# Patient Record
Sex: MALE | Race: WHITE | NOT HISPANIC OR LATINO | Employment: UNEMPLOYED | URBAN - METROPOLITAN AREA
[De-identification: names, ages, dates, MRNs, and addresses within clinical notes are randomized per-mention and may not be internally consistent; named-entity substitution may affect disease eponyms.]

---

## 2017-01-24 ENCOUNTER — HOSPITAL ENCOUNTER (OUTPATIENT)
Dept: SLEEP MEDICINE | Facility: HOSPITAL | Age: 37
Discharge: HOME | End: 2017-01-24
Attending: INTERNAL MEDICINE | Admitting: INTERNAL MEDICINE

## 2017-04-04 ENCOUNTER — HOSPITAL ENCOUNTER (OUTPATIENT)
Dept: LAB | Facility: HOSPITAL | Age: 37
Discharge: HOME | End: 2017-04-04
Attending: INTERNAL MEDICINE | Admitting: INTERNAL MEDICINE

## 2017-04-04 LAB
ALBUMIN SERPL-MCNC: 4 G/DL (ref 3.5–4.8)
ALBUMIN/GLOB SERPL: 1.4 {RATIO} (ref 1–1.7)
ALP SERPL-CCNC: 50 IU/L (ref 32–91)
ALT SERPL-CCNC: 31 IU/L (ref 17–63)
ANION GAP SERPL CALC-SCNC: 12.8 MMOL/L (ref 10–20)
AST SERPL-CCNC: 19 IU/L (ref 15–41)
BASOPHILS # BLD AUTO: 0 10*3/UL (ref 0–0.2)
BASOPHILS NFR BLD AUTO: 0 % (ref 0–2)
BILIRUB SERPL-MCNC: 0.7 MG/DL (ref 0.3–1.2)
BUN SERPL-MCNC: 10 MG/DL (ref 8–20)
BUN/CREAT SERPL: 11.1 (ref 6.2–20.3)
CALCIUM SERPL-MCNC: 9.2 MG/DL (ref 8.9–10.3)
CHLORIDE SERPL-SCNC: 102 MMOL/L (ref 101–111)
CONV CO2: 28 MMOL/L (ref 22–32)
CONV TOTAL PROTEIN: 6.9 G/DL (ref 6.1–7.9)
CREAT UR-MCNC: 0.9 MG/DL (ref 0.7–1.2)
DIFFERENTIAL METHOD BLD: (no result)
EOSINOPHIL # BLD AUTO: 0 % (ref 0–3)
EOSINOPHIL # BLD AUTO: 0 10*3/UL (ref 0–0.3)
ERYTHROCYTE [DISTWIDTH] IN BLOOD BY AUTOMATED COUNT: 14.9 % (ref 11.5–14.5)
GLOBULIN UR ELPH-MCNC: 2.9 G/DL (ref 2.5–3.8)
GLUCOSE SERPL-MCNC: 177 MG/DL (ref 65–99)
HCT VFR BLD AUTO: 42.8 % (ref 40–54)
HGB BLD-MCNC: 14.4 G/DL (ref 14–18)
LYMPHOCYTES # BLD AUTO: 1.9 10*3/UL (ref 0.8–4.8)
LYMPHOCYTES NFR BLD AUTO: 15 % (ref 18–42)
MCH RBC QN AUTO: 26.7 PG (ref 26–32)
MCHC RBC AUTO-ENTMCNC: 33.6 G/DL (ref 32–36)
MCV RBC AUTO: 79.4 FL (ref 80–94)
MONOCYTES # BLD AUTO: 0.8 10*3/UL (ref 0.1–1.3)
MONOCYTES NFR BLD AUTO: 6 % (ref 2–11)
NEUTROPHILS # BLD AUTO: 10.4 10*3/UL (ref 2.3–8.6)
NEUTROPHILS NFR BLD AUTO: 79 % (ref 50–75)
NRBC BLD AUTO-RTO: 0 /100{WBCS}
NRBC/RBC NFR BLD MANUAL: 0 10*3/UL
PLATELET # BLD AUTO: 256 10*3/UL (ref 150–450)
PMV BLD AUTO: 8.9 FL (ref 7.4–10.4)
POTASSIUM SERPL-SCNC: 3.8 MMOL/L (ref 3.6–5.1)
RBC # BLD AUTO: 5.39 10*6/UL (ref 4.6–6)
SODIUM SERPL-SCNC: 139 MMOL/L (ref 136–144)
WBC # BLD AUTO: 13.2 10*3/UL (ref 4.5–11.5)

## 2017-08-02 ENCOUNTER — CONVERSION ENCOUNTER (OUTPATIENT)
Dept: FAMILY MEDICINE CLINIC | Facility: CLINIC | Age: 37
End: 2017-08-02

## 2017-08-03 ENCOUNTER — HOSPITAL ENCOUNTER (OUTPATIENT)
Dept: PAIN MEDICINE | Facility: HOSPITAL | Age: 37
Discharge: HOME | End: 2017-08-03
Attending: PHYSICAL MEDICINE & REHABILITATION | Admitting: PHYSICAL MEDICINE & REHABILITATION

## 2017-08-03 LAB
ALBUMIN SERPL-MCNC: 4.9 G/DL (ref 3.6–5.1)
ALP SERPL-CCNC: 55 U/L (ref 40–115)
ALT SERPL-CCNC: 39 U/L (ref 9–46)
AMPHETAMINES UR QL SCN: NEGATIVE
AST SERPL-CCNC: 31 U/L (ref 10–40)
BASOPHILS # BLD AUTO: 64 CELLS/UL (ref 0–200)
BASOPHILS NFR BLD AUTO: 0.6 %
BILIRUB SERPL-MCNC: 0.7 MG/DL (ref 0.2–1.2)
BUN SERPL-MCNC: 12 MG/DL (ref 7–25)
BUN/CREAT SERPL: ABNORMAL (CALC) (ref 6–22)
BZE UR QL SCN: NEGATIVE
CALCIUM SERPL-MCNC: 9.8 MG/DL (ref 8.6–10.3)
CHLORIDE SERPL-SCNC: 102 MMOL/L (ref 98–110)
CHOLEST SERPL-MCNC: 173 MG/DL (ref 125–200)
CHOLEST/HDLC SERPL: 5.4 (CALC)
CONV CO2: 25 MMOL/L (ref 20–31)
CONV TOTAL PROTEIN: 7.9 G/DL (ref 6.1–8.1)
CREAT 24H UR-MCNC: NORMAL MG/DL
CREAT UR-MCNC: 1.01 MG/DL (ref 0.6–1.35)
EOSINOPHIL # BLD AUTO: 2 %
EOSINOPHIL # BLD AUTO: 214 CELLS/UL (ref 15–500)
ERYTHROCYTE [DISTWIDTH] IN BLOOD BY AUTOMATED COUNT: 14 % (ref 11–15)
GLOBULIN UR ELPH-MCNC: 3 MG/DL (ref 1.9–3.7)
GLUCOSE UR QL: 124 MG/DL (ref 65–99)
HCT VFR BLD AUTO: 45.5 % (ref 38.5–50)
HDLC SERPL-MCNC: 32 MG/DL
HGB BLD-MCNC: 15.7 G/DL (ref 13.2–17.1)
INSULIN SERPL-ACNC: 1.6 (CALC) (ref 1–2.5)
LDLC SERPL CALC-MCNC: 113 MG/DL
LYMPHOCYTES # BLD AUTO: 3392 CELLS/UL (ref 850–3900)
LYMPHOCYTES NFR BLD AUTO: 31.7 %
MCH RBC QN AUTO: 27.4 PG (ref 27–33)
MCHC RBC AUTO-ENTMCNC: 34.5 G/DL (ref 32–36)
MCV RBC AUTO: 79.4 FL (ref 80–100)
METHADONE UR QL SCN: NEGATIVE
MONOCYTES # BLD AUTO: 899 CELLS/UL (ref 200–950)
MONOCYTES NFR BLD AUTO: 8.4 %
NEUTROPHILS # BLD AUTO: 6131 CELLS/UL (ref 1500–7800)
NEUTROPHILS NFR BLD AUTO: 57.3 %
NONHDLC SERPL-MCNC: 141 MG/DL
OPIATE CONFIRMATION URINE: NORMAL
PLATELET # BLD AUTO: 318 10*3/UL (ref 140–400)
PMV BLD AUTO: 10.9 FL (ref 7.5–12.5)
POTASSIUM SERPL-SCNC: 4 MMOL/L (ref 3.5–5.3)
RBC # BLD AUTO: 5.73 MILLION/UL (ref 4.2–5.8)
SODIUM SERPL-SCNC: 138 MMOL/L (ref 135–146)
THC SERPLBLD CFM-MCNC: NEGATIVE NG/ML
TRIGL SERPL-MCNC: 140 MG/DL
TSH SERPL-ACNC: 1.86 MIU/L (ref 0.4–4.5)
WBC # BLD AUTO: 10.7 10*3/UL (ref 3.8–10.8)

## 2017-08-24 ENCOUNTER — HOSPITAL ENCOUNTER (OUTPATIENT)
Dept: PAIN MEDICINE | Facility: HOSPITAL | Age: 37
Discharge: HOME | End: 2017-08-24
Attending: PHYSICAL MEDICINE & REHABILITATION | Admitting: PHYSICAL MEDICINE & REHABILITATION

## 2017-09-07 ENCOUNTER — HOSPITAL ENCOUNTER (OUTPATIENT)
Dept: PAIN MEDICINE | Facility: HOSPITAL | Age: 37
Discharge: HOME | End: 2017-09-07
Attending: PHYSICAL MEDICINE & REHABILITATION | Admitting: PHYSICAL MEDICINE & REHABILITATION

## 2017-09-21 ENCOUNTER — HOSPITAL ENCOUNTER (OUTPATIENT)
Dept: PAIN MEDICINE | Facility: HOSPITAL | Age: 37
Discharge: HOME | End: 2017-09-21
Attending: PHYSICAL MEDICINE & REHABILITATION | Admitting: PHYSICAL MEDICINE & REHABILITATION

## 2018-02-12 ENCOUNTER — CONVERSION ENCOUNTER (OUTPATIENT)
Dept: FAMILY MEDICINE CLINIC | Facility: CLINIC | Age: 38
End: 2018-02-12

## 2018-02-13 LAB — URATE SERPL-MCNC: 8.3 MG/DL (ref 4–8)

## 2019-05-29 ENCOUNTER — CONVERSION ENCOUNTER (OUTPATIENT)
Dept: ORTHOPEDIC SURGERY | Facility: CLINIC | Age: 39
End: 2019-05-29

## 2019-06-04 VITALS
HEART RATE: 87 BPM | DIASTOLIC BLOOD PRESSURE: 89 MMHG | BODY MASS INDEX: 35.09 KG/M2 | SYSTOLIC BLOOD PRESSURE: 152 MMHG | WEIGHT: 303.25 LBS | HEIGHT: 78 IN

## 2019-06-06 NOTE — PROGRESS NOTES
Referring Provider:  Eric Rosenberg MD  Primary Provider:  Chet HUNTER MD    CC:  numbness and tingling.    History of Present Illness:   This is a 39 years old male who presents for follow-up visit.   previously seen by Dr. Perkins.    Numbness and tingling sensation of skin, Pt. been having problems in his hands and feet in various levels.    Pt. been experiencing memory loss, short term pt. becoming more forgetfull and losing things and not always the same to the next some days the symptoms are worse.   Pt. grandfather on his fathers side has dementia    . Pt. tosses and turns through the night averages 9 hours of sleep per night broken sleep. Pt. does have sleep apnea and currently on a bipap machine and see's Dr. Arroyo.      He has had heart and back pain, saw pt. cardiologist and referred you him to [facility] for pain and tightness in chest and back and lower back pain. work up suggested low risk of cv disease .     Chronic low back pain. no change       Past Medical History:     Reviewed history from 03/21/2017 and no changes required:        Guillain-[place] syndrome - 2015 ([facility])        lower back pain        Hypertension        Neuropathy        Sleep Apnea- out of [facility]- BiPAP        ADD/OCD        Hyperlipidemia        Obesity        sleep apnea    Past Surgical History:     Reviewed history from 01/06/2018 and no changes required:        ear tubes x2        Deviated septum        wisdom teeth        Hernia Repair        Epidural         Social History:     Reviewed history from 01/06/2018 and no changes required:        Marital Status Single        Children 0        Occupation unemployed        Patient is a former smoker.         Passive smoke exposure - yes        Alcohol Use - no        Drug Use - yes        HIV/High Risk - no        Regular Exercise - no                Smoking History:        Patient is a former smoker.        Risk Factors:     Smoked Tobacco Use:  Former smoker     Cigarettes:   Yes  Smokeless Tobacco Use:  Never  Passive smoke exposure:  no  Drug use:  yes     Substance:  marijuana  HIV high-risk behavior:  no  Caffeine use:  4+ drinks per day  Alcohol use:  no  Exercise:  no  Seatbelt use:  100 %  Sun Exposure:  rarely    Family History Risk Factors:     Family History of MI in females < 65 years old:  no     Family History of MI in males < 55 years old:  no    Previous Tobacco Use: Signed On 2018  Smoked Tobacco Use:  Former smoker     Cigarettes:  Yes  Smokeless Tobacco Use:  Never  Passive smoke exposure:  no  Drug use:  yes     Substance:  marijuana  HIV high-risk behavior:  no  Caffeine use:  4+ drinks per day    Previous Alcohol Use: Signed On 2018  Alcohol use:  no  Exercise:  no  Seatbelt use:  100 %  Sun Exposure:  rarely    Family History Risk Factors:     Family History of MI in females < 65 years old:  no     Family History of MI in males < 55 years old:  no      Vital Signs:    Patient Profile:    39 Years Old Male  Height:     79 inches  Weight:     303.25 pounds  BMI:        34.16     Pulse rate: 87 / minute  BP Sittin / 89  (right arm)    Cuff size:  large      Problems: Active problems were reviewed with the patient during this visit.  Medications: Medications were reviewed with the patient during this visit.  Allergies: Allergies were reviewed with the patient during this visit.        Vitals Entered By: Tamra Rizvi CMA (May 29, 2019 4:14 PM)    Active Medications (reviewed today):  LISINOPRIL 20 MG ORAL TABLET (LISINOPRIL) Take 1 tablet by mouth daily  NORVASC 5 MG ORAL TABLET (AMLODIPINE BESYLATE) QD  GABAPENTIN 600 MG ORAL TABLET (GABAPENTIN) Take one (1) tablet by mouth up to three times a day as needed  LIDOCAINE-PRILOCAINE (BULK) 2.5-2.5 % CREAM (LIDOCAINE-PRILOCAINE (BULK))   OXYBUTYNIN CHLORIDE ER 10 MG ORAL TABLET EXTENDED RELEASE 24 HOUR (OXYBUTYNIN CHLORIDE) Take 1 tablet by mouth daily  FLUOXETINE HCL 40 MG ORAL CAPSULE  (FLUOXETINE HCL) Take 1 tablet by mouth daily    Current Allergies (reviewed today):  PENICILLIN (Critical)  SULFA (Critical)    Review of Systems   Neurologic: Complains of Headache.   General: Complains of fatigue.   Eyes: Denies blurring.   Ears/Nose/Throat: Denies nasal congestion.   Cardiovascular: Complains of SOB on exertion.   Respiratory: Denies cough.   Gastrointestinal: Complains of heart burn.   Genitourinary: Complains of urinary frequency.   Musculoskeletal: Complains of back pain.   Skin: Denies rash.   Psychiatric: Complains of anxiety.   Endocrine: Denies cold intolerance.   Heme/Lymphatic: Denies abnormal bruising.   Allergic/Immunologic: Denies itchy eyes.         PHYSICAL EXAMINATION:   Ht: 79    Wt: 303.25    P: 87   BP: 152/89     GENERAL:  Well developed, well nourished and in no acute distress.  HIGHER MENTAL FUNCTION:  5/5 on orientation.  Awake and alert.  GAIT:  Normal.      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 07/03/2019      Problem # 2:  Numbness and tingling sensation of skin (ICD-782.0) (BIY21-B54.2)  Assessment: Deteriorated  Pt feels his symptoms wax and wane,    gabapentin helps with discomfort, but it effects his thinking  will repeat ncs and compare to study of two years ago  Orders:  Ofc Vst, Est Level IV (60465)    Future Orders:  EMG 2 Extremities (14560) ... 07/03/2019      Problem # 3:  Memory impairment (ICD-780.9) (JXO85-J19.3)  Assessment: Deteriorated  pt has complaint of memory problems  labs by dr metcalf in 2017 were all normal  Orders:  Ofc Vst, Est Level IV (21220)      Medications Added to Medication List This Visit:  1)  Metformin Hcl 500 Mg Oral Tablet (Metformin hcl) .... 1 po qd  2)  Protonix 40 Mg Oral Tablet Delayed Release  (Pantoprazole sodium) .... 1 po qd  3)  Alavert 10 Mg Oral Tablet Disintegrating (Loratadine) .... Qd  4)  Allopurinol 100 Mg Oral Tablet (Allopurinol) .... 1 po qd  5)  Oxybutynin Chloride Er 15 Mg Oral Tablet Extended Release 24 Hour (Oxybutynin chloride) .... 1 po qd  6)  Chlorthalidone 50 Mg Oral Tablet (Chlorthalidone) .... 1 po qd  7)  Norvasc 10mg Oral Tablet (amlodipine Besylate)  .... Qd  8)  Gabapentin 600 Mg Oral Tablet (Gabapentin) .... Take one (1) tablet by mouth up to three times a day as needed  9)  Fluoxetine Hcl 40 Mg Oral Capsule (Fluoxetine hcl) .... Take 2 tablet by mouth daily      Patient Instructions:  1)  Please schedule a follow-up appointment in 3-4 months.      ]    Mental Status Examination   What is the year, season, date, day, month? Score: 4  Name state, county, city, place (office or hospital), and location (floor) Score: 5  Name 3 objects, ask patient to name each immediately Score: 3  Spell the word WORLD backwards Score: 5  If patient learned 3 objects above, ask for recall now Score: 2  Show patient two simple objects and ask patient to name them Score: 2  Repeat the phrase 'No ifs, ands or buts'Score: 1  Take the paper in your right hand, fold it in half, and place it on the floor. Score: 3  Write 'Close Your Eyes' on a piece of paper, ask pt to read and follow instructions. Score: 1  Make up and write a sentence about anything. (Must have noun and verb) Score: 1  Draw two 5 sided figures that overlap one corner, and ask pt to copy Score: 1    Total Score: 28              Electronically signed by Joseph F Seipel MD on 05/30/2019 at 8:23 PM  ________________________________________________________________________       Disclaimer: Converted Note message may not contain all data elements that existed in the legacy source system. Please see [facility] System for the original note details.

## 2019-06-07 NOTE — PROCEDURES
[facility] 05/21/2019:      Imported By: Karina Jaeger 5/29/2019 4:15:56 PM    _____________________________________________________________________    External Attachment:      Type: Image      Comment:  External Document      Electronically signed by Joseph F Seipel MD on 05/29/2019 at 4:43 PM  ________________________________________________________________________       Disclaimer: Converted Note message may not contain all data elements that existed in the legacy source system. Please see [facility] System for the original note details.

## 2019-06-18 ENCOUNTER — TELEPHONE (OUTPATIENT)
Dept: FAMILY MEDICINE CLINIC | Facility: CLINIC | Age: 39
End: 2019-06-18

## 2019-06-18 DIAGNOSIS — E11.9 TYPE 2 DIABETES MELLITUS WITHOUT COMPLICATION, WITHOUT LONG-TERM CURRENT USE OF INSULIN (HCC): Primary | ICD-10-CM

## 2019-06-18 PROBLEM — M54.50 CHRONIC LOW BACK PAIN: Status: ACTIVE | Noted: 2017-03-21

## 2019-06-18 PROBLEM — M48.07 LUMBOSACRAL SPINAL STENOSIS: Status: ACTIVE | Noted: 2017-04-12

## 2019-06-18 PROBLEM — G89.29 CHRONIC LOW BACK PAIN: Status: ACTIVE | Noted: 2017-03-21

## 2019-06-18 PROBLEM — G65.0 SEQUELAE OF GUILLAIN-BARRE SYNDROME: Status: ACTIVE | Noted: 2017-03-21

## 2019-06-18 PROBLEM — Z83.3 FAMILY HISTORY OF DIABETES MELLITUS: Status: ACTIVE | Noted: 2019-06-18

## 2019-06-18 PROBLEM — M21.40 ACQUIRED FLEXIBLE FLAT FOOT: Status: ACTIVE | Noted: 2018-02-19

## 2019-06-18 PROBLEM — G47.30 SLEEP APNEA: Status: ACTIVE | Noted: 2017-03-21

## 2019-06-18 PROBLEM — R41.3 MEMORY IMPAIRMENT: Status: ACTIVE | Noted: 2019-05-29

## 2019-06-18 PROBLEM — M53.80 OTHER SPECIFIED DORSOPATHIES, SITE UNSPECIFIED: Status: ACTIVE | Noted: 2017-04-12

## 2019-06-18 RX ORDER — LANCETS 28 GAUGE
1 EACH MISCELLANEOUS DAILY
Qty: 30 EACH | Refills: 1 | Status: SHIPPED | OUTPATIENT
Start: 2019-06-18 | End: 2019-08-05 | Stop reason: SDUPTHER

## 2019-06-18 NOTE — TELEPHONE ENCOUNTER
Patient has an appointment at 2:45pm I got notified at 2 PM that patient did not want to keep appointment if we do not have his labs  Labs were drawn on 6/17/19 at [facility].  I have called [facility] and requested they fax and email them.  Spoke with Portia in [facility] about labs and due to large quantity of labs we have she is not sure we will have them by his nica. Time. Patient upset about how our office is running.

## 2019-06-20 ENCOUNTER — TELEPHONE (OUTPATIENT)
Dept: FAMILY MEDICINE CLINIC | Facility: CLINIC | Age: 39
End: 2019-06-20

## 2019-06-24 RX ORDER — FLUOXETINE HYDROCHLORIDE 40 MG/1
CAPSULE ORAL
Qty: 180 CAPSULE | Refills: 3 | Status: SHIPPED | OUTPATIENT
Start: 2019-06-24 | End: 2019-06-24 | Stop reason: SDUPTHER

## 2019-06-24 RX ORDER — FLUOXETINE HYDROCHLORIDE 40 MG/1
80 CAPSULE ORAL DAILY
Qty: 180 CAPSULE | Refills: 3 | Status: SHIPPED | OUTPATIENT
Start: 2019-06-24 | End: 2019-08-05 | Stop reason: SDUPTHER

## 2019-06-24 NOTE — TELEPHONE ENCOUNTER
Patient called and stated that he was needing to have a refill on his fluoxitine 40 mg . Please send it to [facility] in [place], Please call him if you have any questions at  [phone]

## 2019-07-03 ENCOUNTER — PROCEDURE VISIT (OUTPATIENT)
Dept: NEUROLOGY | Facility: CLINIC | Age: 39
End: 2019-07-03

## 2019-07-03 DIAGNOSIS — G62.9 POLYNEUROPATHY: Primary | ICD-10-CM

## 2019-07-03 DIAGNOSIS — G56.01 CARPAL TUNNEL SYNDROME OF RIGHT WRIST: ICD-10-CM

## 2019-07-03 PROCEDURE — 95910 NRV CNDJ TEST 7-8 STUDIES: CPT | Performed by: PSYCHIATRY & NEUROLOGY

## 2019-07-03 PROCEDURE — 95885 MUSC TST DONE W/NERV TST LIM: CPT | Performed by: PSYCHIATRY & NEUROLOGY

## 2019-07-03 NOTE — PROGRESS NOTES
EMG and Nerve Conduction Studies of RUE and RLE    The complete report includes the data sheets.     Referring Doctor: Joseph Seipel, MD     History: This patient had acute inflammatory polyneuropathy syndrome several years ago with near complete resolution of the acute neuropathy.  He does have diabetes which has been somewhat poorly controlled with a recent globin A1c of 9.  He complains of intermittent numbness in his feet as well as his hands.  Are you a diabetic? yesHepatitis?noHIV?noPacemaker?noDefibrillator?noBlood thinner?noAre you allergic to latex or tape?no    Results:    1.  The median sensory and motor responses are prolonged consistent with moderate right median neuropathy at the wrist.    2.  The right ulnar sensory and motor conduction studies were normal showing no evidence of focal ulnar neuropathy or generalized or sensory neuropathy affecting the upper extremity.    3.  The right sural sensory right peroneal and right tibial motor conduction studies were normal showing no signs of the significant neuropathy affecting the large diameter fibers.    4.  EMG of the muscles examined in the right upper and right lower extremities were normal showing no signs of acute or chronic neurogenic change.    Impression:    This is an abnormal study.  There is evidence of moderate right median neuropathy at the wrist.  There is no evidence of significant motor or sensory neuropathy affecting large diameter fibers and lower extremities.  This however does not rule out the possibility of a small fiber neuropathy associated with his diabetes causing numbness and pain sensation in the feet.    Joseph Seipel, M.D.

## 2019-08-01 ENCOUNTER — TELEPHONE (OUTPATIENT)
Dept: FAMILY MEDICINE CLINIC | Facility: CLINIC | Age: 39
End: 2019-08-01

## 2019-08-05 DIAGNOSIS — E11.9 TYPE 2 DIABETES MELLITUS WITHOUT COMPLICATION, WITHOUT LONG-TERM CURRENT USE OF INSULIN (HCC): ICD-10-CM

## 2019-08-05 DIAGNOSIS — F42.2 MIXED OBSESSIONAL THOUGHTS AND ACTS: Primary | ICD-10-CM

## 2019-08-05 RX ORDER — LANCETS 28 GAUGE
1 EACH MISCELLANEOUS DAILY
Qty: 30 EACH | Refills: 12 | Status: SHIPPED | OUTPATIENT
Start: 2019-08-05 | End: 2019-10-10 | Stop reason: SDUPTHER

## 2019-08-05 RX ORDER — FLUOXETINE HYDROCHLORIDE 40 MG/1
80 CAPSULE ORAL DAILY
Qty: 180 CAPSULE | Refills: 3 | Status: SHIPPED | OUTPATIENT
Start: 2019-08-05 | End: 2020-03-31 | Stop reason: SDUPTHER

## 2019-09-03 ENCOUNTER — OFFICE VISIT (OUTPATIENT)
Dept: FAMILY MEDICINE CLINIC | Facility: CLINIC | Age: 39
End: 2019-09-03

## 2019-09-03 VITALS
WEIGHT: 303 LBS | SYSTOLIC BLOOD PRESSURE: 132 MMHG | HEART RATE: 95 BPM | TEMPERATURE: 97.7 F | RESPIRATION RATE: 18 BRPM | OXYGEN SATURATION: 95 % | HEIGHT: 75 IN | DIASTOLIC BLOOD PRESSURE: 88 MMHG | BODY MASS INDEX: 37.67 KG/M2

## 2019-09-03 DIAGNOSIS — E11.9 TYPE 2 DIABETES MELLITUS WITHOUT COMPLICATION, WITHOUT LONG-TERM CURRENT USE OF INSULIN (HCC): Primary | ICD-10-CM

## 2019-09-03 DIAGNOSIS — M48.07 LUMBOSACRAL SPINAL STENOSIS: ICD-10-CM

## 2019-09-03 DIAGNOSIS — B88.0 CHIGGER BITES: ICD-10-CM

## 2019-09-03 DIAGNOSIS — E78.9 DISORDER OF LIPID METABOLISM: ICD-10-CM

## 2019-09-03 DIAGNOSIS — R21 RASH: ICD-10-CM

## 2019-09-03 DIAGNOSIS — J30.2 OTHER SEASONAL ALLERGIC RHINITIS: ICD-10-CM
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
## 2019-09-03 DIAGNOSIS — I10 HYPERTENSION, BENIGN: ICD-10-CM

## 2019-09-03 PROBLEM — F98.8 ATTENTION DEFICIT DISORDER: Status: ACTIVE | Noted: 2019-09-03

## 2019-09-03 PROBLEM — F42.2 MIXED OBSESSIONAL THOUGHTS AND ACTS: Status: ACTIVE | Noted: 2019-09-03

## 2019-09-03 LAB
BILIRUB BLD-MCNC: NEGATIVE MG/DL
CLARITY, POC: CLEAR
COLOR UR: YELLOW
GLUCOSE BLDC GLUCOMTR-MCNC: 321 MG/DL (ref 70–130)
GLUCOSE UR STRIP-MCNC: ABNORMAL MG/DL
HBA1C MFR BLD: 9.6 %
KETONES UR QL: NEGATIVE
LEUKOCYTE EST, POC: NEGATIVE
NITRITE UR-MCNC: NEGATIVE MG/ML
PH UR: 5 [PH] (ref 5–8)
POC MICROALBUMIN URINE: 100
PROT UR STRIP-MCNC: ABNORMAL MG/DL
RBC # UR STRIP: NEGATIVE /UL
SP GR UR: 1 (ref 1–1.03)
UROBILINOGEN UR QL: NORMAL

## 2019-09-03 PROCEDURE — 82962 GLUCOSE BLOOD TEST: CPT | Performed by: FAMILY MEDICINE

## 2019-09-03 PROCEDURE — 83036 HEMOGLOBIN GLYCOSYLATED A1C: CPT | Performed by: FAMILY MEDICINE

## 2019-09-03 PROCEDURE — 99214 OFFICE O/P EST MOD 30 MIN: CPT | Performed by: FAMILY MEDICINE

## 2019-09-03 PROCEDURE — 82044 UR ALBUMIN SEMIQUANTITATIVE: CPT | Performed by: FAMILY MEDICINE

## 2019-09-03 RX ORDER — PYRAZINAMIDE 500 MG/1
1 TABLET ORAL EVERY 6 HOURS PRN
Qty: 30 TABLET | Refills: 0 | Status: SHIPPED | OUTPATIENT
Start: 2019-09-03 | End: 2019-11-20 | Stop reason: SDUPTHER

## 2019-09-03 RX ORDER — ECHINACEA PURPUREA EXTRACT 125 MG
2 TABLET ORAL AS NEEDED
Qty: 88 ML | Refills: 12 | Status: SHIPPED | OUTPATIENT
Start: 2019-09-03 | End: 2019-11-20 | Stop reason: SDUPTHER

## 2019-09-03 RX ORDER — LORATADINE 10 MG/1
10 TABLET ORAL DAILY
Refills: 12 | COMMUNITY
Start: 2019-06-24 | End: 2020-03-12 | Stop reason: SDUPTHER

## 2019-09-03 RX ORDER — TRIAMCINOLONE ACETONIDE 1 MG/G
CREAM TOPICAL 3 TIMES DAILY
Qty: 80 G | Refills: 0 | Status: SHIPPED | OUTPATIENT
Start: 2019-09-03 | End: 2020-01-09 | Stop reason: SDUPTHER

## 2019-09-03 RX ORDER — MULTIVITAMIN
1 CAPSULE ORAL DAILY
Qty: 30 CAPSULE | Refills: 11 | Status: SHIPPED | OUTPATIENT
Start: 2019-09-03 | End: 2020-09-02

## 2019-09-03 RX ORDER — ERYTHROMYCIN 5 MG/G
OINTMENT OPHTHALMIC
Refills: 3 | COMMUNITY
Start: 2019-07-17 | End: 2022-02-10 | Stop reason: SDUPTHER

## 2019-09-03 NOTE — PROGRESS NOTES
Subjective   Barrett Laguerre is a 39 y.o. male.     Went to urgent care due to rash last week, was prescribed triamcinolone .1%, reports rash is improving but needs refill of steroid cream   Neurologist appt on 9/10/19    Requesting referral to dietitian and podiatrist.   Pt requests refill on T#3 which he takes rarely for back pain.   Pt requests rx for OTC nasal saline and multivitamin      Diabetes   He presents for his follow-up diabetic visit. He has type 2 diabetes mellitus. His disease course has been stable. Hypoglycemia symptoms include headaches. Associated symptoms include fatigue, polydipsia, polyuria and weakness. Pertinent negatives for diabetes include no chest pain. Risk factors for coronary artery disease include male sex, hypertension, obesity, diabetes mellitus and dyslipidemia. Current diabetic treatment includes oral agent (monotherapy). He is compliant with treatment most of the time. His weight is stable. He is following a generally unhealthy diet. When asked about meal planning, he reported none. He has not had a previous visit with a dietitian. He rarely participates in exercise. Home blood sugar record trend: does not check. An ACE inhibitor/angiotensin II receptor blocker is not being taken. He does not see a podiatrist.Eye exam is current.   Rash   The current episode started 1 to 4 weeks ago (10 days ago). The affected locations include the left ankle, left foot, right ankle, right foot, left arm and right arm. The rash is characterized by redness and itchiness. Associated symptoms include fatigue. Pertinent negatives include no diarrhea, fever, shortness of breath or vomiting. Treatments tried: triamcinolon .1% The treatment provided moderate relief.     Hemoglobin A1C   Date Value Ref Range Status   09/03/2019 9.6 % Final   03/14/2019 9.3 (H) <5.7 Final   07/21/2017 6.6 4.6 - 7.1 % Final            The following portions of the patient's history were reviewed and updated as  appropriate: allergies, current medications, past family history, past medical history, past social history, past surgical history and problem list.    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  12   • amLODIPine (NORVASC) 10 MG tablet Take 1 tablet by mouth Daily.     • cetirizine (zyrTEC) 10 MG tablet Take 1 tablet by mouth Daily for 30 days. 30 tablet 0   • chlorthalidone (HYGROTEN) 50 MG tablet Take 1 tablet by mouth Daily.  3   • erythromycin (ROMYCIN) 5 MG/GM ophthalmic ointment APPLY OINTMENT TO BOTH EYES AT BEDTIME FOR 1 WEEK THEN AS NEEDED  3   • FLONASE 50 MCG/ACT nasal spray 1 spray into the nostril(s) as directed by provider 2 (Two) Times a Day.  0   • FLUoxetine (PROzac) 40 MG capsule Take 2 capsules by mouth Daily for 90 days. 180 capsule 3   • gabapentin (NEURONTIN) 600 MG tablet Take 1 tablet by mouth 3 (Three) Times a Day.     • glucose blood test strip Daily as needed 30 each 12   • Lancets (FREESTYLE) lancets 1 each by Other route Daily. 30 each 12   • metFORMIN (GLUCOPHAGE) 500 MG tablet Take 1 tablet by mouth 3 (Three) Times a Day. 90 tablet 3   • oxybutynin XL (DITROPAN XL) 15 MG 24 hr tablet Take 1 tablet by mouth Daily.  12   • pantoprazole (PROTONIX) 40 MG EC tablet Take 1 tablet by mouth Daily.     • loratadine (CLARITIN) 10 MG tablet Take 10 mg by mouth Daily.  12   •  [DISCONTINUED] triamcinolone (KENALOG) 0.1 % cream Apply  topically to the appropriate area as directed 3 (Three) Times a Day. 80 g 0     No current facility-administered medications on file prior to visit.        Allergies   Allergen Reactions   • Penicillin G Anaphylaxis   • Sulfa Antibiotics Hives       Review of Systems   Constitutional: Positive for fatigue. Negative for activity change, appetite change and fever.   HENT: Negative for ear pain, swollen glands and voice change.    Eyes: Negative for visual disturbance.   Respiratory: Negative for shortness of breath and wheezing.    Cardiovascular: Negative for chest pain and leg swelling.   Gastrointestinal: Negative for abdominal pain, blood in stool, constipation, diarrhea, nausea and vomiting.   Endocrine: Positive for polydipsia and polyuria.   Genitourinary: Negative for dysuria, frequency and hematuria.   Musculoskeletal: Negative for joint swelling, neck pain and neck stiffness.   Skin: Positive for rash. Negative for bruise.   Neurological: Positive for weakness. Negative for numbness and headache.   Psychiatric/Behavioral: Negative for suicidal ideas and depressed mood.       Objective   Physical Exam   Constitutional: He is oriented to person, place, and time. He appears well-developed and well-nourished.   Eyes: Conjunctivae and EOM are normal. Pupils are equal, round, and reactive to light.   Neck: Normal range of motion. Neck supple.   Cardiovascular: Normal rate, regular rhythm and normal heart sounds.   Pulmonary/Chest: Effort normal and breath sounds normal.   Abdominal: Soft. Bowel sounds are normal.   Musculoskeletal: Normal range of motion.    Barrett had a diabetic foot exam performed today.   During the foot exam he had a monofilament test performed (normal).  Neurological: He is alert and oriented to person, place, and time.   Skin: Skin is warm and dry.   Psychiatric: He has a normal mood and affect. His behavior is normal. Judgment and  thought content normal.         Assessment/Plan .  Problem List Items Addressed This Visit     Lumbosacral spinal stenosis    Relevant Medications    acetaminophen-codeine (TYLENOL/CODEINE #3) 300-30 MG per tablet    Diabetes (CMS/HCC) - Primary    Relevant Medications    sitaGLIPtin-metFORMIN (JANUMET)  MG per tablet    Multiple Vitamin (MULTIVITAMIN) capsule    Other Relevant Orders    POC Glycosylated Hemoglobin (Hb A1C) (Completed)    POC Glucose (Completed)    POC Microalbumin (Completed)    POC Urinalysis Dipstick, Multipro (Completed)    Comprehensive Metabolic Panel    TSH    Lipid Panel With / Chol / HDL Ratio    Ambulatory Referral to Nutrition Services    Ambulatory Referral to Podiatry    Other seasonal allergic rhinitis    Overview     Over-the-counter medication indications, dosage, and precautions discussed.         Relevant Medications    sodium chloride (OCEAN NASAL SPRAY) 0.65 % nasal spray    Hypertension, benign    Overview     Followed by Cardiology Dr Tong who placed pt on different BP med. .    Proteinuria/creatinine noted         Relevant Medications    Multiple Vitamin (MULTIVITAMIN) capsule    Other Relevant Orders    CBC Auto Differential    Comprehensive Metabolic Panel    Disorder of lipid metabolism    Relevant Medications    Multiple Vitamin (MULTIVITAMIN) capsule      Other Visit Diagnoses     Chigger bites        Rash        Relevant Medications    triamcinolone (KENALOG) 0.1 % cream    Insect bite, initial encounter        Relevant Medications    triamcinolone (KENALOG) 0.1 % cream      Findings discussed. All questions answered.  Medication and medication adverse effects discussed.  Drug education given and explained to patient. Patient verbalized understanding.  Follow up in 3 months for recheck, sooner for concerns.

## 2019-09-10 ENCOUNTER — OFFICE VISIT (OUTPATIENT)
Dept: NEUROLOGY | Facility: CLINIC | Age: 39
End: 2019-09-10

## 2019-09-10 VITALS
HEART RATE: 82 BPM | WEIGHT: 302.4 LBS | BODY MASS INDEX: 36.83 KG/M2 | DIASTOLIC BLOOD PRESSURE: 86 MMHG | HEIGHT: 76 IN | SYSTOLIC BLOOD PRESSURE: 130 MMHG

## 2019-09-10 DIAGNOSIS — G65.0 SEQUELAE OF GUILLAIN-BARRE SYNDROME (HCC): ICD-10-CM

## 2019-09-10 DIAGNOSIS — G47.33 OBSTRUCTIVE SLEEP APNEA SYNDROME: ICD-10-CM

## 2019-09-10 DIAGNOSIS — R41.3 MEMORY LOSS: Primary | ICD-10-CM

## 2019-09-10 PROBLEM — G61.81 CIDP (CHRONIC INFLAMMATORY DEMYELINATING POLYNEUROPATHY): Status: ACTIVE | Noted: 2019-09-10

## 2019-09-10 PROCEDURE — 99214 OFFICE O/P EST MOD 30 MIN: CPT | Performed by: PSYCHIATRY & NEUROLOGY

## 2019-09-10 NOTE — PROGRESS NOTES
Subjective:     Patient ID: Barrett Laguerre is a 39 y.o. male.     Numbness, Guillain-Jasper syndrome, Memory impairment    4 month f/u for numbness and tingling sensation of skin. Patent is currently taking gabapentin helps with discomfort, but it effects his thinking. Some worse than last time seen other wise unchanged.     Sequelae of Guillain-Jasper syndrome 2015. treated with ivig,   f/u from EMG. emg showed cts, no sig neuropathy.   Patient still having nerve issues with tempeture change in hands and feet.   He has diabetes. Recent BS was 300 and hg a1c 9    Memory impairment, short term pt. becoming more forgetfull and losing things and not always the same to the next some days the symptoms are worse.   Patient had a neuro psych done at a vocational [facility]hab patient will obtain a report.     Patient having more issues with fatigue, patient has a CPAP machine and averages 8-9 hours of sleep,  see's Dr. Arroyo for this issue. Labs was done from PCP.       The following portions of the patient's history were reviewed and updated as appropriate: allergies, current medications, past family history, past medical history, past social history, past surgical history and problem list.      Current Outpatient Medications:   •  acetaminophen-codeine (TYLENOL/CODEINE #3) 300-30 MG per tablet, Take 1 tablet by mouth Every 6 (Six) Hours As Needed for Moderate Pain ., Disp: 30 tablet, Rfl: 0  •  allopurinol (ZYLOPRIM) 100 MG tablet, Take 100 mg by mouth Daily., Disp: , Rfl: 12  •  amLODIPine (NORVASC) 10 MG tablet, Take 1 tablet by mouth Daily., Disp: , Rfl:   •  chlorthalidone (HYGROTEN) 50 MG tablet, Take 1 tablet by mouth Daily., Disp: , Rfl: 3  •  erythromycin (ROMYCIN) 5 MG/GM ophthalmic ointment, APPLY OINTMENT TO BOTH EYES AT BEDTIME FOR 1 WEEK THEN AS NEEDED, Disp: , Rfl: 3  •  FLONASE 50 MCG/ACT nasal spray, 1 spray into the nostril(s) as directed by provider 2 (Two) Times a Day., Disp: , Rfl: 0  •  FLUoxetine (PROzac)  40 MG capsule, Take 2 capsules by mouth Daily for 90 days., Disp: 180 capsule, Rfl: 3  •  gabapentin (NEURONTIN) 600 MG tablet, Take 1 tablet by mouth 3 (Three) Times a Day., Disp: , Rfl:   •  glucose blood test strip, Daily as needed, Disp: 30 each, Rfl: 12  •  Lancets (FREESTYLE) lancets, 1 each by Other route Daily., Disp: 30 each, Rfl: 12  •  loratadine (CLARITIN) 10 MG tablet, Take 10 mg by mouth Daily., Disp: , Rfl: 12  •  Multiple Vitamin (MULTIVITAMIN) capsule, Take 1 capsule by mouth Daily., Disp: 30 capsule, Rfl: 11  •  oxybutynin XL (DITROPAN XL) 15 MG 24 hr tablet, Take 1 tablet by mouth Daily., Disp: , Rfl: 12  •  pantoprazole (PROTONIX) 40 MG EC tablet, Take 1 tablet by mouth Daily., Disp: , Rfl:   •  sitaGLIPtin-metFORMIN (JANUMET)  MG per tablet, Take 1 tablet by mouth 2 (Two) Times a Day With Meals., Disp: 60 tablet, Rfl: 5  •  sodium chloride (OCEAN NASAL SPRAY) 0.65 % nasal spray, 2 sprays into the nostril(s) as directed by provider As Needed for Congestion., Disp: 88 mL, Rfl: 12  •  triamcinolone (KENALOG) 0.1 % cream, Apply  topically to the appropriate area as directed 3 (Three) Times a Day., Disp: 80 g, Rfl: 0    Review of Systems   Constitutional: Positive for fatigue. Negative for appetite change.   HENT: Negative for sinus pressure and sinus pain.    Eyes: Negative for pain and itching.   Respiratory: Negative for cough and shortness of breath.    Cardiovascular: Negative for chest pain and palpitations.   Gastrointestinal: Negative for constipation and diarrhea.   Endocrine: Negative for cold intolerance and heat intolerance.   Genitourinary: Positive for frequency. Negative for difficulty urinating.   Musculoskeletal: Negative for back pain and gait problem.   Allergic/Immunologic: Negative for environmental allergies.   Neurological: Positive for numbness. Negative for dizziness, tremors, seizures, syncope, facial asymmetry, speech difficulty, weakness, light-headedness and  "headaches.   Psychiatric/Behavioral: Negative for agitation and confusion.          I have reviewed ROS completed by medical assistant.     Objective:        Physical Exam   Constitutional: He is oriented to person, place, and time. He appears well-developed and well-nourished.   Neurological: He is alert and oriented to person, place, and time.   DTR 2+ at knees and 1+ ankle   Psychiatric: He has a normal mood and affect.   Vitals reviewed.      Assessment/Plan:    Barrett was seen today for sequelae of guillain-barre syndrome, numbness and tingling sensation of skin and memory impairment.    Diagnoses and all orders for this visit:    Obstructive sleep apnea syndrome    Sequelae of Guillain-[place] syndrome (CMS/HCC)      No significant residual from the GBS, but at risk for diabetic neuropathy, encouraged to control blood sugar  CTS no treatment needed at this time  Poor memory, noted that the vit d was 15 two years ago and he has not taken vit d so this may be contributing to  The memory complaints, will check labs  ANDREE pt reports he is using the CPAP on regular basis      This document has been electronically signed by Joseph Seipel, MD on September 10, 2019 4:28 PM    Maximum   Score Patient's   Score Questions   5 5 \"What is the year?Season?Date?Day of the week?Month?\"   5 5 \"Where are we now: State?County?Town/city?Hospital?Floor?\"   3 3 3 Unrelated objects Number of trials:___   5 5 Count backward from 100 by sevens or spell WORLD backwards   3 3 Name 3 things from above   2 2 Identify 2 objects   1 1 Repeat the phrase: No ifs, ands,or buts.   3 3 Take paper in right hand, fold it in half, and put it on the floor.   1 1 Please read this and do what it says. \"Close your eyes\"   1 1 Make up and write a sentence about anything. Noun and verb   1 1 Copy this picture 10 angles must be present.   30 30 Total MMSE         "

## 2019-09-16 DIAGNOSIS — J30.2 OTHER SEASONAL ALLERGIC RHINITIS: Primary | ICD-10-CM

## 2019-10-10 DIAGNOSIS — E11.9 TYPE 2 DIABETES MELLITUS WITHOUT COMPLICATION, WITHOUT LONG-TERM CURRENT USE OF INSULIN (HCC): ICD-10-CM

## 2019-10-10 RX ORDER — LANCETS 28 GAUGE
1 EACH MISCELLANEOUS DAILY
Qty: 30 EACH | Refills: 12 | Status: SHIPPED | OUTPATIENT
Start: 2019-10-10 | End: 2020-09-05 | Stop reason: SDUPTHER

## 2019-11-18 ENCOUNTER — OFFICE VISIT (OUTPATIENT)
Dept: FAMILY MEDICINE CLINIC | Facility: CLINIC | Age: 39
End: 2019-11-18

## 2019-11-18 VITALS
SYSTOLIC BLOOD PRESSURE: 134 MMHG | TEMPERATURE: 97.9 F | DIASTOLIC BLOOD PRESSURE: 82 MMHG | BODY MASS INDEX: 36.65 KG/M2 | WEIGHT: 301 LBS | RESPIRATION RATE: 18 BRPM | HEART RATE: 82 BPM | OXYGEN SATURATION: 96 % | HEIGHT: 76 IN

## 2019-11-18 DIAGNOSIS — I10 HYPERTENSION, BENIGN: Primary | ICD-10-CM

## 2019-11-18 DIAGNOSIS — R41.3 MEMORY IMPAIRMENT: ICD-10-CM

## 2019-11-18 DIAGNOSIS — G61.81 CIDP (CHRONIC INFLAMMATORY DEMYELINATING POLYNEUROPATHY) (HCC): ICD-10-CM

## 2019-11-18 DIAGNOSIS — E78.9 DISORDER OF LIPID METABOLISM: ICD-10-CM

## 2019-11-18 DIAGNOSIS — R35.0 URINARY FREQUENCY: ICD-10-CM

## 2019-11-18 DIAGNOSIS — G65.0 SEQUELAE OF GUILLAIN-BARRE SYNDROME (HCC): ICD-10-CM

## 2019-11-18 DIAGNOSIS — E11.49 TYPE 2 DIABETES MELLITUS WITH OTHER NEUROLOGIC COMPLICATION, WITHOUT LONG-TERM CURRENT USE OF INSULIN (HCC): ICD-10-CM

## 2019-11-18 DIAGNOSIS — M10.9 GOUT, UNSPECIFIED CAUSE, UNSPECIFIED CHRONICITY, UNSPECIFIED SITE: ICD-10-CM

## 2019-11-18 DIAGNOSIS — Z00.00 PHYSICAL EXAM: ICD-10-CM

## 2019-11-18 LAB
BILIRUB BLD-MCNC: NEGATIVE MG/DL
CLARITY, POC: CLEAR
COLOR UR: YELLOW
GLUCOSE UR STRIP-MCNC: NEGATIVE MG/DL
KETONES UR QL: NEGATIVE
LEUKOCYTE EST, POC: NEGATIVE
NITRITE UR-MCNC: NEGATIVE MG/ML
PH UR: 5 [PH] (ref 5–8)
PROT UR STRIP-MCNC: NEGATIVE MG/DL
RBC # UR STRIP: NEGATIVE /UL
SP GR UR: 1.01 (ref 1–1.03)
UROBILINOGEN UR QL: NORMAL

## 2019-11-18 PROCEDURE — 99395 PREV VISIT EST AGE 18-39: CPT | Performed by: FAMILY MEDICINE

## 2019-11-18 PROCEDURE — 81003 URINALYSIS AUTO W/O SCOPE: CPT | Performed by: FAMILY MEDICINE

## 2019-11-18 RX ORDER — DIAPER,BRIEF,INFANT-TODD,DISP
EACH MISCELLANEOUS
Refills: 2 | COMMUNITY
Start: 2019-11-04 | End: 2020-03-05 | Stop reason: SDUPTHER

## 2019-11-18 NOTE — PROGRESS NOTES
Subjective   Barrett Laguerre is a 39 y.o. male.     The patient is here: for coordination of medical care and annual wellness examination.  Patient's last comprehensive physical was on 11/16/18.  Patient's previous physician was Eric Rosenberg MD.  Patient has: minimal activity with work/home activities, good appetite, feels well with minor complaints, decreased energy level and is sleeping well.  Patient's last tetanus shot was unknown.     Requesting an order for Folate, Vit B12 & Vit D. Dr.Seipel order them, Barrett lost the order.     Also requesting an order for water aerobics at the [facility].       Diabetes   He presents for his follow-up diabetic visit. He has type 2 diabetes mellitus. His disease course has been stable. Hypoglycemia symptoms include headaches. Associated symptoms include polydipsia, polyuria and weakness. Pertinent negatives for diabetes include no chest pain and no fatigue. Risk factors for coronary artery disease include male sex, hypertension, obesity, diabetes mellitus and dyslipidemia. Current diabetic treatment includes oral agent (monotherapy). He is compliant with treatment most of the time. His weight is stable. He is following a generally unhealthy diet. When asked about meal planning, he reported none. He has not had a previous visit with a dietitian. He rarely participates in exercise. Home blood sugar record trend: does not check. An ACE inhibitor/angiotensin II receptor blocker is not being taken. He does not see a podiatrist.Eye exam is current.   Hypertension   The current episode started more than 1 year ago. Associated symptoms include headaches. Pertinent negatives include no chest pain, neck pain or shortness of breath. Current antihypertension treatment includes calcium channel blockers.      Hemoglobin A1C   Date Value Ref Range Status   09/03/2019 9.6 % Final   03/14/2019 9.3 (H) <5.7 Final   07/21/2017 6.6 4.6 - 7.1 % Final           The following portions of the patient's  history were reviewed and updated as appropriate: allergies, current medications, past family history, past medical history, past social history, past surgical history and problem list.    Patient Active Problem List   Diagnosis   • Family history of diabetes mellitus   • Gout   • Lumbosacral spinal stenosis   • Memory impairment   • Acquired flexible flat foot   • Chronic low back pain   • Other specified dorsopathies, site unspecified   • Sequelae of Guillain-[place] syndrome (CMS/HCC)   • Sleep apnea   • Attention deficit disorder   • Diabetes (CMS/[facility])   • Other seasonal allergic rhinitis   • Mixed obsessional thoughts and acts   • Hypertension, benign   • Disorder of lipid metabolism   • CIDP (chronic inflammatory demyelinating polyneuropathy) (CMS/[facility])       Current Outpatient Medications on File Prior to Visit   Medication Sig Dispense Refill   • acetaminophen-codeine (TYLENOL/CODEINE #3) 300-30 MG per tablet Take 1 tablet by mouth Every 6 (Six) Hours As Needed for Moderate Pain . 30 tablet 0   • allopurinol (ZYLOPRIM) 100 MG tablet Take 100 mg by mouth Daily.  12   • amLODIPine (NORVASC) 10 MG tablet Take 1 tablet by mouth Daily.     • chlorthalidone (HYGROTEN) 50 MG tablet Take 1 tablet by mouth Daily.  3   • erythromycin (ROMYCIN) 5 MG/GM ophthalmic ointment APPLY OINTMENT TO BOTH EYES AT BEDTIME FOR 1 WEEK THEN AS NEEDED  3   • FLONASE 50 MCG/ACT nasal spray 1 spray into the nostril(s) as directed by provider 2 (Two) Times a Day. 1 bottle 12   • gabapentin (NEURONTIN) 600 MG tablet Take 1 tablet by mouth 3 (Three) Times a Day.     • glucose blood test strip Daily as needed 30 each 12   • Lancets (FREESTYLE) lancets 1 each by Other route Daily. 30 each 12   • loratadine (CLARITIN) 10 MG tablet Take 10 mg by mouth Daily.  12   • Multiple Vitamin (MULTIVITAMIN) capsule Take 1 capsule by mouth Daily. 30 capsule 11   • oxybutynin XL (DITROPAN XL) 15 MG 24 hr tablet Take 1 tablet by mouth Daily.  12   •  pantoprazole (PROTONIX) 40 MG EC tablet Take 1 tablet by mouth Daily.     • sitaGLIPtin-metFORMIN (JANUMET)  MG per tablet Take 1 tablet by mouth 2 (Two) Times a Day With Meals. 60 tablet 5   • sodium chloride (OCEAN NASAL SPRAY) 0.65 % nasal spray 2 sprays into the nostril(s) as directed by provider As Needed for Congestion. 88 mL 12   • triamcinolone (KENALOG) 0.1 % cream Apply  topically to the appropriate area as directed 3 (Three) Times a Day. 80 g 0   • FLUoxetine (PROzac) 40 MG capsule Take 2 capsules by mouth Daily for 90 days. 180 capsule 3     No current facility-administered medications on file prior to visit.        Allergies   Allergen Reactions   • Penicillin G Anaphylaxis   • Sulfa Antibiotics Hives       Review of Systems   Constitutional: Negative for activity change, appetite change, fatigue and fever.   HENT: Negative for ear pain, swollen glands and voice change.    Eyes: Negative for visual disturbance.   Respiratory: Negative for shortness of breath and wheezing.    Cardiovascular: Negative for chest pain and leg swelling.   Gastrointestinal: Negative for abdominal pain, blood in stool, constipation, diarrhea, nausea and vomiting.   Endocrine: Positive for polydipsia and polyuria.   Genitourinary: Negative for dysuria, frequency and hematuria.   Musculoskeletal: Negative for joint swelling, neck pain and neck stiffness.   Skin: Negative for rash and bruise.   Neurological: Positive for weakness. Negative for numbness and headache.   Psychiatric/Behavioral: Negative for suicidal ideas and depressed mood.       Objective   Vitals:    11/18/19 1126   BP: 134/82   Pulse: 82   Resp: 18   Temp: 97.9 °F (36.6 °C)   SpO2: 96%     Physical Exam   Constitutional: He is oriented to person, place, and time. He appears well-developed and well-nourished.   Eyes: Conjunctivae and EOM are normal. Pupils are equal, round, and reactive to light.   Neck: Normal range of motion. Neck supple.    Cardiovascular: Normal rate, regular rhythm and normal heart sounds.   Pulmonary/Chest: Effort normal and breath sounds normal.   Abdominal: Soft. Bowel sounds are normal.   Musculoskeletal: Normal range of motion.   Neurological: He is alert and oriented to person, place, and time.   Skin: Skin is warm and dry.   Psychiatric: He has a normal mood and affect. His behavior is normal. Judgment and thought content normal.         Assessment/Plan .  Problem List Items Addressed This Visit     Gout    Relevant Orders    Uric Acid    Memory impairment    Relevant Orders    CBC Auto Differential    Folate    Vitamin D 25 hydroxy    Vitamin B12    Sequelae of Guillain-[place] syndrome (CMS/HCC)    Diabetes (CMS/[facility])    Relevant Orders    CBC Auto Differential    Hypertension, benign - Primary    Overview     Followed by Cardiology Dr Tong who placed pt on different BP med. .    Proteinuria/creatinine noted         Relevant Orders    POC Urinalysis Dipstick, Automated (Completed)    Disorder of lipid metabolism    Relevant Orders    Comprehensive Metabolic Panel    Lipid Panel With / Chol / HDL Ratio    CIDP (chronic inflammatory demyelinating polyneuropathy) (CMS/[facility])    Overview     Story: s/p IVIG Guillain-[place] per [facility] diagnosed 2015 gabapentin 600 tid. Impression: Symptoms include: numbness and tingling in hands and feet, difficulty concentrating, drops items, chest pain. dizziness. Pt needs routine follow up with neurology. Previously seen by Dr Seipel         Relevant Orders    CBC Auto Differential      Other Visit Diagnoses     Physical exam        Relevant Orders    POC Urinalysis Dipstick, Automated (Completed)    CBC Auto Differential    Comprehensive Metabolic Panel    Lipid Panel With / Chol / HDL Ratio    TSH    Sedimentation Rate    Hemoglobin A1c    Urinary frequency        Relevant Orders    PSA Screen      Findings discussed. All questions answered.  Follow-up for routine health maintenance as  directed  Discussed anticipatory guidance, diet, exercise, and weight loss.  Discussed safety and routine screening examinations.  Discussed self-examinations.  Follow-up after testing complete, sooner for worsening symptoms or any concerns

## 2019-11-19 ENCOUNTER — TELEPHONE (OUTPATIENT)
Dept: FAMILY MEDICINE CLINIC | Facility: CLINIC | Age: 39
End: 2019-11-19

## 2019-11-19 LAB
ALBUMIN SERPL-MCNC: 5 G/DL (ref 3.5–5.5)
ALBUMIN/GLOB SERPL: 1.8 {RATIO} (ref 1.2–2.2)
ALP SERPL-CCNC: 54 IU/L (ref 39–117)
ALT SERPL-CCNC: 51 IU/L (ref 0–44)
AST SERPL-CCNC: 39 IU/L (ref 0–40)
BASOPHILS # BLD AUTO: 0.1 X10E3/UL (ref 0–0.2)
BASOPHILS NFR BLD AUTO: 0 %
BILIRUB SERPL-MCNC: 0.4 MG/DL (ref 0–1.2)
BUN SERPL-MCNC: 23 MG/DL (ref 6–20)
BUN/CREAT SERPL: 21 (ref 9–20)
CALCIUM SERPL-MCNC: 10.4 MG/DL (ref 8.7–10.2)
CHLORIDE SERPL-SCNC: 97 MMOL/L (ref 96–106)
CHOLEST SERPL-MCNC: 141 MG/DL (ref 100–199)
CHOLEST/HDLC SERPL: 4.3 RATIO (ref 0–5)
CO2 SERPL-SCNC: 27 MMOL/L (ref 20–29)
CREAT SERPL-MCNC: 1.11 MG/DL (ref 0.76–1.27)
EOSINOPHIL # BLD AUTO: 0.3 X10E3/UL (ref 0–0.4)
EOSINOPHIL NFR BLD AUTO: 2 %
ERYTHROCYTE [DISTWIDTH] IN BLOOD BY AUTOMATED COUNT: 14.8 % (ref 12.3–15.4)
ERYTHROCYTE [SEDIMENTATION RATE] IN BLOOD BY WESTERGREN METHOD: 7 MM/HR (ref 0–15)
FOLATE SERPL-MCNC: >20 NG/ML
GLOBULIN SER CALC-MCNC: 2.8 G/DL (ref 1.5–4.5)
GLUCOSE SERPL-MCNC: 117 MG/DL (ref 65–99)
HBA1C MFR BLD: 7.5 % (ref 4.8–5.6)
HCT VFR BLD AUTO: 40.9 % (ref 37.5–51)
HDLC SERPL-MCNC: 33 MG/DL
HGB BLD-MCNC: 13.9 G/DL (ref 13–17.7)
IMM GRANULOCYTES # BLD AUTO: 0 X10E3/UL (ref 0–0.1)
IMM GRANULOCYTES NFR BLD AUTO: 0 %
LDLC SERPL CALC-MCNC: 78 MG/DL (ref 0–99)
LYMPHOCYTES # BLD AUTO: 3.5 X10E3/UL (ref 0.7–3.1)
LYMPHOCYTES NFR BLD AUTO: 25 %
MCH RBC QN AUTO: 27.2 PG (ref 26.6–33)
MCHC RBC AUTO-ENTMCNC: 34 G/DL (ref 31.5–35.7)
MCV RBC AUTO: 80 FL (ref 79–97)
MONOCYTES # BLD AUTO: 1.2 X10E3/UL (ref 0.1–0.9)
MONOCYTES NFR BLD AUTO: 8 %
NEUTROPHILS # BLD AUTO: 9.2 X10E3/UL (ref 1.4–7)
NEUTROPHILS NFR BLD AUTO: 65 %
PLATELET # BLD AUTO: 345 X10E3/UL (ref 150–450)
POTASSIUM SERPL-SCNC: 4.2 MMOL/L (ref 3.5–5.2)
PROT SERPL-MCNC: 7.8 G/DL (ref 6–8.5)
PSA SERPL-MCNC: 0.5 NG/ML (ref 0–4)
RBC # BLD AUTO: 5.11 X10E6/UL (ref 4.14–5.8)
SODIUM SERPL-SCNC: 139 MMOL/L (ref 134–144)
TRIGL SERPL-MCNC: 151 MG/DL (ref 0–149)
TSH SERPL DL<=0.005 MIU/L-ACNC: 3.19 UIU/ML (ref 0.45–4.5)
URATE SERPL-MCNC: 8.6 MG/DL (ref 3.7–8.6)
VLDLC SERPL CALC-MCNC: 30 MG/DL (ref 5–40)
WBC # BLD AUTO: 14.2 X10E3/UL (ref 3.4–10.8)

## 2019-11-19 NOTE — TELEPHONE ENCOUNTER
----- Message from Eric Rosenberg MD sent at 11/19/2019 10:22 AM EST -----  Please notify patient that labs are stable/looked ok. Sugar some better

## 2019-11-20 DIAGNOSIS — J30.2 OTHER SEASONAL ALLERGIC RHINITIS: ICD-10-CM

## 2019-11-20 DIAGNOSIS — M48.07 LUMBOSACRAL SPINAL STENOSIS: ICD-10-CM

## 2019-11-21 DIAGNOSIS — G61.81 CIDP (CHRONIC INFLAMMATORY DEMYELINATING POLYNEUROPATHY) (HCC): ICD-10-CM

## 2019-11-21 DIAGNOSIS — M48.07 LUMBOSACRAL SPINAL STENOSIS: Primary | ICD-10-CM

## 2019-11-21 RX ORDER — ECHINACEA PURPUREA EXTRACT 125 MG
2 TABLET ORAL AS NEEDED
Qty: 88 ML | Refills: 12 | Status: SHIPPED | OUTPATIENT
Start: 2019-11-21 | End: 2020-11-26 | Stop reason: SDUPTHER

## 2019-11-21 RX ORDER — PYRAZINAMIDE 500 MG/1
1 TABLET ORAL EVERY 6 HOURS PRN
Qty: 30 TABLET | Refills: 0 | Status: SHIPPED | OUTPATIENT
Start: 2019-11-21 | End: 2020-03-05 | Stop reason: SDUPTHER

## 2019-11-21 RX ORDER — GABAPENTIN 600 MG/1
600 TABLET ORAL 3 TIMES DAILY
Qty: 90 TABLET | Refills: 1 | Status: SHIPPED | OUTPATIENT
Start: 2019-11-21 | End: 2020-02-06

## 2019-11-23 ENCOUNTER — RESULTS ENCOUNTER (OUTPATIENT)
Dept: FAMILY MEDICINE CLINIC | Facility: CLINIC | Age: 39
End: 2019-11-23

## 2019-11-23 DIAGNOSIS — R41.3 MEMORY IMPAIRMENT: ICD-10-CM

## 2019-12-09 RX ORDER — OXYBUTYNIN CHLORIDE 15 MG/1
TABLET, EXTENDED RELEASE ORAL
Qty: 30 TABLET | Refills: 12 | Status: SHIPPED | OUTPATIENT
Start: 2019-12-09 | End: 2020-11-26 | Stop reason: SDUPTHER
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
## 2020-01-09 RX ORDER — TRIAMCINOLONE ACETONIDE 1 MG/G
CREAM TOPICAL 3 TIMES DAILY
Qty: 80 G | Refills: 0 | Status: SHIPPED | OUTPATIENT
Start: 2020-01-09 | End: 2020-09-05 | Stop reason: SDUPTHER

## 2020-01-10 RX ORDER — TRIAMCINOLONE ACETONIDE 1 MG/G
CREAM TOPICAL
Qty: 80 G | Refills: 0 | OUTPATIENT
Start: 2020-01-10

## 2020-01-29 DIAGNOSIS — E11.9 TYPE 2 DIABETES MELLITUS WITHOUT COMPLICATION, WITHOUT LONG-TERM CURRENT USE OF INSULIN (HCC): ICD-10-CM

## 2020-01-30 RX ORDER — SITAGLIPTIN AND METFORMIN HYDROCHLORIDE 1000; 50 MG/1; MG/1
TABLET, FILM COATED ORAL
Qty: 60 TABLET | Refills: 3 | Status: SHIPPED | OUTPATIENT
Start: 2020-01-30 | End: 2020-05-22 | Stop reason: SDUPTHER

## 2020-02-01 DIAGNOSIS — M48.07 LUMBOSACRAL SPINAL STENOSIS: ICD-10-CM

## 2020-02-03 DIAGNOSIS — M48.07 LUMBOSACRAL SPINAL STENOSIS: ICD-10-CM

## 2020-02-03 RX ORDER — PYRAZINAMIDE 500 MG/1
1 TABLET ORAL EVERY 6 HOURS PRN
Qty: 30 TABLET | Refills: 0 | OUTPATIENT
Start: 2020-02-03

## 2020-02-03 RX ORDER — ACETAMINOPHEN AND CODEINE PHOSPHATE 300; 30 MG/1; MG/1
TABLET ORAL
Qty: 30 TABLET | Refills: 0 | OUTPATIENT
Start: 2020-02-03

## 2020-02-05 DIAGNOSIS — M48.07 LUMBOSACRAL SPINAL STENOSIS: ICD-10-CM

## 2020-02-05 DIAGNOSIS — G61.81 CIDP (CHRONIC INFLAMMATORY DEMYELINATING POLYNEUROPATHY) (HCC): ICD-10-CM

## 2020-02-06 RX ORDER — GABAPENTIN 600 MG/1
TABLET ORAL
Qty: 90 TABLET | Refills: 0 | Status: SHIPPED | OUTPATIENT
Start: 2020-02-06 | End: 2020-03-03

## 2020-02-06 RX ORDER — ACETAMINOPHEN AND CODEINE PHOSPHATE 300; 30 MG/1; MG/1
TABLET ORAL
Qty: 30 TABLET | Refills: 0 | OUTPATIENT
Start: 2020-02-06

## 2020-03-03 ENCOUNTER — TELEPHONE (OUTPATIENT)
Dept: FAMILY MEDICINE CLINIC | Facility: CLINIC | Age: 40
End: 2020-03-03

## 2020-03-03 DIAGNOSIS — G61.81 CIDP (CHRONIC INFLAMMATORY DEMYELINATING POLYNEUROPATHY) (HCC): ICD-10-CM

## 2020-03-03 RX ORDER — GABAPENTIN 600 MG/1
TABLET ORAL
Qty: 90 TABLET | Refills: 0 | Status: SHIPPED | OUTPATIENT
Start: 2020-03-03 | End: 2020-03-05 | Stop reason: DRUGHIGH

## 2020-03-03 NOTE — TELEPHONE ENCOUNTER
----- Message from Barrett Laguerre sent at 3/2/2020  6:48 PM EST -----  Regarding: Prescription Question  Contact: [phone]  I called the office to set up an appointment regarding issues I am having in Gabapentin, needing a new preapproval for my pain cream, skin issue, and foot doctor referral. I don't see how all of these issues will be covered in 15 mins allotted.

## 2020-03-05 ENCOUNTER — OFFICE VISIT (OUTPATIENT)
Dept: FAMILY MEDICINE CLINIC | Facility: CLINIC | Age: 40
End: 2020-03-05

## 2020-03-05 VITALS
BODY MASS INDEX: 36.53 KG/M2 | TEMPERATURE: 98.4 F | SYSTOLIC BLOOD PRESSURE: 132 MMHG | HEART RATE: 96 BPM | RESPIRATION RATE: 18 BRPM | WEIGHT: 300 LBS | DIASTOLIC BLOOD PRESSURE: 88 MMHG | HEIGHT: 76 IN | OXYGEN SATURATION: 97 %

## 2020-03-05 DIAGNOSIS — G65.0 SEQUELAE OF GUILLAIN-BARRE SYNDROME (HCC): ICD-10-CM

## 2020-03-05 DIAGNOSIS — M21.40 ACQUIRED FLEXIBLE FLAT FOOT, UNSPECIFIED LATERALITY: ICD-10-CM

## 2020-03-05 DIAGNOSIS — J30.2 OTHER SEASONAL ALLERGIC RHINITIS: ICD-10-CM

## 2020-03-05 DIAGNOSIS — G61.81 CIDP (CHRONIC INFLAMMATORY DEMYELINATING POLYNEUROPATHY) (HCC): Primary | ICD-10-CM

## 2020-03-05 DIAGNOSIS — M48.07 LUMBOSACRAL SPINAL STENOSIS: ICD-10-CM

## 2020-03-05 DIAGNOSIS — E11.9 TYPE 2 DIABETES MELLITUS WITHOUT COMPLICATION, WITHOUT LONG-TERM CURRENT USE OF INSULIN (HCC): ICD-10-CM

## 2020-03-05 DIAGNOSIS — L30.8 OTHER ECZEMA: ICD-10-CM

## 2020-03-05 PROCEDURE — 99214 OFFICE O/P EST MOD 30 MIN: CPT | Performed by: FAMILY MEDICINE

## 2020-03-05 RX ORDER — PYRAZINAMIDE 500 MG/1
1 TABLET ORAL EVERY 6 HOURS PRN
Qty: 30 TABLET | Refills: 0 | Status: SHIPPED | OUTPATIENT
Start: 2020-03-05 | End: 2020-04-08 | Stop reason: SDUPTHER

## 2020-03-05 RX ORDER — FLUOXETINE HYDROCHLORIDE 40 MG/1
2 CAPSULE ORAL DAILY
COMMUNITY
Start: 2020-03-01 | End: 2020-11-09

## 2020-03-05 RX ORDER — GABAPENTIN 300 MG/1
300 CAPSULE ORAL NIGHTLY
Qty: 30 CAPSULE | Refills: 1 | Status: SHIPPED | OUTPATIENT
Start: 2020-03-05 | End: 2020-03-31 | Stop reason: ALTCHOICE

## 2020-03-05 RX ORDER — DIAPER,BRIEF,INFANT-TODD,DISP
EACH MISCELLANEOUS 2 TIMES DAILY
Qty: 60 G | Refills: 0 | Status: SHIPPED | OUTPATIENT
Start: 2020-03-05 | End: 2020-11-26 | Stop reason: SDUPTHER

## 2020-03-05 NOTE — PROGRESS NOTES
Subjective   Barrett Laguerre is a 40 y.o. male.     Concerned Gabapentin may be causing  sxdizziness, drowsiness, fatigued, memory loss, back pain, diarrhea, blurred vision, dry mouth, nausea, runny nose, excessive sweats,difficutly concentrating and nose bleeds. Pt looked online and many of his sx are consistent with med side effects. He is only taking 1 pill daily    Requesting refill and pre approval on compound pain cream. [facility] # [phone] selection opt 2.     Rash   The current episode started 1 to 4 weeks ago. The affected locations include the left hand and right hand. The rash is characterized by itchiness, redness and dryness. He was exposed to chemicals (acetic acid, alcohol ethoxylate, butylcavibital, citrus oil, sodium hydroxide). Pertinent negatives include no diarrhea, fatigue, fever, shortness of breath or vomiting.        The following portions of the patient's history were reviewed and updated as appropriate: allergies, current medications, past family history, past medical history, past social history, past surgical history and problem list.    Patient Active Problem List   Diagnosis   • Family history of diabetes mellitus   • Gout   • Lumbosacral spinal stenosis   • Memory impairment   • Acquired flexible flat foot   • Chronic low back pain   • Other specified dorsopathies, site unspecified   • Sequelae of Guillain-[place] syndrome (CMS/HCC)   • Sleep apnea   • Attention deficit disorder   • Diabetes (CMS/HCC)   • Other seasonal allergic rhinitis   • Mixed obsessional thoughts and acts   • Hypertension, benign   • Disorder of lipid metabolism   • CIDP (chronic inflammatory demyelinating polyneuropathy) (CMS/HCC)       Current Outpatient Medications on File Prior to Visit   Medication Sig Dispense Refill   • allopurinol (ZYLOPRIM) 100 MG tablet Take 100 mg by mouth Daily.  12   • amLODIPine (NORVASC) 10 MG tablet Take 1 tablet by mouth Daily.     • chlorthalidone (HYGROTEN)  50 MG tablet Take 1 tablet by mouth Daily.  3   • FLONASE 50 MCG/ACT nasal spray 1 spray into the nostril(s) as directed by provider 2 (Two) Times a Day. 1 bottle 12   • FLUoxetine (PROzac) 40 MG capsule Take 2 capsules by mouth Daily.     • glucose blood test strip Daily as needed 30 each 12   • JANUMET  MG per tablet TAKE 1 TABLET BY MOUTH TWICE DAILY WITH MEALS 60 tablet 3   • Lancets (FREESTYLE) lancets 1 each by Other route Daily. 30 each 12   • loratadine (CLARITIN) 10 MG tablet Take 10 mg by mouth Daily.  12   • Multiple Vitamin (MULTIVITAMIN) capsule Take 1 capsule by mouth Daily. 30 capsule 11   • oxybutynin XL (DITROPAN XL) 15 MG 24 hr tablet TAKE 1 TABLET BY MOUTH ONCE DAILY 30 tablet 12   • pantoprazole (PROTONIX) 40 MG EC tablet Take 1 tablet by mouth Daily.     • sodium chloride (OCEAN NASAL SPRAY) 0.65 % nasal spray 2 sprays into the nostril(s) as directed by provider As Needed for Congestion. 88 mL 12   • triamcinolone (KENALOG) 0.1 % cream Apply  topically to the appropriate area as directed 3 (Three) Times a Day. 80 g 0   • [DISCONTINUED] acetaminophen-codeine (TYLENOL/CODEINE #3) 300-30 MG per tablet Take 1 tablet by mouth Every 6 (Six) Hours As Needed for Moderate Pain . 30 tablet 0   • [DISCONTINUED] gabapentin (NEURONTIN) 600 MG tablet TAKE 1 TABLET BY MOUTH THREE TIMES DAILY 90 tablet 0   • [DISCONTINUED] hydrocortisone 1 % cream APPLY CREAM TOPICALLY TO AFFECTED AREA ONCE DAILY  2   • erythromycin (ROMYCIN) 5 MG/GM ophthalmic ointment APPLY OINTMENT TO BOTH EYES AT BEDTIME FOR 1 WEEK THEN AS NEEDED  3   • FLUoxetine (PROzac) 40 MG capsule Take 2 capsules by mouth Daily for 90 days. 180 capsule 3     No current facility-administered medications on file prior to visit.      Current outpatient and discharge medications have been reconciled for the patient.  Reviewed by: Eric Rosenberg MD      Allergies   Allergen Reactions   • Penicillin G Anaphylaxis   • Sulfa Antibiotics Hives        Review of Systems   Constitutional: Negative for activity change, appetite change, fatigue and fever.   HENT: Negative for ear pain, swollen glands and voice change.    Eyes: Negative for visual disturbance.   Respiratory: Negative for shortness of breath and wheezing.    Cardiovascular: Negative for chest pain and leg swelling.   Gastrointestinal: Negative for abdominal pain, blood in stool, constipation, diarrhea, nausea and vomiting.   Endocrine: Negative for polydipsia and polyuria.   Genitourinary: Negative for dysuria, frequency and hematuria.   Musculoskeletal: Negative for joint swelling, neck pain and neck stiffness.   Skin: Positive for rash. Negative for bruise.   Neurological: Negative for weakness, numbness and headache.   Psychiatric/Behavioral: Negative for suicidal ideas and depressed mood.     I have reviewed and confirmed the accuracy of the ROS as documented by the MA/LPN/RN Eric Rosenberg MD      Objective   Vitals:    03/05/20 1342   BP: 132/88   Pulse: 96   Resp: 18   Temp: 98.4 °F (36.9 °C)   SpO2: 97%     Physical Exam   Constitutional: He is oriented to person, place, and time. He appears well-developed and well-nourished.   Eyes: Pupils are equal, round, and reactive to light. Conjunctivae and EOM are normal.   Neck: Normal range of motion. Neck supple.   Cardiovascular: Normal rate, regular rhythm and normal heart sounds.   Pulmonary/Chest: Effort normal and breath sounds normal.   Abdominal: Soft. Bowel sounds are normal.   Musculoskeletal: Normal range of motion.   Neurological: He is alert and oriented to person, place, and time.   Skin: Skin is warm and dry.   Psychiatric: He has a normal mood and affect. His behavior is normal. Judgment and thought content normal.         Assessment/Plan .  Problem List Items Addressed This Visit     Lumbosacral spinal stenosis    Relevant Medications    acetaminophen-codeine (TYLENOL/CODEINE #3) 300-30 MG per tablet    gabapentin  (NEURONTIN) 300 MG capsule    Other Relevant Orders    Ambulatory Referral to Pain Management    Acquired flexible flat foot    Relevant Orders    Ambulatory Referral to Podiatry    Sequelae of Guillain-[place] syndrome (CMS/[facility])    Relevant Medications    gabapentin (NEURONTIN) 300 MG capsule    Diabetes (CMS/[facility])    Relevant Orders    Ambulatory Referral to Podiatry    CIDP (chronic inflammatory demyelinating polyneuropathy) (CMS/[facility]) - Primary    Overview     Story: s/p IVIG Guillain-[place] per [facility] diagnosed 2015 gabapentin 600 tid. Impression: Symptoms include: numbness and tingling in hands and feet, difficulty concentrating, drops items, chest pain. dizziness. Pt needs routine follow up with neurology. Previously seen by Dr Seipel         Relevant Medications    gabapentin (NEURONTIN) 300 MG capsule      Other Visit Diagnoses     Other eczema        Relevant Medications    hydrocortisone 1 % cream        Try decreasing gabapentin to 300 at hs.  Podiatry referral for foot care given DM  Needs new referral for pain management for consideration of epidurtals.  T#3 refilled.   Medication and medication adverse effects discussed.  Drug education given and explained to patient. Patient verbalized understanding.  Follow-up for routine health maintenance as directed

## 2020-03-06 RX ORDER — FLUTICASONE PROPIONATE 50 MCG
SPRAY, SUSPENSION (ML) NASAL
Qty: 16 G | Refills: 0 | Status: SHIPPED | OUTPATIENT
Start: 2020-03-06 | End: 2020-10-06 | Stop reason: SDUPTHER

## 2020-03-09 ENCOUNTER — OFFICE VISIT (OUTPATIENT)
Dept: PODIATRY | Facility: CLINIC | Age: 40
End: 2020-03-09

## 2020-03-09 VITALS
SYSTOLIC BLOOD PRESSURE: 142 MMHG | HEIGHT: 75 IN | HEART RATE: 76 BPM | BODY MASS INDEX: 37.23 KG/M2 | DIASTOLIC BLOOD PRESSURE: 97 MMHG | WEIGHT: 299.4 LBS

## 2020-03-09 DIAGNOSIS — L60.3 ONYCHODYSTROPHY: Primary | ICD-10-CM

## 2020-03-09 DIAGNOSIS — M21.42 ACQUIRED BILATERAL FLAT FEET: ICD-10-CM

## 2020-03-09 DIAGNOSIS — M21.41 ACQUIRED BILATERAL FLAT FEET: ICD-10-CM

## 2020-03-09 DIAGNOSIS — E11.42 DM TYPE 2 WITH DIABETIC PERIPHERAL NEUROPATHY (HCC): ICD-10-CM

## 2020-03-09 PROCEDURE — 11721 DEBRIDE NAIL 6 OR MORE: CPT | Performed by: PODIATRIST

## 2020-03-09 PROCEDURE — 99213 OFFICE O/P EST LOW 20 MIN: CPT | Performed by: PODIATRIST

## 2020-03-09 NOTE — PROGRESS NOTES
03/09/2020  Foot and Ankle Surgery - Established Patient/Follow-up  Provider: Dr. Anatoliy Rehman DPM  Location: [facility]    Subjective:  Barrett Laguerre is a 40 y.o. male.     Chief Complaint   Patient presents with   • Right Foot - Follow-up   • Left Foot - Follow-up       HPI: Patient returns for issues involving his left foot.  He states that he was trying to trim his nails and noticed some bleeding.  He has not had any additional issues at this time but would like to get his feet checked given his diabetes and history of Guillain-[place].  He denies any other issues.  He has not had any open wounds or infections to his feet.  His most recent A1c was 7.5%    Allergies   Allergen Reactions   • Penicillin G Anaphylaxis   • Sulfa Antibiotics Hives       Current Outpatient Medications on File Prior to Visit   Medication Sig Dispense Refill   • acetaminophen-codeine (TYLENOL/CODEINE #3) 300-30 MG per tablet Take 1 tablet by mouth Every 6 (Six) Hours As Needed for Moderate Pain . 30 tablet 0   • allopurinol (ZYLOPRIM) 100 MG tablet Take 100 mg by mouth Daily.  12   • amLODIPine (NORVASC) 10 MG tablet Take 1 tablet by mouth Daily.     • chlorthalidone (HYGROTEN) 50 MG tablet Take 1 tablet by mouth Daily.  3   • erythromycin (ROMYCIN) 5 MG/GM ophthalmic ointment APPLY OINTMENT TO BOTH EYES AT BEDTIME FOR 1 WEEK THEN AS NEEDED  3   • FLUoxetine (PROzac) 40 MG capsule Take 2 capsules by mouth Daily for 90 days. 180 capsule 3   • FLUoxetine (PROzac) 40 MG capsule Take 2 capsules by mouth Daily.     • fluticasone (FLONASE) 50 MCG/ACT nasal spray Use 1 spray(s) in each nostril twice daily 16 g 0   • gabapentin (NEURONTIN) 300 MG capsule Take 1 capsule by mouth Every Night. 30 capsule 1   • glucose blood test strip Daily as needed 30 each 12   • hydrocortisone 1 % cream Apply  topically to the appropriate area as directed 2 (Two) Times a Day. 60 g 0   • JANUMET  MG per tablet TAKE 1 TABLET BY MOUTH TWICE  "DAILY WITH MEALS 60 tablet 3   • Lancets (FREESTYLE) lancets 1 each by Other route Daily. 30 each 12   • loratadine (CLARITIN) 10 MG tablet Take 10 mg by mouth Daily.  12   • Multiple Vitamin (MULTIVITAMIN) capsule Take 1 capsule by mouth Daily. 30 capsule 11   • oxybutynin XL (DITROPAN XL) 15 MG 24 hr tablet TAKE 1 TABLET BY MOUTH ONCE DAILY 30 tablet 12   • pantoprazole (PROTONIX) 40 MG EC tablet Take 1 tablet by mouth Daily.     • sodium chloride (OCEAN NASAL SPRAY) 0.65 % nasal spray 2 sprays into the nostril(s) as directed by provider As Needed for Congestion. 88 mL 12   • triamcinolone (KENALOG) 0.1 % cream Apply  topically to the appropriate area as directed 3 (Three) Times a Day. 80 g 0     No current facility-administered medications on file prior to visit.        Objective   /97   Pulse 76   Ht 189.9 cm (74.75\")   Wt 136 kg (299 lb 6.4 oz)   BMI 37.67 kg/m²     Podiatry Exam       General Appearance:   obese; no apparent distress  Mental Status Exam        Judgement and Insight:  Intact       Orientation:  Oriented to time, place, and person       Memory:  Intact for recent and remote events       Mood and Affect:  No depression, anxiety, or agitation  Cardiovascular (Bilateral)       Dorsalis Pedis Pulse (BL):  2/4       Posterior Tibialis Pulse (BL):  2/4       Capillary Filling Time (BL):  1-3 Seconds       Edema (BL):  No Edema  Dermatological Exam       Temperature:  warm to warm       Skin Elasticity:  normal skin elasticity  Nails: Elongated and thickened x10.  No signs of infection.  Neurological Exam (Bilateral)       Paresthesia (Bl):  negative       Patella DTRs (Bl):  symmetric       Achilles DTRs (Bl):  symmetric       Tinel over Tarsal Tunnel (Bl):  negative  Monofilament Test (Bl):   Diminished       Diabetic Risk (Bl):  No loss of protective sensation  Additional Neurological Findings   sensation is somewhat diminished with light touch and monofilament " testing        MusculoSkeletal Exam (Bilateral)       Gait and Stance (Bl):   abducted and pronated gait, right.  Early heel off.  Nonantalgic       ROM (Bl):   ankle and pedal joint range of motion is supple, nontender crepitus free       Muscle Strength (Bl):  symmetrical 5/5       Subluxed Digits (Bl):  no subluxation or laxity of joints       Dislocated Joints (Bl):  no dislocation of joints       Gastroc soleus equinus (Bl):  Inability to dorsiflex past neutral position both straight legged and bent knee       Post tibial tendon (Bl):  no soreness noted       Peroneal Tendon (Bl):  no soreness noted  Additional Musculoskelatal Findings   excessive pronation  With flatfoot deformity , right greater than left.  No significant tenderness with palpation.    Assessment/Plan   Barrett was seen today for follow-up and follow-up.    Diagnoses and all orders for this visit:    Onychodystrophy    DM type 2 with diabetic peripheral neuropathy (CMS/HCC)    Acquired bilateral flat feet      Patient presents after recent issue trimming his nails.  He noticed bleeding.  Today, there is no open wounds or signs of infection.  Patient does have loss of protective sensation secondary.  No other concerning features are present.  I did review the importance of diabetic foot care and glycemic control with him.  I do feel that he is at mild to moderate risk for pedal complications.Explained importance of diabetic foot care, daily foot checks, and glycemic control. Patient should check both feet on a daily basis, monitor and control blood sugars, make sure that both feet and in between toes are towel dried after baths or showers. Avoid barefoot walking at all times. Check shoes before putting them on.   Patient was given information on proper foot care. Call the office at the first signs of a wound or with signs of infection.  Patient is to return in 6 months for routine foot check.    Nail debridement: Both feet x10    Nails were  debrided with a nail nipper without complication.  No anesthesia was required.  Indications for procedure were thickened, dystrophic nails which are difficult to trim.  Proper self-care and technique was discussed with patient.  Patient was stable after procedure.    No orders of the defined types were placed in this encounter.         Note is dictated utilizing voice recognition software. Unfortunately this leads to occasional typographical errors. I apologize in advance if the situation occurs. If questions occur please do not hesitate to call our office.

## 2020-03-09 NOTE — PATIENT INSTRUCTIONS
Diabetes Mellitus and Foot Care  Foot care is an important part of your health, especially when you have diabetes. Diabetes may cause you to have problems because of poor blood flow (circulation) to your feet and legs, which can cause your skin to:  · Become thinner and drier.  · Break more easily.  · Heal more slowly.  · Peel and crack.  You may also have nerve damage (neuropathy) in your legs and feet, causing decreased feeling in them. This means that you may not notice minor injuries to your feet that could lead to more serious problems. Noticing and addressing any potential problems early is the best way to prevent future foot problems.  How to care for your feet  Foot hygiene  · Wash your feet daily with warm water and mild soap. Do not use hot water. Then, pat your feet and the areas between your toes until they are completely dry. Do not soak your feet as this can dry your skin.  · Trim your toenails straight across. Do not dig under them or around the cuticle. File the edges of your nails with an emery board or nail file.  · Apply a moisturizing lotion or petroleum jelly to the skin on your feet and to dry, brittle toenails. Use lotion that does not contain alcohol and is unscented. Do not apply lotion between your toes.  Shoes and socks  · Wear clean socks or stockings every day. Make sure they are not too tight. Do not wear knee-high stockings since they may decrease blood flow to your legs.  · Wear shoes that fit properly and have enough cushioning. Always look in your shoes before you put them on to be sure there are no objects inside.  · To break in new shoes, wear them for just a few hours a day. This prevents injuries on your feet.  Wounds, scrapes, corns, and calluses  · Check your feet daily for blisters, cuts, bruises, sores, and redness. If you cannot see the bottom of your feet, use a mirror or ask someone for help.  · Do not cut corns or calluses or try to remove them with medicine.  · If you  find a minor scrape, cut, or break in the skin on your feet, keep it and the skin around it clean and dry. You may clean these areas with mild soap and water. Do not clean the area with peroxide, alcohol, or iodine.  · If you have a wound, scrape, corn, or callus on your foot, look at it several times a day to make sure it is healing and not infected. Check for:  ? Redness, swelling, or pain.  ? Fluid or blood.  ? Warmth.  ? Pus or a bad smell.  General instructions  · Do not cross your legs. This may decrease blood flow to your feet.  · Do not use heating pads or hot water bottles on your feet. They may burn your skin. If you have lost feeling in your feet or legs, you may not know this is happening until it is too late.  · Protect your feet from hot and cold by wearing shoes, such as at the beach or on hot pavement.  · Schedule a complete foot exam at least once a year (annually) or more often if you have foot problems. If you have foot problems, report any cuts, sores, or bruises to your health care provider immediately.  Contact a health care provider if:  · You have a medical condition that increases your risk of infection and you have any cuts, sores, or bruises on your feet.  · You have an injury that is not healing.  · You have redness on your legs or feet.  · You feel burning or tingling in your legs or feet.  · You have pain or cramps in your legs and feet.  · Your legs or feet are numb.  · Your feet always feel cold.  · You have pain around a toenail.  Get help right away if:  · You have a wound, scrape, corn, or callus on your foot and:  ? You have pain, swelling, or redness that gets worse.  ? You have fluid or blood coming from the wound, scrape, corn, or callus.  ? Your wound, scrape, corn, or callus feels warm to the touch.  ? You have pus or a bad smell coming from the wound, scrape, corn, or callus.  ? You have a fever.  ? You have a red line going up your leg.  Summary  · Check your feet every day  for cuts, sores, red spots, swelling, and blisters.  · Moisturize feet and legs daily.  · Wear shoes that fit properly and have enough cushioning.  · If you have foot problems, report any cuts, sores, or bruises to your health care provider immediately.  · Schedule a complete foot exam at least once a year (annually) or more often if you have foot problems.  This information is not intended to replace advice given to you by your health care provider. Make sure you discuss any questions you have with your health care provider.  Document Released: 12/15/2001 Document Revised: 01/30/2019 Document Reviewed: 01/19/2018  [employer] Interactive Patient Education © 2020 [employer]

## 2020-03-11 ENCOUNTER — TELEPHONE (OUTPATIENT)
Dept: FAMILY MEDICINE CLINIC | Facility: CLINIC | Age: 40
End: 2020-03-11

## 2020-03-11 NOTE — TELEPHONE ENCOUNTER
Pt called and said that he is having problems with 2 scripts getting refilled and they are very 2 important scripts and  that the pharmacy will not refill. He is currently still having problems with the pain cream also.

## 2020-03-12 DIAGNOSIS — K21.9 GASTROESOPHAGEAL REFLUX DISEASE, ESOPHAGITIS PRESENCE NOT SPECIFIED: ICD-10-CM

## 2020-03-12 DIAGNOSIS — J30.2 OTHER SEASONAL ALLERGIC RHINITIS: Primary | ICD-10-CM

## 2020-03-12 RX ORDER — LORATADINE 10 MG/1
10 TABLET ORAL DAILY
Qty: 30 TABLET | Refills: 12 | Status: SHIPPED | OUTPATIENT
Start: 2020-03-12 | End: 2020-07-06 | Stop reason: SDUPTHER

## 2020-03-12 RX ORDER — PANTOPRAZOLE SODIUM 40 MG/1
40 TABLET, DELAYED RELEASE ORAL DAILY
Qty: 30 TABLET | Refills: 12 | Status: SHIPPED | OUTPATIENT
Start: 2020-03-12 | End: 2020-11-26 | Stop reason: SDUPTHER

## 2020-03-13 ENCOUNTER — TELEPHONE (OUTPATIENT)
Dept: FAMILY MEDICINE CLINIC | Facility: CLINIC | Age: 40
End: 2020-03-13

## 2020-03-13 NOTE — TELEPHONE ENCOUNTER
Received fax from [employer] requesting PA on compound pain cream from [facility].  Form filled ou and faxed.  Waiting on authorization.

## 2020-03-17 ENCOUNTER — TELEPHONE (OUTPATIENT)
Dept: FAMILY MEDICINE CLINIC | Facility: CLINIC | Age: 40
End: 2020-03-17

## 2020-03-17 NOTE — TELEPHONE ENCOUNTER
Patient called and needs to speak to you re his pain cream, please contact him at .  He said if he doesn't answer he will call you right back. Thanks Portia

## 2020-03-20 ENCOUNTER — TELEPHONE (OUTPATIENT)
Dept: FAMILY MEDICINE CLINIC | Facility: CLINIC | Age: 40
End: 2020-03-20

## 2020-03-20 NOTE — TELEPHONE ENCOUNTER
Patient called and said that he wanted to know if you got the prior auth sent to the correct fax number, please contact him at .  Thanks Portia

## 2020-03-31 ENCOUNTER — OFFICE VISIT (OUTPATIENT)
Dept: NEUROLOGY | Facility: CLINIC | Age: 40
End: 2020-03-31

## 2020-03-31 VITALS
SYSTOLIC BLOOD PRESSURE: 136 MMHG | DIASTOLIC BLOOD PRESSURE: 85 MMHG | TEMPERATURE: 98.1 F | HEIGHT: 75 IN | WEIGHT: 293.8 LBS | HEART RATE: 85 BPM | BODY MASS INDEX: 36.53 KG/M2

## 2020-03-31 DIAGNOSIS — R41.3 MEMORY IMPAIRMENT: Primary | ICD-10-CM

## 2020-03-31 DIAGNOSIS — G89.29 CHRONIC RIGHT-SIDED LOW BACK PAIN WITH RIGHT-SIDED SCIATICA: ICD-10-CM

## 2020-03-31 DIAGNOSIS — M54.41 CHRONIC RIGHT-SIDED LOW BACK PAIN WITH RIGHT-SIDED SCIATICA: ICD-10-CM

## 2020-03-31 DIAGNOSIS — G65.0 SEQUELAE OF GUILLAIN-BARRE SYNDROME (HCC): ICD-10-CM

## 2020-03-31 PROCEDURE — 99214 OFFICE O/P EST MOD 30 MIN: CPT | Performed by: PSYCHIATRY & NEUROLOGY

## 2020-03-31 RX ORDER — GABAPENTIN 100 MG/1
CAPSULE ORAL
Qty: 120 CAPSULE | Refills: 5 | Status: SHIPPED | OUTPATIENT
Start: 2020-03-31 | End: 2020-10-05

## 2020-03-31 NOTE — PROGRESS NOTES
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 male.    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   Patent is currently taking gabapentin helps with discomfort, but it effects his thinking and PCP adjusted his dose trial and the problem is he is experiencing more pain with the decrease dose and coming from the sciatic nerve from the right lower side.  Pt is using a pain cream which helps some.  Also has some tylenol with codeine but insurance doesn't very well       Patient needs a epidural but everything is currently on hold and is wondering if he should go back to the increased dose till after he gets a injection.     Patient is a diabetic and has uncontrolled BS late last night was 123, and  A1C 11/19/2019 was 7.5.   Patient nausea isn't as bad with the lower dosage of gabapentin, and been experiencing cold feet with the numbness and most always at night time.     Memory impairment, short term and forgetfulness slightly better with dosage change but having more numbness and tingling of feet.      (patient has a CPAP machine and see's Dr. Arroyo for his sleep issue. Patient still having fatigue and sleeping allot due to pain.)     The following portions of the patient's history were reviewed and updated as appropriate: allergies, current medications, past family history, past medical history, past social history, past surgical history and problem list.    Family History   Problem Relation Age of Onset   • Diabetes Mother    • Heart disease Maternal Grandmother    • Diabetes Maternal Grandmother    • Heart disease Maternal Grandfather    • Diabetes Maternal Grandfather    • Cancer Paternal Grandfather    • Heart disease Other        Past Medical History:   Diagnosis Date   • Acquired pes planus of both feet     Impression: needs referral for orthotics.   • Acute  bronchitis, unspecified    • Acute conjunctivitis of right eye    • Acute non-recurrent maxillary sinusitis     Impression: His symptoms were not improving.   • ADD (attention deficit disorder)    • Ankle pain, right     Impression: possibly gout vs sprian. No known trauma.   • Annual physical exam     Impression: Discussed anticipatory guidance, diet, exercise, and weight loss. Discussed safety, seatbelts, and routine screening examinations. Discussed self-examinations.   • Asthma     Impression: Stable   • Asymptomatic microscopic hematuria     Impression: recheck neg. Will follow   • Bug bite of face with infection, initial encounter    • Chest pain     Impression: vague, unlikely cardiac. Pt would like referral to cardiology. Take PPI as I suspect this is GI   • Chronic gout, unspecified, without tophus (tophi)     Impression: Recommend rechecking uric acid.   • CIDP (chronic inflammatory demyelinating polyneuropathy) (CMS/HCC)     Story: s/p IVIG Guillain-[place] per [facility] diagnosed 2015 gabapentin 600 tid. Impression: Symptoms include: numbness and tingling in hands and feet, difficulty concentrating, drops items, chest pain. dizziness. Pt needs routine follow up with neurology. Previously seen by Dr Seipel   • Contact with and (suspected) exposure to infections with a predominantly sexual mode of transmission     Impression: Pt requested to be tested. Findings discussed. All questions answered. Discussed the inherent difficulty in screening for all forms of diseases with sexual contact mode of transmission. Follow-up after testing complete, sooner for worsening symptoms or any concerns.   • Diabetes mellitus (CMS/HCC)    • Difficulty concentrating    • Dyslipidemia    • Epigastric pain     Impression: medication side effect, likely from gabapentin.   • Epistaxis     Impression: seems to be improved spontaneously. Will follow. Use vaseline at hs   • Eye drainage     Impression: exam unrevealing here, no redness  or drainage noted. Try astepro. Consider ophth eval if not improved. Suspect allergic   • Guillain-[place] (CMS/HCC)    • Hematuria, microscopic     Impression: noted. Occasuinal dysuria. May need CT with stone protocol.   • History of tobacco use    • Hyperglycemia     Impression: will follow. Last A1C noted. Will recheck. May need to increase metformin as indicated   • Hypertension, benign     Impression: Followed by Cardiology Dr Tong who placed pt on different BP med. . Proteinuria/creatinine noted   • Idiopathic chronic gout without tophus     Unspecified site. Impression: stable. Story: UA 8.3 2/2018   • Intervertebral disc stenosis of neural canal of lumbar region     Story: MRI 11/15 showed lumbar disk disease. 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 Pt would like referra to Dr Watkins, has appt Dec 15   • Laryngitis, acute    • LLQ pain     Impression: sounds self-limiting. Findings discussed. All questions answered. Consider GI eval if not improved.   • Low back pain    • Lumbar disc disorder with myelopathy     Impression: failed therapy. Pt would like refill on compounded topical med. Rx faxed   • Malaise and fatigue    • Microcytosis     Impression: will check iron levels   • Mixed obsessional thoughts and acts    • Obstructive sleep apnea     Impression: needs cpap mask   • Other seasonal allergic rhinitis     Impression: Over-the-counter medication indications, dosage, and precautions discussed.   • Overactive bladder    • Overweight (BMI 25.0-29.9)    • Screening for depression    • Screening for hyperlipidemia    • SI (sacroiliac) joint dysfunction     Impression: Findings discussed. All questions answered. Medication and medication adverse effects discussed. Drug education given and explained to patient. Patient verbalized understanding. Follow-up in 4-6 weeks if not better. Follow-up sooner for worsening symptoms or for any concerns. Consider chiropractor   • Tobacco use disorder        Social  History     Socioeconomic History   • Marital status: Single     Spouse name: Not on file   • Number of children: Not on file   • Years of education: Not on file   • Highest education level: Not on file   Tobacco Use   • Smoking status: Former Smoker   • Smokeless tobacco: Never Used   Substance and Sexual Activity   • Alcohol use: Yes   • Drug use: No   • Sexual activity: Defer         Current Outpatient Medications:   •  acetaminophen-codeine (TYLENOL/CODEINE #3) 300-30 MG per tablet, Take 1 tablet by mouth Every 6 (Six) Hours As Needed for Moderate Pain ., Disp: 30 tablet, Rfl: 0  •  allopurinol (ZYLOPRIM) 100 MG tablet, Take 100 mg by mouth Daily., Disp: , Rfl: 12  •  amLODIPine (NORVASC) 10 MG tablet, Take 1 tablet by mouth Daily., Disp: , Rfl:   •  chlorthalidone (HYGROTEN) 50 MG tablet, Take 1 tablet by mouth Daily., Disp: , Rfl: 3  •  erythromycin (ROMYCIN) 5 MG/GM ophthalmic ointment, APPLY OINTMENT TO BOTH EYES AT BEDTIME FOR 1 WEEK THEN AS NEEDED, Disp: , Rfl: 3  •  FLUoxetine (PROzac) 40 MG capsule, Take 2 capsules by mouth Daily., Disp: , Rfl:   •  fluticasone (FLONASE) 50 MCG/ACT nasal spray, Use 1 spray(s) in each nostril twice daily, Disp: 16 g, Rfl: 0  •  glucose blood test strip, Daily as needed, Disp: 30 each, Rfl: 12  •  hydrocortisone 1 % cream, Apply  topically to the appropriate area as directed 2 (Two) Times a Day., Disp: 60 g, Rfl: 0  •  JANUMET  MG per tablet, TAKE 1 TABLET BY MOUTH TWICE DAILY WITH MEALS, Disp: 60 tablet, Rfl: 3  •  Lancets (FREESTYLE) lancets, 1 each by Other route Daily., Disp: 30 each, Rfl: 12  •  loratadine (CLARITIN) 10 MG tablet, Take 1 tablet by mouth Daily., Disp: 30 tablet, Rfl: 12  •  Multiple Vitamin (MULTIVITAMIN) capsule, Take 1 capsule by mouth Daily., Disp: 30 capsule, Rfl: 11  •  oxybutynin XL (DITROPAN XL) 15 MG 24 hr tablet, TAKE 1 TABLET BY MOUTH ONCE DAILY, Disp: 30 tablet, Rfl: 12  •  pantoprazole (PROTONIX) 40 MG EC tablet, Take 1 tablet by  mouth Daily., Disp: 30 tablet, Rfl: 12  •  sodium chloride (OCEAN NASAL SPRAY) 0.65 % nasal spray, 2 sprays into the nostril(s) as directed by provider As Needed for Congestion., Disp: 88 mL, Rfl: 12  •  triamcinolone (KENALOG) 0.1 % cream, Apply  topically to the appropriate area as directed 3 (Three) Times a Day., Disp: 80 g, Rfl: 0  •  gabapentin (NEURONTIN) 100 MG capsule, One tab three or four times per day, Disp: 120 capsule, Rfl: 5    Review of Systems   Constitutional: Positive for fatigue. Negative for appetite change.   HENT: Negative for sinus pressure and sinus pain.    Eyes: Negative for pain and itching.   Respiratory: Negative for cough and shortness of breath.    Cardiovascular: Negative for chest pain and palpitations.   Gastrointestinal: Negative for constipation and diarrhea.   Endocrine: Negative for cold intolerance and heat intolerance.   Genitourinary: Positive for frequency. Negative for difficulty urinating.   Musculoskeletal: Positive for back pain. Negative for gait problem.   Allergic/Immunologic: Negative for environmental allergies.   Neurological: Positive for numbness. Negative for dizziness, tremors, seizures, syncope, facial asymmetry, speech difficulty, weakness, light-headedness and headaches.   Psychiatric/Behavioral: Negative for agitation and confusion.        EPWORTH SLEEPINESS SCALE  Sitting and reading  0  WatchingTV  0  Sitting, inactive, in a public place  0  As a passenger in a car for 1 hour w/o a break  0  Lying down to rest in the afternoon  3  Sitting and talking to someone  0  Sitting quietly after a lunch  0  In a car, while stopped for traffic or a light  0  Total 3        I have reviewed ROS completed by medical assistant.     Objective:    Neurologic Exam     Mental Status   Oriented to person, place, and time.   Attention: normal.   Level of consciousness: alert    Gait, Coordination, and Reflexes     Reflexes   Reflexes 2+ except as noted.       Physical Exam    Constitutional: He is oriented to person, place, and time.   Neurological: He is oriented to person, place, and time.       Assessment/Plan:    Barrett was seen today for sequelae of guillain-barre syndrome,, memory loss and sleep apnea.    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

## 2020-04-08 ENCOUNTER — OFFICE VISIT (OUTPATIENT)
Dept: PAIN MEDICINE | Facility: CLINIC | Age: 40
End: 2020-04-08

## 2020-04-08 ENCOUNTER — RESULTS ENCOUNTER (OUTPATIENT)
Dept: PAIN MEDICINE | Facility: CLINIC | Age: 40
End: 2020-04-08

## 2020-04-08 VITALS
DIASTOLIC BLOOD PRESSURE: 100 MMHG | RESPIRATION RATE: 16 BRPM | WEIGHT: 293 LBS | BODY MASS INDEX: 36.43 KG/M2 | SYSTOLIC BLOOD PRESSURE: 154 MMHG | OXYGEN SATURATION: 97 % | HEART RATE: 80 BPM | TEMPERATURE: 98.9 F | HEIGHT: 75 IN

## 2020-04-08 DIAGNOSIS — Z79.899 ENCOUNTER FOR LONG-TERM (CURRENT) USE OF OTHER MEDICATIONS: ICD-10-CM

## 2020-04-08 DIAGNOSIS — M54.41 CHRONIC RIGHT-SIDED LOW BACK PAIN WITH RIGHT-SIDED SCIATICA: Primary | ICD-10-CM

## 2020-04-08 DIAGNOSIS — G89.29 CHRONIC RIGHT-SIDED LOW BACK PAIN WITH RIGHT-SIDED SCIATICA: Primary | ICD-10-CM

## 2020-04-08 DIAGNOSIS — M48.07 LUMBOSACRAL SPINAL STENOSIS: ICD-10-CM

## 2020-04-08 PROCEDURE — G0463 HOSPITAL OUTPT CLINIC VISIT: HCPCS | Performed by: PHYSICAL MEDICINE & REHABILITATION

## 2020-04-08 PROCEDURE — 99214 OFFICE O/P EST MOD 30 MIN: CPT | Performed by: PHYSICAL MEDICINE & REHABILITATION

## 2020-04-08 RX ORDER — LIDOCAINE AND PRILOCAINE 25; 25 MG/G; MG/G
CREAM TOPICAL
COMMUNITY
Start: 2020-03-20 | End: 2022-10-26

## 2020-04-08 RX ORDER — PYRAZINAMIDE 500 MG/1
1 TABLET ORAL EVERY 6 HOURS PRN
Qty: 28 TABLET | Refills: 0 | Status: SHIPPED | OUTPATIENT
Start: 2020-04-08 | End: 2020-04-08 | Stop reason: SDUPTHER

## 2020-04-08 RX ORDER — PYRAZINAMIDE 500 MG/1
1 TABLET ORAL EVERY 6 HOURS PRN
Qty: 28 TABLET | Refills: 5 | Status: SHIPPED | OUTPATIENT
Start: 2020-04-08 | End: 2020-05-22 | Stop reason: SDUPTHER
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
## 2020-05-22 ENCOUNTER — OFFICE VISIT (OUTPATIENT)
Dept: PAIN MEDICINE | Facility: CLINIC | Age: 40
End: 2020-05-22

## 2020-05-22 VITALS
TEMPERATURE: 96.6 F | DIASTOLIC BLOOD PRESSURE: 82 MMHG | BODY MASS INDEX: 33.06 KG/M2 | HEIGHT: 77 IN | SYSTOLIC BLOOD PRESSURE: 146 MMHG | HEART RATE: 82 BPM | RESPIRATION RATE: 16 BRPM | WEIGHT: 280 LBS | OXYGEN SATURATION: 99 %

## 2020-05-22 DIAGNOSIS — E11.9 TYPE 2 DIABETES MELLITUS WITHOUT COMPLICATION, WITHOUT LONG-TERM CURRENT USE OF INSULIN (HCC): ICD-10-CM

## 2020-05-22 DIAGNOSIS — M54.16 LUMBAR RADICULOPATHY: ICD-10-CM

## 2020-05-22 DIAGNOSIS — M48.07 LUMBOSACRAL SPINAL STENOSIS: ICD-10-CM

## 2020-05-22 DIAGNOSIS — M54.41 CHRONIC RIGHT-SIDED LOW BACK PAIN WITH RIGHT-SIDED SCIATICA: Primary | ICD-10-CM

## 2020-05-22 DIAGNOSIS — G89.29 CHRONIC RIGHT-SIDED LOW BACK PAIN WITH RIGHT-SIDED SCIATICA: Primary | ICD-10-CM

## 2020-05-22 PROCEDURE — 99214 OFFICE O/P EST MOD 30 MIN: CPT | Performed by: PHYSICAL MEDICINE & REHABILITATION

## 2020-05-22 PROCEDURE — G0463 HOSPITAL OUTPT CLINIC VISIT: HCPCS | Performed by: PHYSICAL MEDICINE & REHABILITATION

## 2020-05-22 RX ORDER — PYRAZINAMIDE 500 MG/1
1 TABLET ORAL EVERY 6 HOURS PRN
Qty: 28 TABLET | Refills: 5 | Status: SHIPPED | OUTPATIENT
Start: 2020-05-22 | End: 2020-07-09 | Stop reason: SDUPTHER
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
## 2020-06-02 ENCOUNTER — OFFICE VISIT (OUTPATIENT)
Dept: FAMILY MEDICINE CLINIC | Facility: CLINIC | Age: 40
End: 2020-06-02

## 2020-06-02 VITALS
WEIGHT: 299 LBS | HEART RATE: 96 BPM | HEIGHT: 77 IN | TEMPERATURE: 98.3 F | DIASTOLIC BLOOD PRESSURE: 88 MMHG | BODY MASS INDEX: 35.3 KG/M2 | SYSTOLIC BLOOD PRESSURE: 130 MMHG | OXYGEN SATURATION: 99 % | RESPIRATION RATE: 18 BRPM

## 2020-06-02 DIAGNOSIS — E78.9 DISORDER OF LIPID METABOLISM: ICD-10-CM

## 2020-06-02 DIAGNOSIS — G65.0 SEQUELAE OF GUILLAIN-BARRE SYNDROME (HCC): ICD-10-CM

## 2020-06-02 DIAGNOSIS — E11.49 TYPE 2 DIABETES MELLITUS WITH OTHER NEUROLOGIC COMPLICATION, WITHOUT LONG-TERM CURRENT USE OF INSULIN (HCC): ICD-10-CM

## 2020-06-02 DIAGNOSIS — I10 HYPERTENSION, BENIGN: Primary | ICD-10-CM

## 2020-06-02 DIAGNOSIS — E11.9 TYPE 2 DIABETES MELLITUS WITHOUT COMPLICATION, WITHOUT LONG-TERM CURRENT USE OF INSULIN (HCC): ICD-10-CM

## 2020-06-02 DIAGNOSIS — G61.81 CIDP (CHRONIC INFLAMMATORY DEMYELINATING POLYNEUROPATHY) (HCC): ICD-10-CM

## 2020-06-02 DIAGNOSIS — J30.89 NON-SEASONAL ALLERGIC RHINITIS DUE TO OTHER ALLERGIC TRIGGER: ICD-10-CM

## 2020-06-02 LAB
GLUCOSE BLDC GLUCOMTR-MCNC: 279 MG/DL (ref 70–130)
HBA1C MFR BLD: 7.2 %

## 2020-06-02 PROCEDURE — 82962 GLUCOSE BLOOD TEST: CPT | Performed by: FAMILY MEDICINE

## 2020-06-02 PROCEDURE — 99214 OFFICE O/P EST MOD 30 MIN: CPT | Performed by: FAMILY MEDICINE

## 2020-06-02 PROCEDURE — 83036 HEMOGLOBIN GLYCOSYLATED A1C: CPT | Performed by: FAMILY MEDICINE

## 2020-06-02 RX ORDER — MONTELUKAST SODIUM 10 MG/1
10 TABLET ORAL NIGHTLY
Qty: 30 TABLET | Refills: 5 | Status: SHIPPED | OUTPATIENT
Start: 2020-06-02 | End: 2020-11-26 | Stop reason: SDUPTHER

## 2020-06-02 NOTE — PROGRESS NOTES
Subjective   Barrett Laguerre is a 40 y.o. male.     Neuropathy symptoms have worsened, reports hands and feet are getting cold with the numbness/tingling now.  Has not been taking sugars regularly.     Pt with worsening allergy sx as well. Currently on flonase on claritin.  Had been on singulair in the past.       Diabetes   He presents for his follow-up diabetic visit. He has type 2 diabetes mellitus. His disease course has been stable. Hypoglycemia symptoms include headaches. Associated symptoms include polydipsia, polyuria and weakness. Pertinent negatives for diabetes include no chest pain and no fatigue. Risk factors for coronary artery disease include male sex, hypertension, obesity, diabetes mellitus and dyslipidemia. Current diabetic treatment includes oral agent (monotherapy). He is compliant with treatment most of the time. His weight is stable. He is following a generally unhealthy diet. When asked about meal planning, he reported none. He has not had a previous visit with a dietitian. He rarely participates in exercise. Home blood sugar record trend: does not check. An ACE inhibitor/angiotensin II receptor blocker is not being taken. He does not see a podiatrist.Eye exam is current.   Hypertension   The current episode started more than 1 year ago. Associated symptoms include headaches. Pertinent negatives include no chest pain, neck pain or shortness of breath. Current antihypertension treatment includes calcium channel blockers.     Hemoglobin A1C   Date Value Ref Range Status   06/02/2020 7.2 % Final   11/18/2019 7.5 (H) 4.8 - 5.6 % Final     Comment:              Prediabetes: 5.7 - 6.4           Diabetes: >6.4           Glycemic control for adults with diabetes: <7.0     09/03/2019 9.6 % Final   03/14/2019 9.3 (H) <5.7 Final   07/21/2017 6.6 4.6 - 7.1 % Final       The following portions of the patient's history were reviewed and updated as appropriate: allergies, current medications, past family  history, past medical history, past social history, past surgical history and problem list.    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 28 tablet 5   • allopurinol (ZYLOPRIM) 100 MG tablet Take 100 mg by mouth Daily.  12   • amLODIPine (NORVASC) 10 MG tablet Take 1 tablet by mouth Daily.     • chlorthalidone (HYGROTEN) 50 MG tablet Take 1 tablet by mouth Daily.  3   • erythromycin (ROMYCIN) 5 MG/GM ophthalmic ointment APPLY OINTMENT TO BOTH EYES AT BEDTIME FOR 1 WEEK THEN AS NEEDED  3   • FLUoxetine (PROzac) 40 MG capsule Take 2 capsules by mouth Daily.     • fluticasone (FLONASE) 50 MCG/ACT nasal spray Use 1 spray(s) in each nostril twice daily 16 g 0   • gabapentin (NEURONTIN) 100 MG capsule One tab three or four times per day 120 capsule 5   • glucose blood test strip Daily as needed 30 each 12   • hydrocortisone 1 % cream Apply  topically to the appropriate area as directed 2 (Two) Times a Day. 60 g 0   • Lancets (FREESTYLE) lancets 1 each by Other route Daily. 30 each 12   • lidocaine-prilocaine (EMLA) 2.5-2.5 % cream      • loratadine (CLARITIN) 10 MG tablet Take 1 tablet by mouth Daily. 30 tablet 12   • Multiple Vitamin (MULTIVITAMIN) capsule Take 1  capsule by mouth Daily. 30 capsule 11   • oxybutynin XL (DITROPAN XL) 15 MG 24 hr tablet TAKE 1 TABLET BY MOUTH ONCE DAILY 30 tablet 12   • pantoprazole (PROTONIX) 40 MG EC tablet Take 1 tablet by mouth Daily. 30 tablet 12   • sodium chloride (OCEAN NASAL SPRAY) 0.65 % nasal spray 2 sprays into the nostril(s) as directed by provider As Needed for Congestion. 88 mL 12   • triamcinolone (KENALOG) 0.1 % cream Apply  topically to the appropriate area as directed 3 (Three) Times a Day. 80 g 0   • [DISCONTINUED] sitaGLIPtin-metFORMIN (Janumet)  MG per tablet Take 1 tablet by mouth 2 (Two) Times a Day With Meals. 60 tablet 3     No current facility-administered medications on file prior to visit.      Current outpatient and discharge medications have been reconciled for the patient.  Reviewed by: Eric Rosenberg MD      Allergies   Allergen Reactions   • Penicillin G Anaphylaxis   • Sulfa Antibiotics Hives       Review of Systems   Constitutional: Negative for activity change, appetite change, fatigue and fever.   HENT: Negative for ear pain, swollen glands and voice change.    Eyes: Negative for visual disturbance.   Respiratory: Negative for shortness of breath and wheezing.    Cardiovascular: Negative for chest pain and leg swelling.   Gastrointestinal: Negative for abdominal pain, blood in stool, constipation, diarrhea, nausea and vomiting.   Endocrine: Positive for polydipsia and polyuria.   Genitourinary: Negative for dysuria, frequency and hematuria.   Musculoskeletal: Negative for joint swelling, neck pain and neck stiffness.   Skin: Negative for rash and bruise.   Neurological: Positive for weakness. Negative for numbness and headache.   Psychiatric/Behavioral: Negative for suicidal ideas and depressed mood.     I have reviewed and confirmed the accuracy of the ROS as documented by the MA/LPN/RN Eric Rosenberg MD      Objective   Vitals:    06/02/20 1438   BP: 130/88   Pulse: 96   Resp: 18    Temp: 98.3 °F (36.8 °C)   SpO2: 99%     Physical Exam   Constitutional: He is oriented to person, place, and time. He appears well-developed and well-nourished.   Eyes: Pupils are equal, round, and reactive to light. Conjunctivae and EOM are normal.   Neck: Normal range of motion. Neck supple.   Cardiovascular: Normal rate, regular rhythm and normal heart sounds.   Pulmonary/Chest: Effort normal and breath sounds normal.   Abdominal: Soft. Bowel sounds are normal.   Musculoskeletal: Normal range of motion.   Neurological: He is alert and oriented to person, place, and time.   Skin: Skin is warm and dry.   Psychiatric: He has a normal mood and affect. His behavior is normal. Judgment and thought content normal.       I wore protective equipment throughout this patient encounter to include mask. Hand hygiene was performed before donning protective equipment and after removal when leaving the room.    Assessment/Plan .  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 .    Proteinuria/creatinine noted         Relevant Orders    Comprehensive Metabolic Panel    CBC Auto Differential    Disorder of lipid metabolism    Relevant Orders    Comprehensive Metabolic Panel    Lipid Panel With / Chol / HDL Ratio    TSH    CIDP (chronic inflammatory demyelinating polyneuropathy) (CMS/[facility])    Overview     Story: s/p IVIG Guillain-[place] per [facility] diagnosed 2015 gabapentin 600 tid. Impression: Symptoms include: numbness and tingling in hands and feet, difficulty concentrating, drops items, chest pain. dizziness. Pt needs routine follow up with neurology. Followed by   [facility]         Relevant Orders    Sedimentation Rate      Other Visit Diagnoses     Non-seasonal allergic rhinitis due to other allergic trigger        Relevant Medications    montelukast (Singulair) 10 MG tablet      Findings discussed. All questions answered.  Medication and medication adverse effects discussed.  Drug education given and explained to patient. Patient verbalized understanding.  Follow up in 6 months for recheck, sooner for worsening symptoms or any concerns.

## 2020-06-09 DIAGNOSIS — M10.9 GOUT, UNSPECIFIED CAUSE, UNSPECIFIED CHRONICITY, UNSPECIFIED SITE: Primary | ICD-10-CM

## 2020-06-09 RX ORDER — ALLOPURINOL 100 MG/1
100 TABLET ORAL DAILY
Qty: 30 TABLET | Refills: 3 | Status: SHIPPED | OUTPATIENT
Start: 2020-06-09 | End: 2020-09-05 | Stop reason: SDUPTHER

## 2020-06-11 ENCOUNTER — HOSPITAL ENCOUNTER (OUTPATIENT)
Dept: PAIN MEDICINE | Facility: HOSPITAL | Age: 40
Discharge: HOME | End: 2020-06-11
Admitting: PHYSICAL MEDICINE & REHABILITATION

## 2020-06-11 ENCOUNTER — HOSPITAL ENCOUNTER (OUTPATIENT)
Dept: PAIN MEDICINE | Facility: HOSPITAL | Age: 40
Discharge: HOME | End: 2020-06-11

## 2020-06-11 VITALS
WEIGHT: 285 LBS | SYSTOLIC BLOOD PRESSURE: 146 MMHG | DIASTOLIC BLOOD PRESSURE: 97 MMHG | TEMPERATURE: 98 F | HEIGHT: 76 IN | HEART RATE: 85 BPM | RESPIRATION RATE: 16 BRPM | BODY MASS INDEX: 34.7 KG/M2 | OXYGEN SATURATION: 97 %

## 2020-06-11 DIAGNOSIS — M48.07 LUMBOSACRAL SPINAL STENOSIS: Primary | ICD-10-CM

## 2020-06-11 DIAGNOSIS — M54.16 LUMBAR RADICULOPATHY: ICD-10-CM

## 2020-06-11 DIAGNOSIS — M54.41 CHRONIC RIGHT-SIDED LOW BACK PAIN WITH RIGHT-SIDED SCIATICA: ICD-10-CM

## 2020-06-11 DIAGNOSIS — R52 PAIN: ICD-10-CM

## 2020-06-11 DIAGNOSIS — G89.29 CHRONIC RIGHT-SIDED LOW BACK PAIN WITH RIGHT-SIDED SCIATICA: ICD-10-CM

## 2020-06-11 PROCEDURE — 77003 FLUOROGUIDE FOR SPINE INJECT: CPT

## 2020-06-11 PROCEDURE — 25010000002 METHYLPREDNISOLONE PER 80 MG

## 2020-06-11 PROCEDURE — 62323 NJX INTERLAMINAR LMBR/SAC: CPT | Performed by: PHYSICAL MEDICINE & REHABILITATION

## 2020-06-11 PROCEDURE — 0 IOPAMIDOL 41 % SOLUTION: Performed by: PHYSICAL MEDICINE & REHABILITATION

## 2020-06-11 RX ORDER — AMANTADINE HYDROCHLORIDE 100 MG/1
TABLET ORAL
COMMUNITY
Start: 2020-06-02 | End: 2022-10-26

## 2020-06-11 RX ORDER — METHYLPREDNISOLONE ACETATE 80 MG/ML
INJECTION, SUSPENSION INTRA-ARTICULAR; INTRALESIONAL; INTRAMUSCULAR; SOFT TISSUE
Status: DISPENSED
Start: 2020-06-11 | End: 2020-06-11

## 2020-06-11 RX ORDER — METHYLPREDNISOLONE ACETATE 80 MG/ML
80 INJECTION, SUSPENSION INTRA-ARTICULAR; INTRALESIONAL; INTRAMUSCULAR; SOFT TISSUE ONCE
Status: COMPLETED | OUTPATIENT
Start: 2020-06-11 | End: 2020-06-11

## 2020-06-11 RX ADMIN — IOPAMIDOL 10 ML: 408 INJECTION, SOLUTION INTRATHECAL at 10:52

## 2020-06-11 RX ADMIN — METHYLPREDNISOLONE ACETATE 80 MG: 80 INJECTION, SUSPENSION INTRA-ARTICULAR; INTRALESIONAL; INTRAMUSCULAR; SOFT TISSUE at 10:53
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
## 2020-07-06 DIAGNOSIS — J30.2 OTHER SEASONAL ALLERGIC RHINITIS: ICD-10-CM

## 2020-07-06 RX ORDER — AMLODIPINE BESYLATE 10 MG/1
TABLET ORAL
Qty: 90 TABLET | Refills: 0 | Status: SHIPPED | OUTPATIENT
Start: 2020-07-06 | End: 2020-09-05 | Stop reason: SDUPTHER

## 2020-07-06 RX ORDER — LORATADINE 10 MG/1
10 TABLET ORAL DAILY
Qty: 30 TABLET | Refills: 12 | Status: SHIPPED | OUTPATIENT
Start: 2020-07-06 | End: 2020-11-26 | Stop reason: SDUPTHER

## 2020-07-06 RX ORDER — LORATADINE 10 MG/1
10 TABLET ORAL DAILY
Qty: 30 TABLET | Refills: 12 | Status: SHIPPED | OUTPATIENT
Start: 2020-07-06 | End: 2020-10-02 | Stop reason: SDUPTHER

## 2020-07-07 ENCOUNTER — TELEPHONE (OUTPATIENT)
Dept: FAMILY MEDICINE CLINIC | Facility: CLINIC | Age: 40
End: 2020-07-07

## 2020-07-07 NOTE — TELEPHONE ENCOUNTER
Received fax from [facility] in [place] requesting PA on Pantoprazole 40mg.  PA submitted online thru Covermymeds.  Med approved. PA Case: [id number], Status: Approved, Coverage Starts on: 7/7/2020 12:00:00 AM, Coverage Ends on: 7/7/2021 12:00:00 AM.  Notified [facility].

## 2020-07-09 ENCOUNTER — HOSPITAL ENCOUNTER (OUTPATIENT)
Dept: PAIN MEDICINE | Facility: HOSPITAL | Age: 40
Discharge: HOME | End: 2020-07-09
Admitting: PHYSICAL MEDICINE & REHABILITATION

## 2020-07-09 ENCOUNTER — HOSPITAL ENCOUNTER (OUTPATIENT)
Dept: PAIN MEDICINE | Facility: HOSPITAL | Age: 40
Discharge: HOME | End: 2020-07-09

## 2020-07-09 VITALS
DIASTOLIC BLOOD PRESSURE: 84 MMHG | HEIGHT: 76 IN | BODY MASS INDEX: 34.7 KG/M2 | SYSTOLIC BLOOD PRESSURE: 134 MMHG | HEART RATE: 83 BPM | WEIGHT: 285 LBS | TEMPERATURE: 96.8 F | OXYGEN SATURATION: 95 % | RESPIRATION RATE: 16 BRPM

## 2020-07-09 DIAGNOSIS — M54.16 LUMBAR RADICULOPATHY: ICD-10-CM

## 2020-07-09 DIAGNOSIS — M54.41 CHRONIC RIGHT-SIDED LOW BACK PAIN WITH RIGHT-SIDED SCIATICA: Primary | ICD-10-CM

## 2020-07-09 DIAGNOSIS — M48.07 LUMBOSACRAL SPINAL STENOSIS: ICD-10-CM

## 2020-07-09 DIAGNOSIS — R52 PAIN: ICD-10-CM

## 2020-07-09 DIAGNOSIS — G89.29 CHRONIC RIGHT-SIDED LOW BACK PAIN WITH RIGHT-SIDED SCIATICA: Primary | ICD-10-CM

## 2020-07-09 PROCEDURE — 25010000002 METHYLPREDNISOLONE PER 80 MG

## 2020-07-09 PROCEDURE — 77003 FLUOROGUIDE FOR SPINE INJECT: CPT

## 2020-07-09 PROCEDURE — 0 IOPAMIDOL 41 % SOLUTION: Performed by: PHYSICAL MEDICINE & REHABILITATION

## 2020-07-09 PROCEDURE — 62323 NJX INTERLAMINAR LMBR/SAC: CPT | Performed by: PHYSICAL MEDICINE & REHABILITATION

## 2020-07-09 RX ORDER — PYRAZINAMIDE 500 MG/1
1 TABLET ORAL EVERY 6 HOURS PRN
Qty: 28 TABLET | Refills: 5 | Status: SHIPPED | OUTPATIENT
Start: 2020-07-09 | End: 2020-09-01 | Stop reason: SDUPTHER

## 2020-07-09 RX ORDER — METHYLPREDNISOLONE ACETATE 80 MG/ML
INJECTION, SUSPENSION INTRA-ARTICULAR; INTRALESIONAL; INTRAMUSCULAR; SOFT TISSUE
Status: DISPENSED
Start: 2020-07-09 | End: 2020-07-09

## 2020-07-09 RX ORDER — METHYLPREDNISOLONE ACETATE 80 MG/ML
80 INJECTION, SUSPENSION INTRA-ARTICULAR; INTRALESIONAL; INTRAMUSCULAR; SOFT TISSUE ONCE
Status: COMPLETED | OUTPATIENT
Start: 2020-07-09 | End: 2020-07-09

## 2020-07-09 RX ADMIN — METHYLPREDNISOLONE ACETATE 80 MG: 80 INJECTION, SUSPENSION INTRA-ARTICULAR; INTRALESIONAL; INTRAMUSCULAR; SOFT TISSUE at 11:19

## 2020-07-09 RX ADMIN — IOPAMIDOL 1 ML: 408 INJECTION, SOLUTION INTRATHECAL at 11:19
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
## 2020-07-27 ENCOUNTER — OFFICE VISIT (OUTPATIENT)
Dept: FAMILY MEDICINE CLINIC | Facility: CLINIC | Age: 40
End: 2020-07-27

## 2020-07-27 VITALS
BODY MASS INDEX: 36.04 KG/M2 | OXYGEN SATURATION: 95 % | HEIGHT: 76 IN | DIASTOLIC BLOOD PRESSURE: 86 MMHG | RESPIRATION RATE: 20 BRPM | WEIGHT: 296 LBS | HEART RATE: 101 BPM | TEMPERATURE: 98.2 F | SYSTOLIC BLOOD PRESSURE: 157 MMHG

## 2020-07-27 DIAGNOSIS — E11.49 TYPE 2 DIABETES MELLITUS WITH OTHER NEUROLOGIC COMPLICATION, WITHOUT LONG-TERM CURRENT USE OF INSULIN (HCC): ICD-10-CM

## 2020-07-27 DIAGNOSIS — I10 HYPERTENSION, BENIGN: ICD-10-CM

## 2020-07-27 DIAGNOSIS — L08.9 ABRASION OF LOWER EXTREMITY WITH INFECTION, UNSPECIFIED LATERALITY, INITIAL ENCOUNTER: ICD-10-CM

## 2020-07-27 DIAGNOSIS — M79.604 PAIN IN BOTH LOWER EXTREMITIES: Primary | ICD-10-CM

## 2020-07-27 DIAGNOSIS — B35.1 ONYCHOMYCOSIS: ICD-10-CM

## 2020-07-27 DIAGNOSIS — M79.605 PAIN IN BOTH LOWER EXTREMITIES: Primary | ICD-10-CM

## 2020-07-27 DIAGNOSIS — Z23 NEED FOR TDAP VACCINATION: ICD-10-CM

## 2020-07-27 DIAGNOSIS — S80.819A ABRASION OF LOWER EXTREMITY WITH INFECTION, UNSPECIFIED LATERALITY, INITIAL ENCOUNTER: ICD-10-CM

## 2020-07-27 PROCEDURE — 99214 OFFICE O/P EST MOD 30 MIN: CPT | Performed by: FAMILY MEDICINE

## 2020-07-27 PROCEDURE — 90471 IMMUNIZATION ADMIN: CPT | Performed by: FAMILY MEDICINE

## 2020-07-27 PROCEDURE — 90715 TDAP VACCINE 7 YRS/> IM: CPT | Performed by: FAMILY MEDICINE

## 2020-07-27 RX ORDER — MUPIROCIN CALCIUM 20 MG/G
CREAM TOPICAL 3 TIMES DAILY
Qty: 30 G | Refills: 2 | Status: SHIPPED | OUTPATIENT
Start: 2020-07-27 | End: 2020-08-10

## 2020-07-27 RX ORDER — DOXYCYCLINE 100 MG/1
100 CAPSULE ORAL 2 TIMES DAILY
Qty: 20 CAPSULE | Refills: 0 | Status: SHIPPED | OUTPATIENT
Start: 2020-07-27 | End: 2020-08-06

## 2020-08-02 PROBLEM — L08.9 ABRASION, LEG WITH INFECTION: Status: ACTIVE | Noted: 2020-08-02

## 2020-08-02 PROBLEM — S80.819A ABRASION, LEG WITH INFECTION: Status: ACTIVE | Noted: 2020-08-02

## 2020-08-02 PROBLEM — B35.1 ONYCHOMYCOSIS: Status: ACTIVE | Noted: 2020-08-02

## 2020-08-06 ENCOUNTER — APPOINTMENT (OUTPATIENT)
Dept: PAIN MEDICINE | Facility: HOSPITAL | Age: 40
End: 2020-08-06

## 2020-09-01 ENCOUNTER — HOSPITAL ENCOUNTER (OUTPATIENT)
Dept: PAIN MEDICINE | Facility: HOSPITAL | Age: 40
Discharge: HOME | End: 2020-09-01
Admitting: PHYSICAL MEDICINE & REHABILITATION

## 2020-09-01 ENCOUNTER — HOSPITAL ENCOUNTER (OUTPATIENT)
Dept: PAIN MEDICINE | Facility: HOSPITAL | Age: 40
Discharge: HOME | End: 2020-09-01

## 2020-09-01 VITALS
SYSTOLIC BLOOD PRESSURE: 151 MMHG | RESPIRATION RATE: 16 BRPM | TEMPERATURE: 96.8 F | DIASTOLIC BLOOD PRESSURE: 91 MMHG | HEART RATE: 75 BPM | OXYGEN SATURATION: 96 %

## 2020-09-01 DIAGNOSIS — M48.07 LUMBOSACRAL SPINAL STENOSIS: ICD-10-CM

## 2020-09-01 DIAGNOSIS — G89.29 CHRONIC RIGHT-SIDED LOW BACK PAIN WITH RIGHT-SIDED SCIATICA: Primary | ICD-10-CM

## 2020-09-01 DIAGNOSIS — M54.41 CHRONIC RIGHT-SIDED LOW BACK PAIN WITH RIGHT-SIDED SCIATICA: Primary | ICD-10-CM

## 2020-09-01 DIAGNOSIS — R52 PAIN: ICD-10-CM

## 2020-09-01 DIAGNOSIS — M54.16 LUMBAR RADICULOPATHY: ICD-10-CM

## 2020-09-01 PROCEDURE — 62323 NJX INTERLAMINAR LMBR/SAC: CPT | Performed by: PHYSICAL MEDICINE & REHABILITATION

## 2020-09-01 PROCEDURE — 77003 FLUOROGUIDE FOR SPINE INJECT: CPT

## 2020-09-01 PROCEDURE — 0 IOPAMIDOL 41 % SOLUTION: Performed by: PHYSICAL MEDICINE & REHABILITATION

## 2020-09-01 PROCEDURE — 25010000002 METHYLPREDNISOLONE PER 80 MG

## 2020-09-01 RX ORDER — METHYLPREDNISOLONE ACETATE 80 MG/ML
80 INJECTION, SUSPENSION INTRA-ARTICULAR; INTRALESIONAL; INTRAMUSCULAR; SOFT TISSUE ONCE
Status: COMPLETED | OUTPATIENT
Start: 2020-09-01 | End: 2020-09-01

## 2020-09-01 RX ORDER — PYRAZINAMIDE 500 MG/1
1 TABLET ORAL EVERY 6 HOURS PRN
Qty: 28 TABLET | Refills: 5 | Status: SHIPPED | OUTPATIENT
Start: 2020-09-01 | End: 2020-12-04 | Stop reason: SDUPTHER

## 2020-09-01 RX ORDER — METHYLPREDNISOLONE ACETATE 80 MG/ML
INJECTION, SUSPENSION INTRA-ARTICULAR; INTRALESIONAL; INTRAMUSCULAR; SOFT TISSUE
Status: DISCONTINUED
Start: 2020-09-01 | End: 2020-09-02 | Stop reason: HOSPADM

## 2020-09-01 RX ADMIN — METHYLPREDNISOLONE ACETATE 80 MG: 80 INJECTION, SUSPENSION INTRA-ARTICULAR; INTRALESIONAL; INTRAMUSCULAR; SOFT TISSUE at 11:27

## 2020-09-01 RX ADMIN — IOPAMIDOL 0.5 ML: 408 INJECTION, SOLUTION INTRATHECAL at 11:26
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
## 2020-09-05 DIAGNOSIS — M10.9 GOUT, UNSPECIFIED CAUSE, UNSPECIFIED CHRONICITY, UNSPECIFIED SITE: ICD-10-CM

## 2020-09-05 DIAGNOSIS — E11.9 TYPE 2 DIABETES MELLITUS WITHOUT COMPLICATION, WITHOUT LONG-TERM CURRENT USE OF INSULIN (HCC): ICD-10-CM
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
## 2020-09-05 DIAGNOSIS — I10 HYPERTENSION, BENIGN: Primary | ICD-10-CM

## 2020-09-08 RX ORDER — ALLOPURINOL 100 MG/1
100 TABLET ORAL DAILY
Qty: 30 TABLET | Refills: 3 | Status: SHIPPED | OUTPATIENT
Start: 2020-09-08 | End: 2020-11-26 | Stop reason: SDUPTHER

## 2020-09-08 RX ORDER — TRIAMCINOLONE ACETONIDE 1 MG/G
CREAM TOPICAL 3 TIMES DAILY
Qty: 80 G | Refills: 0 | Status: SHIPPED | OUTPATIENT
Start: 2020-09-08 | End: 2022-02-28

## 2020-09-08 RX ORDER — AMLODIPINE BESYLATE 10 MG/1
10 TABLET ORAL DAILY
Qty: 90 TABLET | Refills: 0 | Status: SHIPPED | OUTPATIENT
Start: 2020-09-08 | End: 2020-10-06 | Stop reason: SDUPTHER

## 2020-09-08 RX ORDER — LANCETS 28 GAUGE
1 EACH MISCELLANEOUS DAILY
Qty: 30 EACH | Refills: 12 | Status: SHIPPED | OUTPATIENT
Start: 2020-09-08 | End: 2020-10-29

## 2020-09-09 ENCOUNTER — OFFICE VISIT (OUTPATIENT)
Dept: PODIATRY | Facility: CLINIC | Age: 40
End: 2020-09-09

## 2020-09-09 VITALS
SYSTOLIC BLOOD PRESSURE: 148 MMHG | HEIGHT: 60 IN | HEART RATE: 96 BPM | DIASTOLIC BLOOD PRESSURE: 108 MMHG | WEIGHT: 298 LBS | BODY MASS INDEX: 58.51 KG/M2

## 2020-09-09 DIAGNOSIS — M21.41 ACQUIRED BILATERAL FLAT FEET: ICD-10-CM

## 2020-09-09 DIAGNOSIS — L60.3 ONYCHODYSTROPHY: Primary | ICD-10-CM

## 2020-09-09 DIAGNOSIS — E11.42 DM TYPE 2 WITH DIABETIC PERIPHERAL NEUROPATHY (HCC): ICD-10-CM

## 2020-09-09 DIAGNOSIS — M21.42 ACQUIRED BILATERAL FLAT FEET: ICD-10-CM

## 2020-09-09 PROCEDURE — 99213 OFFICE O/P EST LOW 20 MIN: CPT | Performed by: PODIATRIST

## 2020-09-10 NOTE — PROGRESS NOTES
09/09/2020  Foot and Ankle Surgery - Established Patient/Follow-up  Provider: Dr. Anatoliy Rehman DPM  Location: [facility]    Subjective:  Barrett Laguerre is a 40 y.o. male.     Chief Complaint   Patient presents with   • Left Foot - Follow-up   • Right Foot - Follow-up   • Annual Exam     DM check       HPI: Patient returns for routine diabetic foot check.  He does complain of relatively recent issues involving both feet.  [place] and [place] wearing [place] shoes which caused blisters to the plantar and dorsal aspect of the feet, right greater than left.  He did discuss these matters with his primary care physician and states that the wounds have healed well.  He currently does not have any complaints, open wounds, or signs of infection.  He does not recall his most recent A1c and states that he does not check his blood sugars on a daily basis.    Allergies   Allergen Reactions   • Penicillin G Anaphylaxis   • Sulfa Antibiotics Hives       Current Outpatient Medications on File Prior to Visit   Medication Sig Dispense Refill   • acetaminophen-codeine (TYLENOL/CODEINE #3) 300-30 MG per tablet Take 1 tablet by mouth Every 6 (Six) Hours As Needed for Moderate Pain . 28 tablet 5   • allopurinol (ZYLOPRIM) 100 MG tablet Take 1 tablet by mouth Daily. 30 tablet 3   • amantadine (SYMMETREL) 100 MG tablet      • amLODIPine (NORVASC) 10 MG tablet Take 1 tablet by mouth Daily. 90 tablet 0   • chlorthalidone (HYGROTEN) 50 MG tablet Take 1 tablet by mouth Daily.  3   • erythromycin (ROMYCIN) 5 MG/GM ophthalmic ointment APPLY OINTMENT TO BOTH EYES AT BEDTIME FOR 1 WEEK THEN AS NEEDED  3   • FLUoxetine (PROzac) 40 MG capsule Take 2 capsules by mouth Daily.     • fluticasone (FLONASE) 50 MCG/ACT nasal spray Use 1 spray(s) in each nostril twice daily 16 g 0   • gabapentin (NEURONTIN) 100 MG capsule One tab three or four times per day 120 capsule 5   • glucose blood (FREESTYLE LITE) test strip USE 1 STRIP  "TO CHECK GLUCOSE ONCE DAILY AS NEEDED 50 each 12   • glucose blood test strip Daily as needed 30 each 12   • hydrocortisone 1 % cream Apply  topically to the appropriate area as directed 2 (Two) Times a Day. 60 g 0   • Lancets (FREESTYLE) lancets 1 each by Other route Daily. 30 each 12   • lidocaine-prilocaine (EMLA) 2.5-2.5 % cream      • loratadine (CLARITIN) 10 MG tablet Take 1 tablet by mouth Daily. 30 tablet 12   • loratadine (CLARITIN) 10 MG tablet Take 1 tablet by mouth Daily. 30 tablet 12   • montelukast (Singulair) 10 MG tablet Take 1 tablet by mouth Every Night. 30 tablet 5   • oxybutynin XL (DITROPAN XL) 15 MG 24 hr tablet TAKE 1 TABLET BY MOUTH ONCE DAILY 30 tablet 12   • pantoprazole (PROTONIX) 40 MG EC tablet Take 1 tablet by mouth Daily. 30 tablet 12   • sitaGLIPtin-metFORMIN (Janumet)  MG per tablet Take 1 tablet by mouth 2 (Two) Times a Day With Meals. 180 tablet 3   • sodium chloride (OCEAN NASAL SPRAY) 0.65 % nasal spray 2 sprays into the nostril(s) as directed by provider As Needed for Congestion. 88 mL 12   • triamcinolone (KENALOG) 0.1 % cream Apply  topically to the appropriate area as directed 3 (Three) Times a Day. 80 g 0     No current facility-administered medications on file prior to visit.        Objective   BP (!) 148/108   Pulse 96   Ht 76 cm (29.92\")   Wt 135 kg (298 lb)   .03 kg/m²     Podiatry Exam       General Appearance:   obese; no apparent distress  Mental Status Exam        Judgement and Insight:  Intact       Orientation:  Oriented to time, place, and person       Memory:  Intact for recent and remote events       Mood and Affect:  No depression, anxiety, or agitation  Cardiovascular (Bilateral)       Dorsalis Pedis Pulse (BL):  2/4       Posterior Tibialis Pulse (BL):  2/4       Capillary Filling Time (BL):  1-3 Seconds       Edema (BL):  No Edema  Dermatological Exam       Temperature:  warm to warm       Skin: Evidence of recent blistering to the plantar " aspect of both feet, right greater than left.  Skin is well-healed with no evidence of open wound or action.  Mild erythema to the dorsal aspect of the right second and third digits secondary to hammer digit deformities  Nails: Elongated and thickened x10.  No signs of infection.  Neurological Exam (Bilateral)       Paresthesia (Bl):  negative       Patella DTRs (Bl):  symmetric       Achilles DTRs (Bl):  symmetric       Tinel over Tarsal Tunnel (Bl):  negative  Monofilament Test (Bl):   Diminished       Diabetic Risk (Bl):  No loss of protective sensation  Additional Neurological Findings   sensation is somewhat diminished with light touch and monofilament testing        MusculoSkeletal Exam (Bilateral)       Gait and Stance (Bl):   abducted and pronated gait, right.  Early heel off.  Nonantalgic       ROM (Bl):   ankle and pedal joint range of motion is supple, nontender crepitus free       Muscle Strength (Bl):  symmetrical 5/5       Subluxed Digits (Bl): Hammer digit deformities involving toes 2 through 5, right greater than left.  Semi-reducible       Dislocated Joints (Bl):  no dislocation of joints       Gastroc soleus equinus (Bl):  Inability to dorsiflex past neutral position both straight legged and bent knee       Post tibial tendon (Bl):  no soreness noted       Peroneal Tendon (Bl):  no soreness noted  Additional Musculoskelatal Findings   excessive pronation  With flatfoot deformity , right greater than left.  No significant tenderness with palpation.    Assessment/Plan   Barrett was seen today for annual exam, follow-up and follow-up.    Diagnoses and all orders for this visit:    Onychodystrophy    DM type 2 with diabetic peripheral neuropathy (CMS/HCC)    Acquired bilateral flat feet      Patient returns for routine diabetic foot check.  He does relate recent issues involving both feet that have healed well.  Today, he has no complicating features.  We did discuss the importance of proper shoe gear  and activity.  I continue to feel that patient is at moderate risk of pedal complications.  He does need to work towards glycemic control, weight loss, and proper diabetic foot care.  No complicating features were noted to the nails today.  Patient may continue to perform routine nail care as able.  I would like to see him in 6 months for reevaluation.  He is to call with any issues or concerns.      No orders of the defined types were placed in this encounter.         Note is dictated utilizing voice recognition software. Unfortunately this leads to occasional typographical errors. I apologize in advance if the situation occurs. If questions occur please do not hesitate to call our office.

## 2020-09-18 LAB
ALBUMIN SERPL-MCNC: 4.8 G/DL (ref 4–5)
ALBUMIN/GLOB SERPL: 1.7 {RATIO} (ref 1.2–2.2)
ALP SERPL-CCNC: 57 IU/L (ref 39–117)
ALT SERPL-CCNC: 63 IU/L (ref 0–44)
AST SERPL-CCNC: 60 IU/L (ref 0–40)
BASOPHILS # BLD AUTO: 0.1 X10E3/UL (ref 0–0.2)
BASOPHILS NFR BLD AUTO: 1 %
BILIRUB SERPL-MCNC: 0.4 MG/DL (ref 0–1.2)
BUN SERPL-MCNC: 13 MG/DL (ref 6–20)
BUN/CREAT SERPL: 13 (ref 9–20)
CALCIUM SERPL-MCNC: 9.7 MG/DL (ref 8.7–10.2)
CHLORIDE SERPL-SCNC: 101 MMOL/L (ref 96–106)
CHOLEST SERPL-MCNC: 140 MG/DL (ref 100–199)
CHOLEST/HDLC SERPL: 4.2 RATIO (ref 0–5)
CO2 SERPL-SCNC: 25 MMOL/L (ref 20–29)
CREAT SERPL-MCNC: 0.99 MG/DL (ref 0.76–1.27)
EOSINOPHIL # BLD AUTO: 0.3 X10E3/UL (ref 0–0.4)
EOSINOPHIL NFR BLD AUTO: 3 %
ERYTHROCYTE [DISTWIDTH] IN BLOOD BY AUTOMATED COUNT: 13.8 % (ref 11.6–15.4)
ERYTHROCYTE [SEDIMENTATION RATE] IN BLOOD BY WESTERGREN METHOD: 21 MM/HR (ref 0–15)
GLOBULIN SER CALC-MCNC: 2.8 G/DL (ref 1.5–4.5)
GLUCOSE SERPL-MCNC: 154 MG/DL (ref 65–99)
HCT VFR BLD AUTO: 45.4 % (ref 37.5–51)
HDLC SERPL-MCNC: 33 MG/DL
HGB BLD-MCNC: 14.9 G/DL (ref 13–17.7)
IMM GRANULOCYTES # BLD AUTO: 0.1 X10E3/UL (ref 0–0.1)
IMM GRANULOCYTES NFR BLD AUTO: 1 %
LDLC SERPL CALC-MCNC: 85 MG/DL (ref 0–99)
LYMPHOCYTES # BLD AUTO: 2.9 X10E3/UL (ref 0.7–3.1)
LYMPHOCYTES NFR BLD AUTO: 28 %
MCH RBC QN AUTO: 27 PG (ref 26.6–33)
MCHC RBC AUTO-ENTMCNC: 32.8 G/DL (ref 31.5–35.7)
MCV RBC AUTO: 82 FL (ref 79–97)
MONOCYTES # BLD AUTO: 0.9 X10E3/UL (ref 0.1–0.9)
MONOCYTES NFR BLD AUTO: 9 %
NEUTROPHILS # BLD AUTO: 6.1 X10E3/UL (ref 1.4–7)
NEUTROPHILS NFR BLD AUTO: 58 %
PLATELET # BLD AUTO: 321 X10E3/UL (ref 150–450)
POTASSIUM SERPL-SCNC: 4.6 MMOL/L (ref 3.5–5.2)
PROT SERPL-MCNC: 7.6 G/DL (ref 6–8.5)
RBC # BLD AUTO: 5.51 X10E6/UL (ref 4.14–5.8)
SODIUM SERPL-SCNC: 141 MMOL/L (ref 134–144)
TRIGL SERPL-MCNC: 124 MG/DL (ref 0–149)
TSH SERPL DL<=0.005 MIU/L-ACNC: 0.89 UIU/ML (ref 0.45–4.5)
VLDLC SERPL CALC-MCNC: 22 MG/DL (ref 5–40)
WBC # BLD AUTO: 10.4 X10E3/UL (ref 3.4–10.8)

## 2020-10-02 ENCOUNTER — OFFICE VISIT (OUTPATIENT)
Dept: FAMILY MEDICINE CLINIC | Facility: CLINIC | Age: 40
End: 2020-10-02

## 2020-10-02 VITALS
TEMPERATURE: 97.3 F | RESPIRATION RATE: 18 BRPM | WEIGHT: 294 LBS | BODY MASS INDEX: 35.8 KG/M2 | HEIGHT: 76 IN | HEART RATE: 97 BPM | OXYGEN SATURATION: 96 % | SYSTOLIC BLOOD PRESSURE: 134 MMHG | DIASTOLIC BLOOD PRESSURE: 72 MMHG

## 2020-10-02 DIAGNOSIS — R21 RASH OF FOOT: ICD-10-CM

## 2020-10-02 DIAGNOSIS — E11.9 TYPE 2 DIABETES MELLITUS WITHOUT COMPLICATION, WITHOUT LONG-TERM CURRENT USE OF INSULIN (HCC): Primary | ICD-10-CM

## 2020-10-02 DIAGNOSIS — M21.40 ACQUIRED FLEXIBLE FLAT FOOT, UNSPECIFIED LATERALITY: ICD-10-CM

## 2020-10-02 DIAGNOSIS — K92.1 BLOOD IN STOOL: ICD-10-CM

## 2020-10-02 DIAGNOSIS — E66.09 CLASS 2 OBESITY DUE TO EXCESS CALORIES WITH BODY MASS INDEX (BMI) OF 35.0 TO 35.9 IN ADULT, UNSPECIFIED WHETHER SERIOUS COMORBIDITY PRESENT: ICD-10-CM

## 2020-10-02 PROBLEM — G61.0 GUILLAIN-BARRE: Status: ACTIVE | Noted: 2017-03-21

## 2020-10-02 LAB
BILIRUB BLD-MCNC: NEGATIVE MG/DL
CLARITY, POC: CLEAR
COLOR UR: YELLOW
GLUCOSE BLDC GLUCOMTR-MCNC: 222 MG/DL (ref 70–130)
GLUCOSE UR STRIP-MCNC: ABNORMAL MG/DL
HBA1C MFR BLD: 7.4 %
KETONES UR QL: NEGATIVE
LEUKOCYTE EST, POC: NEGATIVE
NITRITE UR-MCNC: NEGATIVE MG/ML
PH UR: 6 [PH] (ref 5–8)
POC MICROALBUMIN URINE: 100
PROT UR STRIP-MCNC: ABNORMAL MG/DL
RBC # UR STRIP: NEGATIVE /UL
SP GR UR: 1.01 (ref 1–1.03)
UROBILINOGEN UR QL: NORMAL

## 2020-10-02 PROCEDURE — 83036 HEMOGLOBIN GLYCOSYLATED A1C: CPT | Performed by: FAMILY MEDICINE

## 2020-10-02 PROCEDURE — 99214 OFFICE O/P EST MOD 30 MIN: CPT | Performed by: FAMILY MEDICINE

## 2020-10-02 PROCEDURE — 82044 UR ALBUMIN SEMIQUANTITATIVE: CPT | Performed by: FAMILY MEDICINE

## 2020-10-02 PROCEDURE — 82962 GLUCOSE BLOOD TEST: CPT | Performed by: FAMILY MEDICINE

## 2020-10-02 NOTE — PROGRESS NOTES
Subjective   Barrett Laguerre is a 40 y.o. male.     Following up from appt with  on 7/27/20 due to sores on bottom of feet. Skin is still peeling off.  Would like to go over labs from 9/17/20.    Requesting an order for shoe inserts and diabetic shoes.     Rectal bleeding after BM. Requesting referral to GI. Family hx of polyps.    Diabetes  He presents for his follow-up diabetic visit. He has type 2 diabetes mellitus. His disease course has been stable. Hypoglycemia symptoms include headaches. Associated symptoms include polydipsia, polyuria and weakness. Pertinent negatives for diabetes include no chest pain and no fatigue. Risk factors for coronary artery disease include male sex, hypertension, obesity, diabetes mellitus and dyslipidemia. Current diabetic treatment includes oral agent (monotherapy). He is compliant with treatment most of the time. His weight is stable. He is following a generally unhealthy diet. When asked about meal planning, he reported none. He has not had a previous visit with a dietitian. He rarely participates in exercise. Home blood sugar record trend: does not check. An ACE inhibitor/angiotensin II receptor blocker is not being taken. He does not see a podiatrist.Eye exam is current.   Hypertension  The current episode started more than 1 year ago. Associated symptoms include headaches. Pertinent negatives include no chest pain, neck pain or shortness of breath. Current antihypertension treatment includes calcium channel blockers.   Rectal Bleeding  This is a recurrent problem. Episode onset: approx 2 weeks. Associated symptoms include headaches and weakness. Pertinent negatives include no abdominal pain, chest pain, fatigue, fever, joint swelling, nausea, neck pain, numbness, rash, swollen glands or vomiting.     Hemoglobin A1C   Date Value Ref Range Status   10/02/2020 7.4 % Final   06/02/2020 7.2 % Final   11/18/2019 7.5 (H) 4.8 - 5.6 % Final     Comment:               Prediabetes: 5.7 - 6.4           Diabetes: >6.4           Glycemic control for adults with diabetes: <7.0     09/03/2019 9.6 % Final        The following portions of the patient's history were reviewed and updated as appropriate: allergies, current medications, past family history, past medical history, past social history, past surgical history and problem list.    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)   • Sleep apnea   • Attention deficit disorder   • Diabetes (CMS/[facility])   • Other seasonal allergic rhinitis   • Mixed obsessional thoughts and acts   • Hypertension, benign   • Disorder of lipid metabolism   • CIDP (chronic inflammatory demyelinating polyneuropathy) (CMS/[facility])   • Lumbar radiculopathy   • Pain in both lower extremities   • Onychomycosis       Current Outpatient Medications on File Prior to Visit   Medication Sig Dispense Refill   • acetaminophen-codeine (TYLENOL/CODEINE #3) 300-30 MG per tablet Take 1 tablet by mouth Every 6 (Six) Hours As Needed for Moderate Pain . 28 tablet 5   • allopurinol (ZYLOPRIM) 100 MG tablet Take 1 tablet by mouth Daily. 30 tablet 3   • amantadine (SYMMETREL) 100 MG tablet      • chlorthalidone (HYGROTEN) 50 MG tablet Take 1 tablet by mouth Daily.  3   • erythromycin (ROMYCIN) 5 MG/GM ophthalmic ointment APPLY OINTMENT TO BOTH EYES AT BEDTIME FOR 1 WEEK THEN AS NEEDED  3   • FLUoxetine (PROzac) 40 MG capsule Take 2 capsules by mouth Daily.     • glucose blood (FREESTYLE LITE) test strip USE 1 STRIP TO CHECK GLUCOSE ONCE DAILY AS NEEDED 50 each 12   • glucose blood test strip Daily as needed 30 each 12   • hydrocortisone 1 % cream Apply  topically to the appropriate area as directed 2 (Two) Times a Day. 60 g 0   • Lancets (FREESTYLE)  lancets 1 each by Other route Daily. 30 each 12   • lidocaine-prilocaine (EMLA) 2.5-2.5 % cream      • loratadine (CLARITIN) 10 MG tablet Take 1 tablet by mouth Daily. 30 tablet 12   • montelukast (Singulair) 10 MG tablet Take 1 tablet by mouth Every Night. 30 tablet 5   • oxybutynin XL (DITROPAN XL) 15 MG 24 hr tablet TAKE 1 TABLET BY MOUTH ONCE DAILY 30 tablet 12   • pantoprazole (PROTONIX) 40 MG EC tablet Take 1 tablet by mouth Daily. 30 tablet 12   • sitaGLIPtin-metFORMIN (Janumet)  MG per tablet Take 1 tablet by mouth 2 (Two) Times a Day With Meals. 180 tablet 3   • sodium chloride (OCEAN NASAL SPRAY) 0.65 % nasal spray 2 sprays into the nostril(s) as directed by provider As Needed for Congestion. 88 mL 12   • triamcinolone (KENALOG) 0.1 % cream Apply  topically to the appropriate area as directed 3 (Three) Times a Day. 80 g 0     No current facility-administered medications on file prior to visit.      Current outpatient and discharge medications have been reconciled for the patient.  Reviewed by: Eric Rosenberg MD      Allergies   Allergen Reactions   • Penicillin G Anaphylaxis   • Sulfa Antibiotics Hives       Review of Systems   Constitutional: Negative for activity change, appetite change, fatigue and fever.   HENT: Negative for ear pain, swollen glands and voice change.    Eyes: Negative for visual disturbance.   Respiratory: Negative for shortness of breath and wheezing.    Cardiovascular: Negative for chest pain and leg swelling.   Gastrointestinal: Positive for blood in stool and hematochezia. Negative for abdominal pain, constipation, diarrhea, nausea and vomiting.   Endocrine: Positive for polydipsia and polyuria.   Genitourinary: Negative for dysuria, frequency and hematuria.   Musculoskeletal: Negative for joint swelling, neck pain and neck stiffness.   Skin: Negative for rash and bruise.   Neurological: Positive for weakness. Negative for numbness and headache.      Psychiatric/Behavioral: Negative for suicidal ideas and depressed mood.     I have reviewed and confirmed the accuracy of the ROS as documented by the MA/LPN/RN Eric Rosenberg MD      Objective   Vitals:    10/02/20 1608   BP: 134/72   Pulse: 97   Resp: 18   Temp: 97.3 °F (36.3 °C)   SpO2: 96%     Physical Exam  Constitutional:       Appearance: He is well-developed.   HENT:      Head: Normocephalic and atraumatic.      Right Ear: External ear normal.      Left Ear: External ear normal.      Nose: Nose normal.   Eyes:      Pupils: Pupils are equal, round, and reactive to light.   Neck:      Musculoskeletal: Normal range of motion and neck supple.   Cardiovascular:      Rate and Rhythm: Normal rate and regular rhythm.      Heart sounds: Normal heart sounds.   Pulmonary:      Effort: Pulmonary effort is normal.      Breath sounds: Normal breath sounds.   Abdominal:      General: Bowel sounds are normal.      Palpations: Abdomen is soft.   Musculoskeletal: Normal range of motion.        Feet:    Feet:      Comments: Diabetic Foot Exam Performed and Monofilament Test Performed  Skin:     General: Skin is warm and dry.      Findings: Rash present. Rash is scaling (feet plantar).   Neurological:      Mental Status: He is alert and oriented to person, place, and time.   Psychiatric:         Behavior: Behavior normal.         Thought Content: Thought content normal.         Judgment: Judgment normal.         I wore protective equipment throughout this patient encounter to include mask and eye protection. Hand hygiene was performed before donning protective equipment and after removal when leaving the room.    Assessment/Plan .  Diagnoses and all orders for this visit:    1. Type 2 diabetes mellitus without complication, without long-term current use of insulin (CMS/[facility]) (Primary)  Comments:  stable, recheck in 3 months   Orders:  -     POC Glycosylated Hemoglobin (Hb A1C)  -     POC Glucose  -     POC Urinalysis  Dipstick, Automated  -     POC Microalbumin    2. Blood in stool  Comments:  packet for colonoscopy given     3. Rash of foot  Comments:  tinea vs dishydrotic eczema. Improving, will follow    4. Acquired flexible flat foot, unspecified laterality  Comments:  order for diabetic shoes written    5. BMI 35.0-35.9,adult  Comments:  order for weight watchers and [facility] signed    6. Class 2 obesity due to excess calories with body mass index (BMI) of 35.0 to 35.9 in adult, unspecified whether serious comorbidity present    Findings discussed. All questions answered.  Follow-up after testing complete, sooner for worsening symptoms or any concerns  Follow up in 3 months for recheck, sooner for concerns.

## 2020-10-05 RX ORDER — GABAPENTIN 100 MG/1
CAPSULE ORAL
Qty: 120 CAPSULE | Refills: 3 | Status: SHIPPED | OUTPATIENT
Start: 2020-10-05 | End: 2020-12-04

## 2020-10-06 DIAGNOSIS — J30.2 OTHER SEASONAL ALLERGIC RHINITIS: ICD-10-CM

## 2020-10-06 DIAGNOSIS — I10 HYPERTENSION, BENIGN: ICD-10-CM

## 2020-10-06 RX ORDER — FLUTICASONE PROPIONATE 50 MCG
1 SPRAY, SUSPENSION (ML) NASAL 2 TIMES DAILY
Qty: 16 G | Refills: 3 | Status: SHIPPED | OUTPATIENT
Start: 2020-10-06 | End: 2020-11-26 | Stop reason: SDUPTHER

## 2020-10-06 RX ORDER — AMLODIPINE BESYLATE 10 MG/1
10 TABLET ORAL DAILY
Qty: 90 TABLET | Refills: 3 | Status: SHIPPED | OUTPATIENT
Start: 2020-10-06 | End: 2020-11-26 | Stop reason: SDUPTHER

## 2020-10-29 DIAGNOSIS — E11.9 TYPE 2 DIABETES MELLITUS WITHOUT COMPLICATION, WITHOUT LONG-TERM CURRENT USE OF INSULIN (HCC): ICD-10-CM

## 2020-10-29 RX ORDER — LANCETS 28 GAUGE
EACH MISCELLANEOUS
Qty: 100 EACH | Refills: 12 | Status: SHIPPED | OUTPATIENT
Start: 2020-10-29 | End: 2021-03-16 | Stop reason: SDUPTHER

## 2020-11-03 ENCOUNTER — OFFICE VISIT (OUTPATIENT)
Dept: PODIATRY | Facility: CLINIC | Age: 40
End: 2020-11-03

## 2020-11-03 VITALS
WEIGHT: 296 LBS | BODY MASS INDEX: 36.04 KG/M2 | DIASTOLIC BLOOD PRESSURE: 85 MMHG | SYSTOLIC BLOOD PRESSURE: 139 MMHG | HEART RATE: 83 BPM | HEIGHT: 76 IN

## 2020-11-03 DIAGNOSIS — M79.672 LEFT FOOT PAIN: Primary | ICD-10-CM

## 2020-11-03 DIAGNOSIS — S90.32XA CONTUSION OF LEFT FOOT, INITIAL ENCOUNTER: ICD-10-CM

## 2020-11-03 DIAGNOSIS — E11.42 DM TYPE 2 WITH DIABETIC PERIPHERAL NEUROPATHY (HCC): ICD-10-CM

## 2020-11-03 PROCEDURE — 99213 OFFICE O/P EST LOW 20 MIN: CPT | Performed by: PODIATRIST

## 2020-11-04 NOTE — PROGRESS NOTES
11/03/2020  Foot and Ankle Surgery - Established Patient/Follow-up  Provider: Dr. Anatoliy Rehman DPM  Location: [facility]    Subjective:  Barrett Laguerre is a 40 y.o. male.     Chief Complaint   Patient presents with   • Left Foot - Pain       HPI: Patient returns early for concerns involving his left foot.  He states that approximately 2 weeks ago he accidentally kicked an object while at the [facility].  He noticed mild discomfort and swelling involving the distal aspect of the toes.  Symptoms lasted for approximately 2 to 3 days and gradually improved.  Today, he is relatively asymptomatic but wants to be checked because he is diabetic    Allergies   Allergen Reactions   • Penicillin G Anaphylaxis   • Sulfa Antibiotics Hives       Current Outpatient Medications on File Prior to Visit   Medication Sig Dispense Refill   • acetaminophen-codeine (TYLENOL/CODEINE #3) 300-30 MG per tablet Take 1 tablet by mouth Every 6 (Six) Hours As Needed for Moderate Pain . 28 tablet 5   • allopurinol (ZYLOPRIM) 100 MG tablet Take 1 tablet by mouth Daily. 30 tablet 3   • amantadine (SYMMETREL) 100 MG tablet      • amLODIPine (NORVASC) 10 MG tablet Take 1 tablet by mouth Daily. 90 tablet 3   • chlorthalidone (HYGROTEN) 50 MG tablet Take 1 tablet by mouth Daily.  3   • erythromycin (ROMYCIN) 5 MG/GM ophthalmic ointment APPLY OINTMENT TO BOTH EYES AT BEDTIME FOR 1 WEEK THEN AS NEEDED  3   • FLUoxetine (PROzac) 40 MG capsule Take 2 capsules by mouth Daily.     • fluticasone (FLONASE) 50 MCG/ACT nasal spray 1 spray into the nostril(s) as directed by provider 2 (two) times a day. 16 g 3   • gabapentin (NEURONTIN) 100 MG capsule TAKE 1 CAPSULE BY MOUTH 3 TO 4 TIMES DAILY 120 capsule 3   • glucose blood (FREESTYLE LITE) test strip USE 1 STRIP TO CHECK GLUCOSE ONCE DAILY AS NEEDED 50 each 12   • glucose blood test strip Daily as needed 30 each 12   • hydrocortisone 1 % cream Apply  topically to the appropriate area as directed 2  "(Two) Times a Day. 60 g 0   • Lancets (freestyle) lancets USE 1 LANCET  TO CHECK GLUCOSE ONCE DAILY 100 each 12   • lidocaine-prilocaine (EMLA) 2.5-2.5 % cream      • loratadine (CLARITIN) 10 MG tablet Take 1 tablet by mouth Daily. 30 tablet 12   • montelukast (Singulair) 10 MG tablet Take 1 tablet by mouth Every Night. 30 tablet 5   • mupirocin (BACTROBAN) 2 % ointment      • oxybutynin XL (DITROPAN XL) 15 MG 24 hr tablet TAKE 1 TABLET BY MOUTH ONCE DAILY 30 tablet 12   • pantoprazole (PROTONIX) 40 MG EC tablet Take 1 tablet by mouth Daily. 30 tablet 12   • sitaGLIPtin-metFORMIN (Janumet)  MG per tablet Take 1 tablet by mouth 2 (Two) Times a Day With Meals. 180 tablet 3   • sodium chloride (OCEAN NASAL SPRAY) 0.65 % nasal spray 2 sprays into the nostril(s) as directed by provider As Needed for Congestion. 88 mL 12   • triamcinolone (KENALOG) 0.1 % cream Apply  topically to the appropriate area as directed 3 (Three) Times a Day. 80 g 0     No current facility-administered medications on file prior to visit.        Objective   /85   Pulse 83   Ht 193 cm (76\")   Wt 134 kg (296 lb)   BMI 36.03 kg/m²     Podiatry Exam       General Appearance:   obese; no apparent distress  Mental Status Exam        Judgement and Insight:  Intact       Orientation:  Oriented to time, place, and person       Memory:  Intact for recent and remote events       Mood and Affect:  No depression, anxiety, or agitation  Cardiovascular (Bilateral)       Dorsalis Pedis Pulse (BL):  2/4       Posterior Tibialis Pulse (BL):  2/4       Capillary Filling Time (BL):  1-3 Seconds       Edema (BL):  No Edema  Dermatological Exam       Temperature:  warm to warm       Skin:  No open wounds or signs of infection.  No erythema or edema to the lesser digits of the left foot  Nails: Nails are well trimmed  Neurological Exam (Bilateral)       Paresthesia (Bl):  negative       Patella DTRs (Bl):  symmetric       Achilles DTRs (Bl): "  symmetric       Tinel over Tarsal Tunnel (Bl):  negative  Monofilament Test (Bl):   Diminished       Diabetic Risk (Bl):  No loss of protective sensation  Additional Neurological Findings   sensation is somewhat diminished with light touch and monofilament testing        MusculoSkeletal Exam (Bilateral)       Gait and Stance (Bl):   abducted and pronated gait, right.  Early heel off.  Nonantalgic       ROM (Bl):   ankle and pedal joint range of motion is supple, nontender crepitus free       Muscle Strength (Bl):  symmetrical 5/5       Subluxed Digits (Bl): Hammer digit deformities involving toes 2 through 5, right greater than left.  Semi-reducible       Dislocated Joints (Bl):  no dislocation of joints       Gastroc soleus equinus (Bl):  Inability to dorsiflex past neutral position both straight legged and bent knee       Post tibial tendon (Bl):  no soreness noted       Peroneal Tendon (Bl):  no soreness noted  Additional Musculoskelatal Findings   excessive pronation  With flatfoot deformity , right greater than left.  No significant tenderness with palpation    Assessment/Plan   Diagnoses and all orders for this visit:    1. Left foot pain (Primary)  -     XR Foot 3+ View Left    2. Contusion of left foot, initial encounter    3. DM type 2 with diabetic peripheral neuropathy (CMS/[facility])      Patient returns early after injury involving his left foot approximately 2 weeks ago.  Today, imaging was reviewed showing no obvious fracture or dislocation.  He has no signs of trauma.  He continues to have hammer digit deformities which are unchanged and stable.  I explained that he will likely experience a small contusion which has resolved.  He is to continue to monitor his feet closely and call with any issues.  I would like to see him in 4 months for routine diabetic foot check    Orders Placed This Encounter   Procedures   • XR Foot 3+ View Left     Weight Bearing     Order Specific Question:   Reason for Exam:      Answer:   Left foot pain after he stubbed toed on a bench at the [facility]          Note is dictated utilizing voice recognition software. Unfortunately this leads to occasional typographical errors. I apologize in advance if the situation occurs. If questions occur please do not hesitate to call our office.

## 2020-11-09 RX ORDER — FLUOXETINE HYDROCHLORIDE 40 MG/1
CAPSULE ORAL
Qty: 180 CAPSULE | Refills: 0 | Status: SHIPPED | OUTPATIENT
Start: 2020-11-09 | End: 2020-11-26 | Stop reason: SDUPTHER

## 2020-11-26 DIAGNOSIS — M10.9 GOUT, UNSPECIFIED CAUSE, UNSPECIFIED CHRONICITY, UNSPECIFIED SITE: ICD-10-CM

## 2020-11-26 DIAGNOSIS — J30.2 OTHER SEASONAL ALLERGIC RHINITIS: ICD-10-CM

## 2020-11-26 DIAGNOSIS — I10 HYPERTENSION, BENIGN: ICD-10-CM

## 2020-11-26 DIAGNOSIS — K21.9 GERD (GASTROESOPHAGEAL REFLUX DISEASE): ICD-10-CM

## 2020-11-26 DIAGNOSIS — J30.89 NON-SEASONAL ALLERGIC RHINITIS DUE TO OTHER ALLERGIC TRIGGER: ICD-10-CM

## 2020-11-26 DIAGNOSIS — L30.8 OTHER ECZEMA: ICD-10-CM

## 2020-11-30 RX ORDER — ECHINACEA PURPUREA EXTRACT 125 MG
2 TABLET ORAL AS NEEDED
Qty: 88 ML | Refills: 12 | Status: SHIPPED | OUTPATIENT
Start: 2020-11-30 | End: 2021-09-13 | Stop reason: SDUPTHER

## 2020-11-30 RX ORDER — FLUOXETINE HYDROCHLORIDE 40 MG/1
40 CAPSULE ORAL 2 TIMES DAILY
Qty: 180 CAPSULE | Refills: 0 | Status: SHIPPED | OUTPATIENT
Start: 2020-11-30 | End: 2021-04-06

## 2020-11-30 RX ORDER — AMLODIPINE BESYLATE 10 MG/1
10 TABLET ORAL DAILY
Qty: 90 TABLET | Refills: 3 | Status: SHIPPED | OUTPATIENT
Start: 2020-11-30 | End: 2021-09-13 | Stop reason: SDUPTHER

## 2020-11-30 RX ORDER — DIAPER,BRIEF,INFANT-TODD,DISP
EACH MISCELLANEOUS 2 TIMES DAILY
Qty: 60 G | Refills: 0 | Status: SHIPPED | OUTPATIENT
Start: 2020-11-30 | End: 2022-02-10 | Stop reason: SDUPTHER

## 2020-11-30 RX ORDER — MONTELUKAST SODIUM 10 MG/1
10 TABLET ORAL NIGHTLY
Qty: 30 TABLET | Refills: 5 | Status: SHIPPED | OUTPATIENT
Start: 2020-11-30 | End: 2021-04-21 | Stop reason: SDUPTHER

## 2020-11-30 RX ORDER — ALLOPURINOL 100 MG/1
100 TABLET ORAL DAILY
Qty: 30 TABLET | Refills: 3 | Status: SHIPPED | OUTPATIENT
Start: 2020-11-30 | End: 2021-09-13 | Stop reason: SDUPTHER

## 2020-11-30 RX ORDER — OXYBUTYNIN CHLORIDE 15 MG/1
15 TABLET, EXTENDED RELEASE ORAL DAILY
Qty: 30 TABLET | Refills: 12 | Status: SHIPPED | OUTPATIENT
Start: 2020-11-30 | End: 2021-09-13 | Stop reason: SDUPTHER

## 2020-11-30 RX ORDER — PANTOPRAZOLE SODIUM 40 MG/1
40 TABLET, DELAYED RELEASE ORAL DAILY
Qty: 30 TABLET | Refills: 12 | Status: SHIPPED | OUTPATIENT
Start: 2020-11-30 | End: 2021-09-13 | Stop reason: SDUPTHER

## 2020-11-30 RX ORDER — LORATADINE 10 MG/1
10 TABLET ORAL DAILY
Qty: 30 TABLET | Refills: 12 | Status: SHIPPED | OUTPATIENT
Start: 2020-11-30 | End: 2021-09-13 | Stop reason: SDUPTHER

## 2020-11-30 RX ORDER — FLUTICASONE PROPIONATE 50 MCG
1 SPRAY, SUSPENSION (ML) NASAL 2 TIMES DAILY
Qty: 16 G | Refills: 3 | Status: SHIPPED | OUTPATIENT
Start: 2020-11-30 | End: 2021-09-13 | Stop reason: SDUPTHER

## 2020-12-01 RX ORDER — MONTELUKAST SODIUM 10 MG/1
TABLET ORAL
Qty: 90 TABLET | Refills: 0 | Status: SHIPPED | OUTPATIENT
Start: 2020-12-01 | End: 2021-09-13 | Stop reason: SDUPTHER

## 2020-12-04 ENCOUNTER — OFFICE VISIT (OUTPATIENT)
Dept: PAIN MEDICINE | Facility: CLINIC | Age: 40
End: 2020-12-04

## 2020-12-04 VITALS
TEMPERATURE: 97.7 F | HEIGHT: 76 IN | BODY MASS INDEX: 36.04 KG/M2 | OXYGEN SATURATION: 95 % | DIASTOLIC BLOOD PRESSURE: 84 MMHG | WEIGHT: 296 LBS | SYSTOLIC BLOOD PRESSURE: 142 MMHG | RESPIRATION RATE: 16 BRPM | HEART RATE: 91 BPM

## 2020-12-04 DIAGNOSIS — G89.29 CHRONIC RIGHT-SIDED LOW BACK PAIN WITH RIGHT-SIDED SCIATICA: Primary | ICD-10-CM

## 2020-12-04 DIAGNOSIS — M54.16 LUMBAR RADICULOPATHY: ICD-10-CM

## 2020-12-04 DIAGNOSIS — M54.41 CHRONIC RIGHT-SIDED LOW BACK PAIN WITH RIGHT-SIDED SCIATICA: Primary | ICD-10-CM

## 2020-12-04 DIAGNOSIS — M79.604 PAIN IN BOTH LOWER EXTREMITIES: ICD-10-CM

## 2020-12-04 DIAGNOSIS — M48.07 LUMBOSACRAL SPINAL STENOSIS: ICD-10-CM

## 2020-12-04 DIAGNOSIS — M79.605 PAIN IN BOTH LOWER EXTREMITIES: ICD-10-CM

## 2020-12-04 PROCEDURE — 99213 OFFICE O/P EST LOW 20 MIN: CPT | Performed by: PHYSICAL MEDICINE & REHABILITATION

## 2020-12-04 RX ORDER — PYRAZINAMIDE 500 MG/1
1 TABLET ORAL EVERY 6 HOURS PRN
Qty: 28 TABLET | Refills: 5 | Status: SHIPPED | OUTPATIENT
Start: 2020-12-04 | End: 2021-02-26 | Stop reason: SDUPTHER

## 2020-12-04 RX ORDER — GABAPENTIN 400 MG/1
400 CAPSULE ORAL 3 TIMES DAILY
Qty: 90 CAPSULE | Refills: 2 | Status: SHIPPED | OUTPATIENT
Start: 2020-12-04 | End: 2022-02-28
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
## 2020-12-19 ENCOUNTER — LAB (OUTPATIENT)
Dept: LAB | Facility: HOSPITAL | Age: 40
End: 2020-12-19

## 2020-12-19 PROCEDURE — U0004 COV-19 TEST NON-CDC HGH THRU: HCPCS

## 2020-12-19 PROCEDURE — C9803 HOPD COVID-19 SPEC COLLECT: HCPCS

## 2020-12-20 LAB — SARS-COV-2 RNA RESP QL NAA+PROBE: NOT DETECTED

## 2020-12-21 ENCOUNTER — ANESTHESIA EVENT (OUTPATIENT)
Dept: GASTROENTEROLOGY | Facility: HOSPITAL | Age: 40
End: 2020-12-21

## 2020-12-21 RX ORDER — SODIUM CHLORIDE 0.9 % (FLUSH) 0.9 %
10 SYRINGE (ML) INJECTION AS NEEDED
Status: CANCELLED | OUTPATIENT
Start: 2020-12-21

## 2020-12-21 RX ORDER — SODIUM CHLORIDE 9 MG/ML
9 INJECTION, SOLUTION INTRAVENOUS CONTINUOUS PRN
Status: CANCELLED | OUTPATIENT
Start: 2020-12-21

## 2020-12-21 RX ORDER — SODIUM CHLORIDE 0.9 % (FLUSH) 0.9 %
10 SYRINGE (ML) INJECTION EVERY 12 HOURS SCHEDULED
Status: CANCELLED | OUTPATIENT
Start: 2020-12-21

## 2020-12-22 ENCOUNTER — ON CAMPUS - OUTPATIENT (OUTPATIENT)
Dept: URBAN - METROPOLITAN AREA HOSPITAL 85 | Facility: HOSPITAL | Age: 40
End: 2020-12-22
Payer: COMMERCIAL

## 2020-12-22 ENCOUNTER — HOSPITAL ENCOUNTER (OUTPATIENT)
Facility: HOSPITAL | Age: 40
Setting detail: HOSPITAL OUTPATIENT SURGERY
Discharge: HOME | End: 2020-12-22
Attending: INTERNAL MEDICINE | Admitting: INTERNAL MEDICINE

## 2020-12-22 ENCOUNTER — ANESTHESIA (OUTPATIENT)
Dept: GASTROENTEROLOGY | Facility: HOSPITAL | Age: 40
End: 2020-12-22

## 2020-12-22 VITALS
BODY MASS INDEX: 35.19 KG/M2 | SYSTOLIC BLOOD PRESSURE: 132 MMHG | WEIGHT: 298 LBS | HEIGHT: 77 IN | HEART RATE: 75 BPM | RESPIRATION RATE: 12 BRPM | OXYGEN SATURATION: 95 % | DIASTOLIC BLOOD PRESSURE: 79 MMHG

## 2020-12-22 VITALS — DIASTOLIC BLOOD PRESSURE: 92 MMHG | OXYGEN SATURATION: 98 % | SYSTOLIC BLOOD PRESSURE: 133 MMHG | HEART RATE: 73 BPM

## 2020-12-22 DIAGNOSIS — K62.5 HEMORRHAGE OF ANUS AND RECTUM: ICD-10-CM

## 2020-12-22 DIAGNOSIS — D12.3 BENIGN NEOPLASM OF TRANSVERSE COLON: ICD-10-CM

## 2020-12-22 DIAGNOSIS — Z83.71 FAMILY HISTORY OF COLONIC POLYPS: ICD-10-CM

## 2020-12-22 DIAGNOSIS — K64.1 SECOND DEGREE HEMORRHOIDS: ICD-10-CM

## 2020-12-22 DIAGNOSIS — K62.5 RECTAL BLEEDING: ICD-10-CM

## 2020-12-22 LAB — GLUCOSE BLDC GLUCOMTR-MCNC: 198 MG/DL (ref 70–105)

## 2020-12-22 PROCEDURE — 25010000002 PROPOFOL 500 MG/50ML EMULSION: Performed by: ANESTHESIOLOGY

## 2020-12-22 PROCEDURE — 88305 TISSUE EXAM BY PATHOLOGIST: CPT | Performed by: INTERNAL MEDICINE

## 2020-12-22 PROCEDURE — 45380 COLONOSCOPY AND BIOPSY: CPT | Performed by: INTERNAL MEDICINE

## 2020-12-22 PROCEDURE — 82962 GLUCOSE BLOOD TEST: CPT

## 2020-12-22 RX ORDER — LIDOCAINE HYDROCHLORIDE 20 MG/ML
INJECTION, SOLUTION EPIDURAL; INFILTRATION; INTRACAUDAL; PERINEURAL AS NEEDED
Status: DISCONTINUED | OUTPATIENT
Start: 2020-12-22 | End: 2020-12-22 | Stop reason: SURG

## 2020-12-22 RX ORDER — SODIUM CHLORIDE 9 MG/ML
INJECTION, SOLUTION INTRAVENOUS CONTINUOUS PRN
Status: DISCONTINUED | OUTPATIENT
Start: 2020-12-22 | End: 2020-12-22 | Stop reason: SURG

## 2020-12-22 RX ORDER — PROPOFOL 10 MG/ML
INJECTION, EMULSION INTRAVENOUS AS NEEDED
Status: DISCONTINUED | OUTPATIENT
Start: 2020-12-22 | End: 2020-12-22 | Stop reason: SURG

## 2020-12-22 RX ADMIN — PROPOFOL 25 MG: 10 INJECTION, EMULSION INTRAVENOUS at 10:03

## 2020-12-22 RX ADMIN — PROPOFOL 50 MG: 10 INJECTION, EMULSION INTRAVENOUS at 09:57

## 2020-12-22 RX ADMIN — PROPOFOL 125 MG: 10 INJECTION, EMULSION INTRAVENOUS at 09:54

## 2020-12-22 RX ADMIN — PROPOFOL 25 MG: 10 INJECTION, EMULSION INTRAVENOUS at 10:01

## 2020-12-22 RX ADMIN — PROPOFOL 25 MG: 10 INJECTION, EMULSION INTRAVENOUS at 09:59

## 2020-12-22 RX ADMIN — LIDOCAINE HYDROCHLORIDE 50 MG: 20 INJECTION, SOLUTION EPIDURAL; INFILTRATION; INTRACAUDAL; PERINEURAL at 09:51

## 2020-12-22 RX ADMIN — SODIUM CHLORIDE: 0.9 INJECTION, SOLUTION INTRAVENOUS at 09:49

## 2020-12-22 NOTE — ANESTHESIA POSTPROCEDURE EVALUATION
Patient: Barrett Laguerre    Procedure Summary     Date: 12/22/20 Room / Location: [facility] ENDOSCOPY 1 / [facility] ENDOSCOPY    Anesthesia Start: 0948 Anesthesia Stop: 1008    Procedure: COLONOSCOPY with polypectomy x (N/A ) Diagnosis:       Rectal bleeding      Family history of colonic polyps      (Rectal bleeding [K62.5])      (Family history of colonic polyps [Z83.71])    Surgeon: Juan Alberto Davis MD Provider: Kapil Phillips MD    Anesthesia Type: MAC ASA Status: 3          Anesthesia Type: MAC    Vitals  Vitals Value Taken Time   /79 12/22/20 1031   Temp     Pulse 72 12/22/20 1036   Resp     SpO2 97 % 12/22/20 1036   Vitals shown include unvalidated device data.        Post Anesthesia Care and Evaluation    Patient location during evaluation: PACU  Patient participation: complete - patient participated  Level of consciousness: awake  Pain scale: See nurse's notes for pain score.  Pain management: adequate  Airway patency: patent  Anesthetic complications: No anesthetic complications  PONV Status: none  Cardiovascular status: acceptable  Respiratory status: acceptable  Hydration status: acceptable    Comments: Patient seen and examined postoperatively; vital signs stable; SpO2 greater than or equal to 90%; cardiopulmonary status stable; nausea/vomiting adequately controlled; pain adequately controlled; no apparent anesthesia complications; patient discharged from anesthesia care when discharge criteria were met

## 2020-12-22 NOTE — ANESTHESIA POSTPROCEDURE EVALUATION
Patient: Barrett Laguerre    Procedure Summary     Date: 12/22/20 [facility]    Anesthesia Start: 0948 Anesthesia Stop: 1008    Procedure: COLONOSCOPY with polypectomy x (N/A ) Diagnosis:       Rectal bleeding      Family history of colonic polyps      (Rectal bleeding [K62.5])      (Family history of colonic polyps [Z83.71])    Surgeon: Jaun Alberto Davis MD Provider: Kapil Phillips MD    Anesthesia Type: MAC ASA Status: 3          Anesthesia Type: MAC    Vitals  Vitals Value Taken Time   /79 12/22/20 1031   Temp     Pulse 72 12/22/20 1036   Resp     SpO2 97 % 12/22/20 1036   Vitals shown include unvalidated device data.        Post Anesthesia Care and Evaluation    Patient location during evaluation: PACU  Patient participation: complete - patient participated  Level of consciousness: awake  Pain scale: See nurse's notes for pain score.  Pain management: adequate  Airway patency: patent  Anesthetic complications: No anesthetic complications  PONV Status: none  Cardiovascular status: acceptable  Respiratory status: acceptable  Hydration status: acceptable    Comments: Patient seen and examined postoperatively; vital signs stable; SpO2 greater than or equal to 90%; cardiopulmonary status stable; nausea/vomiting adequately controlled; pain adequately controlled; no apparent anesthesia complications; patient discharged from anesthesia care when discharge criteria were met

## 2020-12-22 NOTE — ANESTHESIA PREPROCEDURE EVALUATION
Anesthesia Evaluation     Patient summary reviewed and Nursing notes reviewed   NPO Solid Status: > 8 hours  NPO Liquid Status: > 8 hours           Airway   Mallampati: II  TM distance: >3 FB  Neck ROM: full  No difficulty expected  Dental - normal exam     Pulmonary - normal exam   (+) asthma,sleep apnea,   Cardiovascular - normal exam    (+) hypertension,       Neuro/Psych  (+) neuromuscular disease, numbness, psychiatric history,     GI/Hepatic/Renal/Endo    (+) obesity,   diabetes mellitus,     Musculoskeletal     Abdominal  - normal exam    Bowel sounds: normal.   Substance History      OB/GYN          Other        ROS/Med Hx Other: Guillain-[place]                  Anesthesia Plan    ASA 3     MAC       Anesthetic plan, all risks, benefits, and alternatives have been provided, discussed and informed consent has been obtained with: patient.

## 2020-12-23 LAB
LAB AP CASE REPORT: NORMAL
PATH REPORT.FINAL DX SPEC: NORMAL
PATH REPORT.GROSS SPEC: NORMAL

## 2020-12-29 ENCOUNTER — TELEPHONE (OUTPATIENT)
Dept: NEUROLOGY | Facility: CLINIC | Age: 40
End: 2020-12-29

## 2020-12-29 NOTE — TELEPHONE ENCOUNTER
----- Message from Tamra Rizvi CMA sent at 12/28/2020  3:54 PM EST -----  Regarding: Complaint  Contact: [phone]  Patient is trying to get some insight on weather or not its safe to get the corona virus injection, he has Jennifer bureau syndrome please advise    ----- Message -----  From: Barrett Laguerre  Sent: 12/28/2020   3:49 PM EST  To: [facility]  Subject: Complaint                                        I have something important I need to discuss on the phone. I have called the office at 1:18 today and have not received a call back. I have attempted to recall the office and am on a endless hold to talk to a person. Please call me back at [phone]

## 2020-12-29 NOTE — TELEPHONE ENCOUNTER
I have not heard of any cases of acute demyelinating neuropathy with the covid vaccine  I feel it would be better to take my chance with the vaccine compared to the very real chance of severe disease or death from the virus given the history of diabetes.    Check with the cdc web site re the current recommendations and contraindications

## 2021-01-11 ENCOUNTER — OFFICE VISIT (OUTPATIENT)
Dept: FAMILY MEDICINE CLINIC | Facility: CLINIC | Age: 41
End: 2021-01-11

## 2021-01-11 VITALS
WEIGHT: 298.8 LBS | OXYGEN SATURATION: 99 % | SYSTOLIC BLOOD PRESSURE: 155 MMHG | RESPIRATION RATE: 20 BRPM | HEART RATE: 99 BPM | HEIGHT: 77 IN | DIASTOLIC BLOOD PRESSURE: 94 MMHG | BODY MASS INDEX: 35.28 KG/M2 | TEMPERATURE: 97.1 F

## 2021-01-11 DIAGNOSIS — E66.3 OVER WEIGHT: ICD-10-CM

## 2021-01-11 DIAGNOSIS — T14.8XXA BLISTER: ICD-10-CM

## 2021-01-11 DIAGNOSIS — M79.604 PAIN OF RIGHT LOWER EXTREMITY: Primary | ICD-10-CM

## 2021-01-11 PROCEDURE — 99214 OFFICE O/P EST MOD 30 MIN: CPT | Performed by: FAMILY MEDICINE

## 2021-01-11 NOTE — PROGRESS NOTES
Subjective   Barrett Laguerre is a 40 y.o. male.     Fell x 2 in [place]. Pain anterior/superior right shin with bearing weight     Right foot blister as well. 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 The injury mechanism was a fall. The pain is present in the right knee and right leg. The quality of the pain is described as aching and burning. The pain is at a severity of 10/10. The pain is severe. The pain has been constant since onset. Associated symptoms include muscle weakness, numbness and tingling. Pertinent negatives include no inability to bear weight, loss of motion or loss of sensation. He reports no foreign bodies present. The symptoms are aggravated by movement and weight bearing. He has tried nothing for the symptoms.   Blister  This is a new problem. The current episode started in the past 7 days. Associated symptoms include numbness. Pertinent negatives include no abdominal pain, chest pain, chills, congestion, coughing, diaphoresis, fatigue, fever, headaches, nausea, sore throat, swollen glands or visual change. The symptoms are aggravated by walking. He has tried rest for the symptoms. The treatment provided mild relief.        The following portions of the patient's history were reviewed and updated as appropriate: allergies, current medications, past family history, past medical history, past social history, past surgical history and problem list.    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)   • Sleep apnea   • Attention deficit disorder   • Diabetes (CMS/HCC)   • Other seasonal allergic rhinitis   • Mixed obsessional thoughts and acts   • Hypertension, benign   • Disorder of lipid metabolism   • CIDP (chronic  inflammatory demyelinating polyneuropathy) (CMS/HCC)   • Lumbar radiculopathy   • Pain of right lower extremity   • Onychomycosis   • Blister   • Over weight       Current Outpatient Medications on File Prior to Visit   Medication Sig Dispense Refill   • acetaminophen-codeine (TYLENOL/CODEINE #3) 300-30 MG per tablet Take 1 tablet by mouth Every 6 (Six) Hours As Needed for Moderate Pain . 28 tablet 5   • allopurinol (ZYLOPRIM) 100 MG tablet Take 1 tablet by mouth Daily. 30 tablet 3   • amantadine (SYMMETREL) 100 MG tablet      • amLODIPine (NORVASC) 10 MG tablet Take 1 tablet by mouth Daily. 90 tablet 3   • chlorthalidone (HYGROTEN) 50 MG tablet Take 1 tablet by mouth Daily.  3   • erythromycin (ROMYCIN) 5 MG/GM ophthalmic ointment APPLY OINTMENT TO BOTH EYES AT BEDTIME FOR 1 WEEK THEN AS NEEDED  3   • FLUoxetine (PROzac) 40 MG capsule Take 1 capsule by mouth 2 (Two) Times a Day. 180 capsule 0   • fluticasone (FLONASE) 50 MCG/ACT nasal spray 1 spray into the nostril(s) as directed by provider 2 (two) times a day. 16 g 3   • gabapentin (NEURONTIN) 400 MG capsule Take 1 capsule by mouth 3 (Three) Times a Day. 90 capsule 2   • glucose blood (FREESTYLE LITE) test strip USE 1 STRIP TO CHECK GLUCOSE ONCE DAILY AS NEEDED 50 each 12   • glucose blood test strip Daily as needed 30 each 12   • hydrocortisone 1 % cream Apply  topically to the appropriate area as directed 2 (Two) Times a Day. 60 g 0   • Lancets (freestyle) lancets USE 1 LANCET  TO CHECK GLUCOSE ONCE DAILY 100 each 12   • lidocaine-prilocaine (EMLA) 2.5-2.5 % cream      • loratadine (CLARITIN) 10 MG tablet Take 1 tablet by mouth Daily. 30 tablet 12   • montelukast (SINGULAIR) 10 MG tablet TAKE 1 TABLET BY MOUTH ONCE DAILY AT NIGHT 90 tablet 0   • montelukast (Singulair) 10 MG tablet Take 1 tablet by mouth Every Night. 30 tablet 5   • mupirocin (BACTROBAN) 2 % ointment      • oxybutynin XL (DITROPAN XL) 15 MG 24 hr tablet Take 1 tablet by mouth Daily. 30 tablet  12   • pantoprazole (Protonix) 40 MG EC tablet Take 1 tablet by mouth Daily. 30 tablet 12   • sitaGLIPtin-metFORMIN (Janumet)  MG per tablet Take 1 tablet by mouth 2 (Two) Times a Day With Meals. 180 tablet 3   • sodium chloride (Ocean Nasal Spray) 0.65 % nasal spray 2 sprays into the nostril(s) as directed by provider As Needed for Congestion. 88 mL 12   • triamcinolone (KENALOG) 0.1 % cream Apply  topically to the appropriate area as directed 3 (Three) Times a Day. 80 g 0     No current facility-administered medications on file prior to visit.      Current outpatient and discharge medications have been reconciled for the patient.  Reviewed by: Eric Rosenberg MD      Allergies   Allergen Reactions   • Penicillin G Anaphylaxis   • Sulfa Antibiotics Hives       Review of Systems   Constitutional: Negative for chills, diaphoresis, fatigue and fever.   HENT: Negative for congestion, sore throat, swollen glands, trouble swallowing and voice change.    Eyes: Negative for visual disturbance.   Respiratory: Negative for cough and shortness of breath.    Cardiovascular: Negative for chest pain and palpitations.   Gastrointestinal: Negative for abdominal pain and nausea.   Endocrine: Negative for polydipsia and polyphagia.   Genitourinary: Negative for hematuria.   Musculoskeletal: Negative for neck stiffness.   Skin: Negative for color change and pallor.   Allergic/Immunologic: Negative for immunocompromised state.   Neurological: Positive for tingling and numbness. Negative for seizures and syncope.   Hematological: Negative for adenopathy.   Psychiatric/Behavioral: Negative for hallucinations, sleep disturbance and suicidal ideas.     I have reviewed and confirmed the accuracy of the ROS as documented by the MA/LPN/RN Eric Rosenberg MD    Objective   Vitals:    01/11/21 1726   BP: 155/94   Pulse: 99   Resp: 20   Temp: 97.1 °F (36.2 °C)   SpO2: 99%     Physical Exam  Constitutional:       Appearance:  He is well-developed.   HENT:      Head: Normocephalic and atraumatic.      Right Ear: External ear normal.      Left Ear: External ear normal.      Nose: Nose normal.   Eyes:      Pupils: Pupils are equal, round, and reactive to light.   Neck:      Musculoskeletal: Normal range of motion and neck supple.   Cardiovascular:      Rate and Rhythm: Normal rate and regular rhythm.      Heart sounds: Normal heart sounds.   Pulmonary:      Effort: Pulmonary effort is normal.      Breath sounds: Normal breath sounds.   Abdominal:      General: Bowel sounds are normal.      Palpations: Abdomen is soft.   Musculoskeletal: Normal range of motion.   Skin:     General: Skin is warm and dry.      Findings: Rash present. Rash is pustular.      Comments: Blister right foot at metatarsal heads   Neurological:      Mental Status: He is alert and oriented to person, place, and time.   Psychiatric:         Behavior: Behavior normal.         Thought Content: Thought content normal.         Judgment: Judgment normal.         Common labs    Common Labsle 6/2/20 9/17/20 9/17/20 9/17/20 10/2/20     1118 1118 1118    Glucose  154 (A)      BUN  13      Creatinine  0.99      eGFR Non  Am  96      eGFR African Am  110      Sodium  141      Potassium  4.6      Chloride  101      Calcium  9.7      Total Protein  7.6      Albumin  4.8      Total Bilirubin  0.4      Alkaline Phosphatase  57      AST (SGOT)  60 (A)      ALT (SGPT)  63 (A)      WBC    10.4    Hemoglobin    14.9    Hematocrit    45.4    Platelets    321    Total Cholesterol   140     Triglycerides   124     HDL Cholesterol   33 (A)     LDL Cholesterol    85     Hemoglobin A1C 7.2    7.4   (A) Abnormal value              Assessment/Plan .  Problem List Items Addressed This Visit     Pain of right lower extremity - Primary    Relevant Orders    XR Tibia Fibula 2 View Right    Blister    Over weight       Findings discussed. All questions answered.  Wound care  discussed.  Follow-up in approximately 3 days for reevaluation if not improved.  Follow-up sooner for worsening symptoms or any concerns. Recommended follow-up for full physical examination and routine health maintanence.     I wore protective equipment throughout this patient encounter to include mask and eye protection. Hand hygiene was performed before donning protective equipment and after removal when leaving the room

## 2021-01-13 ENCOUNTER — HOSPITAL ENCOUNTER (OUTPATIENT)
Dept: GENERAL RADIOLOGY | Facility: HOSPITAL | Age: 41
Discharge: HOME | End: 2021-01-13
Admitting: FAMILY MEDICINE

## 2021-01-13 PROCEDURE — 73590 X-RAY EXAM OF LOWER LEG: CPT

## 2021-01-14 ENCOUNTER — OFFICE VISIT (OUTPATIENT)
Dept: FAMILY MEDICINE CLINIC | Facility: CLINIC | Age: 41
End: 2021-01-14

## 2021-01-14 VITALS
DIASTOLIC BLOOD PRESSURE: 81 MMHG | HEART RATE: 94 BPM | SYSTOLIC BLOOD PRESSURE: 124 MMHG | TEMPERATURE: 97.6 F | OXYGEN SATURATION: 96 % | BODY MASS INDEX: 34.79 KG/M2 | WEIGHT: 293.4 LBS

## 2021-01-14 DIAGNOSIS — I10 HYPERTENSION, BENIGN: ICD-10-CM

## 2021-01-14 DIAGNOSIS — Z13.220 SCREENING FOR HYPERLIPIDEMIA: ICD-10-CM

## 2021-01-14 DIAGNOSIS — Z00.00 ANNUAL PHYSICAL EXAM: Primary | ICD-10-CM

## 2021-01-14 DIAGNOSIS — E11.49 TYPE 2 DIABETES MELLITUS WITH OTHER NEUROLOGIC COMPLICATION, WITHOUT LONG-TERM CURRENT USE OF INSULIN (HCC): ICD-10-CM

## 2021-01-14 DIAGNOSIS — E66.3 OVER WEIGHT: ICD-10-CM

## 2021-01-14 DIAGNOSIS — L91.8 SKIN TAG: ICD-10-CM

## 2021-01-14 LAB
BILIRUB BLD-MCNC: ABNORMAL MG/DL
CLARITY, POC: CLEAR
COLOR UR: YELLOW
GLUCOSE BLDC GLUCOMTR-MCNC: 189 MG/DL (ref 70–130)
GLUCOSE UR STRIP-MCNC: NEGATIVE MG/DL
HBA1C MFR BLD: 7.2 %
KETONES UR QL: NEGATIVE
LEUKOCYTE EST, POC: NEGATIVE
NITRITE UR-MCNC: NEGATIVE MG/ML
PH UR: 6 [PH] (ref 5–8)
PROT UR STRIP-MCNC: ABNORMAL MG/DL
RBC # UR STRIP: NEGATIVE /UL
SP GR UR: 1.02 (ref 1–1.03)
UROBILINOGEN UR QL: NORMAL

## 2021-01-14 PROCEDURE — 99395 PREV VISIT EST AGE 18-39: CPT | Performed by: FAMILY MEDICINE

## 2021-01-14 PROCEDURE — 82962 GLUCOSE BLOOD TEST: CPT | Performed by: FAMILY MEDICINE

## 2021-01-14 PROCEDURE — 81003 URINALYSIS AUTO W/O SCOPE: CPT | Performed by: FAMILY MEDICINE

## 2021-01-14 PROCEDURE — 83036 HEMOGLOBIN GLYCOSYLATED A1C: CPT | Performed by: FAMILY MEDICINE

## 2021-01-14 NOTE — PROGRESS NOTES
Subjective   Barrett Laguerre is a 40 y.o. male.     Chief Complaint   Patient presents with   • Annual Exam   • Diabetes   • Hypertension       The patient is here: for coordination of medical care to discuss health maintenance and disease prevention to follow up on multiple medical problems.  Last comprehensive physical was on 11/18/2019.  Previous physical was performed by  Eric Rosenberg MD  Overall has: minimal activity with work/home activities, exercises 2 - 3 times per week, good appetite, feels well with minor complaints, decreased energy level and is sleeping poorly. Nutrition: balanced diet. Last tetanus shot was 07/27/2020.     Skin tag right inner thigh, painful and irritated       Recent Hospitalizations:  No hospitalization(s) within the last year..  ccc      I personally reviewed and updated the patient's allergies, medications, problem list, and past medical, surgical, social, and family history. I have reviewed and confirmed the accuracy of the HPI and ROS as documented by the MA/LPN/RN Eric Rosenberg MD    Family History   Problem Relation Age of Onset   • Diabetes Mother    • Heart disease Maternal Grandmother    • Diabetes Maternal Grandmother    • Heart disease Maternal Grandfather    • Diabetes Maternal Grandfather    • Cancer Paternal Grandfather    • Heart disease Other        Social History     Tobacco Use   • Smoking status: Never Smoker   • Smokeless tobacco: Never Used   Substance Use Topics   • Alcohol use: Not Currently   • Drug use: Not Currently       Past Surgical History:   Procedure Laterality Date   • COLONOSCOPY N/A 12/22/2020    Procedure: COLONOSCOPY with polypectomy x;  Surgeon: Juan Alberto Davis MD;  Location: [facility] ENDOSCOPY;  Service: Gastroenterology;  Laterality: N/A;  post: transverse colon polyp   • INGUINAL HERNIA REPAIR     • TYMPANOSTOMY TUBE PLACEMENT         Patient Active Problem List   Diagnosis   • Family history of diabetes mellitus    • Gout   • Lumbosacral spinal stenosis   • Memory impairment   • Acquired flexible flat foot   • Chronic low back pain   • Other specified dorsopathies, site unspecified   • 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)   • Sleep apnea   • Attention deficit disorder   • Diabetes (CMS/[facility])   • Other seasonal allergic rhinitis   • Mixed obsessional thoughts and acts   • Hypertension, benign   • Disorder of lipid metabolism   • CIDP (chronic inflammatory demyelinating polyneuropathy) (CMS/[facility])   • Lumbar radiculopathy   • Pain of right lower extremity   • Onychomycosis   • Blister   • Over weight   • Annual physical exam         Current Outpatient Medications:   •  acetaminophen-codeine (TYLENOL/CODEINE #3) 300-30 MG per tablet, Take 1 tablet by mouth Every 6 (Six) Hours As Needed for Moderate Pain ., Disp: 28 tablet, Rfl: 5  •  allopurinol (ZYLOPRIM) 100 MG tablet, Take 1 tablet by mouth Daily., Disp: 30 tablet, Rfl: 3  •  amantadine (SYMMETREL) 100 MG tablet, , Disp: , Rfl:   •  amLODIPine (NORVASC) 10 MG tablet, Take 1 tablet by mouth Daily., Disp: 90 tablet, Rfl: 3  •  chlorthalidone (HYGROTEN) 50 MG tablet, Take 1 tablet by mouth Daily., Disp: , Rfl: 3  •  erythromycin (ROMYCIN) 5 MG/GM ophthalmic ointment, APPLY OINTMENT TO BOTH EYES AT BEDTIME FOR 1 WEEK THEN AS NEEDED, Disp: , Rfl: 3  •  FLUoxetine (PROzac) 40 MG capsule, Take 1 capsule by mouth 2 (Two) Times a Day., Disp: 180 capsule, Rfl: 0  •  fluticasone (FLONASE) 50 MCG/ACT nasal spray, 1 spray into the nostril(s) as directed by provider 2 (two) times a day., Disp: 16 g, Rfl: 3  •  gabapentin (NEURONTIN) 400 MG capsule, Take 1 capsule by mouth 3 (Three) Times a Day., Disp: 90 capsule, Rfl: 2  •  glucose blood (FREESTYLE LITE) test strip, USE 1 STRIP TO CHECK GLUCOSE ONCE DAILY AS NEEDED, Disp: 50 each, Rfl: 12  •  glucose blood test strip, Daily as needed, Disp: 30 each, Rfl: 12  •  hydrocortisone 1 % cream, Apply  topically to the appropriate area as directed 2 (Two) Times  a Day., Disp: 60 g, Rfl: 0  •  Lancets (freestyle) lancets, USE 1 LANCET  TO CHECK GLUCOSE ONCE DAILY, Disp: 100 each, Rfl: 12  •  lidocaine-prilocaine (EMLA) 2.5-2.5 % cream, , Disp: , Rfl:   •  loratadine (CLARITIN) 10 MG tablet, Take 1 tablet by mouth Daily., Disp: 30 tablet, Rfl: 12  •  montelukast (SINGULAIR) 10 MG tablet, TAKE 1 TABLET BY MOUTH ONCE DAILY AT NIGHT, Disp: 90 tablet, Rfl: 0  •  montelukast (Singulair) 10 MG tablet, Take 1 tablet by mouth Every Night., Disp: 30 tablet, Rfl: 5  •  mupirocin (BACTROBAN) 2 % ointment, , Disp: , Rfl:   •  oxybutynin XL (DITROPAN XL) 15 MG 24 hr tablet, Take 1 tablet by mouth Daily., Disp: 30 tablet, Rfl: 12  •  pantoprazole (Protonix) 40 MG EC tablet, Take 1 tablet by mouth Daily., Disp: 30 tablet, Rfl: 12  •  sitaGLIPtin-metFORMIN (Janumet)  MG per tablet, Take 1 tablet by mouth 2 (Two) Times a Day With Meals., Disp: 180 tablet, Rfl: 3  •  sodium chloride (Ocean Nasal Spray) 0.65 % nasal spray, 2 sprays into the nostril(s) as directed by provider As Needed for Congestion., Disp: 88 mL, Rfl: 12  •  triamcinolone (KENALOG) 0.1 % cream, Apply  topically to the appropriate area as directed 3 (Three) Times a Day., Disp: 80 g, Rfl: 0         Review of Systems   Constitutional: Negative for activity change, appetite change, chills, diaphoresis, fatigue and fever.   HENT: Negative for ear pain, swollen glands, trouble swallowing and voice change.    Eyes: Negative for visual disturbance.   Respiratory: Negative for shortness of breath and wheezing.    Cardiovascular: Negative for chest pain, palpitations and leg swelling.   Gastrointestinal: Negative for abdominal pain, blood in stool, constipation, diarrhea, nausea and vomiting.   Endocrine: Negative for polydipsia, polyphagia and polyuria.   Genitourinary: Negative for dysuria, frequency and hematuria.   Musculoskeletal: Negative for joint swelling, neck pain and neck stiffness.   Skin: Negative for color change,  pallor, rash and bruise.   Allergic/Immunologic: Negative for immunocompromised state.   Neurological: Negative for seizures, syncope, weakness, numbness and headache.   Hematological: Negative for adenopathy.   Psychiatric/Behavioral: Negative for hallucinations, sleep disturbance, suicidal ideas and depressed mood.       I have reviewed and confirmed the accuracy of the ROS as documented by the MA/LPN/RN Eric Rosenberg MD      Objective   /81   Pulse 94   Temp 97.6 °F (36.4 °C)   Wt 133 kg (293 lb 6.4 oz)   SpO2 96%   BMI 34.79 kg/m²   BP Readings from Last 3 Encounters:   01/14/21 124/81   01/11/21 155/94   12/22/20 133/92     Wt Readings from Last 3 Encounters:   01/14/21 133 kg (293 lb 6.4 oz)   01/11/21 136 kg (298 lb 12.8 oz)   12/14/20 135 kg (298 lb)     Physical Exam  Constitutional:       Appearance: He is well-developed.   HENT:      Head: Normocephalic and atraumatic.      Right Ear: External ear normal.      Left Ear: External ear normal.      Nose: Nose normal.   Eyes:      Pupils: Pupils are equal, round, and reactive to light.   Neck:      Musculoskeletal: Normal range of motion and neck supple.   Cardiovascular:      Rate and Rhythm: Normal rate and regular rhythm.      Heart sounds: Normal heart sounds.   Pulmonary:      Effort: Pulmonary effort is normal.      Breath sounds: Normal breath sounds.   Abdominal:      General: Bowel sounds are normal.      Palpations: Abdomen is soft.   Musculoskeletal: Normal range of motion.   Skin:     General: Skin is warm and dry.   Neurological:      Mental Status: He is alert and oriented to person, place, and time.   Psychiatric:         Behavior: Behavior normal.         Thought Content: Thought content normal.         Judgment: Judgment normal.         Data / Lab Results:    Hemoglobin A1C   Date Value Ref Range Status   01/14/2021 7.2 % Final   10/02/2020 7.4 % Final   06/02/2020 7.2 % Final     Lab Results   Component Value Date    GLU  154 (H) 09/17/2020     Lab Results   Component Value Date    LDL 85 09/17/2020    LDL 78 11/18/2019    LDL 92 03/14/2019     Lab Results   Component Value Date    CHOL 145 03/14/2019    CHOL 169 11/16/2018    CHOL 173 08/02/2017     Lab Results   Component Value Date    TRIG 124 09/17/2020    TRIG 151 (H) 11/18/2019    TRIG 135 03/14/2019     Lab Results   Component Value Date    HDL 33 (L) 09/17/2020    HDL 33 (L) 11/18/2019    HDL 30 (L) 03/14/2019     Lab Results   Component Value Date    PSA 0.5 11/18/2019     Lab Results   Component Value Date    WBC 10.4 09/17/2020    HGB 14.9 09/17/2020    HCT 45.4 09/17/2020    MCV 82 09/17/2020     09/17/2020     Lab Results   Component Value Date    TSH 0.894 09/17/2020      Lab Results   Component Value Date    GLUCOSE 177 (H) 04/04/2017    BUN 13 09/17/2020    CREATININE 0.99 09/17/2020    EGFRIFNONA 96 09/17/2020    EGFRIFAFRI 110 09/17/2020    BCR 13 09/17/2020    K 4.6 09/17/2020    CO2 25 09/17/2020    CALCIUM 9.7 09/17/2020    PROTENTOTREF 7.6 09/17/2020    ALBUMIN 4.8 09/17/2020    LABIL2 1.7 09/17/2020    AST 60 (H) 09/17/2020    ALT 63 (H) 09/17/2020     Lab Results   Component Value Date    PADMINI  03/31/2017     NEGATIVE This result is designed to aid in the diagnosis of many of the    PADMINI  03/31/2017     systemic autoimmune disorders and is not diagnostic by itself.  Test results    PADMINI  03/31/2017     should be interpreted in conjunction with the clinical evaluation of the    PADMINI  03/31/2017     patient.  SLE patients undergoing steroid therapy may have negative results.    SEDRATE 21 (H) 09/17/2020      No results found for: CRP   Lab Results   Component Value Date    IRON 108 04/11/2017      Lab Results   Component Value Date    [id number] 399 04/11/2017        Age-appropriate Screening Schedule:  Refer to the list below for future screening recommendations based on patient's age, sex and/or medical conditions. Orders for these recommended tests are  listed in the plan section. The patient has been provided with a written plan.    Health Maintenance   Topic Date Due   • DIABETIC EYE EXAM  06/18/2019   • INFLUENZA VACCINE  01/14/2022 (Originally 8/1/2020)   • HEMOGLOBIN A1C  04/02/2021   • DIABETIC FOOT EXAM  10/02/2021   • URINE MICROALBUMIN  10/02/2021   • TDAP/TD VACCINES (2 - Td) 07/27/2030           Assessment/Plan      Medications        Problem List         LOS        Diagnoses and all orders for this visit:    1. Annual physical exam (Primary)  -     CBC Auto Differential  -     Comprehensive Metabolic Panel  -     Lipid Panel With / Chol / HDL Ratio  -     TSH    2. Type 2 diabetes mellitus with other neurologic complication, without long-term current use of insulin (CMS/[facility])  -     POC Glycosylated Hemoglobin (Hb A1C)  -     POC Urinalysis Dipstick, Multipro  -     POC Glucose    3. Hypertension, benign    4. Over weight    5. Skin tag  Comments:  consider excision due to pain       Discussed anticipatory guidance, diet, exercise, and weight loss.  Discussed safety and routine screening examinations.  Discussed self-examinations.         Expected course, medications, and adverse effects discussed.  Call or return if worsening or persistent symptoms.  I wore protective equipment throughout this patient encounter including a mask, gloves, and eye protection.  Hand hygiene was performed before donning protective equipment and after removal when leaving the room. The complete contents of the Assessment and Plan and Data / Lab Results as documented above have been reviewed and addressed by myself with the patient today as part of an ongoing evaluation / treatment plan.  If some of the documentation has been copied from a previous note and is unchanged it indicates that this problem / plan has been assessed today but is stable from a previous visit and no changes have been recommended.

## 2021-01-14 NOTE — PROGRESS NOTES
Subjective   Barrett Laguerre is a 40 y.o. male.     Diabetes  He presents for his follow-up diabetic visit. He has type 2 diabetes mellitus. His disease course has been stable. Hypoglycemia symptoms include headaches. Associated symptoms include polydipsia, polyuria and weakness. Pertinent negatives for diabetes include no chest pain and no fatigue. Risk factors for coronary artery disease include male sex, hypertension, obesity, diabetes mellitus and dyslipidemia. Current diabetic treatment includes oral agent (monotherapy). He is compliant with treatment most of the time. His weight is stable. He is following a generally unhealthy diet. When asked about meal planning, he reported none. He has not had a previous visit with a dietitian. He rarely participates in exercise. Home blood sugar record trend: does not check. An ACE inhibitor/angiotensin II receptor blocker is not being taken. He does not see a podiatrist.Eye exam is current.   Hypertension  The current episode started more than 1 year ago. Associated symptoms include headaches. Pertinent negatives include no chest pain, neck pain or shortness of breath. Current antihypertension treatment includes calcium channel blockers.        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)   • Sleep apnea   • Attention deficit disorder   • Diabetes (CMS/HCC)   • Other seasonal allergic rhinitis   • Mixed obsessional thoughts and acts   • Hypertension, benign   • Disorder of lipid metabolism   • CIDP (chronic inflammatory demyelinating polyneuropathy) (CMS/HCC)   • Lumbar radiculopathy   • Pain of right lower extremity   • Onychomycosis   • Blister   • Over weight       Current Outpatient Medications on  File Prior to Visit   Medication Sig Dispense Refill   • acetaminophen-codeine (TYLENOL/CODEINE #3) 300-30 MG per tablet Take 1 tablet by mouth Every 6 (Six) Hours As Needed for Moderate Pain . 28 tablet 5   • allopurinol (ZYLOPRIM) 100 MG tablet Take 1 tablet by mouth Daily. 30 tablet 3   • amantadine (SYMMETREL) 100 MG tablet      • amLODIPine (NORVASC) 10 MG tablet Take 1 tablet by mouth Daily. 90 tablet 3   • chlorthalidone (HYGROTEN) 50 MG tablet Take 1 tablet by mouth Daily.  3   • erythromycin (ROMYCIN) 5 MG/GM ophthalmic ointment APPLY OINTMENT TO BOTH EYES AT BEDTIME FOR 1 WEEK THEN AS NEEDED  3   • FLUoxetine (PROzac) 40 MG capsule Take 1 capsule by mouth 2 (Two) Times a Day. 180 capsule 0   • fluticasone (FLONASE) 50 MCG/ACT nasal spray 1 spray into the nostril(s) as directed by provider 2 (two) times a day. 16 g 3   • gabapentin (NEURONTIN) 400 MG capsule Take 1 capsule by mouth 3 (Three) Times a Day. 90 capsule 2   • glucose blood (FREESTYLE LITE) test strip USE 1 STRIP TO CHECK GLUCOSE ONCE DAILY AS NEEDED 50 each 12   • glucose blood test strip Daily as needed 30 each 12   • hydrocortisone 1 % cream Apply  topically to the appropriate area as directed 2 (Two) Times a Day. 60 g 0   • Lancets (freestyle) lancets USE 1 LANCET  TO CHECK GLUCOSE ONCE DAILY 100 each 12   • lidocaine-prilocaine (EMLA) 2.5-2.5 % cream      • loratadine (CLARITIN) 10 MG tablet Take 1 tablet by mouth Daily. 30 tablet 12   • montelukast (SINGULAIR) 10 MG tablet TAKE 1 TABLET BY MOUTH ONCE DAILY AT NIGHT 90 tablet 0   • montelukast (Singulair) 10 MG tablet Take 1 tablet by mouth Every Night. 30 tablet 5   • mupirocin (BACTROBAN) 2 % ointment      • oxybutynin XL (DITROPAN XL) 15 MG 24 hr tablet Take 1 tablet by mouth Daily. 30 tablet 12   • pantoprazole (Protonix) 40 MG EC tablet Take 1 tablet by mouth Daily. 30 tablet 12   • sitaGLIPtin-metFORMIN (Janumet)  MG per tablet Take 1 tablet by mouth 2 (Two) Times a Day With  "Meals. 180 tablet 3   • sodium chloride (Ocean Nasal Spray) 0.65 % nasal spray 2 sprays into the nostril(s) as directed by provider As Needed for Congestion. 88 mL 12   • triamcinolone (KENALOG) 0.1 % cream Apply  topically to the appropriate area as directed 3 (Three) Times a Day. 80 g 0     No current facility-administered medications on file prior to visit.      Current outpatient and discharge medications have been reconciled for the patient.  Reviewed by: Chon Gifford MA      Allergies   Allergen Reactions   • Penicillin G Anaphylaxis   • Sulfa Antibiotics Hives       Review of Systems   Constitutional: Negative for fatigue.   Respiratory: Negative for shortness of breath.    Cardiovascular: Negative for chest pain.   Endocrine: Positive for polydipsia and polyuria.   Musculoskeletal: Negative for neck pain.   Neurological: Positive for weakness.     I have reviewed and confirmed the accuracy of the ROS as documented by the MA/LPN/RN Chon Gifford MA    Objective   There were no vitals filed for this visit.  Physical Exam    {Ambulatory Labs (Optional):88148:::0}    Assessment/Plan .  Problem List Items Addressed This Visit     None           I wore protective equipment throughout this patient encounter to include {Personal Protective Equipment:59258::\"mask\",\"eye protection\"}. Hand hygiene was performed before donning protective equipment and after removal when leaving the room     "

## 2021-01-28 ENCOUNTER — TELEPHONE (OUTPATIENT)
Dept: ORTHOPEDICS | Facility: OTHER | Age: 41
End: 2021-01-28

## 2021-01-28 NOTE — TELEPHONE ENCOUNTER
Caller: COBY BARTH    Relationship: SELF   Best call back number: [phone]    What orders are you requesting (i.e. lab or imaging): ORDER FOR INSOLES/ DIABETIC SHOES    In what timeframe would the patient need to come in: ASAP- TRYING TO GET THIS ACCOMPLISHED SINCE APPOINTMENT IN November 2020      Additional notes: PATIENT HAS BEEN TRYING TO GET THE ORDER FILLED FOR HIS DIABETIC SHOES AND INSERTS- THE [facility] IN [place].    THEY STATED THAT FAXED OVER THE PAPERWORK TO GET THIS ORDER COMPLETED 1/27/21- FOR THE SECOND TIME.     PLEASE CONTACT PATIENT TO GET THIS RESOLVED- UNABLE TO WARM TRANSFER.     MAY LEAVE VOICEMAIL

## 2021-02-26 ENCOUNTER — OFFICE VISIT (OUTPATIENT)
Dept: PAIN MEDICINE | Facility: CLINIC | Age: 41
End: 2021-02-26

## 2021-02-26 VITALS
TEMPERATURE: 97.3 F | DIASTOLIC BLOOD PRESSURE: 92 MMHG | HEART RATE: 89 BPM | HEIGHT: 77 IN | SYSTOLIC BLOOD PRESSURE: 178 MMHG | WEIGHT: 293 LBS | BODY MASS INDEX: 34.59 KG/M2 | RESPIRATION RATE: 16 BRPM | OXYGEN SATURATION: 97 %

## 2021-02-26 DIAGNOSIS — M48.07 LUMBOSACRAL SPINAL STENOSIS: ICD-10-CM

## 2021-02-26 DIAGNOSIS — G89.29 CHRONIC RIGHT-SIDED LOW BACK PAIN WITH RIGHT-SIDED SCIATICA: Primary | ICD-10-CM

## 2021-02-26 DIAGNOSIS — M54.41 CHRONIC RIGHT-SIDED LOW BACK PAIN WITH RIGHT-SIDED SCIATICA: Primary | ICD-10-CM

## 2021-02-26 DIAGNOSIS — F19.90 CURRENT DRUG USE: Primary | ICD-10-CM

## 2021-02-26 DIAGNOSIS — M54.16 LUMBAR RADICULOPATHY: ICD-10-CM

## 2021-02-26 DIAGNOSIS — M79.604 PAIN OF RIGHT LOWER EXTREMITY: ICD-10-CM

## 2021-02-26 PROCEDURE — 99213 OFFICE O/P EST LOW 20 MIN: CPT | Performed by: PHYSICAL MEDICINE & REHABILITATION

## 2021-02-26 RX ORDER — PYRAZINAMIDE 500 MG/1
1 TABLET ORAL EVERY 6 HOURS PRN
Qty: 28 TABLET | Refills: 5 | Status: SHIPPED | OUTPATIENT
Start: 2021-02-26 | End: 2021-05-21 | Stop reason: SDUPTHER
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
## 2021-03-16 ENCOUNTER — OFFICE VISIT (OUTPATIENT)
Dept: PODIATRY | Facility: CLINIC | Age: 41
End: 2021-03-16

## 2021-03-16 VITALS
HEART RATE: 90 BPM | BODY MASS INDEX: 34.95 KG/M2 | HEIGHT: 77 IN | DIASTOLIC BLOOD PRESSURE: 90 MMHG | SYSTOLIC BLOOD PRESSURE: 149 MMHG | WEIGHT: 296 LBS

## 2021-03-16 DIAGNOSIS — M21.41 ACQUIRED BILATERAL FLAT FEET: ICD-10-CM

## 2021-03-16 DIAGNOSIS — M21.42 ACQUIRED BILATERAL FLAT FEET: ICD-10-CM

## 2021-03-16 DIAGNOSIS — E11.9 TYPE 2 DIABETES MELLITUS WITHOUT COMPLICATION, WITHOUT LONG-TERM CURRENT USE OF INSULIN (HCC): ICD-10-CM

## 2021-03-16 DIAGNOSIS — E11.42 DM TYPE 2 WITH DIABETIC PERIPHERAL NEUROPATHY (HCC): Primary | ICD-10-CM

## 2021-03-16 DIAGNOSIS — L60.3 ONYCHODYSTROPHY: ICD-10-CM

## 2021-03-16 PROCEDURE — 11721 DEBRIDE NAIL 6 OR MORE: CPT | Performed by: PODIATRIST

## 2021-03-16 PROCEDURE — 99213 OFFICE O/P EST LOW 20 MIN: CPT | Performed by: PODIATRIST

## 2021-03-16 RX ORDER — LANCETS 28 GAUGE
1 EACH MISCELLANEOUS DAILY
Qty: 100 EACH | Refills: 12 | Status: SHIPPED | OUTPATIENT
Start: 2021-03-16 | End: 2021-03-18

## 2021-03-16 NOTE — TELEPHONE ENCOUNTER
Caller: Barrett Laguerre    Relationship: Self    Best call back number: [phone]     Medication needed:   Requested Prescriptions     Pending Prescriptions Disp Refills   • Lancets (freestyle) lancets 100 each 12     Sig: Use as instructed       When do you need the refill by: ASAP    What additional details did the patient provide when requesting the medication: PT STATES THAT HE NEEDS A PRE-AUTHORIZATION FOR THE FREESTYLE NAME BRAND LANCETS. PT STATES THAT HE HAS 12 DAYS REMAINING    Does the patient have less than a 3 day supply:  [] Yes  [x] No    What is the patient's preferred pharmacy: [facility] [address] [place] [address] [phone] [facility] [phone] FX

## 2021-03-16 NOTE — PROGRESS NOTES
03/16/2021  Foot and Ankle Surgery - Established Patient/Follow-up  Provider: Dr. Anatoliy Rehman DPM  Location: [facility]    Subjective:  Barrett Laguerre is a 41 y.o. male.     Chief Complaint   Patient presents with   • Annual Exam     DM check       HPI: Patient returns for routine diabetic foot check.  He states that he has been doing well and denies any new issues with his feet.  He is unaware of his most recent A1c.  He has not noticed any open wounds or infections to the feet.  He has been wearing the diabetic shoes and inserts as directed.  He describes no new issues.    Allergies   Allergen Reactions   • Penicillin G Anaphylaxis   • Sulfa Antibiotics Hives       Current Outpatient Medications on File Prior to Visit   Medication Sig Dispense Refill   • acetaminophen-codeine (TYLENOL/CODEINE #3) 300-30 MG per tablet Take 1 tablet by mouth Every 6 (Six) Hours As Needed for Moderate Pain . 28 tablet 5   • allopurinol (ZYLOPRIM) 100 MG tablet Take 1 tablet by mouth Daily. 30 tablet 3   • amantadine (SYMMETREL) 100 MG tablet      • amLODIPine (NORVASC) 10 MG tablet Take 1 tablet by mouth Daily. 90 tablet 3   • chlorthalidone (HYGROTEN) 50 MG tablet Take 1 tablet by mouth Daily.  3   • erythromycin (ROMYCIN) 5 MG/GM ophthalmic ointment APPLY OINTMENT TO BOTH EYES AT BEDTIME FOR 1 WEEK THEN AS NEEDED  3   • FLUoxetine (PROzac) 40 MG capsule Take 1 capsule by mouth 2 (Two) Times a Day. 180 capsule 0   • fluticasone (FLONASE) 50 MCG/ACT nasal spray 1 spray into the nostril(s) as directed by provider 2 (two) times a day. 16 g 3   • gabapentin (NEURONTIN) 400 MG capsule Take 1 capsule by mouth 3 (Three) Times a Day. 90 capsule 2   • glucose blood (FREESTYLE LITE) test strip USE 1 STRIP TO CHECK GLUCOSE ONCE DAILY AS NEEDED 50 each 12   • glucose blood test strip Daily as needed 30 each 12   • hydrocortisone 1 % cream Apply  topically to the appropriate area as directed 2 (Two) Times a Day. 60 g 0   •  "Lancets (freestyle) lancets USE 1 LANCET  TO CHECK GLUCOSE ONCE DAILY 100 each 12   • lidocaine-prilocaine (EMLA) 2.5-2.5 % cream      • loratadine (CLARITIN) 10 MG tablet Take 1 tablet by mouth Daily. 30 tablet 12   • montelukast (SINGULAIR) 10 MG tablet TAKE 1 TABLET BY MOUTH ONCE DAILY AT NIGHT 90 tablet 0   • montelukast (Singulair) 10 MG tablet Take 1 tablet by mouth Every Night. 30 tablet 5   • mupirocin (BACTROBAN) 2 % ointment      • oxybutynin XL (DITROPAN XL) 15 MG 24 hr tablet Take 1 tablet by mouth Daily. 30 tablet 12   • pantoprazole (Protonix) 40 MG EC tablet Take 1 tablet by mouth Daily. 30 tablet 12   • sitaGLIPtin-metFORMIN (Janumet)  MG per tablet Take 1 tablet by mouth 2 (Two) Times a Day With Meals. 180 tablet 3   • sodium chloride (Ocean Nasal Spray) 0.65 % nasal spray 2 sprays into the nostril(s) as directed by provider As Needed for Congestion. 88 mL 12   • triamcinolone (KENALOG) 0.1 % cream Apply  topically to the appropriate area as directed 3 (Three) Times a Day. 80 g 0     No current facility-administered medications on file prior to visit.       Objective   /90   Pulse 90   Ht 195.6 cm (77\")   Wt 134 kg (296 lb)   BMI 35.10 kg/m²     Podiatry Exam       General Appearance:   obese; no apparent distress  Mental Status Exam        Judgement and Insight:  Intact       Orientation:  Oriented to time, place, and person       Memory:  Intact for recent and remote events       Mood and Affect:  No depression, anxiety, or agitation  Cardiovascular (Bilateral)       Dorsalis Pedis Pulse (BL):  2/4       Posterior Tibialis Pulse (BL):  2/4       Capillary Filling Time (BL):  1-3 Seconds       Edema (BL):  No Edema  Dermatological Exam       Temperature:  warm to warm       Skin:  No open wounds or signs of infection.    Nails: Nails are elongated, thickened, and dystrophic x10  Neurological Exam (Bilateral)       Paresthesia (Bl):  negative       Patella DTRs (Bl): "  symmetric       Achilles DTRs (Bl):  symmetric       Tinel over Tarsal Tunnel (Bl):  negative  Monofilament Test (Bl):   Diminished       Diabetic Risk (Bl):  No loss of protective sensation  Additional Neurological Findings   sensation is somewhat diminished with light touch and monofilament testing        MusculoSkeletal Exam (Bilateral)       Gait and Stance (Bl):   abducted and pronated gait, right.  Early heel off.  Nonantalgic       ROM (Bl):   ankle and pedal joint range of motion is supple, nontender crepitus free       Muscle Strength (Bl):  symmetrical 5/5       Subluxed Digits (Bl): Hammer digit deformities involving toes 2 through 5, right greater than left.  Semi-reducible       Dislocated Joints (Bl):  no dislocation of joints       Gastroc soleus equinus (Bl):  Inability to dorsiflex past neutral position both straight legged and bent knee       Post tibial tendon (Bl):  no soreness noted       Peroneal Tendon (Bl):  no soreness noted  Additional Musculoskelatal Findings   excessive pronation  With flatfoot deformity , right greater than left.  No significant tenderness with palpation    Assessment/Plan   Diagnoses and all orders for this visit:    1. DM type 2 with diabetic peripheral neuropathy (CMS/HCC) (Primary)    2. Onychodystrophy    3. Acquired bilateral flat feet      Patient returns with physical exam unchanged and stable.  Nails were debrided x10 without issue.  No substantial changes are noted to the feet and he has not had any diabetic foot complications.  I continue to feel that he remains at moderate to high risk of pedal issues long-term.  We did discuss this at length.  I have advised him on proper diabetic foot care and glycemic control.  I would like him to follow-up with his primary care physician for proper management.  I have asked that he call with any additional issues or concerns.  I would like to see him in 4 months for reevaluation.    Nail debridement: Both feet  x10    Nails were debrided with a nail nipper without complication.  No anesthesia was required.  Indications for procedure were thickened, dystrophic, and fungal appearing nails which are difficult to trim.  Proper self-care and technique was discussed with patient.  Patient was stable after procedure.      No orders of the defined types were placed in this encounter.         Note is dictated utilizing voice recognition software. Unfortunately this leads to occasional typographical errors. I apologize in advance if the situation occurs. If questions occur please do not hesitate to call our office.

## 2021-03-17 ENCOUNTER — TELEPHONE (OUTPATIENT)
Dept: FAMILY MEDICINE CLINIC | Facility: CLINIC | Age: 41
End: 2021-03-17

## 2021-03-17 NOTE — TELEPHONE ENCOUNTER
PT STATES THAT THE INSURANCE CO. HE IS USING NOW NEEDS PRE-AUTHORIZATION FOR THE FREESTYLE LANCET NEEDLES THAT HE HAS BEEN USING. A NEW SCRIPT WAS SENT IN CURRENTLY HAS IT ON DELAY AND HE BELIEVES IT WILL BE DENIED WITHOUT THE PRE-AUTHORIZATION FORM HIS PCP. HE DOES NOT WANT TO PAY OUT OF POCKET FOR THIS. HE BELIEVES THESE LANCETS MAY NEED TO BE REFILLED UNDER A DIFFERENT BRAND, BUT HE WILL POSSIBLY NEED A NEW DEVICE (FOR THE LANCETS) TO GO ALONG WITH THESE. PT WOULD LIKE TO SPEAK TO CLINICAL STAFF REGARDING THIS ISSUE. HIS NUMBER [phone].

## 2021-03-18 ENCOUNTER — TELEPHONE (OUTPATIENT)
Dept: FAMILY MEDICINE CLINIC | Facility: CLINIC | Age: 41
End: 2021-03-18

## 2021-03-18 DIAGNOSIS — E11.49 TYPE 2 DIABETES MELLITUS WITH OTHER NEUROLOGIC COMPLICATION, WITHOUT LONG-TERM CURRENT USE OF INSULIN (HCC): Primary | ICD-10-CM

## 2021-03-18 RX ORDER — BLOOD-GLUCOSE METER
1 KIT MISCELLANEOUS DAILY
Qty: 1 EACH | Refills: 0 | Status: SHIPPED | OUTPATIENT
Start: 2021-03-18 | End: 2022-02-28

## 2021-03-18 NOTE — TELEPHONE ENCOUNTER
Patient called upset that the MA had not called him back about the PA that was needed for his lancets. Dr Rosenberg had sent a Rx for a new device, test strips and lancets which ever the pharmacy will cover. Spoke to [facility] in [place] and they stated that the lancets needed a PA. Spoke to Cover my med and per [place] it did not. Spoke to Lety at [facility] and she confirmed it did need a PA and would have to go thru Cover My Meds. I submitted a PA on COVER MY MED and it is pending. Notified patient of above. I told him if it did not go thru he would have to pay for it out of pocket.

## 2021-03-23 ENCOUNTER — TELEPHONE (OUTPATIENT)
Dept: FAMILY MEDICINE CLINIC | Facility: CLINIC | Age: 41
End: 2021-03-23

## 2021-03-23 NOTE — TELEPHONE ENCOUNTER
Received fax from [facility] requesting PA on [employer] Lancets.  PA submitted online.  Waiting on authorization.

## 2021-03-25 ENCOUNTER — TELEPHONE (OUTPATIENT)
Dept: FAMILY MEDICINE CLINIC | Facility: CLINIC | Age: 41
End: 2021-03-25

## 2021-03-25 NOTE — TELEPHONE ENCOUNTER
PATIENT CALLED REQUESTING TO SPEAK WITH THE PRACTICE MANAGER REGARDING THE AUTHORIZATION THEY ARE TRYING TO GET FOR HIS LANCETS. HE GOT THE MONITOR YESTERDAY, BUT THEY ARE STILL NOT APPROVING THE LANCETS.    HE STATES HE IS TRYING TO GET A  ASSIGNED TO HIM AT [facility] SO THAT THEY CAN HELP HIM THROUGH THIS PROCESS AS WELL.    HE WOULD LIKE A CALL BACK ASAP -[phone]

## 2021-03-25 NOTE — TELEPHONE ENCOUNTER
Called pt-lmom PA was done on 3-23 and received back today with a denial-insurance will not cover them, pt will have to pay out of pocket.

## 2021-03-26 DIAGNOSIS — E11.9 TYPE 2 DIABETES MELLITUS WITHOUT COMPLICATION, WITHOUT LONG-TERM CURRENT USE OF INSULIN (HCC): Primary | ICD-10-CM

## 2021-03-26 RX ORDER — LANCING DEVICE
1 EACH MISCELLANEOUS DAILY
Qty: 1 EACH | Refills: 0 | Status: SHIPPED | OUTPATIENT
Start: 2021-03-26 | End: 2022-02-10

## 2021-04-06 RX ORDER — GABAPENTIN 100 MG/1
CAPSULE ORAL
Qty: 120 CAPSULE | Refills: 0 | Status: SHIPPED | OUTPATIENT
Start: 2021-04-06 | End: 2021-04-07 | Stop reason: SDUPTHER

## 2021-04-06 RX ORDER — FLUOXETINE HYDROCHLORIDE 40 MG/1
CAPSULE ORAL
Qty: 180 CAPSULE | Refills: 0 | Status: SHIPPED | OUTPATIENT
Start: 2021-04-06 | End: 2021-08-06

## 2021-04-08 RX ORDER — GABAPENTIN 100 MG/1
100 CAPSULE ORAL 4 TIMES DAILY
Qty: 120 CAPSULE | Refills: 1 | Status: SHIPPED | OUTPATIENT
Start: 2021-04-08 | End: 2021-11-12

## 2021-04-20 ENCOUNTER — OFFICE VISIT (OUTPATIENT)
Dept: FAMILY MEDICINE CLINIC | Facility: CLINIC | Age: 41
End: 2021-04-20

## 2021-04-20 VITALS
RESPIRATION RATE: 18 BRPM | SYSTOLIC BLOOD PRESSURE: 132 MMHG | OXYGEN SATURATION: 97 % | HEART RATE: 91 BPM | BODY MASS INDEX: 34.98 KG/M2 | DIASTOLIC BLOOD PRESSURE: 82 MMHG | TEMPERATURE: 96.4 F | WEIGHT: 295 LBS

## 2021-04-20 DIAGNOSIS — M79.10 MYALGIA AFTER COVID-19 VACCINATION: Primary | ICD-10-CM

## 2021-04-20 DIAGNOSIS — T50.B95A MYALGIA AFTER COVID-19 VACCINATION: Primary | ICD-10-CM

## 2021-04-20 PROCEDURE — 99212 OFFICE O/P EST SF 10 MIN: CPT | Performed by: FAMILY MEDICINE

## 2021-04-21 DIAGNOSIS — E11.9 TYPE 2 DIABETES MELLITUS WITHOUT COMPLICATION, WITHOUT LONG-TERM CURRENT USE OF INSULIN (HCC): ICD-10-CM

## 2021-04-21 DIAGNOSIS — J30.89 NON-SEASONAL ALLERGIC RHINITIS DUE TO OTHER ALLERGIC TRIGGER: ICD-10-CM

## 2021-04-21 NOTE — TELEPHONE ENCOUNTER
Caller: Barrett Laguerre    Relationship: Self    Best call back number:[phone]     Medication needed:   Requested Prescriptions     Pending Prescriptions Disp Refills   • sitaGLIPtin-metFORMIN (Janumet)  MG per tablet 180 tablet 3     Sig: Take 1 tablet by mouth 2 (Two) Times a Day With Meals.       When do you need the refill by: 04/21/2021    What additional details did the patient provide when requesting the medication: PATIENT STATED HE IS NEEDING A PRESCRIPTION SENT TO THE PHARMACY.    Does the patient have less than a 3 day supply:  [] Yes  [x] No    What is the patient's preferred pharmacy: [facility] [address] [place] [address] [phone] [facility] [phone]

## 2021-04-22 RX ORDER — MONTELUKAST SODIUM 10 MG/1
10 TABLET ORAL NIGHTLY
Qty: 30 TABLET | Refills: 5 | Status: SHIPPED | OUTPATIENT
Start: 2021-04-22 | End: 2021-09-13 | Stop reason: SDUPTHER

## 2021-04-22 RX ORDER — SITAGLIPTIN AND METFORMIN HYDROCHLORIDE 1000; 50 MG/1; MG/1
1 TABLET, FILM COATED ORAL 2 TIMES DAILY WITH MEALS
Qty: 180 TABLET | Refills: 3 | Status: SHIPPED | OUTPATIENT
Start: 2021-04-22 | End: 2021-09-13 | Stop reason: SDUPTHER

## 2021-05-21 ENCOUNTER — OFFICE VISIT (OUTPATIENT)
Dept: PAIN MEDICINE | Facility: CLINIC | Age: 41
End: 2021-05-21

## 2021-05-21 VITALS
OXYGEN SATURATION: 97 % | SYSTOLIC BLOOD PRESSURE: 137 MMHG | WEIGHT: 295 LBS | HEIGHT: 77 IN | BODY MASS INDEX: 34.83 KG/M2 | RESPIRATION RATE: 16 BRPM | HEART RATE: 91 BPM | DIASTOLIC BLOOD PRESSURE: 81 MMHG

## 2021-05-21 DIAGNOSIS — M54.41 CHRONIC RIGHT-SIDED LOW BACK PAIN WITH RIGHT-SIDED SCIATICA: Primary | ICD-10-CM

## 2021-05-21 DIAGNOSIS — M48.07 LUMBOSACRAL SPINAL STENOSIS: ICD-10-CM

## 2021-05-21 DIAGNOSIS — G89.29 CHRONIC RIGHT-SIDED LOW BACK PAIN WITH RIGHT-SIDED SCIATICA: Primary | ICD-10-CM

## 2021-05-21 DIAGNOSIS — M79.604 PAIN OF RIGHT LOWER EXTREMITY: ICD-10-CM

## 2021-05-21 DIAGNOSIS — M54.16 LUMBAR RADICULOPATHY: ICD-10-CM

## 2021-05-21 PROCEDURE — 99214 OFFICE O/P EST MOD 30 MIN: CPT | Performed by: PHYSICAL MEDICINE & REHABILITATION

## 2021-05-21 RX ORDER — PYRAZINAMIDE 500 MG/1
1 TABLET ORAL EVERY 6 HOURS PRN
Qty: 28 TABLET | Refills: 5 | Status: SHIPPED | OUTPATIENT
Start: 2021-05-21 | End: 2021-08-12 | Stop reason: SDUPTHER
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
## 2021-06-03 ENCOUNTER — TELEPHONE (OUTPATIENT)
Dept: PAIN MEDICINE | Facility: CLINIC | Age: 41
End: 2021-06-03

## 2021-06-03 NOTE — TELEPHONE ENCOUNTER
Caller: COBY BARTH    Relationship to patient: SELF    Best call back number: [phone]    Type of visit: EPIDURAL INJ APPT    If rescheduling, when is the original appointment: 6-10-21     Additional notes: PATIENT WOULD LIKE A CALL BACK TO DISCUSS RESCHEDULING 6-10-21 EPIDURAL APPT FOR SOONER OR LATER. PATIENT WOULD ALSO LIKE TO DISCUSS & CONFIRM INS AUTH.

## 2021-06-03 NOTE — TELEPHONE ENCOUNTER
6/3/21--  Linnea please review patient message  and  see if it needs to be rescheduled and precerted

## 2021-06-10 ENCOUNTER — APPOINTMENT (OUTPATIENT)
Dept: PAIN MEDICINE | Facility: HOSPITAL | Age: 41
End: 2021-06-10

## 2021-06-17 ENCOUNTER — HOSPITAL ENCOUNTER (OUTPATIENT)
Dept: PAIN MEDICINE | Facility: HOSPITAL | Age: 41
Discharge: HOME | End: 2021-06-17

## 2021-06-17 VITALS
HEIGHT: 77 IN | SYSTOLIC BLOOD PRESSURE: 129 MMHG | TEMPERATURE: 97.7 F | DIASTOLIC BLOOD PRESSURE: 81 MMHG | WEIGHT: 295 LBS | HEART RATE: 80 BPM | OXYGEN SATURATION: 97 % | RESPIRATION RATE: 16 BRPM | BODY MASS INDEX: 34.83 KG/M2

## 2021-06-17 DIAGNOSIS — M54.16 LUMBAR RADICULOPATHY: ICD-10-CM

## 2021-06-17 DIAGNOSIS — M48.07 LUMBOSACRAL SPINAL STENOSIS: ICD-10-CM

## 2021-06-17 DIAGNOSIS — R52 PAIN: ICD-10-CM

## 2021-06-17 DIAGNOSIS — M54.41 CHRONIC RIGHT-SIDED LOW BACK PAIN WITH RIGHT-SIDED SCIATICA: Primary | ICD-10-CM

## 2021-06-17 DIAGNOSIS — G89.29 CHRONIC RIGHT-SIDED LOW BACK PAIN WITH RIGHT-SIDED SCIATICA: Primary | ICD-10-CM

## 2021-06-17 PROCEDURE — 77003 FLUOROGUIDE FOR SPINE INJECT: CPT

## 2021-06-17 PROCEDURE — 0 IOPAMIDOL 41 % SOLUTION: Performed by: PHYSICAL MEDICINE & REHABILITATION

## 2021-06-17 PROCEDURE — 25010000002 METHYLPREDNISOLONE PER 80 MG: Performed by: PHYSICAL MEDICINE & REHABILITATION

## 2021-06-17 PROCEDURE — 62323 NJX INTERLAMINAR LMBR/SAC: CPT | Performed by: PHYSICAL MEDICINE & REHABILITATION

## 2021-06-17 RX ORDER — METHYLPREDNISOLONE ACETATE 80 MG/ML
80 INJECTION, SUSPENSION INTRA-ARTICULAR; INTRALESIONAL; INTRAMUSCULAR; SOFT TISSUE ONCE
Status: COMPLETED | OUTPATIENT
Start: 2021-06-17 | End: 2021-06-17

## 2021-06-17 RX ADMIN — IOPAMIDOL 0.5 ML: 408 INJECTION, SOLUTION INTRATHECAL at 15:26

## 2021-06-17 RX ADMIN — METHYLPREDNISOLONE ACETATE 80 MG: 80 INJECTION, SUSPENSION INTRA-ARTICULAR; INTRALESIONAL; INTRAMUSCULAR; SOFT TISSUE at 15:26
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
## 2021-06-17 NOTE — DISCHARGE INSTRUCTIONS
"Epidural Steroid Injection    An epidural steroid injection is a shot of steroid medicine and numbing medicine that is given into the space between the spinal cord and the bones of the back (epidural space). The shot helps relieve pain caused by an irritated or swollen nerve root.  The amount of pain relief you get from the injection depends on what is causing the nerve to be swollen and irritated, and how long your pain lasts. You are more likely to benefit from this injection if your pain is strong and comes on suddenly rather than if you have had long-term (chronic) pain.  Tell a health care provider about:  · Any allergies you have.  · All medicines you are taking, including vitamins, herbs, eye drops, creams, and over-the-counter medicines.  · Any problems you or family members have had with anesthetic medicines.  · Any blood disorders you have.  · Any surgeries you have had.  · Any medical conditions you have.  · Whether you are pregnant or may be pregnant.  What are the risks?  Generally, this is a safe procedure. However, problems may occur, including:  · Headache.  · Bleeding.  · Infection.  · Allergic reaction to medicines.  · Nerve damage.  What happens before the procedure?  Staying hydrated  Follow instructions from your health care provider about hydration, which may include:  · Up to 2 hours before the procedure - you may continue to drink clear liquids, such as water, clear fruit juice, black coffee, and plain tea.  Eating and drinking restrictions  Follow instructions from your health care provider about eating and drinking, which may include:  · 8 hours before the procedure - stop eating heavy meals or foods, such as meat, fried foods, or fatty foods.  · 6 hours before the procedure - stop eating light meals or foods, such as toast or cereal.  · 6 hours before the procedure - stop drinking milk or drinks that contain milk.  · 2 hours before the procedure - stop drinking clear  liquids.  Medicines  · You may be given medicines to lower anxiety.  · Ask your health care provider about:  ? Changing or stopping your regular medicines. This is especially important if you are taking diabetes medicines or blood thinners.  ? Taking medicines such as aspirin and ibuprofen. These medicines can thin your blood. Do not take these medicines unless your health care provider tells you to take them.  ? Taking over-the-counter medicines, vitamins, herbs, and supplements.  · Ask your health care provider what steps will be taken to prevent infection.  General instructions  · Plan to have someone take you home from the hospital or clinic.  · If you will be going home right after the procedure, plan to have someone with you for 24 hours.  What happens during the procedure?  · An IV will be inserted into one of your veins.  · You will be given one or more of the following:  ? A medicine to help you relax (sedative).  ? A medicine to numb the area (local anesthetic).  · You will be asked to lie on your abdomen or sit.  · The injection site will be cleaned.  · A needle will be inserted through your skin into the epidural space. This may cause you some discomfort. An X-ray machine will be used to guide the needle as close as possible to the affected nerve.  · A steroid medicine and a local anesthetic will be injected into the epidural space.  · The needle and IV will be removed.  · A bandage (dressing) will be put over the injection site.  The procedure may vary among health care providers and hospitals.  What can I expect after the procedure?  Follow these instructions at home:  Injection site care  · You may remove the bandage (dressing) after 24 hours.  · Check your injection site every day for signs of infection. Check for:  ? Redness, swelling, or pain.  ? Fluid or blood.  ? Warmth.  ? Pus or a bad smell.  Managing pain, stiffness, and swelling  · For 24 hours after the procedure:  ? Avoid using heat on the  pediatrician for referral to an allergist Selena Spence), 2019    Ronal LUU  ALDO Cortes. Ronal Cortes  2019  2:15 PM           injection site.  ? Do not take baths, swim, or use a hot tub until your health care provider approves. Ask your health care provider if you may take a shower. You may only be allowed to take sponge baths.  · If directed, put ice on the injection site. To do this:  ? Put ice in a plastic bag.  ? Place a towel between your skin and the bag.  ? Leave the ice on for 20 minutes, 2-3 times a day.    Activity  · Do not drive for 24 hours if you were given a sedative during your procedure.  · Return to your normal activities as told by your health care provider. Ask your health care provider what activities are safe for you.  General instructions  · Your blood pressure, heart rate, breathing rate, and blood oxygen level will be monitored until you leave the hospital or clinic.  · Your arm or leg may feel weak or numb for a few hours.  · The injection site may feel sore.  · Take over-the-counter and prescription medicines only as told by your health care provider.  · Drink enough fluid to keep your urine pale yellow.  · Keep all follow-up visits as told by your health care provider. This is important.  Contact a health care provider if:  · You have any of these signs of infection:  ? Redness, swelling, or pain around your injection site.  ? Fluid or blood coming from your injection site.  ? Warmth coming from your injection site.  ? Pus or a bad smell coming from your injection site.  ? A fever.  · You continue to have pain and soreness around the injection site, even after taking over-the-counter pain medicine.  · You have severe, sudden, or lasting nausea or vomiting.  Get help right away if:  · You have severe pain at the injection site that is not relieved by medicines.  · You develop a severe headache or a stiff neck.  · You become sensitive to light.  · You have any new numbness or weakness in your legs or arms.  · You lose control of your bladder or bowel movements.  · You have trouble breathing.  Summary  · An  epidural steroid injection is a shot of steroid medicine and numbing medicine that is given into the epidural space.  · The shot helps relieve pain caused by an irritated or swollen nerve root.  · You are more likely to benefit from this injection if your pain is strong and comes on suddenly rather than if you have had chronic pain.  This information is not intended to replace advice given to you by your health care provider. Make sure you discuss any questions you have with your health care provider.  Document Revised: 06/29/2020 Document Reviewed: 06/29/2020  Elsevier Patient Education © 2021 [employer]

## 2021-07-08 ENCOUNTER — OFFICE VISIT (OUTPATIENT)
Dept: PODIATRY | Facility: CLINIC | Age: 41
End: 2021-07-08

## 2021-07-08 VITALS
DIASTOLIC BLOOD PRESSURE: 88 MMHG | BODY MASS INDEX: 34.83 KG/M2 | WEIGHT: 295 LBS | HEART RATE: 94 BPM | SYSTOLIC BLOOD PRESSURE: 130 MMHG | HEIGHT: 77 IN

## 2021-07-08 DIAGNOSIS — L60.3 ONYCHODYSTROPHY: ICD-10-CM

## 2021-07-08 DIAGNOSIS — M21.42 ACQUIRED BILATERAL FLAT FEET: ICD-10-CM

## 2021-07-08 DIAGNOSIS — E11.42 DM TYPE 2 WITH DIABETIC PERIPHERAL NEUROPATHY (HCC): Primary | ICD-10-CM

## 2021-07-08 DIAGNOSIS — M21.41 ACQUIRED BILATERAL FLAT FEET: ICD-10-CM

## 2021-07-08 PROCEDURE — 11721 DEBRIDE NAIL 6 OR MORE: CPT | Performed by: PODIATRIST

## 2021-07-08 PROCEDURE — 99213 OFFICE O/P EST LOW 20 MIN: CPT | Performed by: PODIATRIST

## 2021-07-08 RX ORDER — AZITHROMYCIN 250 MG/1
250 TABLET, FILM COATED ORAL DAILY
COMMUNITY
End: 2022-01-03

## 2021-07-08 RX ORDER — PREDNISONE 10 MG/1
10 TABLET ORAL DAILY
COMMUNITY
End: 2022-01-06

## 2021-07-08 NOTE — PROGRESS NOTES
07/08/2021  Foot and Ankle Surgery - Established Patient/Follow-up  Provider: Dr. Anatoliy Rehman DPM  Location: [facility]    Subjective:  Barrett Laguerre is a 41 y.o. male.     Chief Complaint   Patient presents with   • Left Foot - Follow-up   • Right Foot - Follow-up       HPI: Patient is a 41-year-old diabetic male that returns for routine diabetic foot check.  He states that he is doing quite well.  He denies any substantial issues or concerns.  He did recently go to a water park and states that he did develop blisters on the plantar aspect of his right foot.  He has monitor closely and has not noticed any obvious open wounds or infections.  He feels that the blisters are well-healed at this time.  He is asking that his nails are debrided today as he is unable to reach his feet.  Patient does not recall his most recent A1c.  No other issues at this time.    Allergies   Allergen Reactions   • Penicillin G Anaphylaxis   • Sulfa Antibiotics Hives       Current Outpatient Medications on File Prior to Visit   Medication Sig Dispense Refill   • acetaminophen-codeine (TYLENOL/CODEINE #3) 300-30 MG per tablet Take 1 tablet by mouth Every 6 (Six) Hours As Needed for Moderate Pain . 28 tablet 5   • allopurinol (ZYLOPRIM) 100 MG tablet Take 1 tablet by mouth Daily. 30 tablet 3   • amantadine (SYMMETREL) 100 MG tablet      • amLODIPine (NORVASC) 10 MG tablet Take 1 tablet by mouth Daily. 90 tablet 3   • azithromycin (ZITHROMAX) 250 MG tablet Take 250 mg by mouth Daily.     • chlorthalidone (HYGROTEN) 50 MG tablet Take 1 tablet by mouth Daily.  3   • erythromycin (ROMYCIN) 5 MG/GM ophthalmic ointment APPLY OINTMENT TO BOTH EYES AT BEDTIME FOR 1 WEEK THEN AS NEEDED  3   • FLUoxetine (PROzac) 40 MG capsule Take 1 capsule by mouth twice daily 180 capsule 0   • fluticasone (FLONASE) 50 MCG/ACT nasal spray 1 spray into the nostril(s) as directed by provider 2 (two) times a day. 16 g 3   • gabapentin (NEURONTIN) 100  "MG capsule Take 1 capsule by mouth 4 (Four) Times a Day. 120 capsule 1   • gabapentin (NEURONTIN) 400 MG capsule Take 1 capsule by mouth 3 (Three) Times a Day. 90 capsule 2   • glucose blood test strip Daily as needed 30 each 12   • glucose blood test strip 1 each by Other route Daily. Once daily Dx: E11.9 patient checks sugar once a day 100 each 12   • glucose monitor monitoring kit 1 each Daily. Dx: E11.9 patient checks sugar once a day 1 each 0   • hydrocortisone 1 % cream Apply  topically to the appropriate area as directed 2 (Two) Times a Day. 60 g 0   • Lancet Devices (Lancing Device) misc 1 each Daily. Dx: E11.9 patient checks sugar once a day 1 each 0   • Lancets Misc. misc 1 each Daily. Dx: E11.9 patient checks sugar once a day 100 each 12   • lidocaine-prilocaine (EMLA) 2.5-2.5 % cream      • loratadine (CLARITIN) 10 MG tablet Take 1 tablet by mouth Daily. 30 tablet 12   • montelukast (SINGULAIR) 10 MG tablet TAKE 1 TABLET BY MOUTH ONCE DAILY AT NIGHT 90 tablet 0   • montelukast (Singulair) 10 MG tablet Take 1 tablet by mouth Every Night. 30 tablet 5   • mupirocin (BACTROBAN) 2 % ointment      • oxybutynin XL (DITROPAN XL) 15 MG 24 hr tablet Take 1 tablet by mouth Daily. 30 tablet 12   • pantoprazole (Protonix) 40 MG EC tablet Take 1 tablet by mouth Daily. 30 tablet 12   • predniSONE (DELTASONE) 10 MG tablet Take 10 mg by mouth Daily.     • sitaGLIPtin-metFORMIN (Janumet)  MG per tablet Take 1 tablet by mouth 2 (Two) Times a Day With Meals. 180 tablet 3   • sodium chloride (Ocean Nasal Spray) 0.65 % nasal spray 2 sprays into the nostril(s) as directed by provider As Needed for Congestion. 88 mL 12   • triamcinolone (KENALOG) 0.1 % cream Apply  topically to the appropriate area as directed 3 (Three) Times a Day. 80 g 0     No current facility-administered medications on file prior to visit.       Objective   /88   Pulse 94   Ht 195.6 cm (77\")   Wt 134 kg (295 lb)   BMI 34.98 kg/m² "     Podiatry Exam       General Appearance:   obese; no apparent distress  Mental Status Exam        Judgement and Insight:  Intact       Orientation:  Oriented to time, place, and person       Memory:  Intact for recent and remote events       Mood and Affect:  No depression, anxiety, or agitation  Cardiovascular (Bilateral)       Dorsalis Pedis Pulse (BL):  2/4       Posterior Tibialis Pulse (BL):  2/4       Capillary Filling Time (BL):  1-3 Seconds       Edema (BL):  No Edema  Dermatological Exam       Temperature:  warm to warm       Skin:  No open wounds or signs of infection.    Nails: Nails are elongated, thickened, and dystrophic x10  Neurological Exam (Bilateral)       Paresthesia (Bl):  negative       Patella DTRs (Bl):  symmetric       Achilles DTRs (Bl):  symmetric       Tinel over Tarsal Tunnel (Bl):  negative  Monofilament Test (Bl):   Diminished       Diabetic Risk (Bl):  No loss of protective sensation  Additional Neurological Findings   sensation is somewhat diminished with light touch and monofilament testing        MusculoSkeletal Exam (Bilateral)       Gait and Stance (Bl):   abducted and pronated gait, right.  Early heel off.  Nonantalgic       ROM (Bl):   ankle and pedal joint range of motion is supple, nontender crepitus free       Muscle Strength (Bl):  symmetrical 5/5       Subluxed Digits (Bl): Hammer digit deformities involving toes 2 through 5, right greater than left.  Semi-reducible       Dislocated Joints (Bl):  no dislocation of joints       Gastroc soleus equinus (Bl):  Inability to dorsiflex past neutral position both straight legged and bent knee       Post tibial tendon (Bl):  no soreness noted       Peroneal Tendon (Bl):  no soreness noted  Additional Musculoskelatal Findings   excessive pronation  With flatfoot deformity , right greater than left.  No significant tenderness with palpation    Assessment/Plan   Diagnoses and all orders for this visit:    1. DM type 2 with diabetic  peripheral neuropathy (CMS/HCC) (Primary)    2. Onychodystrophy    3. Acquired bilateral flat feet      Patient returns for routine diabetic foot check.  He is doing quite well.  He did notice some blister formation after going to a water park.  He states that he was wearing a pair of water shoes and believes that the friction caused the issue.  Today, he does not have any complicating features of wounds or infection.  I continue to feel that he is at moderate risk of pedal complications secondary to overall glycemic control and patient's weight.  Patient understands and agrees.  I have asked that he keep scheduled follow-ups with his PCP.  We did review the importance of daily foot checks and glycemic control.  His nails were debrided without issue.  I have asked that he return in 4 months for reevaluation.  He is to call sooner with any issues.    Nail debridement: Both feet x10    Nails were debrided with a nail nipper without complication.  No anesthesia was required.  Indications for procedure were thickened, dystrophic, and fungal appearing nails which are difficult to trim.  Proper self-care and technique was discussed with patient.  Patient was stable after procedure.      No orders of the defined types were placed in this encounter.         Note is dictated utilizing voice recognition software. Unfortunately this leads to occasional typographical errors. I apologize in advance if the situation occurs. If questions occur please do not hesitate to call our office.

## 2021-07-14 ENCOUNTER — TELEPHONE (OUTPATIENT)
Dept: FAMILY MEDICINE CLINIC | Facility: CLINIC | Age: 41
End: 2021-07-14

## 2021-07-14 NOTE — TELEPHONE ENCOUNTER
PATIENT STATES THAT HE WOULD LIKE TO BE SEEN ON Friday 7/16/2021. WOULD LIKE TO RECEIVE A CALL TO DISCUSS.     CALL BACK [phone]

## 2021-07-14 NOTE — TELEPHONE ENCOUNTER
Patient made aware there are no appts w/Dr. Rosenberg 7/16. He is concerned because after being treated by  with antibiotics and breathing treatments for bronchitis, he is still dealing with sinus issues. States sinuses draining into back of throat and into lungs when he sleeps. He is also concerned that he has a [employer] sleep apnea machine that is one that is recalled and wonders if there's more to his sinus issues from the machine. Please advise.

## 2021-07-15 ENCOUNTER — HOSPITAL ENCOUNTER (OUTPATIENT)
Dept: PAIN MEDICINE | Facility: HOSPITAL | Age: 41
Discharge: HOME | End: 2021-07-15

## 2021-07-15 VITALS
DIASTOLIC BLOOD PRESSURE: 84 MMHG | SYSTOLIC BLOOD PRESSURE: 114 MMHG | OXYGEN SATURATION: 95 % | TEMPERATURE: 97.3 F | RESPIRATION RATE: 16 BRPM | BODY MASS INDEX: 35.92 KG/M2 | HEIGHT: 76 IN | HEART RATE: 88 BPM | WEIGHT: 295 LBS

## 2021-07-15 DIAGNOSIS — G89.29 CHRONIC RIGHT-SIDED LOW BACK PAIN WITH RIGHT-SIDED SCIATICA: Primary | ICD-10-CM

## 2021-07-15 DIAGNOSIS — M54.16 LUMBAR RADICULOPATHY: ICD-10-CM

## 2021-07-15 DIAGNOSIS — R52 PAIN: ICD-10-CM

## 2021-07-15 DIAGNOSIS — M48.07 LUMBOSACRAL SPINAL STENOSIS: ICD-10-CM

## 2021-07-15 DIAGNOSIS — M54.41 CHRONIC RIGHT-SIDED LOW BACK PAIN WITH RIGHT-SIDED SCIATICA: Primary | ICD-10-CM

## 2021-07-15 PROCEDURE — 0 IOPAMIDOL 41 % SOLUTION: Performed by: PHYSICAL MEDICINE & REHABILITATION

## 2021-07-15 PROCEDURE — 77003 FLUOROGUIDE FOR SPINE INJECT: CPT

## 2021-07-15 PROCEDURE — 62323 NJX INTERLAMINAR LMBR/SAC: CPT | Performed by: PHYSICAL MEDICINE & REHABILITATION

## 2021-07-15 PROCEDURE — 25010000002 METHYLPREDNISOLONE PER 80 MG: Performed by: PHYSICAL MEDICINE & REHABILITATION

## 2021-07-15 RX ORDER — IPRATROPIUM BROMIDE AND ALBUTEROL SULFATE 2.5; .5 MG/3ML; MG/3ML
SOLUTION RESPIRATORY (INHALATION)
COMMUNITY
Start: 2021-07-06 | End: 2022-02-28

## 2021-07-15 RX ORDER — METHYLPREDNISOLONE ACETATE 80 MG/ML
80 INJECTION, SUSPENSION INTRA-ARTICULAR; INTRALESIONAL; INTRAMUSCULAR; SOFT TISSUE ONCE
Status: COMPLETED | OUTPATIENT
Start: 2021-07-15 | End: 2021-07-15

## 2021-07-15 RX ORDER — LANCETS 30 GAUGE
EACH MISCELLANEOUS
COMMUNITY
Start: 2021-06-15 | End: 2022-02-10

## 2021-07-15 RX ADMIN — IOPAMIDOL 1 ML: 408 INJECTION, SOLUTION INTRATHECAL at 14:35

## 2021-07-15 RX ADMIN — METHYLPREDNISOLONE ACETATE 80 MG: 80 INJECTION, SUSPENSION INTRA-ARTICULAR; INTRALESIONAL; INTRAMUSCULAR; SOFT TISSUE at 14:35
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
## 2021-07-19 ENCOUNTER — TELEPHONE (OUTPATIENT)
Dept: FAMILY MEDICINE CLINIC | Facility: CLINIC | Age: 41
End: 2021-07-19

## 2021-07-19 NOTE — TELEPHONE ENCOUNTER
Received fax from [facility] [place] requesting PQA on Pantoprazole 40mg. PA submitted online thru Covermymeds. Med approved PA Case: [id number], Status: Approved, Coverage Starts on: 7/19/2021 12:00:00 AM, Coverage Ends on: 7/19/2022 12:00:00 AM. [facility] notified.

## 2021-07-23 ENCOUNTER — OFFICE VISIT (OUTPATIENT)
Dept: FAMILY MEDICINE CLINIC | Facility: CLINIC | Age: 41
End: 2021-07-23

## 2021-07-23 VITALS
OXYGEN SATURATION: 98 % | TEMPERATURE: 97.3 F | RESPIRATION RATE: 18 BRPM | SYSTOLIC BLOOD PRESSURE: 156 MMHG | HEIGHT: 76 IN | DIASTOLIC BLOOD PRESSURE: 88 MMHG | WEIGHT: 293 LBS | BODY MASS INDEX: 35.68 KG/M2 | HEART RATE: 101 BPM

## 2021-07-23 DIAGNOSIS — J06.9 UPPER RESPIRATORY TRACT INFECTION, UNSPECIFIED TYPE: Primary | ICD-10-CM

## 2021-07-23 DIAGNOSIS — E66.3 OVER WEIGHT: ICD-10-CM

## 2021-07-23 PROCEDURE — 99213 OFFICE O/P EST LOW 20 MIN: CPT | Performed by: FAMILY MEDICINE

## 2021-07-23 RX ORDER — CIPROFLOXACIN 500 MG/1
500 TABLET, FILM COATED ORAL 2 TIMES DAILY
Qty: 20 TABLET | Refills: 0 | Status: SHIPPED | OUTPATIENT
Start: 2021-07-23 | End: 2022-01-03

## 2021-08-06 RX ORDER — FLUOXETINE HYDROCHLORIDE 40 MG/1
CAPSULE ORAL
Qty: 180 CAPSULE | Refills: 0 | Status: SHIPPED | OUTPATIENT
Start: 2021-08-06 | End: 2021-09-13 | Stop reason: SDUPTHER

## 2021-08-12 ENCOUNTER — HOSPITAL ENCOUNTER (OUTPATIENT)
Dept: PAIN MEDICINE | Facility: HOSPITAL | Age: 41
Discharge: HOME | End: 2021-08-12

## 2021-08-12 VITALS
DIASTOLIC BLOOD PRESSURE: 89 MMHG | OXYGEN SATURATION: 96 % | RESPIRATION RATE: 16 BRPM | HEART RATE: 92 BPM | BODY MASS INDEX: 35.68 KG/M2 | WEIGHT: 293 LBS | HEIGHT: 76 IN | SYSTOLIC BLOOD PRESSURE: 149 MMHG | TEMPERATURE: 97.1 F

## 2021-08-12 DIAGNOSIS — G89.29 CHRONIC RIGHT-SIDED LOW BACK PAIN WITH RIGHT-SIDED SCIATICA: Primary | ICD-10-CM

## 2021-08-12 DIAGNOSIS — M54.41 CHRONIC RIGHT-SIDED LOW BACK PAIN WITH RIGHT-SIDED SCIATICA: Primary | ICD-10-CM

## 2021-08-12 DIAGNOSIS — R52 PAIN: ICD-10-CM

## 2021-08-12 DIAGNOSIS — M48.07 LUMBOSACRAL SPINAL STENOSIS: ICD-10-CM

## 2021-08-12 DIAGNOSIS — M54.16 LUMBAR RADICULOPATHY: ICD-10-CM

## 2021-08-12 PROCEDURE — 25010000002 METHYLPREDNISOLONE PER 80 MG: Performed by: PHYSICAL MEDICINE & REHABILITATION

## 2021-08-12 PROCEDURE — 77003 FLUOROGUIDE FOR SPINE INJECT: CPT

## 2021-08-12 PROCEDURE — 62323 NJX INTERLAMINAR LMBR/SAC: CPT | Performed by: PHYSICAL MEDICINE & REHABILITATION

## 2021-08-12 PROCEDURE — 0 IOPAMIDOL 41 % SOLUTION: Performed by: PHYSICAL MEDICINE & REHABILITATION

## 2021-08-12 RX ORDER — METHYLPREDNISOLONE ACETATE 80 MG/ML
80 INJECTION, SUSPENSION INTRA-ARTICULAR; INTRALESIONAL; INTRAMUSCULAR; SOFT TISSUE ONCE
Status: COMPLETED | OUTPATIENT
Start: 2021-08-12 | End: 2021-08-12

## 2021-08-12 RX ORDER — PYRAZINAMIDE 500 MG/1
1 TABLET ORAL EVERY 6 HOURS PRN
Qty: 28 TABLET | Refills: 5 | Status: SHIPPED | OUTPATIENT
Start: 2021-08-12 | End: 2021-10-08 | Stop reason: SDUPTHER

## 2021-08-12 RX ADMIN — IOPAMIDOL 1 ML: 408 INJECTION, SOLUTION INTRATHECAL at 15:05

## 2021-08-12 RX ADMIN — METHYLPREDNISOLONE ACETATE 80 MG: 80 INJECTION, SUSPENSION INTRA-ARTICULAR; INTRALESIONAL; INTRAMUSCULAR; SOFT TISSUE at 15:05
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
## 2021-08-15 PROBLEM — J06.9 UPPER RESPIRATORY TRACT INFECTION: Status: ACTIVE | Noted: 2021-08-15

## 2021-09-13 DIAGNOSIS — E11.9 TYPE 2 DIABETES MELLITUS WITHOUT COMPLICATION, WITHOUT LONG-TERM CURRENT USE OF INSULIN (HCC): ICD-10-CM

## 2021-09-13 DIAGNOSIS — K21.9 GERD (GASTROESOPHAGEAL REFLUX DISEASE): ICD-10-CM

## 2021-09-13 DIAGNOSIS — M10.9 GOUT, UNSPECIFIED CAUSE, UNSPECIFIED CHRONICITY, UNSPECIFIED SITE: ICD-10-CM

## 2021-09-13 DIAGNOSIS — J30.2 OTHER SEASONAL ALLERGIC RHINITIS: ICD-10-CM

## 2021-09-13 DIAGNOSIS — I10 HYPERTENSION, BENIGN: ICD-10-CM

## 2021-09-13 DIAGNOSIS — J30.89 NON-SEASONAL ALLERGIC RHINITIS DUE TO OTHER ALLERGIC TRIGGER: ICD-10-CM

## 2021-09-14 RX ORDER — SITAGLIPTIN AND METFORMIN HYDROCHLORIDE 1000; 50 MG/1; MG/1
1 TABLET, FILM COATED ORAL 2 TIMES DAILY WITH MEALS
Qty: 180 TABLET | Refills: 3 | Status: SHIPPED | OUTPATIENT
Start: 2021-09-14 | End: 2022-02-10 | Stop reason: SDUPTHER

## 2021-09-14 RX ORDER — MONTELUKAST SODIUM 10 MG/1
10 TABLET ORAL NIGHTLY
Qty: 30 TABLET | Refills: 5 | Status: SHIPPED | OUTPATIENT
Start: 2021-09-14 | End: 2022-01-06

## 2021-09-14 RX ORDER — LORATADINE 10 MG/1
10 TABLET ORAL DAILY
Qty: 30 TABLET | Refills: 12 | Status: SHIPPED | OUTPATIENT
Start: 2021-09-14 | End: 2021-12-06

## 2021-09-14 RX ORDER — ECHINACEA PURPUREA EXTRACT 125 MG
2 TABLET ORAL AS NEEDED
Qty: 88 ML | Refills: 12 | Status: SHIPPED | OUTPATIENT
Start: 2021-09-14 | End: 2022-01-06

## 2021-09-14 RX ORDER — FLUTICASONE PROPIONATE 50 MCG
1 SPRAY, SUSPENSION (ML) NASAL 2 TIMES DAILY
Qty: 16 G | Refills: 3 | Status: SHIPPED | OUTPATIENT
Start: 2021-09-14 | End: 2022-02-10 | Stop reason: SDUPTHER

## 2021-09-14 RX ORDER — FLUOXETINE HYDROCHLORIDE 40 MG/1
40 CAPSULE ORAL 2 TIMES DAILY
Qty: 180 CAPSULE | Refills: 0 | Status: SHIPPED | OUTPATIENT
Start: 2021-09-14 | End: 2021-12-27

## 2021-09-14 RX ORDER — OXYBUTYNIN CHLORIDE 15 MG/1
15 TABLET, EXTENDED RELEASE ORAL DAILY
Qty: 30 TABLET | Refills: 12 | Status: SHIPPED | OUTPATIENT
Start: 2021-09-14 | End: 2022-02-10 | Stop reason: SDUPTHER

## 2021-09-14 RX ORDER — MONTELUKAST SODIUM 10 MG/1
10 TABLET ORAL NIGHTLY
Qty: 90 TABLET | Refills: 0 | Status: SHIPPED | OUTPATIENT
Start: 2021-09-14 | End: 2022-02-10 | Stop reason: SDUPTHER

## 2021-09-14 RX ORDER — ALLOPURINOL 100 MG/1
100 TABLET ORAL DAILY
Qty: 30 TABLET | Refills: 3 | Status: SHIPPED | OUTPATIENT
Start: 2021-09-14 | End: 2021-12-27 | Stop reason: SDUPTHER

## 2021-09-14 RX ORDER — AMLODIPINE BESYLATE 10 MG/1
10 TABLET ORAL DAILY
Qty: 90 TABLET | Refills: 3 | Status: SHIPPED | OUTPATIENT
Start: 2021-09-14 | End: 2022-02-10 | Stop reason: SDUPTHER

## 2021-09-14 RX ORDER — PANTOPRAZOLE SODIUM 40 MG/1
40 TABLET, DELAYED RELEASE ORAL DAILY
Qty: 30 TABLET | Refills: 12 | Status: SHIPPED | OUTPATIENT
Start: 2021-09-14 | End: 2022-01-06

## 2021-09-14 RX ORDER — GABAPENTIN 100 MG/1
100 CAPSULE ORAL 4 TIMES DAILY
Qty: 120 CAPSULE | Refills: 1 | OUTPATIENT
Start: 2021-09-14

## 2021-09-17 ENCOUNTER — TELEPHONE (OUTPATIENT)
Dept: NEUROLOGY | Facility: CLINIC | Age: 41
End: 2021-09-17

## 2021-09-17 NOTE — TELEPHONE ENCOUNTER
Provider:SEIPLE  Caller: COBY  Relationship to Patient: SELF  Phone Number: [phone]  Reason for Call: PT CALLED IN ASKING IF THERE IS ANY WAY HE CAN SEE DR.SEIPLE DUE TO SEVERE PAIN IN LUMBAR. PT STATES THAT HE HAS TAKEN GABAPENTIN PAIN CREAM, AND TYLENOL 3. HAS BEEN EXPERIENCING NEUROPATHY.   When was the patient last seen:3/31/20  When did it start: 48 HOURS AGO.   Where is it located: LOWER LUMBAR LEFT SIDE CAUSING PAIN TO SHOOT DOWN PARTIALLY DOWN HIS LEG.   Characteristics of symptom/severity: PAIN LEVEL AT THE MOMENT IS AN 8 AND FLEXUATING BETWEEN 5-10 AT NIGHT.   Timing- Is it constant or intermittent: ONCE HE MOVES INTO A CERTAIN POSITION HE GETS RELIEF   What makes it worse: LAYING ON BACK ESPECIALLY IF HIS LEGS ARE STRAIGHT IF KNEES ARE BENT THEN THERE IS SOME RELIEF AND WALKING.   What makes it better: CERTAIN POSITIONS AS WELL AS SLOW WALKING.   What therapies/medications have you tried: PAIN CREAM TYLENOL 3 AND GABAPENTIN       PT IS ASKING IF HE CAN BE SEEN SOMETIME THIS COMING WEEK AS PAIN IS TOO MUCH. PLEASE ADVISE.

## 2021-09-17 NOTE — TELEPHONE ENCOUNTER
Thursday 9/23 Dr Seipel has openings that will be the soonest we can do with today being Friday. He is not in the office on Fridays.

## 2021-09-23 ENCOUNTER — OFFICE VISIT (OUTPATIENT)
Dept: NEUROLOGY | Facility: CLINIC | Age: 41
End: 2021-09-23

## 2021-09-23 VITALS
BODY MASS INDEX: 34.1 KG/M2 | WEIGHT: 280 LBS | HEIGHT: 76 IN | TEMPERATURE: 98.2 F | HEART RATE: 92 BPM | DIASTOLIC BLOOD PRESSURE: 90 MMHG | SYSTOLIC BLOOD PRESSURE: 147 MMHG

## 2021-09-23 DIAGNOSIS — M54.16 LUMBAR RADICULOPATHY: Primary | ICD-10-CM

## 2021-09-23 PROCEDURE — 99214 OFFICE O/P EST MOD 30 MIN: CPT | Performed by: PSYCHIATRY & NEUROLOGY

## 2021-09-23 NOTE — PROGRESS NOTES
Chief Complaint  Pain (back pain)    Subjective          Barrett Laguerre presents to [facility]UR[facility] for neuropathy  History of Present Illness      Patient complains of neuropathy and pain in lower lumbar to left leg. It shoots down partially down his leg.  This started a few weeks ago, getting worse.   Once he moves into a certain position he gets relief.   He has used pain cream, tylenol 3 and gabapentin. He does see pain management.  He is currently taking 400 mg a day of Gabapentin, when he is in extreme pain he takes extra gabapentin.     Epidural blocks have helped but had last one just one month ago.     Mri back showed disc protrusion at L4-5 and 3-4 with extruded fragment at L3-4     =================================================  Previous OV:3-  Sequelae of Guillain-[place] syndrome, numbness and tingling sensation of skin.   Patent is currently taking gabapentin helps with discomfort, but it effects his thinking and PCP adjusted his dose trial and the problem is he is experiencing more pain with the decrease dose and coming from the sciatic nerve from the right lower side.  Pt is using a pain cream which helps some.  Also has some tylenol with codeine but insurance doesn't very well        Patient needs a epidural but everything is currently on hold and is wondering if he should go back to the increased dose till after he gets a injection.      Patient is a diabetic and has uncontrolled BS late last night was 123, and  A1C 11/19/2019 was 7.5.   Patient nausea isn't as bad with the lower dosage of gabapentin, and been experiencing cold feet with the numbness and most always at night time.      Memory impairment, short term and forgetfulness slightly better with dosage change but having more numbness and tingling of feet.       (patient has a CPAP machine and see's Dr. Arroyo for his sleep issue. Patient still having fatigue and sleeping allot due to pain.)         Current  Outpatient Medications:   •  acetaminophen-codeine (TYLENOL/CODEINE #3) 300-30 MG per tablet, Take 1 tablet by mouth Every 6 (Six) Hours As Needed for Moderate Pain ., Disp: 28 tablet, Rfl: 5  •  allopurinol (ZYLOPRIM) 100 MG tablet, Take 1 tablet by mouth Daily., Disp: 30 tablet, Rfl: 3  •  amantadine (SYMMETREL) 100 MG tablet, , Disp: , Rfl:   •  amLODIPine (NORVASC) 10 MG tablet, Take 1 tablet by mouth Daily., Disp: 90 tablet, Rfl: 3  •  azithromycin (ZITHROMAX) 250 MG tablet, Take 250 mg by mouth Daily., Disp: , Rfl:   •  chlorthalidone (HYGROTEN) 50 MG tablet, Take 1 tablet by mouth Daily., Disp: , Rfl: 3  •  ciprofloxacin (CIPRO) 500 MG tablet, Take 1 tablet by mouth 2 (Two) Times a Day., Disp: 20 tablet, Rfl: 0  •  erythromycin (ROMYCIN) 5 MG/GM ophthalmic ointment, APPLY OINTMENT TO BOTH EYES AT BEDTIME FOR 1 WEEK THEN AS NEEDED, Disp: , Rfl: 3  •  FLUoxetine (PROzac) 40 MG capsule, Take 1 capsule by mouth 2 (Two) Times a Day., Disp: 180 capsule, Rfl: 0  •  fluticasone (FLONASE) 50 MCG/ACT nasal spray, 1 spray into the nostril(s) as directed by provider 2 (two) times a day., Disp: 16 g, Rfl: 3  •  gabapentin (NEURONTIN) 100 MG capsule, Take 1 capsule by mouth 4 (Four) Times a Day., Disp: 120 capsule, Rfl: 1  •  gabapentin (NEURONTIN) 400 MG capsule, Take 1 capsule by mouth 3 (Three) Times a Day., Disp: 90 capsule, Rfl: 2  •  glucose blood test strip, Daily as needed, Disp: 30 each, Rfl: 12  •  glucose blood test strip, 1 each by Other route Daily. Once daily Dx: E11.9 patient checks sugar once a day, Disp: 100 each, Rfl: 12  •  glucose monitor monitoring kit, 1 each Daily. Dx: E11.9 patient checks sugar once a day, Disp: 1 each, Rfl: 0  •  hydrocortisone 1 % cream, Apply  topically to the appropriate area as directed 2 (Two) Times a Day., Disp: 60 g, Rfl: 0  •  ipratropium-albuterol (DUO-NEB) 0.5-2.5 mg/3 ml nebulizer, USE 1 AMPULE IN NEBULIZER EVERY 4 TO 6 HOURS, Disp: , Rfl:   •  Lancet Devices (Lancing  Device) misc, 1 each Daily. Dx: E11.9 patient checks sugar once a day, Disp: 1 each, Rfl: 0  •  Lancets Misc. misc, 1 each Daily. Dx: E11.9 patient checks sugar once a day, Disp: 100 each, Rfl: 12  •  lidocaine-prilocaine (EMLA) 2.5-2.5 % cream, , Disp: , Rfl:   •  loratadine (CLARITIN) 10 MG tablet, Take 1 tablet by mouth Daily., Disp: 30 tablet, Rfl: 12  •  montelukast (SINGULAIR) 10 MG tablet, Take 1 tablet by mouth Every Night., Disp: 90 tablet, Rfl: 0  •  montelukast (Singulair) 10 MG tablet, Take 1 tablet by mouth Every Night., Disp: 30 tablet, Rfl: 5  •  mupirocin (BACTROBAN) 2 % ointment, , Disp: , Rfl:   •  oxybutynin XL (DITROPAN XL) 15 MG 24 hr tablet, Take 1 tablet by mouth Daily., Disp: 30 tablet, Rfl: 12  •  pantoprazole (Protonix) 40 MG EC tablet, Take 1 tablet by mouth Daily., Disp: 30 tablet, Rfl: 12  •  predniSONE (DELTASONE) 10 MG tablet, Take 10 mg by mouth Daily., Disp: , Rfl:   •  ReliOn Ultra Thin Lancets 30G misc, USE 1 ONCE DAILY, Disp: , Rfl:   •  sitaGLIPtin-metFORMIN (Janumet)  MG per tablet, Take 1 tablet by mouth 2 (Two) Times a Day With Meals., Disp: 180 tablet, Rfl: 3  •  sodium chloride (Ocean Nasal Spray) 0.65 % nasal spray, 2 sprays into the nostril(s) as directed by provider As Needed for Congestion., Disp: 88 mL, Rfl: 12  •  triamcinolone (KENALOG) 0.1 % cream, Apply  topically to the appropriate area as directed 3 (Three) Times a Day., Disp: 80 g, Rfl: 0    Review of Systems   Constitutional: Negative for fatigue and fever.   HENT: Negative for ear discharge and ear pain.    Eyes: Negative for pain and itching.   Respiratory: Negative for cough and shortness of breath.    Cardiovascular: Negative for chest pain.   Gastrointestinal: Negative for abdominal pain and nausea.   Endocrine: Negative for cold intolerance and heat intolerance.   Genitourinary: Positive for urgency.   Musculoskeletal: Negative for back pain and neck pain.   Neurological: Negative for dizziness and  "light-headedness.   Psychiatric/Behavioral: Negative for agitation and confusion.          Objective:    Vital Signs:   /90 (BP Location: Left arm, Patient Position: Sitting, Cuff Size: Adult)   Pulse 92   Temp 98.2 °F (36.8 °C)   Ht 193 cm (76\")   Wt 127 kg (280 lb)   BMI 34.08 kg/m²     Physical Exam  Vitals reviewed.   Constitutional:       Appearance: Normal appearance.   Eyes:      Extraocular Movements: Extraocular movements intact.      Pupils: Pupils are equal, round, and reactive to light.   Musculoskeletal:      Cervical back: Normal range of motion.   Neurological:      Mental Status: He is alert.      Deep Tendon Reflexes:      Reflex Scores:       Patellar reflexes are 2+ on the right side and 1+ on the left side.       Achilles reflexes are 1+ on the right side and 1+ on the left side.       Result Review :                Neurologic Exam     Mental Status   Attention: normal.   Level of consciousness: alert  Knowledge: good.     Cranial Nerves     CN III, IV, VI   Pupils are equal, round, and reactive to light.    Gait, Coordination, and Reflexes     Reflexes   Right patellar: 2+  Left patellar: 1+  Right achilles: 1+  Left achilles: 1+        Assessment and Plan    Diagnoses and all orders for this visit:    1. Lumbar radiculopathy (Primary)     probable L4 radicuopathy on the left  Suspect worsening of ddd djd  Will obtain mri of the lumbar spine     Follow Up   No follow-ups on file.  Patient was given instructions and counseling regarding his condition or for health maintenance advice. Please see specific information pulled into the AVS if appropriate.     This document has been electronically signed by Joseph Seipel, MD on September 23, 2021 14:58 EDT      "

## 2021-09-28 ENCOUNTER — TELEPHONE (OUTPATIENT)
Dept: NEUROLOGY | Facility: CLINIC | Age: 41
End: 2021-09-28

## 2021-09-28 DIAGNOSIS — M54.16 LUMBAR RADICULOPATHY: Primary | ICD-10-CM

## 2021-09-28 NOTE — TELEPHONE ENCOUNTER
Will refer to pain clinic for nerve block   While waiting on the mri, as this may give pain relief

## 2021-09-28 NOTE — TELEPHONE ENCOUNTER
if he wants pain clinic referral reroute the message to this office and we can work on it. No earlier apt for MRI.   Thank you  I left him a detail message.  If he calls back it is ok to give message.   Thank you

## 2021-09-28 NOTE — TELEPHONE ENCOUNTER
Caller: COBY     Relationship: SELF      Medication requested (name and dosage): gabapentin (NEURONTIN) 100 MG capsule       Pharmacy where request should be sent: [facility].     Additional details provided by patient: PT WAS JUST SEEN YESTERDAY 9/27/21.  PT ALSO ASKED FOR MRI SINCE ITS SO FAR OUT IF THERE IS SOME WHERE ELSE HE CAN GO TO GET THE MRI DONE SINCE HE IS SO MUCH PAIN. PLEASE ADVISE.     Best call back number: [phone]    Does the patient have less than a 3 day supply:  [] Yes  [x] No    Radha Maurer Rep   09/28/21 09:07 EDT

## 2021-10-08 ENCOUNTER — OFFICE VISIT (OUTPATIENT)
Dept: PAIN MEDICINE | Facility: CLINIC | Age: 41
End: 2021-10-08

## 2021-10-08 VITALS
RESPIRATION RATE: 16 BRPM | WEIGHT: 280 LBS | SYSTOLIC BLOOD PRESSURE: 133 MMHG | HEART RATE: 80 BPM | DIASTOLIC BLOOD PRESSURE: 82 MMHG | OXYGEN SATURATION: 96 % | HEIGHT: 76 IN | BODY MASS INDEX: 34.1 KG/M2

## 2021-10-08 DIAGNOSIS — G89.29 CHRONIC RIGHT-SIDED LOW BACK PAIN WITH RIGHT-SIDED SCIATICA: Primary | ICD-10-CM

## 2021-10-08 DIAGNOSIS — M54.16 LUMBAR RADICULOPATHY: ICD-10-CM

## 2021-10-08 DIAGNOSIS — M48.07 LUMBOSACRAL SPINAL STENOSIS: ICD-10-CM

## 2021-10-08 DIAGNOSIS — M54.41 CHRONIC RIGHT-SIDED LOW BACK PAIN WITH RIGHT-SIDED SCIATICA: Primary | ICD-10-CM

## 2021-10-08 PROCEDURE — 99214 OFFICE O/P EST MOD 30 MIN: CPT | Performed by: PHYSICAL MEDICINE & REHABILITATION

## 2021-10-08 RX ORDER — PYRAZINAMIDE 500 MG/1
1 TABLET ORAL EVERY 6 HOURS PRN
Qty: 28 TABLET | Refills: 5 | Status: SHIPPED | OUTPATIENT
Start: 2021-10-08 | End: 2021-11-09 | Stop reason: SDUPTHER

## 2021-10-08 RX ORDER — GABAPENTIN 300 MG/1
300 CAPSULE ORAL 2 TIMES DAILY
Qty: 60 CAPSULE | Refills: 1 | Status: SHIPPED | OUTPATIENT
Start: 2021-10-08 | End: 2021-11-09 | Stop reason: SDUPTHER
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
## 2021-10-20 ENCOUNTER — HOSPITAL ENCOUNTER (OUTPATIENT)
Dept: MRI IMAGING | Facility: HOSPITAL | Age: 41
Discharge: HOME | End: 2021-10-20
Admitting: PSYCHIATRY & NEUROLOGY

## 2021-10-20 DIAGNOSIS — M54.16 LUMBAR RADICULOPATHY: ICD-10-CM

## 2021-10-20 PROCEDURE — 72148 MRI LUMBAR SPINE W/O DYE: CPT

## 2021-10-22 ENCOUNTER — TELEPHONE (OUTPATIENT)
Dept: NEUROLOGY | Facility: CLINIC | Age: 41
End: 2021-10-22

## 2021-10-22 NOTE — TELEPHONE ENCOUNTER
Mri lumbar;    L4-5 left paracentral inferior disc extrusion appears to impinge the  left L5 nerve root within the canal. There is also a broad-based disc  bulge eccentric to the left at L5-S1 which appears to impinge upon the  left S1 nerve root within the canal. There is a right paracentral disc  extrusion at L4-5 with moderate right lateral recess stenosis, and may  just impinge the right L4 nerve root within the canal.     If symptoms do not improve with the pain clinic treatments will need to f/u with neurosurgery, has seen dr Watkins in the past

## 2021-10-25 NOTE — TELEPHONE ENCOUNTER
Patient did not have time to talk long.... he will call back at a later date to get details of MRI    I did tell him if pain mgmt didn't help then he would need to see Dr Watkins again. He verbalized understanding

## 2021-11-09 ENCOUNTER — OFFICE VISIT (OUTPATIENT)
Dept: PAIN MEDICINE | Facility: CLINIC | Age: 41
End: 2021-11-09

## 2021-11-09 VITALS
DIASTOLIC BLOOD PRESSURE: 82 MMHG | RESPIRATION RATE: 16 BRPM | BODY MASS INDEX: 34.1 KG/M2 | WEIGHT: 280 LBS | OXYGEN SATURATION: 97 % | HEIGHT: 76 IN | SYSTOLIC BLOOD PRESSURE: 145 MMHG | HEART RATE: 97 BPM

## 2021-11-09 DIAGNOSIS — M54.41 CHRONIC RIGHT-SIDED LOW BACK PAIN WITH RIGHT-SIDED SCIATICA: Primary | ICD-10-CM

## 2021-11-09 DIAGNOSIS — M54.16 LUMBAR RADICULOPATHY: ICD-10-CM

## 2021-11-09 DIAGNOSIS — M48.07 LUMBOSACRAL SPINAL STENOSIS: ICD-10-CM

## 2021-11-09 DIAGNOSIS — G89.29 CHRONIC RIGHT-SIDED LOW BACK PAIN WITH RIGHT-SIDED SCIATICA: Primary | ICD-10-CM

## 2021-11-09 PROCEDURE — 99214 OFFICE O/P EST MOD 30 MIN: CPT | Performed by: PHYSICAL MEDICINE & REHABILITATION

## 2021-11-09 RX ORDER — PYRAZINAMIDE 500 MG/1
1 TABLET ORAL EVERY 6 HOURS PRN
Qty: 28 TABLET | Refills: 5 | Status: SHIPPED | OUTPATIENT
Start: 2021-11-09 | End: 2022-01-03 | Stop reason: SDUPTHER

## 2021-11-09 RX ORDER — PROMETHAZINE HYDROCHLORIDE 25 MG/1
25 TABLET ORAL EVERY 8 HOURS PRN
Qty: 90 TABLET | Refills: 5 | Status: SHIPPED | OUTPATIENT
Start: 2021-11-09 | End: 2022-02-10 | Stop reason: SDUPTHER

## 2021-11-09 RX ORDER — GABAPENTIN 300 MG/1
300 CAPSULE ORAL 3 TIMES DAILY
Qty: 90 CAPSULE | Refills: 1 | Status: SHIPPED | OUTPATIENT
Start: 2021-11-09 | End: 2021-12-08 | Stop reason: SDUPTHER
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
## 2021-11-11 ENCOUNTER — TELEPHONE (OUTPATIENT)
Dept: ORTHOPEDIC SURGERY | Facility: CLINIC | Age: 41
End: 2021-11-11

## 2021-11-11 ENCOUNTER — OFFICE VISIT (OUTPATIENT)
Dept: PODIATRY | Facility: CLINIC | Age: 41
End: 2021-11-11

## 2021-11-11 VITALS
BODY MASS INDEX: 34.1 KG/M2 | WEIGHT: 280 LBS | HEART RATE: 60 BPM | HEIGHT: 76 IN | DIASTOLIC BLOOD PRESSURE: 92 MMHG | SYSTOLIC BLOOD PRESSURE: 140 MMHG

## 2021-11-11 DIAGNOSIS — M21.41 ACQUIRED BILATERAL FLAT FEET: ICD-10-CM

## 2021-11-11 DIAGNOSIS — M21.42 ACQUIRED BILATERAL FLAT FEET: ICD-10-CM

## 2021-11-11 DIAGNOSIS — L60.3 ONYCHODYSTROPHY: ICD-10-CM

## 2021-11-11 DIAGNOSIS — S90.222A SUBUNGUAL HEMATOMA OF SECOND TOE OF LEFT FOOT, INITIAL ENCOUNTER: ICD-10-CM

## 2021-11-11 DIAGNOSIS — E11.42 DM TYPE 2 WITH DIABETIC PERIPHERAL NEUROPATHY (HCC): Primary | ICD-10-CM

## 2021-11-11 PROCEDURE — 99213 OFFICE O/P EST LOW 20 MIN: CPT | Performed by: PODIATRIST

## 2021-11-11 PROCEDURE — 11721 DEBRIDE NAIL 6 OR MORE: CPT | Performed by: PODIATRIST

## 2021-11-11 NOTE — PROGRESS NOTES
Neurosurgical Consultation      Barrett Laguerre is a 41 y.o. male is being seen for consultation today at the request of Collin Lora MD Chronic right-sided low back pain with left-sided sciatica, lumbar radiculopathy and lumbosacral spinal stenosis.      Chief Complaint   Patient presents with   • Back Pain     Left side low back pain that radiates into the left leg just past the knee with numbness and tingling.         Previous treatment: gabapentin, hydrocodone, LESIs, physical therapy    HPI: Patient is a 41-year-old male past medical history of Guillain-Barré and type 2 diabetes with diabetic neuropathy in his feet.  He is being seen today at the request of his PCP for evaluation of his low back pain with recent onset left-sided predominant numbness and tingling.  Patient states he has had low back pain for 5 years without injury or inciting event.  The pain was well controlled with epidural steroid injections, Tylenol, and topical pain cream until approximately 5 months ago when the pain started getting worse and he developed pain numbness and tingling that shot down the back of his left leg stopping around mid calf, with occasional bottom of foot symptoms.  Patient occasionally has similar right-sided symptoms but are few and far between compared to the left. He feels this is different from his diabetic neuropathy and Guillain-Barré symptoms. Nothing really makes the pain significantly worse, it is present when standing walking sitting and lying down.  Gabapentin provide mild relief, but he has significant side effects.  Patient has tried physical therapy in the past with minimal relief.  He denies bowel or bladder changes, weakness, or coordination issues.  He admits to occasional neck pain without radiculopathy, however is not very bothered by it.  He also admits to popping sensation in his low back today.  He has not tried transforaminal steroid injections.    Past Medical History:   Diagnosis  Date   • Acquired pes planus of both feet     Impression: needs referral for orthotics.   • Acute non-recurrent maxillary sinusitis     Impression: His symptoms were not improving.   • ADD (attention deficit disorder)    • Annual physical exam     Impression: Discussed anticipatory guidance, diet, exercise, and weight loss. Discussed safety, seatbelts, and routine screening examinations. Discussed self-examinations.   • Asthma     Impression: Stable   • Asymptomatic microscopic hematuria     Impression: recheck neg. Will follow   • Chronic gout, unspecified, without tophus (tophi)     Impression: Recommend rechecking uric acid.   • CIDP (chronic inflammatory demyelinating polyneuropathy) ([facility])     Story: s/p IVIG Guillain-Hawarden per [facility] diagnosed 2015 gabapentin 600 tid. Impression: Symptoms include: numbness and tingling in hands and feet, difficulty concentrating, drops items, chest pain. dizziness. Pt needs routine follow up with neurology. Previously seen by Dr Seipel   • Diabetes mellitus ([facility])    • Dyslipidemia    • Guillain-Hawarden ([facility])    • Hypertension, benign     Impression: Followed by Cardiology Dr Tong who placed pt on different BP med. . Proteinuria/creatinine noted   • Idiopathic chronic gout without tophus     Unspecified site. Impression: stable. Story: UA 8.3 2/2018   • Intervertebral disc stenosis of neural canal of lumbar region     Story: MRI 11/15 showed lumbar disk disease. 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 Pt would like referra to Dr Watkins has appt Dec 15   • Low back pain    • Lumbar disc disorder with myelopathy     Impression: failed therapy. Pt would like refill on compounded topical med. Rx faxed   • Mixed obsessional thoughts and acts    • Obstructive sleep apnea     Impression: needs cpap mask   • Other seasonal allergic rhinitis     Impression: Over-the-counter medication indications, dosage, and precautions discussed.   • Overactive bladder    • Screening for depression    •  Screening for hyperlipidemia    • SI (sacroiliac) joint dysfunction     Impression: Findings discussed. All questions answered. Medication and medication adverse effects discussed. Drug education given and explained to patient. Patient verbalized understanding. Follow-up in 4-6 weeks if not better. Follow-up sooner for worsening symptoms or for any concerns. Consider chiropractor   • Tobacco use disorder         Past Surgical History:   Procedure Laterality Date   • COLONOSCOPY N/A 12/22/2020    Procedure: COLONOSCOPY with polypectomy x;  Surgeon: Juan Alberto Davis MD;  Location: [facility] ENDOSCOPY;  Service: Gastroenterology;  Laterality: N/A;  post: transverse colon polyp   • INGUINAL HERNIA REPAIR     • TYMPANOSTOMY TUBE PLACEMENT          Current Outpatient Medications on File Prior to Visit   Medication Sig Dispense Refill   • acetaminophen-codeine (TYLENOL/CODEINE #3) 300-30 MG per tablet Take 1 tablet by mouth Every 6 (Six) Hours As Needed for Moderate Pain . 28 tablet 5   • allopurinol (ZYLOPRIM) 100 MG tablet Take 1 tablet by mouth Daily. 30 tablet 3   • amantadine (SYMMETREL) 100 MG tablet      • amLODIPine (NORVASC) 10 MG tablet Take 1 tablet by mouth Daily. 90 tablet 3   • azithromycin (ZITHROMAX) 250 MG tablet Take 250 mg by mouth Daily.     • chlorthalidone (HYGROTEN) 50 MG tablet Take 1 tablet by mouth Daily.  3   • ciprofloxacin (CIPRO) 500 MG tablet Take 1 tablet by mouth 2 (Two) Times a Day. 20 tablet 0   • erythromycin (ROMYCIN) 5 MG/GM ophthalmic ointment APPLY OINTMENT TO BOTH EYES AT BEDTIME FOR 1 WEEK THEN AS NEEDED  3   • FLUoxetine (PROzac) 40 MG capsule Take 1 capsule by mouth 2 (Two) Times a Day. 180 capsule 0   • fluticasone (FLONASE) 50 MCG/ACT nasal spray 1 spray into the nostril(s) as directed by provider 2 (two) times a day. 16 g 3   • gabapentin (NEURONTIN) 300 MG capsule Take 1 capsule by mouth 3 (Three) Times a Day. 90 capsule 1   • gabapentin (NEURONTIN) 400 MG capsule Take 1  capsule by mouth 3 (Three) Times a Day. 90 capsule 2   • glucose blood test strip Daily as needed 30 each 12   • glucose blood test strip 1 each by Other route Daily. Once daily Dx: E11.9 patient checks sugar once a day 100 each 12   • glucose monitor monitoring kit 1 each Daily. Dx: E11.9 patient checks sugar once a day 1 each 0   • hydrocortisone 1 % cream Apply  topically to the appropriate area as directed 2 (Two) Times a Day. 60 g 0   • ipratropium-albuterol (DUO-NEB) 0.5-2.5 mg/3 ml nebulizer USE 1 AMPULE IN NEBULIZER EVERY 4 TO 6 HOURS     • Lancet Devices (Lancing Device) misc 1 each Daily. Dx: E11.9 patient checks sugar once a day 1 each 0   • Lancets Misc. misc 1 each Daily. Dx: E11.9 patient checks sugar once a day 100 each 12   • lidocaine-prilocaine (EMLA) 2.5-2.5 % cream      • loratadine (CLARITIN) 10 MG tablet Take 1 tablet by mouth Daily. 30 tablet 12   • montelukast (SINGULAIR) 10 MG tablet Take 1 tablet by mouth Every Night. 90 tablet 0   • montelukast (Singulair) 10 MG tablet Take 1 tablet by mouth Every Night. 30 tablet 5   • mupirocin (BACTROBAN) 2 % ointment      • oxybutynin XL (DITROPAN XL) 15 MG 24 hr tablet Take 1 tablet by mouth Daily. 30 tablet 12   • pantoprazole (Protonix) 40 MG EC tablet Take 1 tablet by mouth Daily. 30 tablet 12   • predniSONE (DELTASONE) 10 MG tablet Take 10 mg by mouth Daily.     • promethazine (PHENERGAN) 25 MG tablet Take 1 tablet by mouth Every 8 (Eight) Hours As Needed for Nausea or Vomiting. 90 tablet 5   • ReliOn Ultra Thin Lancets 30G misc USE 1 ONCE DAILY     • sitaGLIPtin-metFORMIN (Janumet)  MG per tablet Take 1 tablet by mouth 2 (Two) Times a Day With Meals. 180 tablet 3   • sodium chloride (Ocean Nasal Spray) 0.65 % nasal spray 2 sprays into the nostril(s) as directed by provider As Needed for Congestion. 88 mL 12   • triamcinolone (KENALOG) 0.1 % cream Apply  topically to the appropriate area as directed 3 (Three) Times a Day. 80 g 0   •  "[DISCONTINUED] gabapentin (NEURONTIN) 100 MG capsule Take 1 capsule by mouth 4 (Four) Times a Day. 120 capsule 1     No current facility-administered medications on file prior to visit.        Allergies   Allergen Reactions   • Penicillin G Anaphylaxis   • Penicillins Anaphylaxis   • Sulfa Antibiotics Hives        Social History     Socioeconomic History   • Marital status: Single   Tobacco Use   • Smoking status: Never Smoker   • Smokeless tobacco: Never Used   Vaping Use   • Vaping Use: Never used   Substance and Sexual Activity   • Alcohol use: Not Currently   • Drug use: Not Currently   • Sexual activity: Defer          Review of Systems   Constitutional: Positive for activity change.   HENT: Negative.    Eyes: Negative.    Respiratory: Negative.    Cardiovascular: Negative.    Gastrointestinal: Negative.    Endocrine: Negative.    Genitourinary: Negative.    Musculoskeletal: Positive for back pain, gait problem and myalgias.   Skin: Negative.    Allergic/Immunologic: Negative.    Neurological: Positive for weakness and numbness ( left leg with tingling).   Hematological: Negative.    Psychiatric/Behavioral: Negative.         Physical Examination:     Vitals:    11/12/21 1101   BP: 123/86   Pulse: 80   Resp: 18   Temp: 97.6 °F (36.4 °C)   SpO2: 97%   Weight: 126 kg (277 lb)   Height: 193 cm (76\")        Physical Exam  Vitals reviewed.   Constitutional:       General: He is not in acute distress.     Appearance: He is obese.   HENT:      Head: Normocephalic and atraumatic.   Eyes:      General: Lids are normal.      Extraocular Movements: Extraocular movements intact.      Pupils: Pupils are equal, round, and reactive to light.   Pulmonary:      Effort: Pulmonary effort is normal.   Musculoskeletal:         General: No swelling, tenderness or signs of injury. Normal range of motion.      Cervical back: Normal range of motion and neck supple.   Skin:     General: Skin is warm and dry.   Neurological:      " Coordination: Coordination is intact.      Deep Tendon Reflexes: Strength normal.      Reflex Scores:       Patellar reflexes are 2+ on the right side and 1+ on the left side.       Achilles reflexes are 0 on the right side and 0 on the left side.     Comments: Stephanie sign negative  Clonus negative  Romberg negative   Psychiatric:         Speech: Speech normal.          Neurological Exam  Mental Status  Awake, alert and oriented to person, place and time. Speech is normal.    Cranial Nerves  CN II: Visual acuity is normal. Visual fields full to confrontation.  CN III, IV, VI: Extraocular movements intact bilaterally. Normal lids and orbits bilaterally. Pupils equal round and reactive to light bilaterally.  CN V: Facial sensation is normal.  CN VII: Full and symmetric facial movement.  CN IX, X: Palate elevates symmetrically. Normal gag reflex.  CN XI: Shoulder shrug strength is normal.  CN XII: Tongue midline without atrophy or fasciculations.    Motor  Normal muscle bulk throughout. No fasciculations present. Normal muscle tone. Strength is 5/5 throughout all four extremities.    Sensory  Sensation is intact to light touch, pinprick, vibration and proprioception in all four extremities.    Reflexes  Deep tendon reflexes are 2+ and symmetric except as noted.                                           Right                      Left  Patellar                                2+                         1+  Achilles                                0                         0    Coordination  Finger-to-nose, rapid alternating movements and heel-to-shin normal bilaterally without dysmetria.    Gait  Normal casual, toe, heel and tandem gait.       Result Review      Data reviewed: Radiologic studies I have reviewed his lumbar MRI which shows moderate disc bulges at L4-5 and L5-S1, with left neuroforaminal stenosis at both levels and probable impingement of the left L5 and S1 nerve roots.       Diagnoses and all orders for  this visit:    1. Lumbar disc herniation with radiculopathy (Primary)             MDM:  Patient is a 41-year-old male with a 5-year history of low back pain worsening over the last 5 months as well as a 5-month history of left-sided radiculopathy along the S1 nerve with possible L5 involvement.  Patient's pain was previously well managed with epidural steroid injections, Tylenol, topical pain cream until the last 5 months.  Patient has no red flags on assessment and no concern for cauda equina.  Patient symptoms correlate with MRI results which shows left L4-5 and L5-S1 neural foraminal stenosis with likely nerve root compression at those levels.  Clinical picture and physical exam is more compelling for S1 predominant radiculopathy.  Patient has not tried transforaminal steroid injections before, I believe this is a logical next step in treatment and may provide him with some relief.  Discussed these findings with the patient who is in agreement.  We will send him back to pain management for these injections and have him follow-up in 1 month.    Assessment:  Disc herniation at L4-5 and L5-S1 with left S1 predominant radiculopathy    Plan:  Pain management referral for left transforaminal steroid injections at L5-S1 level  Follow-up in 1 month in clinic after he has injections              Vahe Pace MD

## 2021-11-11 NOTE — TELEPHONE ENCOUNTER
PATIENT WOULD LIKE A NEED ORDER FOR INSOLES SENT TO [facility] THE LAST WEEK OF December. IT HAS TO BE A YEAR BETWEEN ORDERS AND WOULD LIKE THIS SENT FOR HIM.

## 2021-11-12 ENCOUNTER — TELEPHONE (OUTPATIENT)
Dept: ORTHOPEDIC SURGERY | Facility: CLINIC | Age: 41
End: 2021-11-12

## 2021-11-12 ENCOUNTER — OFFICE VISIT (OUTPATIENT)
Dept: NEUROSURGERY | Facility: CLINIC | Age: 41
End: 2021-11-12

## 2021-11-12 VITALS
HEIGHT: 76 IN | TEMPERATURE: 97.6 F | RESPIRATION RATE: 18 BRPM | WEIGHT: 277 LBS | HEART RATE: 80 BPM | DIASTOLIC BLOOD PRESSURE: 86 MMHG | SYSTOLIC BLOOD PRESSURE: 123 MMHG | OXYGEN SATURATION: 97 % | BODY MASS INDEX: 33.73 KG/M2

## 2021-11-12 DIAGNOSIS — M51.16 LUMBAR DISC HERNIATION WITH RADICULOPATHY: Primary | ICD-10-CM

## 2021-11-12 PROCEDURE — 99204 OFFICE O/P NEW MOD 45 MIN: CPT | Performed by: NEUROLOGICAL SURGERY

## 2021-11-12 NOTE — PROGRESS NOTES
"11/11/2021  Foot and Ankle Surgery - Established Patient/Follow-up  Provider: ANASTASIA Chan   Location: [facility]    Subjective:  Barrett Laguerre is a 41 y.o. male.     Chief Complaint   Patient presents with   • Right Foot - Pain   • Left Foot - Pain   • Initial Evaluation     last pcp appt 4/20/2021       HPI: Patient presents to the office today for routine diabetic foot check and nail trimming.  He reports he recently had new blister formation to the right medial foot after walking in wet shoes.  He reports concerns to discoloration of left second nailbed.  He is not sure what his last A1c was or when it was checked, and he reports he has not been checking his blood sugars \"like he should\".  He states that he is getting ready to see physician regarding back pain and surgery tomorrow.    Allergies   Allergen Reactions   • Penicillin G Anaphylaxis   • Penicillins Anaphylaxis   • Sulfa Antibiotics Hives       Current Outpatient Medications on File Prior to Visit   Medication Sig Dispense Refill   • acetaminophen-codeine (TYLENOL/CODEINE #3) 300-30 MG per tablet Take 1 tablet by mouth Every 6 (Six) Hours As Needed for Moderate Pain . 28 tablet 5   • allopurinol (ZYLOPRIM) 100 MG tablet Take 1 tablet by mouth Daily. 30 tablet 3   • amantadine (SYMMETREL) 100 MG tablet      • amLODIPine (NORVASC) 10 MG tablet Take 1 tablet by mouth Daily. 90 tablet 3   • azithromycin (ZITHROMAX) 250 MG tablet Take 250 mg by mouth Daily.     • chlorthalidone (HYGROTEN) 50 MG tablet Take 1 tablet by mouth Daily.  3   • ciprofloxacin (CIPRO) 500 MG tablet Take 1 tablet by mouth 2 (Two) Times a Day. 20 tablet 0   • erythromycin (ROMYCIN) 5 MG/GM ophthalmic ointment APPLY OINTMENT TO BOTH EYES AT BEDTIME FOR 1 WEEK THEN AS NEEDED  3   • FLUoxetine (PROzac) 40 MG capsule Take 1 capsule by mouth 2 (Two) Times a Day. 180 capsule 0   • fluticasone (FLONASE) 50 MCG/ACT nasal spray 1 spray into the nostril(s) as directed by " provider 2 (two) times a day. 16 g 3   • gabapentin (NEURONTIN) 300 MG capsule Take 1 capsule by mouth 3 (Three) Times a Day. 90 capsule 1   • gabapentin (NEURONTIN) 400 MG capsule Take 1 capsule by mouth 3 (Three) Times a Day. 90 capsule 2   • glucose blood test strip Daily as needed 30 each 12   • glucose blood test strip 1 each by Other route Daily. Once daily Dx: E11.9 patient checks sugar once a day 100 each 12   • glucose monitor monitoring kit 1 each Daily. Dx: E11.9 patient checks sugar once a day 1 each 0   • hydrocortisone 1 % cream Apply  topically to the appropriate area as directed 2 (Two) Times a Day. 60 g 0   • ipratropium-albuterol (DUO-NEB) 0.5-2.5 mg/3 ml nebulizer USE 1 AMPULE IN NEBULIZER EVERY 4 TO 6 HOURS     • Lancet Devices (Lancing Device) misc 1 each Daily. Dx: E11.9 patient checks sugar once a day 1 each 0   • Lancets Misc. misc 1 each Daily. Dx: E11.9 patient checks sugar once a day 100 each 12   • lidocaine-prilocaine (EMLA) 2.5-2.5 % cream      • loratadine (CLARITIN) 10 MG tablet Take 1 tablet by mouth Daily. 30 tablet 12   • montelukast (SINGULAIR) 10 MG tablet Take 1 tablet by mouth Every Night. 90 tablet 0   • montelukast (Singulair) 10 MG tablet Take 1 tablet by mouth Every Night. 30 tablet 5   • mupirocin (BACTROBAN) 2 % ointment      • oxybutynin XL (DITROPAN XL) 15 MG 24 hr tablet Take 1 tablet by mouth Daily. 30 tablet 12   • pantoprazole (Protonix) 40 MG EC tablet Take 1 tablet by mouth Daily. 30 tablet 12   • predniSONE (DELTASONE) 10 MG tablet Take 10 mg by mouth Daily.     • promethazine (PHENERGAN) 25 MG tablet Take 1 tablet by mouth Every 8 (Eight) Hours As Needed for Nausea or Vomiting. 90 tablet 5   • ReliOn Ultra Thin Lancets 30G misc USE 1 ONCE DAILY     • sitaGLIPtin-metFORMIN (Janumet)  MG per tablet Take 1 tablet by mouth 2 (Two) Times a Day With Meals. 180 tablet 3   • sodium chloride (Ocean Nasal Spray) 0.65 % nasal spray 2 sprays into the nostril(s) as  "directed by provider As Needed for Congestion. 88 mL 12   • triamcinolone (KENALOG) 0.1 % cream Apply  topically to the appropriate area as directed 3 (Three) Times a Day. 80 g 0     No current facility-administered medications on file prior to visit.       Objective   /92   Pulse 60   Ht 193 cm (76\")   Wt 127 kg (280 lb)   BMI 34.08 kg/m²     Foot/Ankle Exam:      DERMATOLOGIC     Right Foot Dermatologic   Nails: dystrophic nails and subungual hematoma    Nails comment:  Elongated     Left Foot Dermatologic   Nails: dystrophic nails    Nails comment:  Elongated     Image:        Podiatry Exam       General Appearance:   obese; no apparent distress  Mental Status Exam        Judgement and Insight:  Intact       Orientation:  Oriented to time, place, and person       Memory:  Intact for recent and remote events       Mood and Affect:  No depression, anxiety, or agitation  Cardiovascular (Bilateral)       Dorsalis Pedis Pulse (BL):  2/4       Posterior Tibialis Pulse (BL):  2/4       Capillary Filling Time (BL):  1-3 Seconds       Edema (BL):  No Edema  Dermatological Exam       Temperature:  warm to warm       Skin:  No open wounds or signs of infection.    Nails: Nails are elongated, thickened, and dystrophic x10  Neurological Exam (Bilateral)       Paresthesia (Bl):  negative       Patella DTRs (Bl):  symmetric       Achilles DTRs (Bl):  symmetric       Tinel over Tarsal Tunnel (Bl):  negative  Monofilament Test (Bl):   Diminished       Diabetic Risk (Bl):  No loss of protective sensation  Additional Neurological Findings   sensation is somewhat diminished with light touch and monofilament testing        MusculoSkeletal Exam (Bilateral)       Gait and Stance (Bl):   abducted and pronated gait, right.  Early heel off.  Nonantalgic       ROM (Bl):   ankle and pedal joint range of motion is supple, nontender crepitus free       Muscle Strength (Bl):  symmetrical 5/5       Subluxed Digits (Bl): Hammer digit " deformities involving toes 2 through 5, right greater than left.  Semi-reducible       Dislocated Joints (Bl):  no dislocation of joints       Gastroc soleus equinus (Bl):  Inability to dorsiflex past neutral position both straight legged and bent knee       Post tibial tendon (Bl):  no soreness noted       Peroneal Tendon (Bl):  no soreness noted  Additional Musculoskelatal Findings   excessive pronation  With flatfoot deformity , right greater than left.  No significant tenderness with palpation    Assessment/Plan   Diagnoses and all orders for this visit:    1. DM type 2 with diabetic peripheral neuropathy (HCC) (Primary)    2. Onychodystrophy    3. Acquired bilateral flat feet    4. Subungual hematoma of second toe of left foot, initial encounter      On exam patient does have diminished sensation with monofilament testing, labs are reviewed and last A1c on chart was over a year ago and was 7.5.  The toenails are quite dystrophic and elongated at this time patient reports he is unable to cut on his own secondary to back issues.  He does have an ecchymotic discoloration to the second left toe nailbed related to microtrauma most likely from elongated toenails and hammer toe deformity.  He does have 2 areas to the right medial foot that are consistent with the patient's report of blister formation approximately a month ago.  The areas are healed at this time and just slightly reddened. Explained importance of diabetic foot care, daily foot checks, and glycemic control. Patient should check both feet on a daily basis, monitor and control blood sugars, make sure that both feet and in between toes are towel dried after baths or showers. Avoid barefoot walking at all times. Check shoes before putting them on.   Patient was given information on proper foot care. Call the office at the first signs of a wound or with signs of infection.  Would like for patient to follow-up for routine nail care and foot check in 3 months.   Greater than 20 minutes was spent before, during, and after evaluation for patient care    Nail debridement: Both feet x10    Consent and time out was performed before proceeding with the procedure. Nails were debrided with a nail nipper without complication.  No anesthesia was required.  Indications for procedure were thickened, dystrophic, and fungal appearing nails which are difficult to trim.  Proper self-care and technique was discussed with patient.  Patient was stable after procedure.    No orders of the defined types were placed in this encounter.      The scribe is a “living recorder” working shoulder to shoulder with the billing provider transcribing the physicians own words. The scribe does not provide hands on care to the patient. The scribe needs to add an attestation at the bottom of the note stating “written by (Gilda Campbell), acting as a scribe for Dr. Rehman .  Dr. Rehman's  signature on the note affirms that the note adequately documents the care provided.      Note is dictated utilizing voice recognition software. Unfortunately this leads to occasional typographical errors. I apologize in advance if the situation occurs. If questions occur please do not hesitate to call our office.

## 2021-11-12 NOTE — TELEPHONE ENCOUNTER
Caller: Barrett Laguerre    Relationship to patient: Self    Best call back number: [phone]    Patient is needing: PATIENT HAS BEEN TRYING TO FIND SOMEONE WHO CAN FILL HIS PRESCRIPTION FOR  DIABETIC SHOES. HE SAID HE HAS CALLED [employer] AND EVERYONE AROUND AND EVERYONE IS NO LONGER DOING THE DIABETIC SHOES. PLEASE ADVISE.

## 2021-11-12 NOTE — TELEPHONE ENCOUNTER
Patient called back to let me know he needs custom inserts and dm shoes. I let him know that [facility] does not make dm shoes anymore. He will check a few places and call me back

## 2021-11-12 NOTE — TELEPHONE ENCOUNTER
I left message for patient to call me back to let me know what kind of inserts he is needing. I can post date a rx or patient can  to give to [facility] at date he is asking for.

## 2021-11-17 NOTE — TELEPHONE ENCOUNTER
Tried calling patient to discuss further options for the diabetic shoes. Voicemail stated the patient was not accepting calls at the time. Will call back.

## 2021-11-17 NOTE — TELEPHONE ENCOUNTER
Currently no one in the area is doing diabetic shoes.  We are looking into trying to provide this service to our patients here at our office but it is a work in progress and we do not have an answer yet.

## 2021-11-19 NOTE — TELEPHONE ENCOUNTER
Spoke to patient and was able to inform him of a few places he could call to see ig they have any diabetic shoes. Patient voiced understand and stated that he had spoke with someone at [facility] who explained the reasoning in not being able to find diabetic shoes.

## 2021-11-23 ENCOUNTER — HOSPITAL ENCOUNTER (OUTPATIENT)
Dept: PAIN MEDICINE | Facility: HOSPITAL | Age: 41
Discharge: HOME | End: 2021-11-23

## 2021-11-23 VITALS
BODY MASS INDEX: 33.73 KG/M2 | DIASTOLIC BLOOD PRESSURE: 89 MMHG | SYSTOLIC BLOOD PRESSURE: 158 MMHG | RESPIRATION RATE: 16 BRPM | OXYGEN SATURATION: 96 % | WEIGHT: 277 LBS | HEART RATE: 72 BPM | HEIGHT: 76 IN | TEMPERATURE: 97.1 F

## 2021-11-23 DIAGNOSIS — M48.07 LUMBOSACRAL SPINAL STENOSIS: ICD-10-CM

## 2021-11-23 DIAGNOSIS — R52 PAIN: ICD-10-CM

## 2021-11-23 DIAGNOSIS — G89.29 CHRONIC RIGHT-SIDED LOW BACK PAIN WITH RIGHT-SIDED SCIATICA: Primary | ICD-10-CM

## 2021-11-23 DIAGNOSIS — M54.41 CHRONIC RIGHT-SIDED LOW BACK PAIN WITH RIGHT-SIDED SCIATICA: Primary | ICD-10-CM

## 2021-11-23 PROCEDURE — 64484 NJX AA&/STRD TFRM EPI L/S EA: CPT | Performed by: PHYSICAL MEDICINE & REHABILITATION

## 2021-11-23 PROCEDURE — 77003 FLUOROGUIDE FOR SPINE INJECT: CPT

## 2021-11-23 PROCEDURE — 25010000002 DEXAMETHASONE SODIUM PHOSPHATE 10 MG/ML SOLUTION: Performed by: PHYSICAL MEDICINE & REHABILITATION

## 2021-11-23 PROCEDURE — 64483 NJX AA&/STRD TFRM EPI L/S 1: CPT | Performed by: PHYSICAL MEDICINE & REHABILITATION

## 2021-11-23 PROCEDURE — 0 IOPAMIDOL 41 % SOLUTION: Performed by: PHYSICAL MEDICINE & REHABILITATION

## 2021-11-23 RX ORDER — DEXAMETHASONE SODIUM PHOSPHATE 10 MG/ML
10 INJECTION, SOLUTION INTRAMUSCULAR; INTRAVENOUS ONCE
Status: COMPLETED | OUTPATIENT
Start: 2021-11-23 | End: 2021-11-23

## 2021-11-23 RX ORDER — LIDOCAINE HYDROCHLORIDE 10 MG/ML
5 INJECTION, SOLUTION EPIDURAL; INFILTRATION; INTRACAUDAL; PERINEURAL ONCE
Status: COMPLETED | OUTPATIENT
Start: 2021-11-23 | End: 2021-11-23

## 2021-11-23 RX ADMIN — LIDOCAINE HYDROCHLORIDE 5 ML: 10 INJECTION, SOLUTION EPIDURAL; INFILTRATION; INTRACAUDAL; PERINEURAL at 11:41

## 2021-11-23 RX ADMIN — DEXAMETHASONE SODIUM PHOSPHATE 10 MG: 10 INJECTION, SOLUTION INTRAMUSCULAR; INTRAVENOUS at 11:41

## 2021-11-23 RX ADMIN — IOPAMIDOL 2 ML: 408 INJECTION, SOLUTION INTRATHECAL at 11:41
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
## 2021-11-23 NOTE — PROGRESS NOTES
RESCHEDULED   pt p/w r shoulder pain, acute on chronic exacerbation. no pain with passive ROM. only active. xray pos for calcific tendinonsis. outpt f/u provided. no signs of septic arthritis or gout. hd stable. pulses intact. no signs of vascular injury.

## 2021-11-23 NOTE — DISCHARGE INSTRUCTIONS
EPIDURAL STEROID INJECTION          An epidural steroid injection is a shot of steroid medicine and numbing medicine that is given into the space between the spinal cord and the bones of the back (epidural space).  The injection helps relieve pain by an irritated or swollen nerve root.    TELL YOUR HEALTH CARE PROVIDER ABOUT:  • Any allergies you have  • All medicines you are taking including any over the counter medicines  • Any blood disorders you have  • Any surgeries you have had  • Any medical conditions you have  • Whether you are pregnant or may be pregnant    WHAT ARE THE RISK?  Generally, this is a safe procedure. However,problems may occur, including  • Headache  • Bleeding  • Infection  • Allergic Reaction  • Nerve Damage    WHAT CAN I EXPECT AFTER THE PROCEDURE?    INJECTION SITE  • Remove the Band-Aid/s after 24 hours  • Check your injection site every day for signs of infection.  Check for:             Redness             Bleeding (small amt is normal)             Warmth             Pus or bad odor  • Some numbness may be experienced for several hours following the procedure.  • Avoid using heat on the injection site for 24 hours. You may use ice intermittently if needed by placing a         towel between your skin and the ice bag and using the ice for 20 minutes 2-3 times a day.  • Do not take baths, swim or use a hot tub for 24 hours.    ACTIVITY  • No strenuous activity for 24 hours then return to normal activity as tolerated.  • If your leg is numb, no driving until full sensation and strength has returned.    GENERAL INSTRUCTIONS:  • The injection site may feel numb, use ice with caution if numbness is present and no heat for 24 hours or until numbness is gone.   • If you have numbness or weakness in your arm or leg, use those areas with caution until normal sensation returns.  • It is not uncommon to notice an increase in discomfort or a change in the location of discomfort for 3-4 days after  the procedure.  If discomfort is noticed at the injection site, ice may be            applied to that area for 20 min 2-3 times a day.  • Take the pain medicine your physician has prescribed or over the counter pain relievers as long as you do not have any contraindications.  • If you are a diabetic, monitor your blood sugar closely.  The steroids used in your procedure may increase your blood sugar level up to 36 hours after the injection.  If your blood sugar is greater than 250, call the physician that helps you monitor your blood sugar.  • Keep all follow-up visits as scheduled by your health care provider. This is important.    CONTACT OUR OFFICE IF:  • You have any of these signs of infection            -Redness, swelling, or warmth around your injection site.            -Fluid or blood coming from your injection site (small amt of blood is normal)            -Pus or a bad odor from your injection site            -A fever  • You develop a severe headache or a stiff neck  • You lose control of your bladder or bowel movements      PAIN [facility]NA[facility]EMENT [facility]ENTER HOURS   • Monday-Friday 7:30 am. - 4:00 pm.  For any problem related to your procedure we can be reached at [phone]  • If you experience an emergency with your procedure, call [phone] or go to the emergency room.

## 2021-11-23 NOTE — ADDENDUM NOTE
Encounter addended by: Ana Castellanos RN on: 11/23/2021 12:06 PM   Actions taken: Flowsheet accepted

## 2021-12-06 DIAGNOSIS — J30.2 OTHER SEASONAL ALLERGIC RHINITIS: ICD-10-CM

## 2021-12-08 RX ORDER — GABAPENTIN 300 MG/1
300 CAPSULE ORAL 3 TIMES DAILY
Qty: 90 CAPSULE | Refills: 1 | Status: SHIPPED | OUTPATIENT
Start: 2021-12-08 | End: 2022-02-10 | Stop reason: SDUPTHER

## 2021-12-10 ENCOUNTER — TELEPHONE (OUTPATIENT)
Dept: NEUROSURGERY | Facility: CLINIC | Age: 41
End: 2021-12-10

## 2021-12-10 ENCOUNTER — TELEPHONE (OUTPATIENT)
Dept: PAIN MEDICINE | Facility: CLINIC | Age: 41
End: 2021-12-10

## 2021-12-10 NOTE — TELEPHONE ENCOUNTER
Caller: COBY BARTH    Relationship to patient: SELF    Best call back number: [phone]    Patient is needing: PATIENT IS UPSET THAT HIS APPOINTMENTS WERE CANCELLED BY THE STAFF DUE TO INSURANCE ISSUES, AND IS INSISTING TO SPEAK TO THE  REGARDING THIS ISSUE.  PATIENT CONFIRMS THAT HE IS TRYING TO CONTEST THESE INSURANCE PROBLEMS WITH [facility] MEDICAID. HE DOES HAVE A  WHO IS TRYING TO RESOLVE THIS WITH [facility] PATIENT IS CONCERNED DUE TO NOT BEING ABLE TO FUNCTION UNTIL June.  THE NEUROSURGEON WANTED THE PATIENT TO RECEIVE INJECTIONS INSTEAD OF SURGERY.  UNABLE TO WARM TRANSFER.

## 2021-12-10 NOTE — TELEPHONE ENCOUNTER
Called the patient to let them know that it would be best if his pain management office tried to get them approved since they are the ones that are doing them. Had to lvm.

## 2021-12-10 NOTE — TELEPHONE ENCOUNTER
Pt called: he is unable to get anymore injections until June 2022 per his insurance. He is wanting to know if Dr. Pace can contest his insurance to get more injections. He was scheduled for his 2nd injection for 12/23/21 and had to be canceled d/t insurance. Please advise! Thanks!      Pt contact: [phone]  Best time to call: anytime

## 2021-12-10 NOTE — TELEPHONE ENCOUNTER
Spoke w/ patient, notified him I am waiting to hear back from auth team to see if a peer to peer is possible.

## 2021-12-14 ENCOUNTER — TELEPHONE (OUTPATIENT)
Dept: PAIN MEDICINE | Facility: CLINIC | Age: 41
End: 2021-12-14

## 2021-12-14 NOTE — TELEPHONE ENCOUNTER
It sounds like 40% is the most accurate, especially if he is reducing his meds after. He has a 2nd one scheduled for 12/23, which will probably help more than the 1st, thanks.

## 2021-12-14 NOTE — TELEPHONE ENCOUNTER
I LM asking pt to call back to notify our office what his percentage of relief was after his last injection, and how long it lasted. HUB ok to ask patient.

## 2021-12-14 NOTE — TELEPHONE ENCOUNTER
Caller: Barrett Laguerre    Relationship: Self    Best call back number: [phone]    What was the call regarding: PATIENT  RETURNED CALL; STATED INITIALLY A 40% IMPROVEMENT TO CURRENTLY A 25% IMPROVEMENT- PATIENT COULD NOT GIVE A CLEAR TIME FRAME; STATED HE HAD A NEAR FALL & TWISTED- PATIENT ALSO STATED HE HAS WENT FROM TAKING TWO  TYLENOL 3'S TO TAKING ONE- PATIENT IS REQUESTING ANOTHER INJECTION-

## 2021-12-23 ENCOUNTER — APPOINTMENT (OUTPATIENT)
Dept: PAIN MEDICINE | Facility: HOSPITAL | Age: 41
End: 2021-12-23

## 2021-12-27 ENCOUNTER — TELEPHONE (OUTPATIENT)
Dept: NEUROSURGERY | Facility: CLINIC | Age: 41
End: 2021-12-27

## 2021-12-27 ENCOUNTER — TELEPHONE (OUTPATIENT)
Dept: PAIN MEDICINE | Facility: CLINIC | Age: 41
End: 2021-12-27

## 2021-12-27 DIAGNOSIS — M10.9 GOUT, UNSPECIFIED CAUSE, UNSPECIFIED CHRONICITY, UNSPECIFIED SITE: ICD-10-CM

## 2021-12-27 RX ORDER — FLUOXETINE HYDROCHLORIDE 40 MG/1
CAPSULE ORAL
Qty: 180 CAPSULE | Refills: 0 | Status: SHIPPED | OUTPATIENT
Start: 2021-12-27 | End: 2022-01-06

## 2021-12-27 RX ORDER — ALLOPURINOL 100 MG/1
100 TABLET ORAL DAILY
Qty: 30 TABLET | Refills: 3 | Status: SHIPPED | OUTPATIENT
Start: 2021-12-27 | End: 2022-01-06 | Stop reason: SDUPTHER

## 2021-12-27 NOTE — TELEPHONE ENCOUNTER
Caller: Barrett Laguerre    Relationship: Self    Best call back number: [phone]     Requested Prescriptions:   Requested Prescriptions     Pending Prescriptions Disp Refills   • allopurinol (ZYLOPRIM) 100 MG tablet 30 tablet 3     Sig: Take 1 tablet by mouth Daily.        Pharmacy where request should be sent:  [facility] [address] [place] [address] [phone] [facility] [phone] FX    Additional details provided by patient: PATIENT HAS ONE DAY LEFT    Does the patient have less than a 3 day supply:  [x] Yes  [] No    Radha Cabrera Rep   12/27/21 16:36 EST

## 2021-12-27 NOTE — TELEPHONE ENCOUNTER
Caller: COBY BARTH    Relationship: SELF    Best call back number: [phone]    What is the best time to reach you:    Who are you requesting to speak with (clinical staff, provider,  specific staff member):     Do you know the name of the person who called:     What was the call regarding: PT CALLED AND STATES THAT HIS INSURANCE HAS STILL NOT APPROVED MORE INJECTIONS UNTIL June OF 2022.  PT WOULD LIKE TO KNOW IF HE SHOULD KEEP HIS APPT FOR TOMORROW 12/28/21 SINCE HE ONLY HAS HAD 1 INJECTIONS THUS FAR.  PT WOULD LIKE TO KNOW IF THERE ARE ANY ALTERNATIVE TREATMENTS HE COULD TRY.  PT STATES WHEN HE HAD THE INJECTION HE HAD ABOUT 40% SUCCESS RATE.    Do you require a callback:    PLEASE CALL PT  THANK YOU

## 2021-12-27 NOTE — TELEPHONE ENCOUNTER
LOOKS LIKE INSURANCE DENIED INJECTION ON 12/23/2021 DUE TO FOUR INJECTIONS IN THE LUMBAR REGION IN A ROLLING YEAR INFORMED PT AND HE WAS NOT HAPPY AND WANTS TO KNOW WHAT TO DO NEXT THAT THE INJECTIONS ARE THE ONLY THING THAT KEEPS HIM FUNCTIONAL ACCORDING TO THE NOTE HE CANT HAVE ANOTHER UNTIL 6/2022  JUDSON SAID THAT IF YOU RIGHT A LETTER OF MEDICAL NECESSITY WE CAN SEND THAT TO THE INSURANCE COMPANY AND TRY AN APPEAL.  PLEASE ADVISE.

## 2021-12-27 NOTE — TELEPHONE ENCOUNTER
Mr. Arnett has been a patient under my care since Aug 3, 2017. In that time he has received several courses of lumbar epidural steroid injections, performed as a series of 3 each time. This course of treatment has made the difference between being functional and non-functional for him. As his pain will become incapacitating without continuing the injections as a series of 3 every 6 months, this course of treatment is medically necessary for Mr. Laguerre.    Please send that to the insurance company.

## 2021-12-30 NOTE — TELEPHONE ENCOUNTER
INSURANCE COMPANY DID NOT ACCEPT LETTER BUT SAID THAT YOU HAVE UNTIL 1/7/22 TO DO A PEER 2 PEER HERE IS THE NUMBER  [phone] REF # REF [id number]

## 2022-01-03 ENCOUNTER — OFFICE VISIT (OUTPATIENT)
Dept: PAIN MEDICINE | Facility: CLINIC | Age: 42
End: 2022-01-03

## 2022-01-03 VITALS
OXYGEN SATURATION: 96 % | WEIGHT: 280 LBS | DIASTOLIC BLOOD PRESSURE: 97 MMHG | HEIGHT: 76 IN | RESPIRATION RATE: 16 BRPM | HEART RATE: 88 BPM | SYSTOLIC BLOOD PRESSURE: 153 MMHG | BODY MASS INDEX: 34.1 KG/M2 | TEMPERATURE: 96.8 F

## 2022-01-03 DIAGNOSIS — G89.29 CHRONIC RIGHT-SIDED LOW BACK PAIN WITH RIGHT-SIDED SCIATICA: Primary | ICD-10-CM

## 2022-01-03 DIAGNOSIS — M54.41 CHRONIC RIGHT-SIDED LOW BACK PAIN WITH RIGHT-SIDED SCIATICA: Primary | ICD-10-CM

## 2022-01-03 DIAGNOSIS — M48.07 LUMBOSACRAL SPINAL STENOSIS: ICD-10-CM

## 2022-01-03 DIAGNOSIS — M54.16 LUMBAR RADICULOPATHY: ICD-10-CM

## 2022-01-03 PROCEDURE — 99213 OFFICE O/P EST LOW 20 MIN: CPT | Performed by: PHYSICAL MEDICINE & REHABILITATION

## 2022-01-03 PROCEDURE — G0463 HOSPITAL OUTPT CLINIC VISIT: HCPCS | Performed by: PHYSICAL MEDICINE & REHABILITATION

## 2022-01-03 RX ORDER — PYRAZINAMIDE 500 MG/1
1 TABLET ORAL EVERY 6 HOURS PRN
Qty: 28 TABLET | Refills: 5 | Status: SHIPPED | OUTPATIENT
Start: 2022-01-03 | End: 2022-02-08 | Stop reason: SDUPTHER
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
## 2022-01-05 DIAGNOSIS — E11.49 TYPE 2 DIABETES MELLITUS WITH OTHER NEUROLOGIC COMPLICATION, WITHOUT LONG-TERM CURRENT USE OF INSULIN: ICD-10-CM

## 2022-01-05 NOTE — TELEPHONE ENCOUNTER
Caller: Barrett Laguerre    Relationship: Self    Best call back number: [phone] (H)    Requested Prescriptions:   Requested Prescriptions     Pending Prescriptions Disp Refills   • glucose blood test strip 100 each 12     Si each by Other route Daily. Once daily Dx: E11.9 patient checks sugar once a day        Pharmacy where request should be sent: [facility] [address] [place] [address] [phone] [facility] [phone] FX     Additional details provided by patient:     Does the patient have less than a 3 day supply:  [x] Yes  [] No    Radha Perez Rep   22 16:09 EST

## 2022-01-06 ENCOUNTER — OFFICE VISIT (OUTPATIENT)
Dept: FAMILY MEDICINE CLINIC | Facility: CLINIC | Age: 42
End: 2022-01-06

## 2022-01-06 VITALS
OXYGEN SATURATION: 98 % | WEIGHT: 288 LBS | RESPIRATION RATE: 20 BRPM | BODY MASS INDEX: 35.07 KG/M2 | HEART RATE: 89 BPM | DIASTOLIC BLOOD PRESSURE: 88 MMHG | HEIGHT: 76 IN | SYSTOLIC BLOOD PRESSURE: 146 MMHG | TEMPERATURE: 97.6 F

## 2022-01-06 DIAGNOSIS — K21.9 GERD (GASTROESOPHAGEAL REFLUX DISEASE): ICD-10-CM

## 2022-01-06 DIAGNOSIS — F39 MOOD DISORDER: ICD-10-CM

## 2022-01-06 DIAGNOSIS — J30.2 OTHER SEASONAL ALLERGIC RHINITIS: ICD-10-CM

## 2022-01-06 DIAGNOSIS — J30.89 NON-SEASONAL ALLERGIC RHINITIS DUE TO OTHER ALLERGIC TRIGGER: ICD-10-CM

## 2022-01-06 DIAGNOSIS — R21 RASH: Primary | ICD-10-CM

## 2022-01-06 DIAGNOSIS — E11.9 TYPE 2 DIABETES MELLITUS WITHOUT COMPLICATION, WITHOUT LONG-TERM CURRENT USE OF INSULIN: ICD-10-CM

## 2022-01-06 DIAGNOSIS — L73.9 FOLLICULITIS: ICD-10-CM

## 2022-01-06 DIAGNOSIS — M10.9 GOUT, UNSPECIFIED CAUSE, UNSPECIFIED CHRONICITY, UNSPECIFIED SITE: ICD-10-CM

## 2022-01-06 PROCEDURE — 99213 OFFICE O/P EST LOW 20 MIN: CPT | Performed by: FAMILY MEDICINE

## 2022-01-06 RX ORDER — PANTOPRAZOLE SODIUM 40 MG/1
40 TABLET, DELAYED RELEASE ORAL DAILY
Qty: 30 TABLET | Refills: 12 | Status: SHIPPED | OUTPATIENT
Start: 2022-01-06 | End: 2022-02-28 | Stop reason: SDUPTHER

## 2022-01-06 RX ORDER — ECHINACEA PURPUREA EXTRACT 125 MG
2 TABLET ORAL AS NEEDED
Qty: 88 ML | Refills: 12 | Status: SHIPPED | OUTPATIENT
Start: 2022-01-06 | End: 2022-02-28 | Stop reason: SDUPTHER

## 2022-01-06 RX ORDER — ALLOPURINOL 100 MG/1
100 TABLET ORAL DAILY
Qty: 30 TABLET | Refills: 3 | Status: SHIPPED | OUTPATIENT
Start: 2022-01-06 | End: 2022-01-06

## 2022-01-06 RX ORDER — DOXYCYCLINE 100 MG/1
100 CAPSULE ORAL 2 TIMES DAILY
Qty: 20 CAPSULE | Refills: 0 | Status: SHIPPED | OUTPATIENT
Start: 2022-01-06 | End: 2022-01-06

## 2022-01-06 RX ORDER — MONTELUKAST SODIUM 10 MG/1
10 TABLET ORAL NIGHTLY
Qty: 30 TABLET | Refills: 5 | Status: SHIPPED | OUTPATIENT
Start: 2022-01-06 | End: 2022-02-10

## 2022-01-06 RX ORDER — FLUOXETINE HYDROCHLORIDE 40 MG/1
40 CAPSULE ORAL 2 TIMES DAILY
Qty: 180 CAPSULE | Refills: 3 | Status: SHIPPED | OUTPATIENT
Start: 2022-01-06 | End: 2022-02-10 | Stop reason: SDUPTHER

## 2022-01-06 RX ORDER — DOXYCYCLINE 100 MG/1
100 CAPSULE ORAL 2 TIMES DAILY
Qty: 20 CAPSULE | Refills: 0 | Status: SHIPPED | OUTPATIENT
Start: 2022-01-06 | End: 2022-01-16

## 2022-01-06 RX ORDER — ALLOPURINOL 100 MG/1
100 TABLET ORAL DAILY
Qty: 30 TABLET | Refills: 12 | Status: SHIPPED | OUTPATIENT
Start: 2022-01-06 | End: 2022-02-10

## 2022-01-06 NOTE — PROGRESS NOTES
Subjective   Barrett Laguerre is a 41 y.o. male.   Chief Complaint   Patient presents with   • Rash       6 days of itchy red rash.  Has been in the therapy pool recently     Also needs refill on some meds     Rash  This is a new problem. The current episode started in the past 7 days. The affected locations include the back, left arm, right arm, right upper leg, right lower leg, left upper leg and left lower leg. The rash is characterized by itchiness and redness. He was exposed to nothing. Pertinent negatives include no diarrhea, fatigue, fever, shortness of breath or vomiting. Past treatments include nothing. The treatment provided no relief.        The following portions of the patient's history were reviewed and updated as appropriate: allergies, current medications, past family history, past medical history, past social history, past surgical history and problem list.    Patient Active Problem List   Diagnosis   • Family history of diabetes mellitus   • Gout   • Lumbosacral spinal stenosis   • Memory impairment   • Acquired flexible flat foot   • Chronic low back pain   • Other specified dorsopathies, site unspecified   • Guillain-[place] (HCC)   • Sleep apnea   • Attention deficit disorder   • Diabetes ([facility])   • Other seasonal allergic rhinitis   • Mixed obsessional thoughts and acts   • Hypertension, benign   • Disorder of lipid metabolism   • CIDP (chronic inflammatory demyelinating polyneuropathy) ([facility])   • Lumbar radiculopathy   • Pain of right lower extremity   • Onychomycosis   • Blister   • Over weight   • Annual physical exam   • Upper respiratory tract infection       Current Outpatient Medications on File Prior to Visit   Medication Sig Dispense Refill   • acetaminophen-codeine (TYLENOL/CODEINE #3) 300-30 MG per tablet Take 1 tablet by mouth Every 6 (Six) Hours As Needed for Moderate Pain . 28 tablet 5   • amantadine (SYMMETREL) 100 MG tablet      • amLODIPine (NORVASC) 10 MG tablet Take 1 tablet by  mouth Daily. 90 tablet 3   • chlorthalidone (HYGROTEN) 50 MG tablet Take 1 tablet by mouth Daily.  3   • EQ Loratadine 10 MG tablet Take 1 tablet by mouth once daily 30 tablet 12   • erythromycin (ROMYCIN) 5 MG/GM ophthalmic ointment APPLY OINTMENT TO BOTH EYES AT BEDTIME FOR 1 WEEK THEN AS NEEDED  3   • fluticasone (FLONASE) 50 MCG/ACT nasal spray 1 spray into the nostril(s) as directed by provider 2 (two) times a day. 16 g 3   • gabapentin (NEURONTIN) 300 MG capsule Take 1 capsule by mouth 3 (Three) Times a Day. 90 capsule 1   • gabapentin (NEURONTIN) 400 MG capsule Take 1 capsule by mouth 3 (Three) Times a Day. 90 capsule 2   • glucose blood test strip 1 each by Other route Daily. Once daily Dx: E11.9 patient checks sugar once a day 100 each 12   • glucose monitor monitoring kit 1 each Daily. Dx: E11.9 patient checks sugar once a day 1 each 0   • hydrocortisone 1 % cream Apply  topically to the appropriate area as directed 2 (Two) Times a Day. 60 g 0   • ipratropium-albuterol (DUO-NEB) 0.5-2.5 mg/3 ml nebulizer USE 1 AMPULE IN NEBULIZER EVERY 4 TO 6 HOURS     • Lancet Devices (Lancing Device) misc 1 each Daily. Dx: E11.9 patient checks sugar once a day 1 each 0   • Lancets Misc. misc 1 each Daily. Dx: E11.9 patient checks sugar once a day 100 each 12   • lidocaine-prilocaine (EMLA) 2.5-2.5 % cream      • montelukast (SINGULAIR) 10 MG tablet Take 1 tablet by mouth Every Night. 90 tablet 0   • mupirocin (BACTROBAN) 2 % ointment      • oxybutynin XL (DITROPAN XL) 15 MG 24 hr tablet Take 1 tablet by mouth Daily. 30 tablet 12   • promethazine (PHENERGAN) 25 MG tablet Take 1 tablet by mouth Every 8 (Eight) Hours As Needed for Nausea or Vomiting. 90 tablet 5   • ReliOn Ultra Thin Lancets 30G misc USE 1 ONCE DAILY     • sitaGLIPtin-metFORMIN (Janumet)  MG per tablet Take 1 tablet by mouth 2 (Two) Times a Day With Meals. 180 tablet 3   • triamcinolone (KENALOG) 0.1 % cream Apply  topically to the appropriate area  as directed 3 (Three) Times a Day. 80 g 0   • [DISCONTINUED] allopurinol (ZYLOPRIM) 100 MG tablet Take 1 tablet by mouth Daily. 30 tablet 3   • [DISCONTINUED] FLUoxetine (PROzac) 40 MG capsule Take 1 capsule by mouth twice daily 180 capsule 0   • [DISCONTINUED] glucose blood test strip Daily as needed 30 each 12   • [DISCONTINUED] montelukast (Singulair) 10 MG tablet Take 1 tablet by mouth Every Night. 30 tablet 5   • [DISCONTINUED] pantoprazole (Protonix) 40 MG EC tablet Take 1 tablet by mouth Daily. 30 tablet 12   • [DISCONTINUED] sodium chloride (Ocean Nasal Spray) 0.65 % nasal spray 2 sprays into the nostril(s) as directed by provider As Needed for Congestion. 88 mL 12   • [DISCONTINUED] predniSONE (DELTASONE) 10 MG tablet Take 10 mg by mouth Daily.       No current facility-administered medications on file prior to visit.     Current outpatient and discharge medications have been reconciled for the patient.  Reviewed by: Eric Rosenberg MD      Allergies   Allergen Reactions   • Penicillin G Anaphylaxis   • Penicillins Anaphylaxis   • Sulfa Antibiotics Hives       Review of Systems   Constitutional: Negative for activity change, appetite change, fatigue and fever.   HENT: Negative for ear pain, swollen glands and voice change.    Eyes: Negative for visual disturbance.   Respiratory: Negative for shortness of breath and wheezing.    Cardiovascular: Negative for chest pain and leg swelling.   Gastrointestinal: Negative for abdominal pain, blood in stool, constipation, diarrhea, nausea and vomiting.   Endocrine: Negative for polydipsia and polyuria.   Genitourinary: Negative for dysuria, frequency and hematuria.   Musculoskeletal: Negative for joint swelling, neck pain and neck stiffness.   Skin: Positive for rash. Negative for bruise.   Neurological: Negative for weakness, numbness and headache.   Psychiatric/Behavioral: Negative for suicidal ideas and depressed mood.     I have reviewed and confirmed the  "accuracy of the ROS as documented by the MA/LPN/RN Eric Rosenberg MD    Objective   Visit Vitals  /88 (BP Location: Right arm, Patient Position: Sitting, Cuff Size: Adult)   Pulse 89   Temp 97.6 °F (36.4 °C)   Resp 20   Ht 193 cm (76\")   Wt 131 kg (288 lb)   SpO2 98%   BMI 35.06 kg/m²       Physical Exam  Constitutional:       Appearance: He is well-developed.   HENT:      Head: Normocephalic and atraumatic.      Right Ear: External ear normal.      Left Ear: External ear normal.      Nose: Nose normal.   Eyes:      Pupils: Pupils are equal, round, and reactive to light.   Cardiovascular:      Rate and Rhythm: Normal rate and regular rhythm.      Heart sounds: Normal heart sounds.   Pulmonary:      Effort: Pulmonary effort is normal.      Breath sounds: Normal breath sounds.   Abdominal:      General: Bowel sounds are normal.      Palpations: Abdomen is soft.   Musculoskeletal:         General: Normal range of motion.      Cervical back: Normal range of motion and neck supple.   Skin:     General: Skin is warm and dry.   Neurological:      Mental Status: He is alert and oriented to person, place, and time.   Psychiatric:         Behavior: Behavior normal.         Thought Content: Thought content normal.         Judgment: Judgment normal.         Assessment/Plan .  Problem List Items Addressed This Visit     Gout    Relevant Medications    allopurinol (ZYLOPRIM) 100 MG tablet    Diabetes (HCC)    Relevant Medications    glucose blood test strip    Other seasonal allergic rhinitis    Overview     Over-the-counter medication indications, dosage, and precautions discussed.         Relevant Medications    sodium chloride (Ocean Nasal Spray) 0.65 % nasal spray      Other Visit Diagnoses     Rash    -  Primary    Relevant Medications    doxycycline (MONODOX) 100 MG capsule    Folliculitis        Relevant Medications    doxycycline (MONODOX) 100 MG capsule    GERD (gastroesophageal reflux disease)        " Relevant Medications    pantoprazole (Protonix) 40 MG EC tablet    Non-seasonal allergic rhinitis due to other allergic trigger        Relevant Medications    montelukast (Singulair) 10 MG tablet    Mood disorder (HCC)        Relevant Medications    FLUoxetine (PROzac) 40 MG capsule           Findings discussed. All questions answered.  Medication and medication adverse effects discussed.  Drug education given and explained to patient. Patient verbalized understanding.  Follow-up in 2 weeks if not better.  Follow-up sooner for worsening symptoms or for any concerns.        I wore protective equipment throughout this patient encounter to include mask and gloves. Hand hygiene was performed before donning protective equipment and after removal when leaving the room

## 2022-01-10 ENCOUNTER — TELEPHONE (OUTPATIENT)
Dept: FAMILY MEDICINE CLINIC | Facility: CLINIC | Age: 42
End: 2022-01-10

## 2022-01-10 DIAGNOSIS — E11.9 TYPE 2 DIABETES MELLITUS WITHOUT COMPLICATION, WITHOUT LONG-TERM CURRENT USE OF INSULIN: ICD-10-CM

## 2022-01-10 DIAGNOSIS — E11.49 TYPE 2 DIABETES MELLITUS WITH OTHER NEUROLOGIC COMPLICATION, WITHOUT LONG-TERM CURRENT USE OF INSULIN: Primary | ICD-10-CM

## 2022-01-10 DIAGNOSIS — E11.49 TYPE 2 DIABETES MELLITUS WITH OTHER NEUROLOGIC COMPLICATION, WITHOUT LONG-TERM CURRENT USE OF INSULIN: ICD-10-CM

## 2022-01-10 DIAGNOSIS — L73.9 FOLLICULITIS: ICD-10-CM

## 2022-01-10 DIAGNOSIS — R21 RASH: ICD-10-CM

## 2022-01-10 RX ORDER — LANCETS 33 GAUGE
1 EACH MISCELLANEOUS DAILY
Qty: 100 EACH | Refills: 0 | Status: SHIPPED | OUTPATIENT
Start: 2022-01-10 | End: 2022-02-10 | Stop reason: SDUPTHER

## 2022-01-10 NOTE — TELEPHONE ENCOUNTER
PATIENT CALLED STATING THAT [employer] HAS NOT RECEIVED THIS REFILL FOR PATIENT  AS  TODAY    HE ALSO STATED THAT THE   BRAND NAME IS [employer] [facility]   NOT MICRO-THIN LANCETS  33G   #100    [facility]     HE IS OUT OF MEDS- NEEDS ASAP

## 2022-01-10 NOTE — TELEPHONE ENCOUNTER
Spoke with pt. Per Dr. Rosenberg pt just started on Doxy on 1-6 so give that a little more time and he can take some benadryl or cream otc. Ok to send in one month refill on lancets but no other refills until he is seen for his diabetic check. He's not had his sugar checked in a year.

## 2022-01-10 NOTE — TELEPHONE ENCOUNTER
Caller: Barrett Laguerre    Relationship: Self    Best call back number: [phone]     Requested Prescriptions:   Requested Prescriptions     Pending Prescriptions Disp Refills   • Lancets Misc. misc 100 each 12     Si each Daily. Dx: E11.9 patient checks sugar once a day      [facility] LANC[facility] MICRO 33   30 DAY SUPPLY   Pharmacy where request should be sent: [id number] [address] [phone] [facility] [phone] FX     Additional details provided by patient: PATIENT STATES HE IS OUT     Does the patient have less than a 3 day supply:  [x] Yes  [] No    Radha Claudio Rep   01/10/22 10:39 EST

## 2022-01-10 NOTE — TELEPHONE ENCOUNTER
"Patient is upset. States he had a prescription sent to [facility] for Relion Lancets. All [facility] pharmacies are out of stock and no idea when they will get them. That is the only brand his insurance will cover at [facility]. Patient is almost out of lancets and is not happy that a new rx was denied because he \"hadn't been seen\". He states he was seen last week. [facility] has their store brand, Microgauge 33 gauge, in stock and his insurance will cover. Requesting new rx be sent to [facility] as soon as possible, as he is almost out of lancets.   "

## 2022-01-10 NOTE — TELEPHONE ENCOUNTER
Caller: Barrett Laguerre    Relationship: Self    Best call back number: [phone]     What is the best time to reach you: ANYTIME    Who are you requesting to speak with (clinical staff, provider,  specific staff member): MA OR DOCTOR    What was the call regarding: PATIENT STATES HE HAS BEEN TAKING doxycycline (MONODOX) 100 MG capsule SINCE Friday AND CONCERNED HE STILL HAS EXTREME ITCHING     Do you require a callback: YES

## 2022-01-11 ENCOUNTER — PATIENT MESSAGE (OUTPATIENT)
Dept: FAMILY MEDICINE CLINIC | Facility: CLINIC | Age: 42
End: 2022-01-11

## 2022-01-13 ENCOUNTER — TELEPHONE (OUTPATIENT)
Dept: PAIN MEDICINE | Facility: CLINIC | Age: 42
End: 2022-01-13

## 2022-01-13 ENCOUNTER — TELEPHONE (OUTPATIENT)
Dept: PODIATRY | Facility: CLINIC | Age: 42
End: 2022-01-13

## 2022-01-13 DIAGNOSIS — M48.07 LUMBOSACRAL SPINAL STENOSIS: Primary | ICD-10-CM

## 2022-01-13 NOTE — TELEPHONE ENCOUNTER
Caller: Barrett Laguerre    Relationship: Self    Best call back number: [phone]    Equipment requested: DIABETIC SHOES     Reason for the request: NEEDS THESE ASAP     Additional information or concerns: WANTED TO KNOW IF HE WAS ABLE TO GET THE DIABETIC SHOES AND INSERTS DONE. PATIENT IS UPSET AND WANTS TO GET THE SHOE ORDER FILLED OR NOT. SHOULD HE PROCEED WITH GOING TO ANOTHER SUPPLY COMPANY. HE WOULD LIKE A CALL BACK BY END OF DAY TODAY OR TOMORROW PLEASE AND THANK YOU.

## 2022-01-13 NOTE — TELEPHONE ENCOUNTER
I spoke patient and explained the few options we had for diabetic shoes. I gave the patient the information to call and see if they could help

## 2022-01-13 NOTE — TELEPHONE ENCOUNTER
Patient called back stating he found a pharmacy that would accept his insurance and be able to get his diabetic shoes and inserts. I told patient I would Fax info and script to [facility] to get the process moving. Patient voiced understanding.

## 2022-01-13 NOTE — TELEPHONE ENCOUNTER
Caller: Barrett Laguerre    Relationship to patient: Self    Best call back number:     Patient is needing: PATIENT STATES HE WAS SUPPOSED TO HAVE A PEER TO PEER SET UP FOR HIM OR POSSIBLY LOOK INTO DIFFERENT PREAPPROVALS.  PLEASE CONTACT HIM WHEN AVAILABLE

## 2022-01-14 ENCOUNTER — TELEPHONE (OUTPATIENT)
Dept: ORTHOPEDIC SURGERY | Facility: CLINIC | Age: 42
End: 2022-01-14

## 2022-01-14 NOTE — TELEPHONE ENCOUNTER
I was not able to schedule a P2P in the time I was given. Would he like to go ahead with the TFESIs as we discussed?

## 2022-01-14 NOTE — TELEPHONE ENCOUNTER
ATTEMPTED TO WARM TRANSFER    PATIENT CALLING CHECKING ON MESSAGE AGAIN. SAYS HE IS IN A LOT OF PAIN. PATIENT INFORMED MEHNAZ IS WORKING ON MESSAGE WITH DR. PATTERSON. PATIENT WOULD LIKE MEHNAZ TO CALL HIM ASAP.

## 2022-01-14 NOTE — TELEPHONE ENCOUNTER
Spoke with patient, understood his frustration about the need for diabetic shoes and no local company being able to provide this service. I informed patient that we are aware of this situation and are trying to find a solution to get diabetic shoes. The patient gave me information about a company in [facility] who can provide this service but it is to far for th patient to drive. I confirmed with staff that information was faxed as the patient stated [facility] pharmacy did not receive paperwork. Our office refaxed the information while I was on the phone with the patient. I told the patient I would discuss his concerns with Dr. Rehman and we will see if company is willing to work with us to get patients taken care of here in [facility].patient was grateful with our conversation and I told him I would follow up with him early next week

## 2022-01-14 NOTE — TELEPHONE ENCOUNTER
Caller: Barrett Laguerre    Relationship to patient: Self    Best call back number:[phone]    Patient is needing: PATIENT IS WANTING TO TALK TO THE PRACTICE MANAGER WITH SEVERAL COMPLAINTS WITH THE OFFICE

## 2022-01-14 NOTE — TELEPHONE ENCOUNTER
LUMBAR TFESI'S  ARE WHAT HAVE BEEN DENIED HE CAN'T DO THEM AGAIN UNTIL AFTER 6/22 WITH HIS CURRENT INSURANCE COMPANY.

## 2022-01-14 NOTE — TELEPHONE ENCOUNTER
I thought it was ILESIs that had been denied. That is what we have done before. TFESIs are a different code. Let's try and get 3 b/l L3 TFESIs please.

## 2022-01-17 ENCOUNTER — TELEPHONE (OUTPATIENT)
Dept: FAMILY MEDICINE CLINIC | Facility: CLINIC | Age: 42
End: 2022-01-17

## 2022-01-17 NOTE — TELEPHONE ENCOUNTER
THE TFESI'S WERE DENIED DUE TO ONLY BEING ABLE TO HAVE FOUR INJECTIONS IN THE LUMBAR REGION PUT THE DENIAL ON YOUR DESK.

## 2022-01-17 NOTE — TELEPHONE ENCOUNTER
Caller: Barrett Laguerre    Relationship: Self    Best call back number: [phone]    Who are you requesting to speak with (clinical staff, provider,  specific staff member): CLINICAL STAFF     What was the call regarding: HAS QUESTIONS ON WHAT TO DO ABOUT SKIN ISSUES STILL NOT CLEARED UP     Do you require a callback: YES

## 2022-01-18 ENCOUNTER — TELEPHONE (OUTPATIENT)
Dept: FAMILY MEDICINE CLINIC | Facility: CLINIC | Age: 42
End: 2022-01-18

## 2022-01-18 DIAGNOSIS — R21 RASH: Primary | ICD-10-CM

## 2022-01-18 RX ORDER — METHYLPREDNISOLONE 4 MG/1
TABLET ORAL
Qty: 1 EACH | Refills: 0 | Status: SHIPPED | OUTPATIENT
Start: 2022-01-18 | End: 2022-02-10

## 2022-01-18 NOTE — TELEPHONE ENCOUNTER
And they are making it retroactive to include the 3 from mid-2021, which is really bad. It looks like there is nothing else we can do. I'll send in a steroid dosepak and hope that provides some relief. Thanks.

## 2022-01-19 ENCOUNTER — TELEPHONE (OUTPATIENT)
Dept: FAMILY MEDICINE CLINIC | Facility: CLINIC | Age: 42
End: 2022-01-19

## 2022-01-19 NOTE — TELEPHONE ENCOUNTER
Caller: Barrett Laguerre    Relationship: Self    Best call back number: [phone]    What is the best time to reach you: ANY TIME    Who are you requesting to speak with (clinical staff, provider,  specific staff member): CLINICAL STAFF    What was the call regarding: PATIENT CALLED STATING THE DERMATOLOGIST HE WAS REFERRED TO CANNOT GET HIM IN UNTIL 3/11/22. THE OTHER LOCATIONS WITH THIS DERMATOLOGY GROUP ARE NOT TAKING NEW PATIENTS AND THIS IS THE SOONEST THEY CAN GET HIM. PATIENT IS WANTING TO KNOW IF THERE IS ANY MEDICATION THAT DR. RIVERA CAN CALL IN FOR HIM TO HELP WITH THE ITCHING HE IS HAVING DUE TO HIS SKIN CONDITION. HE ORIGINALLY HAD PATIENT ON triamcinolone (KENALOG) 0.1 % cream, AND PATIENT WANTS TO KNOW IF HE CAN SEND SOME MORE OF THIS CREAM SENT IN TO HIS LOCAL PHARMACY. PATIENT HAS AN APPOINTMENT FOR 2/3/22 FOR A PHYSICAL WITH DR. RIVERA.    PLEASE ADVISE    [facility] [address] [place] [address] - [phone] [facility] [phone]   [phone]    Do you require a callback: YES

## 2022-01-19 NOTE — TELEPHONE ENCOUNTER
LM THAT INSURANCE WILL NOT COVER ANYMORE INJECTIONS IN LUMBAR REGION UNTIL AFTER 6/17/22 PATIENT WILL HAVE TO WAIT UNTIL THEN TO GET ANOTHER INJECTION STEROID DOSE PACK SENT IN TO HELP. HUB OK TO READ

## 2022-01-20 DIAGNOSIS — R21 RASH: Primary | ICD-10-CM

## 2022-01-20 DIAGNOSIS — L29.9 ITCHING: ICD-10-CM

## 2022-01-20 RX ORDER — HYDROXYZINE HYDROCHLORIDE 25 MG/1
25 TABLET, FILM COATED ORAL 4 TIMES DAILY PRN
Qty: 30 TABLET | Refills: 2 | Status: SHIPPED | OUTPATIENT
Start: 2022-01-20 | End: 2022-02-10 | Stop reason: SDUPTHER

## 2022-01-20 NOTE — TELEPHONE ENCOUNTER
Caller: Barrett Laguerre    Relationship: Self    Best call back number:     Who are you requesting to speak with (clinical staff, provider,  specific staff member): NIKOLAS    What was the call regarding: THE PATIENT STATED HE HAS NOT HEARD ANYTHING BACK ABOUT GETTING HIS DIABETIC SHOES AND STATED HE THOUGHT HE WOULD GET A CALL BACK AFTER HER CALL WITH THE COMPANY ON MONDAY. HE WOULD LIKE A CALL BACK TO DISCUSS THIS.    Do you require a callback: YES

## 2022-01-25 ENCOUNTER — TELEPHONE (OUTPATIENT)
Dept: FAMILY MEDICINE CLINIC | Facility: CLINIC | Age: 42
End: 2022-01-25

## 2022-01-25 NOTE — TELEPHONE ENCOUNTER
Patient called asking if there had been any change in whether or not the [facility] pharmacy rep could come to supply shoes in the office. I explained to patient that our practice manager has had a lot going on and I informed him we are waiting to hear back. I explained to the patient that te pharmacy sent forms over to be filled out about diabetic shoes and was successfully faxed back. Patientstated he would give them a call and see if they received it.

## 2022-01-26 NOTE — TELEPHONE ENCOUNTER
Spoke with Cristel at [facility] and she states she is working on this patients diabetic order. She is waiting on paperwork from PCP, we discussed in depth the process of getting shoes, she states she offered patient to have  come to his home and fit him, patient stated he will come to their location. Left patient a message letting him know that there is a process for [employer] to be able to come in the office and fit patients for shoes and unfortunately due to his already being processed, our approval process for them to come to our office will not be completed in time for him to get his shoes, informed patient that if he had any other questions he can reach out.   Cristel at [facility] states she has everything from our office that she needs for this patient and there is nothing further we need to do.

## 2022-02-08 ENCOUNTER — OFFICE VISIT (OUTPATIENT)
Dept: PAIN MEDICINE | Facility: CLINIC | Age: 42
End: 2022-02-08

## 2022-02-08 VITALS
RESPIRATION RATE: 16 BRPM | HEIGHT: 76 IN | WEIGHT: 288 LBS | HEART RATE: 84 BPM | BODY MASS INDEX: 35.07 KG/M2 | DIASTOLIC BLOOD PRESSURE: 89 MMHG | OXYGEN SATURATION: 96 % | SYSTOLIC BLOOD PRESSURE: 133 MMHG

## 2022-02-08 DIAGNOSIS — M48.07 LUMBOSACRAL SPINAL STENOSIS: ICD-10-CM

## 2022-02-08 DIAGNOSIS — G89.29 CHRONIC RIGHT-SIDED LOW BACK PAIN WITH RIGHT-SIDED SCIATICA: Primary | ICD-10-CM

## 2022-02-08 DIAGNOSIS — M54.41 CHRONIC RIGHT-SIDED LOW BACK PAIN WITH RIGHT-SIDED SCIATICA: Primary | ICD-10-CM

## 2022-02-08 DIAGNOSIS — M54.16 LUMBAR RADICULOPATHY: ICD-10-CM

## 2022-02-08 PROCEDURE — 99214 OFFICE O/P EST MOD 30 MIN: CPT | Performed by: PHYSICAL MEDICINE & REHABILITATION

## 2022-02-08 RX ORDER — DICLOFENAC EPOLAMINE 0.01 G/1
1 SYSTEM TOPICAL 2 TIMES DAILY PRN
Qty: 60 PATCH | Refills: 2 | Status: SHIPPED | OUTPATIENT
Start: 2022-02-08 | End: 2022-02-28 | Stop reason: SDUPTHER

## 2022-02-08 RX ORDER — PYRAZINAMIDE 500 MG/1
1 TABLET ORAL EVERY 6 HOURS PRN
Qty: 28 TABLET | Refills: 5 | Status: SHIPPED | OUTPATIENT
Start: 2022-02-08 | End: 2022-06-03
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
## 2022-02-09 ENCOUNTER — TELEPHONE (OUTPATIENT)
Dept: PAIN MEDICINE | Facility: CLINIC | Age: 42
End: 2022-02-09

## 2022-02-09 NOTE — TELEPHONE ENCOUNTER
Caller: RUTH DANIELLE    Relationship to patient: [facility]    Best call back number:     Patient is needing: CALLING IN REGARDS TO BILLING FOR THE PATIENT. SHE WOULD LIKE NAVID TO GIVE HER A CALL BACK.           CC: Follow-up visit, cough, left shoulder pain.  I have not seen this patient after I saw him in May 2018.  Patient did not come for any follow-up visits.    HISTORY OF PRESENT ILLNESS: Patient is a 71 y.o. male established patient who presents today to discuss her medical conditions as mentioned HPI below.    Health Maintenance: Due for flu vaccine, pneumonia vaccine okay to get as an MA visit as he is getting the trigger point Kenalog at this time.    Acute shoulder pain  This is a new problem on the left shoulder blade since last 4-5 days . Denies any lifting weights and has worsening while coughing. Pt says that its 10/10 in severity in the nights.     Current smoker on some days  This is a chronic health problem that is uncontrolled with current medications and lifestyle measures. Pt states that he smokes pipe / Cigars once in 3 - 4 weeks.     Cough  There is a new problem which patient is facing for the last many years but never expressed this as he has never been following any primary care.  I am seeing him after more than 1-1/2-year.  He has a symptom only when he is lying down flat during the nights.      PHQ score 0, BMI > 34 , Chronic active Someday tobacco, no fall injuries.    Patient Active Problem List    Diagnosis Date Noted   • Cough 11/08/2019   • Acute shoulder pain 11/08/2019   • Obesity (BMI 30-39.9) 11/08/2019   • Current smoker on some days 11/08/2019   • Seasonal allergic rhinitis due to pollen 05/25/2018   • Hx of total knee arthroplasty 03/06/2018   • Chronic midline low back pain with sciatica 03/06/2018   • Essential hypertension 03/06/2018   • Healthcare maintenance 03/06/2018   • Obesity (BMI 35.0-39.9 without comorbidity) ([facility]) 03/06/2018   • History of total right knee replacement 03/06/2018   • Colon cancer screening 03/06/2018   • Post-traumatic osteoarthritis of both knees 03/06/2018   • Hip pain, acute, left 03/06/2018      Allergies:Patient has no known allergies.    Current  Outpatient Medications   Medication Sig Dispense Refill   • lisinopril-hydrochlorothiazide (PRINZIDE) 20-12.5 MG per tablet TAKE TWO TABLETS BY MOUTH DAILY 60 Tab 0     Current Facility-Administered Medications   Medication Dose Route Frequency Provider Last Rate Last Dose   • triamcinolone acetonide (KENALOG-40) injection 40 mg  40 mg Intramuscular Once Louise Muñoz M.D.       • lidocaine PF (XYLOCAINE-MPF) 2 % injection PF 4 mL  4 mL Injection Once Louise Muñoz M.D.           Social History     Tobacco Use   • Smoking status: Light Tobacco Smoker     Packs/day: 0.00     Types: Cigars   • Smokeless tobacco: Never Used   • Tobacco comment: 4 cigars/year   Substance Use Topics   • Alcohol use: Yes     Comment: Cocktails of 2 -3 drinks around 5-6x/week   • Drug use: No     Social History     Patient does not qualify to have social determinant information on file (likely too young).   Social History Narrative   • Not on file       Family History   Problem Relation Age of Onset   • No Known Problems Sister    • No Known Problems Maternal Grandmother    • No Known Problems Maternal Grandfather    • No Known Problems Paternal Grandmother    • No Known Problems Paternal Grandfather         ROS:     - Constitutional:  Negative for fever, chills, unexpected weight change, and fatigue/generalized weakness.    - HEENT:  Negative for headaches, vision changes, hearing changes, ear pain, ear discharge, rhinorrhea, sinus congestion, sore throat, and neck pain.      - Respiratory:As mentioned in HPI above Negative for sputum production, chest congestion, dyspnea, wheezing, and crackles.      - Cardiovascular: Negative for chest pain, palpitations, orthopnea, and bilateral lower extremity edema.     - Gastrointestinal: Negative for heartburn, nausea, vomiting, abdominal pain, hematochezia, melena, diarrhea, constipation, and greasy/foul-smelling stools.     - Genitourinary: Negative for dysuria, polyuria, hematuria,  "pyuria, urinary urgency, and urinary incontinence.     - Musculoskeletal:As mentioned in HPI above Negative for back pain, and joint pain.     - Skin: Negative for rash, itching, cyanotic skin color change.     - Neurological: Negative for dizziness, tingling, tremors, focal sensory deficit, focal weakness and headaches.     - Endo/Heme/Allergies: Does not bruise/bleed easily.     - Psychiatric/Behavioral: Negative for depression, suicidal/homicidal ideation and memory loss.      Last lab work in 03/2018 reviewed and discussed with the patient.    Exam:    /78 (BP Location: Right arm, Patient Position: Sitting, BP Cuff Size: Adult)   Pulse 94   Temp 36.9 °C (98.4 °F) (Temporal)   Ht 1.854 m (6' 1\")   Wt 120.2 kg (265 lb)   SpO2 93%  Body mass index is 34.96 kg/m².    General:  Well nourished, well developed male in NAD  Head is grossly normal.  Neck: Supple without JVD or bruit. Thyroid is not enlarged.  Shoulder exam : No tenderness on palpation of bony points, jump sign present on palpation of left interscapular muscle region.  Pulmonary: Clear to ausculation and percussion.  Normal effort. No rales, ronchi, or wheezing.  Cardiovascular: Regular rate and rhythm without murmur. Carotid and radial pulses are intact and equal bilaterally.  Extremities: no clubbing, cyanosis, or edema.    Please note that this dictation was created using voice recognition software. I have made every reasonable attempt to correct obvious errors, but I expect that there are errors of grammar and possibly content that I did not discover before finalizing the note.    Assessment/Plan:  1. Acute pain of left shoulder  2. Muscle spasm of left shoulder area  New problem, as mentioned in HPI but most of the exam findings this is muscle spasm of left interscapular muscle region, will do a trigger point injection for immediate relief as it is causing him insomnia.  - Consent for Surgery / Procedure  - triamcinolone acetonide " (KENALOG-40) injection 40 mg  - lidocaine PF (XYLOCAINE-MPF) 2 % injection PF 4 mL    3. Cough  New problem, as mentioned in HPI above and my physical exam findings this is most likely related to GERD mostly in the nights, will treat with empiric PPIs of recheck the chest x-ray to rule out any other pathology causing this.  Patient understood and agreed with the plan.  - DX-CHEST-2 VIEWS; Future    4. Need for hepatitis C screening test  - HEP C VIRUS ANTIBODY; Future    5. Encounter for screening for metabolic disorder  Patient never had his lipid panel checked as he was not following up with primary care regularly.  Requesting for all the baseline labs to be done will do the lipid panel and treat if abnormal.  Given his smoking, hypertension and obesity as risk factors.  - Lipid Profile; Future    7. Essential hypertension  Well-controlled, continue current lisinopril/HCTZ 40/25 mg daily.  Will check renal function which was never checked for this patient.  - Comp Metabolic Panel; Future  - lisinopril-hydrochlorothiazide (PRINZIDE) 20-12.5 MG per tablet; TAKE TWO TABLETS BY MOUTH DAILY  Dispense: 60 Tab; Refill: 0    8. Obesity (BMI 30-39.9)  9. Class 1 obesity with body mass index (BMI) of 34.0 to 34.9 in adult, unspecified obesity type, unspecified whether serious comorbidity present  - Patient identified as having weight management issue.  Appropriate orders and counseling given.    10. Current smoker on some days  11. Encounter for smoking cessation counseling  Patient was counseled on smoking cessation, we discussed the benefits of quitting including decrease risk of MI by 50% after one year of cessation, decreased risk of lung cancer after 12 years, one cigarette is enough to paralyze cilia for 24 hours leading to increase risk of pulmonary infection, etc.... .Also encouraged to set a stop date.This was more than 3 minutes duration.    12. Colon cancer screening  - OCCULT BLOOD,FECAL,IMMUNOASSAY

## 2022-02-10 ENCOUNTER — OFFICE VISIT (OUTPATIENT)
Dept: FAMILY MEDICINE CLINIC | Facility: CLINIC | Age: 42
End: 2022-02-10

## 2022-02-10 VITALS
SYSTOLIC BLOOD PRESSURE: 150 MMHG | WEIGHT: 290.2 LBS | BODY MASS INDEX: 35.34 KG/M2 | TEMPERATURE: 97.5 F | DIASTOLIC BLOOD PRESSURE: 92 MMHG | RESPIRATION RATE: 22 BRPM | OXYGEN SATURATION: 98 % | HEART RATE: 89 BPM | HEIGHT: 76 IN

## 2022-02-10 DIAGNOSIS — E11.9 TYPE 2 DIABETES MELLITUS WITHOUT COMPLICATION, WITHOUT LONG-TERM CURRENT USE OF INSULIN: ICD-10-CM

## 2022-02-10 DIAGNOSIS — J30.89 NON-SEASONAL ALLERGIC RHINITIS DUE TO OTHER ALLERGIC TRIGGER: ICD-10-CM

## 2022-02-10 DIAGNOSIS — F39 MOOD DISORDER: ICD-10-CM

## 2022-02-10 DIAGNOSIS — R53.82 CHRONIC FATIGUE: ICD-10-CM

## 2022-02-10 DIAGNOSIS — R35.0 URINARY FREQUENCY: ICD-10-CM

## 2022-02-10 DIAGNOSIS — G61.0 GUILLAIN-BARRE: ICD-10-CM

## 2022-02-10 DIAGNOSIS — G61.81 CIDP (CHRONIC INFLAMMATORY DEMYELINATING POLYNEUROPATHY): ICD-10-CM

## 2022-02-10 DIAGNOSIS — Z00.00 ANNUAL PHYSICAL EXAM: Primary | ICD-10-CM

## 2022-02-10 DIAGNOSIS — E11.49 TYPE 2 DIABETES MELLITUS WITH OTHER NEUROLOGIC COMPLICATION, WITHOUT LONG-TERM CURRENT USE OF INSULIN: ICD-10-CM

## 2022-02-10 DIAGNOSIS — L29.9 ITCHING: ICD-10-CM

## 2022-02-10 DIAGNOSIS — N52.9 ERECTILE DYSFUNCTION, UNSPECIFIED ERECTILE DYSFUNCTION TYPE: ICD-10-CM

## 2022-02-10 DIAGNOSIS — R11.0 NAUSEA: ICD-10-CM

## 2022-02-10 DIAGNOSIS — J30.2 OTHER SEASONAL ALLERGIC RHINITIS: ICD-10-CM

## 2022-02-10 DIAGNOSIS — M21.40 ACQUIRED FLEXIBLE FLAT FOOT, UNSPECIFIED LATERALITY: ICD-10-CM

## 2022-02-10 DIAGNOSIS — Z30.9 ENCOUNTER FOR CONTRACEPTIVE MANAGEMENT, UNSPECIFIED TYPE: ICD-10-CM

## 2022-02-10 DIAGNOSIS — L73.9 FOLLICULITIS: ICD-10-CM

## 2022-02-10 DIAGNOSIS — M10.9 GOUT, UNSPECIFIED CAUSE, UNSPECIFIED CHRONICITY, UNSPECIFIED SITE: ICD-10-CM

## 2022-02-10 DIAGNOSIS — I10 HYPERTENSION, BENIGN: ICD-10-CM

## 2022-02-10 DIAGNOSIS — E78.9 DISORDER OF LIPID METABOLISM: ICD-10-CM

## 2022-02-10 DIAGNOSIS — E55.9 VITAMIN D DEFICIENCY: ICD-10-CM

## 2022-02-10 DIAGNOSIS — R21 RASH: ICD-10-CM

## 2022-02-10 DIAGNOSIS — L30.8 OTHER ECZEMA: ICD-10-CM

## 2022-02-10 DIAGNOSIS — H10.89 OTHER CONJUNCTIVITIS OF BOTH EYES: ICD-10-CM

## 2022-02-10 LAB
EXPIRATION DATE: NORMAL
GLUCOSE BLDC GLUCOMTR-MCNC: 143 MG/DL (ref 70–130)
HBA1C MFR BLD: 7.2 %
Lab: NORMAL

## 2022-02-10 PROCEDURE — 99396 PREV VISIT EST AGE 40-64: CPT | Performed by: FAMILY MEDICINE

## 2022-02-10 PROCEDURE — 82962 GLUCOSE BLOOD TEST: CPT | Performed by: FAMILY MEDICINE

## 2022-02-10 PROCEDURE — 3051F HG A1C>EQUAL 7.0%<8.0%: CPT | Performed by: FAMILY MEDICINE

## 2022-02-10 PROCEDURE — 99214 OFFICE O/P EST MOD 30 MIN: CPT | Performed by: FAMILY MEDICINE

## 2022-02-10 PROCEDURE — 83036 HEMOGLOBIN GLYCOSYLATED A1C: CPT | Performed by: FAMILY MEDICINE

## 2022-02-10 RX ORDER — MONTELUKAST SODIUM 10 MG/1
10 TABLET ORAL NIGHTLY
Qty: 90 TABLET | Refills: 0 | Status: SHIPPED | OUTPATIENT
Start: 2022-02-10 | End: 2022-02-15 | Stop reason: SDUPTHER

## 2022-02-10 RX ORDER — AMLODIPINE BESYLATE 10 MG/1
10 TABLET ORAL DAILY
Qty: 90 TABLET | Refills: 3 | Status: SHIPPED | OUTPATIENT
Start: 2022-02-10 | End: 2022-02-15 | Stop reason: SDUPTHER

## 2022-02-10 RX ORDER — SILDENAFIL CITRATE 20 MG/1
20 TABLET ORAL DAILY PRN
Qty: 30 TABLET | Refills: 3 | Status: SHIPPED | OUTPATIENT
Start: 2022-02-10 | End: 2022-02-28 | Stop reason: SDUPTHER

## 2022-02-10 RX ORDER — LANCETS 33 GAUGE
1 EACH MISCELLANEOUS DAILY
Qty: 100 EACH | Refills: 4 | Status: SHIPPED | OUTPATIENT
Start: 2022-02-10 | End: 2022-02-15 | Stop reason: SDUPTHER

## 2022-02-10 RX ORDER — SITAGLIPTIN AND METFORMIN HYDROCHLORIDE 1000; 50 MG/1; MG/1
1 TABLET, FILM COATED ORAL 2 TIMES DAILY WITH MEALS
Qty: 180 TABLET | Refills: 3 | Status: SHIPPED | OUTPATIENT
Start: 2022-02-10 | End: 2022-02-15 | Stop reason: SDUPTHER

## 2022-02-10 RX ORDER — LANCETS 33 GAUGE
1 EACH MISCELLANEOUS DAILY
Qty: 100 EACH | Refills: 4 | Status: SHIPPED | OUTPATIENT
Start: 2022-02-10 | End: 2022-02-10 | Stop reason: SDUPTHER

## 2022-02-10 RX ORDER — PROMETHAZINE HYDROCHLORIDE 25 MG/1
25 TABLET ORAL EVERY 8 HOURS PRN
Qty: 90 TABLET | Refills: 5 | Status: SHIPPED | OUTPATIENT
Start: 2022-02-10 | End: 2022-02-28 | Stop reason: SDUPTHER

## 2022-02-10 RX ORDER — GABAPENTIN 300 MG/1
300 CAPSULE ORAL 3 TIMES DAILY
Qty: 90 CAPSULE | Refills: 1 | Status: SHIPPED | OUTPATIENT
Start: 2022-02-10 | End: 2022-06-13

## 2022-02-10 RX ORDER — HYDROXYZINE HYDROCHLORIDE 25 MG/1
25 TABLET, FILM COATED ORAL 4 TIMES DAILY PRN
Qty: 30 TABLET | Refills: 2 | Status: SHIPPED | OUTPATIENT
Start: 2022-02-10 | End: 2022-02-15 | Stop reason: SDUPTHER

## 2022-02-10 RX ORDER — LANCETS 33 GAUGE
1 EACH MISCELLANEOUS DAILY
Qty: 100 EACH | Refills: 0 | Status: SHIPPED | OUTPATIENT
Start: 2022-02-10 | End: 2022-02-10

## 2022-02-10 RX ORDER — FLUTICASONE PROPIONATE 50 MCG
1 SPRAY, SUSPENSION (ML) NASAL DAILY
Qty: 16 G | Refills: 3 | Status: SHIPPED | OUTPATIENT
Start: 2022-02-10 | End: 2022-02-15 | Stop reason: SDUPTHER

## 2022-02-10 RX ORDER — LANCETS 33 GAUGE
1 EACH MISCELLANEOUS DAILY
Qty: 100 EACH | Refills: 0 | Status: SHIPPED | OUTPATIENT
Start: 2022-02-10 | End: 2022-02-10 | Stop reason: SDUPTHER

## 2022-02-10 RX ORDER — ERYTHROMYCIN 5 MG/G
OINTMENT OPHTHALMIC NIGHTLY
Qty: 1 EACH | Refills: 3 | Status: SHIPPED | OUTPATIENT
Start: 2022-02-10 | End: 2022-02-15 | Stop reason: SDUPTHER

## 2022-02-10 RX ORDER — OXYBUTYNIN CHLORIDE 15 MG/1
15 TABLET, EXTENDED RELEASE ORAL DAILY
Qty: 30 TABLET | Refills: 12 | Status: SHIPPED | OUTPATIENT
Start: 2022-02-10 | End: 2022-02-28 | Stop reason: SDUPTHER

## 2022-02-10 RX ORDER — LORATADINE 10 MG/1
10 TABLET ORAL DAILY
Qty: 90 TABLET | Refills: 3 | Status: SHIPPED | OUTPATIENT
Start: 2022-02-10 | End: 2022-02-15 | Stop reason: SDUPTHER

## 2022-02-10 RX ORDER — CHLORTHALIDONE 50 MG/1
50 TABLET ORAL DAILY
Qty: 90 TABLET | Refills: 3 | Status: SHIPPED | OUTPATIENT
Start: 2022-02-10 | End: 2022-02-15 | Stop reason: SDUPTHER

## 2022-02-10 RX ORDER — DIAPER,BRIEF,INFANT-TODD,DISP
EACH MISCELLANEOUS 2 TIMES DAILY
Qty: 60 G | Refills: 0 | Status: SHIPPED | OUTPATIENT
Start: 2022-02-10 | End: 2022-02-15 | Stop reason: SDUPTHER

## 2022-02-10 RX ORDER — CHOLECALCIFEROL (VITAMIN D3) 50 MCG
2000 TABLET ORAL DAILY
Qty: 90 TABLET | Refills: 3 | Status: SHIPPED | OUTPATIENT
Start: 2022-02-10 | End: 2022-02-10 | Stop reason: SDUPTHER

## 2022-02-10 RX ORDER — CHOLECALCIFEROL (VITAMIN D3) 50 MCG
2000 TABLET ORAL DAILY
Qty: 30 TABLET | Refills: 12 | Status: SHIPPED | OUTPATIENT
Start: 2022-02-10 | End: 2022-02-28 | Stop reason: SDUPTHER

## 2022-02-10 RX ORDER — FLUOXETINE HYDROCHLORIDE 40 MG/1
40 CAPSULE ORAL 2 TIMES DAILY
Qty: 180 CAPSULE | Refills: 3 | Status: SHIPPED | OUTPATIENT
Start: 2022-02-10 | End: 2022-02-15 | Stop reason: SDUPTHER

## 2022-02-10 RX ORDER — LANOLIN ALCOHOL/MO/W.PET/CERES
1000 CREAM (GRAM) TOPICAL DAILY
Qty: 90 TABLET | Refills: 3 | Status: SHIPPED | OUTPATIENT
Start: 2022-02-10 | End: 2022-02-14 | Stop reason: SDUPTHER

## 2022-02-10 NOTE — PROGRESS NOTES
Subjective   Barrett Laguerre is a 41 y.o. male.   Chief Complaint   Patient presents with   • Annual Exam   • Diabetes       The patient is here: for coordination of medical care and annual wellness examination.  Patient's last comprehensive physical was on 1/14/2021 Patient's previous physician was Eric Rosenberg.  Patient has: minimal activity with work/home activities, good appetite, feels well with minor complaints, decreased energy level and is sleeping well  Patient's last tetanus shot was 2020. .  Current pain scale 0/10.      Pt needs diabetic shoes due to foot deformity and diagnosis of neuropathy   Needs multiple refills sent to various places.  ED - would like viagra  Needs new cardiologist  Pain management cannot get pt another epidural until June per pt due to insurance  Ongoing fatigue, would like testosterone checked  Needs rx for condoms, test strips, diabetic shoes     Diabetes  He presents for his follow-up diabetic visit. He has type 2 diabetes mellitus. Associated symptoms include fatigue, visual change and weakness. Pertinent negatives for diabetes include no chest pain, no polydipsia and no polyuria. Risk factors for coronary artery disease include diabetes mellitus, male sex and obesity. Meal planning includes avoidance of concentrated sweets. (Glucose 143)      The following portions of the patient's history were reviewed and updated as appropriate: allergies, current medications, past family history, past medical history, past social history, past surgical history and problem list.    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)   • Sleep apnea   • Attention deficit disorder   • Diabetes (HCC)   • Other seasonal allergic rhinitis   • Mixed obsessional thoughts  and acts   • Hypertension, benign   • Disorder of lipid metabolism   • CIDP (chronic inflammatory demyelinating polyneuropathy) ([facility])   • Lumbar radiculopathy   • Pain of right lower extremity   • Onychomycosis   • Blister   • Over weight   • Annual physical exam   • Upper respiratory tract infection       Current Outpatient Medications on File Prior to Visit   Medication Sig Dispense Refill   • acetaminophen-codeine (TYLENOL/CODEINE #3) 300-30 MG per tablet Take 1 tablet by mouth Every 6 (Six) Hours As Needed for Moderate Pain . 28 tablet 5   • amantadine (SYMMETREL) 100 MG tablet      • Diclofenac Epolamine (FLECTOR) 1.3 % patch patch Apply 1 patch topically to the appropriate area as directed 2 (Two) Times a Day As Needed (back pain). 60 patch 2   • gabapentin (NEURONTIN) 400 MG capsule Take 1 capsule by mouth 3 (Three) Times a Day. 90 capsule 2   • glucose blood test strip 1 each by Other route Daily. Once daily Dx: E11.9 patient checks sugar once a day 100 each 12   • glucose monitor monitoring kit 1 each Daily. Dx: E11.9 patient checks sugar once a day 1 each 0   • ipratropium-albuterol (DUO-NEB) 0.5-2.5 mg/3 ml nebulizer USE 1 AMPULE IN NEBULIZER EVERY 4 TO 6 HOURS     • lidocaine-prilocaine (EMLA) 2.5-2.5 % cream      • pantoprazole (Protonix) 40 MG EC tablet Take 1 tablet by mouth Daily. 30 tablet 12   • sodium chloride (Ocean Nasal Spray) 0.65 % nasal spray 2 sprays into the nostril(s) as directed by provider As Needed for Congestion. 88 mL 12   • triamcinolone (KENALOG) 0.1 % cream Apply  topically to the appropriate area as directed 3 (Three) Times a Day. 80 g 0   • [DISCONTINUED] allopurinol (ZYLOPRIM) 100 MG tablet Take 1 tablet by mouth Daily. 30 tablet 12   • [DISCONTINUED] amLODIPine (NORVASC) 10 MG tablet Take 1 tablet by mouth Daily. 90 tablet 3   • [DISCONTINUED] chlorthalidone (HYGROTEN) 50 MG tablet Take 1 tablet by mouth Daily.  3   • [DISCONTINUED] EQ Loratadine 10 MG tablet Take 1 tablet by  mouth once daily 30 tablet 12   • [DISCONTINUED] erythromycin (ROMYCIN) 5 MG/GM ophthalmic ointment APPLY OINTMENT TO BOTH EYES AT BEDTIME FOR 1 WEEK THEN AS NEEDED  3   • [DISCONTINUED] FLUoxetine (PROzac) 40 MG capsule Take 1 capsule by mouth 2 (Two) Times a Day. 180 capsule 3   • [DISCONTINUED] fluticasone (FLONASE) 50 MCG/ACT nasal spray 1 spray into the nostril(s) as directed by provider 2 (two) times a day. 16 g 3   • [DISCONTINUED] gabapentin (NEURONTIN) 300 MG capsule Take 1 capsule by mouth 3 (Three) Times a Day. 90 capsule 1   • [DISCONTINUED] hydrocortisone 1 % cream Apply  topically to the appropriate area as directed 2 (Two) Times a Day. 60 g 0   • [DISCONTINUED] hydrOXYzine (ATARAX) 25 MG tablet Take 1 tablet by mouth 4 (Four) Times a Day As Needed for Itching. 30 tablet 2   • [DISCONTINUED] Lancets 33G misc 1 each Daily. Patient requesting Microgauge Brand 100 each 0   • [DISCONTINUED] montelukast (SINGULAIR) 10 MG tablet Take 1 tablet by mouth Every Night. 90 tablet 0   • [DISCONTINUED] mupirocin (BACTROBAN) 2 % ointment      • [DISCONTINUED] oxybutynin XL (DITROPAN XL) 15 MG 24 hr tablet Take 1 tablet by mouth Daily. 30 tablet 12   • [DISCONTINUED] promethazine (PHENERGAN) 25 MG tablet Take 1 tablet by mouth Every 8 (Eight) Hours As Needed for Nausea or Vomiting. 90 tablet 5   • [DISCONTINUED] sitaGLIPtin-metFORMIN (Janumet)  MG per tablet Take 1 tablet by mouth 2 (Two) Times a Day With Meals. 180 tablet 3   • [DISCONTINUED] glucose blood test strip Daily as needed 30 each 12   • [DISCONTINUED] Lancet Devices (Lancing Device) misc 1 each Daily. Dx: E11.9 patient checks sugar once a day 1 each 0   • [DISCONTINUED] Lancets Misc. misc 1 each Daily. Dx: E11.9 patient checks sugar once a day 100 each 12   • [DISCONTINUED] methylPREDNISolone (MEDROL) 4 MG dose pack Take as directed on package instructions. 1 each 0   • [DISCONTINUED] montelukast (Singulair) 10 MG tablet Take 1 tablet by mouth Every  "Night. 30 tablet 5   • [DISCONTINUED] ReliOn Ultra Thin Lancets 30G misc USE 1 ONCE DAILY       No current facility-administered medications on file prior to visit.     Current outpatient and discharge medications have been reconciled for the patient.  Reviewed by: Eric Rosenberg MD      Allergies   Allergen Reactions   • Penicillin G Anaphylaxis   • Penicillins Anaphylaxis   • Sulfa Antibiotics Hives       Review of Systems   Constitutional: Positive for fatigue. Negative for activity change, appetite change and fever.   HENT: Negative for ear pain, swollen glands and voice change.    Eyes: Negative for visual disturbance.   Respiratory: Negative for shortness of breath and wheezing.    Cardiovascular: Negative for chest pain and leg swelling.   Gastrointestinal: Negative for abdominal pain, blood in stool, constipation, diarrhea, nausea and vomiting.   Endocrine: Negative for polydipsia and polyuria.   Genitourinary: Negative for dysuria, frequency and hematuria.   Musculoskeletal: Negative for joint swelling, neck pain and neck stiffness.   Skin: Negative for rash and wound.   Neurological: Positive for weakness. Negative for numbness and headache.   Psychiatric/Behavioral: Negative for suicidal ideas and depressed mood.     I have reviewed and confirmed the accuracy of the ROS as documented by the MA/LPN/RN Eric Rosenberg MD    Objective   Visit Vitals  /92 (BP Location: Right arm, Patient Position: Sitting, Cuff Size: Adult)   Pulse 89   Temp 97.5 °F (36.4 °C)   Resp 22   Ht 193 cm (75.98\")   Wt 132 kg (290 lb 3.2 oz)   SpO2 98%   BMI 35.34 kg/m²       Physical Exam  Constitutional:       Appearance: He is well-developed.   HENT:      Head: Normocephalic and atraumatic.      Right Ear: External ear normal.      Left Ear: External ear normal.      Nose: Nose normal.   Eyes:      Pupils: Pupils are equal, round, and reactive to light.   Cardiovascular:      Rate and Rhythm: Normal rate and " regular rhythm.      Heart sounds: Normal heart sounds.   Pulmonary:      Effort: Pulmonary effort is normal.      Breath sounds: Normal breath sounds.   Abdominal:      General: Bowel sounds are normal.      Palpations: Abdomen is soft.   Musculoskeletal:         General: Normal range of motion.      Cervical back: Normal range of motion and neck supple.   Feet:      Comments: Diabetic Foot Exam Performed    Skin:     General: Skin is warm and dry.   Neurological:      Mental Status: He is alert and oriented to person, place, and time.   Psychiatric:         Behavior: Behavior normal.         Thought Content: Thought content normal.         Judgment: Judgment normal.       Derm Physical Exam    Assessment/Plan .    Diagnoses and all orders for this visit:    1. Annual physical exam (Primary)  -     CBC Auto Differential  -     Comprehensive Metabolic Panel  -     Lipid Panel With / Chol / HDL Ratio  -     TSH  -     Sedimentation Rate  -     Uric Acid  -     Testosterone    2. Type 2 diabetes mellitus with other neurologic complication, without long-term current use of insulin (HCC)  -     POC Glycosylated Hemoglobin (Hb A1C)  -     POC Glucose  -     Discontinue: Lancets 33G misc; 1 each Daily. Patient requesting Microgauge Brand  Dispense: 100 each; Refill: 0  -     Discontinue: Lancets 33G misc; 1 each Daily. Patient requesting Microgauge Brand  Dispense: 100 each; Refill: 0  -     Lancets 33G misc; 1 each Daily. Patient requesting Microgauge Brand  Dispense: 100 each; Refill: 4    3. Hypertension, benign  Overview:  Followed by Cardiology Dr Tong who placed pt on different BP med. .    Proteinuria/creatinine noted    Orders:  -     amLODIPine (NORVASC) 10 MG tablet; Take 1 tablet by mouth Daily.  Dispense: 90 tablet; Refill: 3  -     chlorthalidone (HYGROTEN) 50 MG tablet; Take 1 tablet by mouth Daily.  Dispense: 90 tablet; Refill: 3  -     Ambulatory Referral to Cardiology  -     CBC Auto Differential  -      Comprehensive Metabolic Panel    4. Mood disorder ([facility])  -     FLUoxetine (PROzac) 40 MG capsule; Take 1 capsule by mouth 2 (Two) Times a Day.  Dispense: 180 capsule; Refill: 3    5. Other seasonal allergic rhinitis  Overview:  Over-the-counter medication indications, dosage, and precautions discussed.    Orders:  -     fluticasone (FLONASE) 50 MCG/ACT nasal spray; 1 spray into the nostril(s) as directed by provider Daily.  Dispense: 16 g; Refill: 3  -     loratadine (EQ Loratadine) 10 MG tablet; Take 1 tablet by mouth Daily.  Dispense: 90 tablet; Refill: 3    6. Other eczema  -     hydrocortisone 1 % cream; Apply  topically to the appropriate area as directed 2 (Two) Times a Day.  Dispense: 60 g; Refill: 0    7. Rash  -     hydrOXYzine (ATARAX) 25 MG tablet; Take 1 tablet by mouth 4 (Four) Times a Day As Needed for Itching.  Dispense: 30 tablet; Refill: 2    8. Itching  -     hydrOXYzine (ATARAX) 25 MG tablet; Take 1 tablet by mouth 4 (Four) Times a Day As Needed for Itching.  Dispense: 30 tablet; Refill: 2    9. Non-seasonal allergic rhinitis due to other allergic trigger  -     montelukast (SINGULAIR) 10 MG tablet; Take 1 tablet by mouth Every Night.  Dispense: 90 tablet; Refill: 0    10. Type 2 diabetes mellitus without complication, without long-term current use of insulin ([facility])  -     sitaGLIPtin-metFORMIN (Janumet)  MG per tablet; Take 1 tablet by mouth 2 (Two) Times a Day With Meals.  Dispense: 180 tablet; Refill: 3    11. Other conjunctivitis of both eyes  -     erythromycin (ROMYCIN) 5 MG/GM ophthalmic ointment; Administer  to both eyes Every Night.  Dispense: 1 each; Refill: 3    12. Guillain-[place] ([facility])  -     gabapentin (NEURONTIN) 300 MG capsule; Take 1 capsule by mouth 3 (Three) Times a Day.  Dispense: 90 capsule; Refill: 1  -     oxybutynin XL (DITROPAN XL) 15 MG 24 hr tablet; Take 1 tablet by mouth Daily.  Dispense: 30 tablet; Refill: 12    13. Folliculitis  -     mupirocin (BACTROBAN) 2 %  ointment; Apply  topically to the appropriate area as directed Daily.  Dispense: 1 each; Refill: 3    14. Nausea  -     promethazine (PHENERGAN) 25 MG tablet; Take 1 tablet by mouth Every 8 (Eight) Hours As Needed for Nausea or Vomiting.  Dispense: 90 tablet; Refill: 5    15. Vitamin D deficiency  -     Discontinue: Cholecalciferol (Vitamin D) 50 MCG (2000 UT) tablet; Take 2,000 Units by mouth Daily for 90 days.  Dispense: 90 tablet; Refill: 3  -     Cholecalciferol (Vitamin D) 50 MCG (2000 UT) tablet; Take 2,000 Units by mouth Daily for 90 days.  Dispense: 30 tablet; Refill: 12    16. CIDP (chronic inflammatory demyelinating polyneuropathy) ([facility])  Overview:  Story: s/p IVIG Guillain-[place] per [facility] diagnosed 2015 gabapentin 600 tid. Impression: Symptoms include: numbness and tingling in hands and feet, difficulty concentrating, drops items, chest pain. dizziness. Pt needs routine follow up with neurology. Followed by Dr Seipel      17. Chronic fatigue  -     vitamin B-12 (CYANOCOBALAMIN) 1000 MCG tablet; Take 1 tablet by mouth Daily.  Dispense: 90 tablet; Refill: 3  -     TSH  -     Sedimentation Rate  -     Testosterone    18. Encounter for contraceptive management, unspecified type  -     Discontinue: Condoms - Male misc; Use as directed  Dispense: 36 each; Refill: 3  -     Condoms - Male misc; Use as directed  Dispense: 36 each; Refill: 12    19. Urinary frequency  -     PSA DIAGNOSTIC ONLY    20. Gout, unspecified cause, unspecified chronicity, unspecified site  -     Uric Acid    21. Disorder of lipid metabolism  -     Comprehensive Metabolic Panel  -     Lipid Panel With / Chol / HDL Ratio    22. Erectile dysfunction, unspecified erectile dysfunction type  -     sildenafil (REVATIO) 20 MG tablet; Take 1 tablet by mouth Daily As Needed (ed).  Dispense: 30 tablet; Refill: 3    23. Acquired flexible flat foot, unspecified laterality  Overview:  Needs diabetic shoes     Discussed anticipatory guidance, diet,  exercise, and weight loss.  Discussed safety and routine screening examinations.  Discussed self-examinations.  Patient's Body mass index is 35.34 kg/m². indicating that he is obese (BMI >30). Obesity-related health conditions include the following: hypertension and diabetes mellitus. Obesity is unchanged. BMI is is above average; BMI management plan is completed. We discussed portion control and increasing exercise..     Barrett Laguerre  reports that he has never smoked. He has never used smokeless tobacco.     Follow-up for routine health maintenance as indicated.  Findings discussed. All questions answered.  Medication and medication adverse effects discussed.  Drug education given and explained to patient. Patient verbalized understanding.  Follow-up for routine health maintenance as directed     I wore protective equipment throughout this patient encounter to include mask and gloves. Hand hygiene was performed before donning protective equipment and after removal when leaving the room

## 2022-02-11 ENCOUNTER — TELEPHONE (OUTPATIENT)
Dept: FAMILY MEDICINE CLINIC | Facility: CLINIC | Age: 42
End: 2022-02-11

## 2022-02-11 NOTE — TELEPHONE ENCOUNTER
Received fax from [facility] in [place] requesting PA on Janumet. PA submitted online thru Covermymeds. Waiting on authorization.

## 2022-02-11 NOTE — TELEPHONE ENCOUNTER
Received response back-med approved. PA Case: [id number], Status: Approved, Coverage Starts on: 2/11/2022 12:00:00 AM, Coverage Ends on: 2/11/2023 12:00:00 AM. Notified [facility].

## 2022-02-12 ENCOUNTER — PATIENT MESSAGE (OUTPATIENT)
Dept: FAMILY MEDICINE CLINIC | Facility: CLINIC | Age: 42
End: 2022-02-12

## 2022-02-12 LAB
ALBUMIN SERPL-MCNC: 4.5 G/DL (ref 4–5)
ALBUMIN/GLOB SERPL: 1.5 {RATIO} (ref 1.2–2.2)
ALP SERPL-CCNC: 80 IU/L (ref 44–121)
ALT SERPL-CCNC: 62 IU/L (ref 0–44)
AST SERPL-CCNC: 46 IU/L (ref 0–40)
BASOPHILS # BLD AUTO: 0.1 X10E3/UL (ref 0–0.2)
BASOPHILS NFR BLD AUTO: 1 %
BILIRUB SERPL-MCNC: 0.3 MG/DL (ref 0–1.2)
BUN SERPL-MCNC: 13 MG/DL (ref 6–24)
BUN/CREAT SERPL: 12 (ref 9–20)
CALCIUM SERPL-MCNC: 10 MG/DL (ref 8.7–10.2)
CHLORIDE SERPL-SCNC: 100 MMOL/L (ref 96–106)
CHOLEST SERPL-MCNC: 164 MG/DL (ref 100–199)
CHOLEST/HDLC SERPL: 5 RATIO (ref 0–5)
CO2 SERPL-SCNC: 24 MMOL/L (ref 20–29)
CREAT SERPL-MCNC: 1.1 MG/DL (ref 0.76–1.27)
EOSINOPHIL # BLD AUTO: 0.3 X10E3/UL (ref 0–0.4)
EOSINOPHIL NFR BLD AUTO: 3 %
ERYTHROCYTE [DISTWIDTH] IN BLOOD BY AUTOMATED COUNT: 13.7 % (ref 11.6–15.4)
ERYTHROCYTE [SEDIMENTATION RATE] IN BLOOD BY WESTERGREN METHOD: 13 MM/HR (ref 0–15)
GLOBULIN SER CALC-MCNC: 3.1 G/DL (ref 1.5–4.5)
GLUCOSE SERPL-MCNC: 164 MG/DL (ref 65–99)
HCT VFR BLD AUTO: 42.9 % (ref 37.5–51)
HDLC SERPL-MCNC: 33 MG/DL
HGB BLD-MCNC: 14.1 G/DL (ref 13–17.7)
IMM GRANULOCYTES # BLD AUTO: 0 X10E3/UL (ref 0–0.1)
IMM GRANULOCYTES NFR BLD AUTO: 0 %
LDLC SERPL CALC-MCNC: 101 MG/DL (ref 0–99)
LYMPHOCYTES # BLD AUTO: 3.2 X10E3/UL (ref 0.7–3.1)
LYMPHOCYTES NFR BLD AUTO: 26 %
MCH RBC QN AUTO: 27.3 PG (ref 26.6–33)
MCHC RBC AUTO-ENTMCNC: 32.9 G/DL (ref 31.5–35.7)
MCV RBC AUTO: 83 FL (ref 79–97)
MONOCYTES # BLD AUTO: 1.4 X10E3/UL (ref 0.1–0.9)
MONOCYTES NFR BLD AUTO: 11 %
NEUTROPHILS # BLD AUTO: 7.2 X10E3/UL (ref 1.4–7)
NEUTROPHILS NFR BLD AUTO: 59 %
PLATELET # BLD AUTO: 323 X10E3/UL (ref 150–450)
POTASSIUM SERPL-SCNC: 3.9 MMOL/L (ref 3.5–5.2)
PROT SERPL-MCNC: 7.6 G/DL (ref 6–8.5)
PSA SERPL-MCNC: 0.4 NG/ML (ref 0–4)
RBC # BLD AUTO: 5.17 X10E6/UL (ref 4.14–5.8)
SODIUM SERPL-SCNC: 141 MMOL/L (ref 134–144)
TESTOST SERPL-MCNC: 246 NG/DL (ref 264–916)
TRIGL SERPL-MCNC: 171 MG/DL (ref 0–149)
TSH SERPL DL<=0.005 MIU/L-ACNC: 2.53 UIU/ML (ref 0.45–4.5)
URATE SERPL-MCNC: 7.3 MG/DL (ref 3.8–8.4)
VLDLC SERPL CALC-MCNC: 30 MG/DL (ref 5–40)
WBC # BLD AUTO: 12.2 X10E3/UL (ref 3.4–10.8)

## 2022-02-14 ENCOUNTER — OFFICE VISIT (OUTPATIENT)
Dept: PODIATRY | Facility: CLINIC | Age: 42
End: 2022-02-14

## 2022-02-14 ENCOUNTER — TELEPHONE (OUTPATIENT)
Dept: FAMILY MEDICINE CLINIC | Facility: CLINIC | Age: 42
End: 2022-02-14

## 2022-02-14 ENCOUNTER — TELEPHONE (OUTPATIENT)
Dept: PAIN MEDICINE | Facility: CLINIC | Age: 42
End: 2022-02-14

## 2022-02-14 VITALS
BODY MASS INDEX: 35.34 KG/M2 | DIASTOLIC BLOOD PRESSURE: 89 MMHG | HEART RATE: 96 BPM | HEIGHT: 76 IN | WEIGHT: 290.2 LBS | SYSTOLIC BLOOD PRESSURE: 155 MMHG

## 2022-02-14 DIAGNOSIS — L60.3 ONYCHODYSTROPHY: Primary | ICD-10-CM

## 2022-02-14 DIAGNOSIS — R53.82 CHRONIC FATIGUE: ICD-10-CM

## 2022-02-14 DIAGNOSIS — E11.42 DM TYPE 2 WITH DIABETIC PERIPHERAL NEUROPATHY: ICD-10-CM

## 2022-02-14 DIAGNOSIS — Z79.899 HIGH RISK MEDICATION USE: Primary | ICD-10-CM

## 2022-02-14 PROCEDURE — 11721 DEBRIDE NAIL 6 OR MORE: CPT

## 2022-02-14 PROCEDURE — 99212 OFFICE O/P EST SF 10 MIN: CPT

## 2022-02-14 RX ORDER — LANOLIN ALCOHOL/MO/W.PET/CERES
1000 CREAM (GRAM) TOPICAL DAILY
Qty: 90 TABLET | Refills: 3 | Status: SHIPPED | OUTPATIENT
Start: 2022-02-14 | End: 2022-02-28

## 2022-02-14 RX ORDER — LANOLIN ALCOHOL/MO/W.PET/CERES
1000 CREAM (GRAM) TOPICAL DAILY
Qty: 90 TABLET | Refills: 3 | Status: SHIPPED | OUTPATIENT
Start: 2022-02-14 | End: 2022-02-14 | Stop reason: SDUPTHER

## 2022-02-14 NOTE — PROGRESS NOTES
02/14/2022  Foot and Ankle Surgery - Established Patient/Follow-up  Provider: ANASTASIA Chan   Location: [facility]    Subjective:  Barrett Laguerre is a 41 y.o. male.     Chief Complaint   Patient presents with   • Left Foot - DM FOOT CARE   • Right Foot - DM FOOT CARE   • Follow-up     Last pcp appt with CYRUS Rosenberg MD        HPI: Patient presents to office today for routine diabetic foot check.  He reports his last A1c was 7.2.  He denies any new problems or complaints with his feet.  Patient is requesting nail debridement today.    Allergies   Allergen Reactions   • Penicillin G Anaphylaxis   • Penicillins Anaphylaxis   • Sulfa Antibiotics Hives       Current Outpatient Medications on File Prior to Visit   Medication Sig Dispense Refill   • acetaminophen-codeine (TYLENOL/CODEINE #3) 300-30 MG per tablet Take 1 tablet by mouth Every 6 (Six) Hours As Needed for Moderate Pain . 28 tablet 5   • amantadine (SYMMETREL) 100 MG tablet      • amLODIPine (NORVASC) 10 MG tablet Take 1 tablet by mouth Daily. 90 tablet 3   • chlorthalidone (HYGROTEN) 50 MG tablet Take 1 tablet by mouth Daily. 90 tablet 3   • Cholecalciferol (Vitamin D) 50 MCG (2000 UT) tablet Take 2,000 Units by mouth Daily for 90 days. 30 tablet 12   • Condoms - Male misc Use as directed 36 each 12   • Diclofenac Epolamine (FLECTOR) 1.3 % patch patch Apply 1 patch topically to the appropriate area as directed 2 (Two) Times a Day As Needed (back pain). 60 patch 2   • erythromycin (ROMYCIN) 5 MG/GM ophthalmic ointment Administer  to both eyes Every Night. 1 each 3   • FLUoxetine (PROzac) 40 MG capsule Take 1 capsule by mouth 2 (Two) Times a Day. 180 capsule 3   • fluticasone (FLONASE) 50 MCG/ACT nasal spray 1 spray into the nostril(s) as directed by provider Daily. 16 g 3   • gabapentin (NEURONTIN) 300 MG capsule Take 1 capsule by mouth 3 (Three) Times a Day. 90 capsule 1   • gabapentin (NEURONTIN) 400 MG capsule Take 1 capsule by mouth 3  (Three) Times a Day. 90 capsule 2   • glucose blood test strip 1 each by Other route Daily. Once daily Dx: E11.9 patient checks sugar once a day 100 each 12   • glucose monitor monitoring kit 1 each Daily. Dx: E11.9 patient checks sugar once a day 1 each 0   • hydrocortisone 1 % cream Apply  topically to the appropriate area as directed 2 (Two) Times a Day. 60 g 0   • hydrOXYzine (ATARAX) 25 MG tablet Take 1 tablet by mouth 4 (Four) Times a Day As Needed for Itching. 30 tablet 2   • ipratropium-albuterol (DUO-NEB) 0.5-2.5 mg/3 ml nebulizer USE 1 AMPULE IN NEBULIZER EVERY 4 TO 6 HOURS     • Lancets 33G misc 1 each Daily. Patient requesting Microgauge Brand 100 each 4   • lidocaine-prilocaine (EMLA) 2.5-2.5 % cream      • loratadine (EQ Loratadine) 10 MG tablet Take 1 tablet by mouth Daily. 90 tablet 3   • montelukast (SINGULAIR) 10 MG tablet Take 1 tablet by mouth Every Night. 90 tablet 0   • mupirocin (BACTROBAN) 2 % ointment Apply  topically to the appropriate area as directed Daily. 1 each 3   • oxybutynin XL (DITROPAN XL) 15 MG 24 hr tablet Take 1 tablet by mouth Daily. 30 tablet 12   • pantoprazole (Protonix) 40 MG EC tablet Take 1 tablet by mouth Daily. 30 tablet 12   • promethazine (PHENERGAN) 25 MG tablet Take 1 tablet by mouth Every 8 (Eight) Hours As Needed for Nausea or Vomiting. 90 tablet 5   • sildenafil (REVATIO) 20 MG tablet Take 1 tablet by mouth Daily As Needed (ed). 30 tablet 3   • sitaGLIPtin-metFORMIN (Janumet)  MG per tablet Take 1 tablet by mouth 2 (Two) Times a Day With Meals. 180 tablet 3   • sodium chloride (Ocean Nasal Spray) 0.65 % nasal spray 2 sprays into the nostril(s) as directed by provider As Needed for Congestion. 88 mL 12   • triamcinolone (KENALOG) 0.1 % cream Apply  topically to the appropriate area as directed 3 (Three) Times a Day. 80 g 0   • [DISCONTINUED] vitamin B-12 (CYANOCOBALAMIN) 1000 MCG tablet Take 1 tablet by mouth Daily. 90 tablet 3     No current  "facility-administered medications on file prior to visit.       Objective   /89   Pulse 96   Ht 193 cm (75.98\")   Wt 132 kg (290 lb 3.2 oz)   BMI 35.34 kg/m²     Foot/Ankle Exam:       General:   Appearance: appears stated age and healthy and disheveled    Orientation: AAOx3    Affect: appropriate    Gait: unimpaired    Assistance: independent    Shoe Gear:  Casual shoes    VASCULAR      Right Foot Vascularity   Dorsalis pedis:  2+  Posterior tibial:  2+  Skin Temperature: warm    Edema Grading:  None  CFT:  < 3 seconds  Pedal Hair Growth:  Present  Varicosities: none       Left Foot Vascularity   Dorsalis pedis:  2+  Posterior tibial:  2+  Skin Temperature: warm    Edema Grading:  None  CFT:  < 3 seconds  Pedal Hair Growth:  Present  Varicosities: none        NEUROLOGIC     Right Foot Neurologic   Protective Sensation using [place] Monofilament:  Diminished     Left Foot Neurologic   Protective Sensation using [place] Monofilament:  Diminished     MUSCULOSKELETAL      Right Foot Musculoskeletal   Ecchymosis:  None  Tenderness: none       Left Foot Musculoskeletal   Ecchymosis:  None  Tenderness: none       DERMATOLOGIC     Right Foot Dermatologic   Skin: skin intact    Nails: dystrophic nails       Left Foot Dermatologic   Skin: skin intact    Nails: dystrophic nails        Assessment/Plan   Diagnoses and all orders for this visit:    1. Onychodystrophy (Primary)    2. DM type 2 with diabetic peripheral neuropathy (HCC)      On exam patient's bilateral feet do appear stable.  Skin is intact.  Nails are dystrophic and elongated and were debrided today without complication.  Do feel patient is at mild to moderate risk of pedal complications due to diabetes and this was discussed with patient.  Explained importance of diabetic foot care, daily foot checks, and glycemic control. Patient should check both feet on a daily basis, monitor and control blood sugars, make sure that both feet and in " between toes are towel dried after baths or showers. Avoid barefoot walking at all times. Check shoes before putting them on.  Would like for patient to follow-up in 3 months for routine diabetic foot check and nail care.  Patient was given information on proper foot care. Call the office at the first signs of a wound or with signs of infection.    Nail debridement: Both feet x10    Consent and time out was performed before proceeding with the procedure. Nails were debrided with a nail nipper without complication.  No anesthesia was required.  Indications for procedure were thickened, dystrophic, and fungal appearing nails which are difficult to trim.  Proper self-care and technique was discussed with patient.  Patient was stable after procedure.    No orders of the defined types were placed in this encounter.         Note is dictated utilizing voice recognition software. Unfortunately this leads to occasional typographical errors. I apologize in advance if the situation occurs. If questions occur please do not hesitate to call our office.

## 2022-02-14 NOTE — TELEPHONE ENCOUNTER
----- Message from Eric Rosenberg MD sent at 2/14/2022  8:26 AM EST -----  Please notify patient that labs are stable/looked ok.

## 2022-02-15 DIAGNOSIS — R21 RASH: ICD-10-CM

## 2022-02-15 DIAGNOSIS — L73.9 FOLLICULITIS: ICD-10-CM

## 2022-02-15 DIAGNOSIS — J30.89 NON-SEASONAL ALLERGIC RHINITIS DUE TO OTHER ALLERGIC TRIGGER: ICD-10-CM

## 2022-02-15 DIAGNOSIS — E11.9 TYPE 2 DIABETES MELLITUS WITHOUT COMPLICATION, WITHOUT LONG-TERM CURRENT USE OF INSULIN: ICD-10-CM

## 2022-02-15 DIAGNOSIS — E11.49 TYPE 2 DIABETES MELLITUS WITH OTHER NEUROLOGIC COMPLICATION, WITHOUT LONG-TERM CURRENT USE OF INSULIN: ICD-10-CM

## 2022-02-15 DIAGNOSIS — J30.2 OTHER SEASONAL ALLERGIC RHINITIS: ICD-10-CM

## 2022-02-15 DIAGNOSIS — I10 HYPERTENSION, BENIGN: ICD-10-CM

## 2022-02-15 DIAGNOSIS — H10.89 OTHER CONJUNCTIVITIS OF BOTH EYES: ICD-10-CM

## 2022-02-15 DIAGNOSIS — F39 MOOD DISORDER: ICD-10-CM

## 2022-02-15 DIAGNOSIS — L29.9 ITCHING: ICD-10-CM

## 2022-02-15 DIAGNOSIS — L30.8 OTHER ECZEMA: ICD-10-CM

## 2022-02-15 RX ORDER — ERYTHROMYCIN 5 MG/G
OINTMENT OPHTHALMIC NIGHTLY
Qty: 1 EACH | Refills: 3 | Status: SHIPPED | OUTPATIENT
Start: 2022-02-15 | End: 2022-02-28 | Stop reason: SDUPTHER

## 2022-02-15 RX ORDER — AMLODIPINE BESYLATE 10 MG/1
10 TABLET ORAL DAILY
Qty: 90 TABLET | Refills: 3 | Status: SHIPPED | OUTPATIENT
Start: 2022-02-15 | End: 2022-02-28 | Stop reason: SDUPTHER

## 2022-02-15 RX ORDER — LANCETS 33 GAUGE
1 EACH MISCELLANEOUS DAILY
Qty: 100 EACH | Refills: 4 | Status: SHIPPED | OUTPATIENT
Start: 2022-02-15 | End: 2022-02-28

## 2022-02-15 RX ORDER — LORATADINE 10 MG/1
10 TABLET ORAL DAILY
Qty: 90 TABLET | Refills: 3 | Status: SHIPPED | OUTPATIENT
Start: 2022-02-15 | End: 2022-02-28 | Stop reason: SDUPTHER

## 2022-02-15 RX ORDER — HYDROXYZINE HYDROCHLORIDE 25 MG/1
25 TABLET, FILM COATED ORAL 4 TIMES DAILY PRN
Qty: 30 TABLET | Refills: 2 | Status: SHIPPED | OUTPATIENT
Start: 2022-02-15 | End: 2022-02-28 | Stop reason: SDUPTHER

## 2022-02-15 RX ORDER — FLUOXETINE HYDROCHLORIDE 40 MG/1
40 CAPSULE ORAL 2 TIMES DAILY
Qty: 180 CAPSULE | Refills: 3 | Status: SHIPPED | OUTPATIENT
Start: 2022-02-15 | End: 2022-02-28 | Stop reason: SDUPTHER

## 2022-02-15 RX ORDER — FLUTICASONE PROPIONATE 50 MCG
1 SPRAY, SUSPENSION (ML) NASAL DAILY
Qty: 16 G | Refills: 3 | Status: SHIPPED | OUTPATIENT
Start: 2022-02-15 | End: 2022-02-28 | Stop reason: SDUPTHER

## 2022-02-15 RX ORDER — MONTELUKAST SODIUM 10 MG/1
10 TABLET ORAL NIGHTLY
Qty: 90 TABLET | Refills: 0 | Status: SHIPPED | OUTPATIENT
Start: 2022-02-15 | End: 2022-02-28 | Stop reason: SDUPTHER

## 2022-02-15 RX ORDER — SITAGLIPTIN AND METFORMIN HYDROCHLORIDE 1000; 50 MG/1; MG/1
1 TABLET, FILM COATED ORAL 2 TIMES DAILY WITH MEALS
Qty: 180 TABLET | Refills: 3 | Status: SHIPPED | OUTPATIENT
Start: 2022-02-15 | End: 2022-02-28 | Stop reason: SDUPTHER

## 2022-02-15 RX ORDER — CHLORTHALIDONE 50 MG/1
50 TABLET ORAL DAILY
Qty: 90 TABLET | Refills: 3 | Status: SHIPPED | OUTPATIENT
Start: 2022-02-15 | End: 2022-02-28 | Stop reason: SDUPTHER

## 2022-02-15 RX ORDER — DIAPER,BRIEF,INFANT-TODD,DISP
EACH MISCELLANEOUS 2 TIMES DAILY
Qty: 60 G | Refills: 0 | Status: SHIPPED | OUTPATIENT
Start: 2022-02-15 | End: 2022-02-28 | Stop reason: SDUPTHER

## 2022-02-15 NOTE — TELEPHONE ENCOUNTER
----- Message from Barrett Laguerre sent at 2/15/2022  2:45 AM EST -----  Regarding: RXs that were sent to [facility] [place] on February 10  I have sent you requests for the medicines in which not enough refills were sent. That is with exception to Gabapentin.

## 2022-02-18 ENCOUNTER — TELEPHONE (OUTPATIENT)
Dept: FAMILY MEDICINE CLINIC | Facility: CLINIC | Age: 42
End: 2022-02-18

## 2022-02-28 ENCOUNTER — OFFICE VISIT (OUTPATIENT)
Dept: FAMILY MEDICINE CLINIC | Facility: CLINIC | Age: 42
End: 2022-02-28

## 2022-02-28 VITALS
HEART RATE: 105 BPM | BODY MASS INDEX: 35.29 KG/M2 | WEIGHT: 289.8 LBS | DIASTOLIC BLOOD PRESSURE: 94 MMHG | RESPIRATION RATE: 20 BRPM | OXYGEN SATURATION: 98 % | HEIGHT: 76 IN | SYSTOLIC BLOOD PRESSURE: 154 MMHG | TEMPERATURE: 97.1 F

## 2022-02-28 DIAGNOSIS — M48.07 LUMBOSACRAL SPINAL STENOSIS: ICD-10-CM

## 2022-02-28 DIAGNOSIS — E66.01 MORBID (SEVERE) OBESITY DUE TO EXCESS CALORIES: ICD-10-CM

## 2022-02-28 DIAGNOSIS — L29.9 ITCHING: ICD-10-CM

## 2022-02-28 DIAGNOSIS — E11.49 TYPE 2 DIABETES MELLITUS WITH OTHER NEUROLOGIC COMPLICATION, WITHOUT LONG-TERM CURRENT USE OF INSULIN: ICD-10-CM

## 2022-02-28 DIAGNOSIS — R53.82 CHRONIC FATIGUE: ICD-10-CM

## 2022-02-28 DIAGNOSIS — J30.2 OTHER SEASONAL ALLERGIC RHINITIS: ICD-10-CM

## 2022-02-28 DIAGNOSIS — M10.9 GOUT, UNSPECIFIED CAUSE, UNSPECIFIED CHRONICITY, UNSPECIFIED SITE: Primary | ICD-10-CM

## 2022-02-28 DIAGNOSIS — E11.9 TYPE 2 DIABETES MELLITUS WITHOUT COMPLICATION, WITHOUT LONG-TERM CURRENT USE OF INSULIN: ICD-10-CM

## 2022-02-28 DIAGNOSIS — L73.9 FOLLICULITIS: ICD-10-CM

## 2022-02-28 DIAGNOSIS — L30.8 OTHER ECZEMA: ICD-10-CM

## 2022-02-28 DIAGNOSIS — E55.9 VITAMIN D DEFICIENCY: ICD-10-CM

## 2022-02-28 DIAGNOSIS — R21 RASH: ICD-10-CM

## 2022-02-28 DIAGNOSIS — J30.89 NON-SEASONAL ALLERGIC RHINITIS DUE TO OTHER ALLERGIC TRIGGER: ICD-10-CM

## 2022-02-28 DIAGNOSIS — F39 MOOD DISORDER: ICD-10-CM

## 2022-02-28 DIAGNOSIS — K21.9 GERD (GASTROESOPHAGEAL REFLUX DISEASE): ICD-10-CM

## 2022-02-28 DIAGNOSIS — N52.9 ERECTILE DYSFUNCTION, UNSPECIFIED ERECTILE DYSFUNCTION TYPE: ICD-10-CM

## 2022-02-28 DIAGNOSIS — H10.89 OTHER CONJUNCTIVITIS OF BOTH EYES: ICD-10-CM

## 2022-02-28 DIAGNOSIS — R11.0 NAUSEA: ICD-10-CM

## 2022-02-28 DIAGNOSIS — I10 HYPERTENSION, BENIGN: ICD-10-CM

## 2022-02-28 DIAGNOSIS — G61.0 GUILLAIN-BARRE: ICD-10-CM

## 2022-02-28 PROCEDURE — 99214 OFFICE O/P EST MOD 30 MIN: CPT | Performed by: FAMILY MEDICINE

## 2022-02-28 RX ORDER — FLUOXETINE HYDROCHLORIDE 40 MG/1
40 CAPSULE ORAL 2 TIMES DAILY
Qty: 180 CAPSULE | Refills: 3 | Status: SHIPPED | OUTPATIENT
Start: 2022-02-28 | End: 2022-07-22 | Stop reason: SDUPTHER

## 2022-02-28 RX ORDER — CHLORTHALIDONE 50 MG/1
50 TABLET ORAL DAILY
Qty: 90 TABLET | Refills: 3 | Status: SHIPPED | OUTPATIENT
Start: 2022-02-28 | End: 2022-10-04 | Stop reason: SDUPTHER

## 2022-02-28 RX ORDER — AMLODIPINE BESYLATE 10 MG/1
10 TABLET ORAL DAILY
Qty: 90 TABLET | Refills: 3 | Status: SHIPPED | OUTPATIENT
Start: 2022-02-28 | End: 2022-06-14 | Stop reason: SDUPTHER

## 2022-02-28 RX ORDER — HYDROXYZINE HYDROCHLORIDE 25 MG/1
25 TABLET, FILM COATED ORAL 4 TIMES DAILY PRN
Qty: 30 TABLET | Refills: 2 | Status: SHIPPED | OUTPATIENT
Start: 2022-02-28 | End: 2022-10-04 | Stop reason: SDUPTHER

## 2022-02-28 RX ORDER — OXYBUTYNIN CHLORIDE 15 MG/1
15 TABLET, EXTENDED RELEASE ORAL DAILY
Qty: 30 TABLET | Refills: 12 | Status: SHIPPED | OUTPATIENT
Start: 2022-02-28 | End: 2022-06-14 | Stop reason: SDUPTHER

## 2022-02-28 RX ORDER — ERYTHROMYCIN 5 MG/G
OINTMENT OPHTHALMIC NIGHTLY
Qty: 1 EACH | Refills: 3 | Status: SHIPPED | OUTPATIENT
Start: 2022-02-28 | End: 2022-10-29 | Stop reason: HOSPADM

## 2022-02-28 RX ORDER — CHOLECALCIFEROL (VITAMIN D3) 50 MCG
2000 TABLET ORAL DAILY
Qty: 30 TABLET | Refills: 12 | Status: SHIPPED | OUTPATIENT
Start: 2022-02-28 | End: 2022-05-29

## 2022-02-28 RX ORDER — PROMETHAZINE HYDROCHLORIDE 25 MG/1
25 TABLET ORAL EVERY 8 HOURS PRN
Qty: 90 TABLET | Refills: 5 | Status: SHIPPED | OUTPATIENT
Start: 2022-02-28 | End: 2023-03-23 | Stop reason: SDUPTHER

## 2022-02-28 RX ORDER — PROBENECID AND COLCHICINE 500; .5 MG/1; MG/1
1 TABLET ORAL DAILY
Qty: 30 TABLET | Refills: 5 | Status: SHIPPED | OUTPATIENT
Start: 2022-02-28 | End: 2022-06-14 | Stop reason: SDUPTHER

## 2022-02-28 RX ORDER — LANCETS 30 GAUGE
1 EACH MISCELLANEOUS DAILY
Qty: 100 EACH | Refills: 4 | Status: SHIPPED | OUTPATIENT
Start: 2022-02-28 | End: 2022-03-15 | Stop reason: SDUPTHER

## 2022-02-28 RX ORDER — PANTOPRAZOLE SODIUM 40 MG/1
40 TABLET, DELAYED RELEASE ORAL DAILY
Qty: 30 TABLET | Refills: 12 | Status: SHIPPED | OUTPATIENT
Start: 2022-02-28 | End: 2022-10-04 | Stop reason: SDUPTHER

## 2022-02-28 RX ORDER — DIAPER,BRIEF,INFANT-TODD,DISP
EACH MISCELLANEOUS 2 TIMES DAILY
Qty: 60 G | Refills: 0 | Status: SHIPPED | OUTPATIENT
Start: 2022-02-28 | End: 2022-10-04 | Stop reason: SDUPTHER

## 2022-02-28 RX ORDER — DICLOFENAC EPOLAMINE 0.01 G/1
1 SYSTEM TOPICAL 2 TIMES DAILY
Qty: 60 PATCH | Refills: 12 | Status: SHIPPED | OUTPATIENT
Start: 2022-02-28 | End: 2022-02-28 | Stop reason: SDUPTHER

## 2022-02-28 RX ORDER — DICLOFENAC EPOLAMINE 0.01 G/1
1 SYSTEM TOPICAL 2 TIMES DAILY
Qty: 60 PATCH | Refills: 12 | Status: SHIPPED | OUTPATIENT
Start: 2022-02-28 | End: 2022-10-26 | Stop reason: SDUPTHER

## 2022-02-28 RX ORDER — SILDENAFIL CITRATE 20 MG/1
20 TABLET ORAL DAILY PRN
Qty: 30 TABLET | Refills: 3 | Status: SHIPPED | OUTPATIENT
Start: 2022-02-28

## 2022-02-28 RX ORDER — FLUTICASONE PROPIONATE 50 MCG
1 SPRAY, SUSPENSION (ML) NASAL DAILY
Qty: 16 G | Refills: 3 | Status: SHIPPED | OUTPATIENT
Start: 2022-02-28 | End: 2022-10-04 | Stop reason: SDUPTHER

## 2022-02-28 RX ORDER — SITAGLIPTIN AND METFORMIN HYDROCHLORIDE 1000; 50 MG/1; MG/1
1 TABLET, FILM COATED ORAL 2 TIMES DAILY WITH MEALS
Qty: 180 TABLET | Refills: 3 | Status: SHIPPED | OUTPATIENT
Start: 2022-02-28 | End: 2022-06-14 | Stop reason: SDUPTHER

## 2022-02-28 RX ORDER — DICLOFENAC EPOLAMINE 0.01 G/1
1 SYSTEM TOPICAL 2 TIMES DAILY PRN
Qty: 60 PATCH | Refills: 2 | Status: SHIPPED | OUTPATIENT
Start: 2022-02-28 | End: 2023-03-10 | Stop reason: SDUPTHER

## 2022-02-28 RX ORDER — LORATADINE 10 MG/1
10 TABLET ORAL DAILY
Qty: 90 TABLET | Refills: 3 | Status: SHIPPED | OUTPATIENT
Start: 2022-02-28 | End: 2022-12-12 | Stop reason: SDUPTHER

## 2022-02-28 RX ORDER — ECHINACEA PURPUREA EXTRACT 125 MG
2 TABLET ORAL AS NEEDED
Qty: 88 ML | Refills: 12 | Status: SHIPPED | OUTPATIENT
Start: 2022-02-28 | End: 2022-10-04 | Stop reason: SDUPTHER

## 2022-02-28 RX ORDER — PROBENECID AND COLCHICINE 500; .5 MG/1; MG/1
1 TABLET ORAL DAILY
Qty: 30 TABLET | Refills: 5 | Status: SHIPPED | OUTPATIENT
Start: 2022-02-28 | End: 2022-02-28 | Stop reason: SDUPTHER

## 2022-02-28 RX ORDER — MONTELUKAST SODIUM 10 MG/1
10 TABLET ORAL NIGHTLY
Qty: 90 TABLET | Refills: 0 | Status: SHIPPED | OUTPATIENT
Start: 2022-02-28 | End: 2022-06-13

## 2022-02-28 NOTE — PROGRESS NOTES
Subjective   Barrett Laguerre is a 42 y.o. male.   Chief Complaint   Patient presents with   • Foot Pain       Pt allergic to allopurinol, needs something to help with gout   Needs written rx for all meds as his pharmacy needs change depending on the availability and coverage by insurance    Foot Pain  This is a recurrent problem. The current episode started more than 1 year ago. The problem occurs constantly. Associated symptoms include arthralgias and fatigue. Pertinent negatives include no abdominal pain, chest pain, fever, joint swelling, nausea, neck pain, numbness, rash, swollen glands, vomiting or weakness. The symptoms are aggravated by walking and twisting. Treatments tried: Neurotin.      The following portions of the patient's history were reviewed and updated as appropriate: allergies, current medications, past family history, past medical history, past social history, past surgical history and problem list.       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)   • Sleep apnea   • Attention deficit disorder   • Diabetes ([facility])   • Other seasonal allergic rhinitis   • Mixed obsessional thoughts and acts   • Hypertension, benign   • Disorder of lipid metabolism   • CIDP (chronic inflammatory demyelinating polyneuropathy) ([facility])   • Lumbar radiculopathy   • Pain of right lower extremity   • Onychomycosis   • Blister   • Over weight   • Annual physical exam   • Upper respiratory tract infection   • Morbid (severe) obesity due to excess calories ([facility])       Current Outpatient Medications on File Prior to Visit   Medication Sig Dispense Refill   • acetaminophen-codeine (TYLENOL/CODEINE #3) 300-30 MG per tablet Take 1 tablet by mouth Every 6 (Six) Hours As Needed for Moderate Pain . 28  tablet 5   • amantadine (SYMMETREL) 100 MG tablet      • gabapentin (NEURONTIN) 300 MG capsule Take 1 capsule by mouth 3 (Three) Times a Day. 90 capsule 1   • glucose blood test strip 1 each by Other route Daily. Once daily Dx: E11.9 patient checks sugar once a day 100 each 12   • lidocaine-prilocaine (EMLA) 2.5-2.5 % cream      • [DISCONTINUED] amLODIPine (NORVASC) 10 MG tablet Take 1 tablet by mouth Daily. 90 tablet 3   • [DISCONTINUED] chlorthalidone (HYGROTEN) 50 MG tablet Take 1 tablet by mouth Daily. 90 tablet 3   • [DISCONTINUED] Cholecalciferol (Vitamin D) 50 MCG (2000 UT) tablet Take 2,000 Units by mouth Daily for 90 days. 30 tablet 12   • [DISCONTINUED] Diclofenac Epolamine (FLECTOR) 1.3 % patch patch Apply 1 patch topically to the appropriate area as directed 2 (Two) Times a Day As Needed (back pain). 60 patch 2   • [DISCONTINUED] erythromycin (ROMYCIN) 5 MG/GM ophthalmic ointment Administer  to both eyes Every Night. 1 each 3   • [DISCONTINUED] FLUoxetine (PROzac) 40 MG capsule Take 1 capsule by mouth 2 (Two) Times a Day. 180 capsule 3   • [DISCONTINUED] fluticasone (FLONASE) 50 MCG/ACT nasal spray 1 spray into the nostril(s) as directed by provider Daily. 16 g 3   • [DISCONTINUED] hydrocortisone 1 % cream Apply  topically to the appropriate area as directed 2 (Two) Times a Day. 60 g 0   • [DISCONTINUED] hydrOXYzine (ATARAX) 25 MG tablet Take 1 tablet by mouth 4 (Four) Times a Day As Needed for Itching. 30 tablet 2   • [DISCONTINUED] Lancets 33G misc 1 each Daily. Patient requesting Microgauge Brand 100 each 4   • [DISCONTINUED] loratadine (EQ Loratadine) 10 MG tablet Take 1 tablet by mouth Daily. 90 tablet 3   • [DISCONTINUED] montelukast (SINGULAIR) 10 MG tablet Take 1 tablet by mouth Every Night. 90 tablet 0   • [DISCONTINUED] mupirocin (BACTROBAN) 2 % ointment Apply  topically to the appropriate area as directed Daily. 1 each 3   • [DISCONTINUED] oxybutynin XL (DITROPAN XL) 15 MG 24 hr tablet Take 1  tablet by mouth Daily. 30 tablet 12   • [DISCONTINUED] pantoprazole (Protonix) 40 MG EC tablet Take 1 tablet by mouth Daily. 30 tablet 12   • [DISCONTINUED] promethazine (PHENERGAN) 25 MG tablet Take 1 tablet by mouth Every 8 (Eight) Hours As Needed for Nausea or Vomiting. 90 tablet 5   • [DISCONTINUED] sildenafil (REVATIO) 20 MG tablet Take 1 tablet by mouth Daily As Needed (ed). 30 tablet 3   • [DISCONTINUED] sitaGLIPtin-metFORMIN (Janumet)  MG per tablet Take 1 tablet by mouth 2 (Two) Times a Day With Meals. 180 tablet 3   • [DISCONTINUED] sodium chloride (Ocean Nasal Spray) 0.65 % nasal spray 2 sprays into the nostril(s) as directed by provider As Needed for Congestion. 88 mL 12   • [DISCONTINUED] vitamin B-12 (CYANOCOBALAMIN) 1000 MCG tablet Take 1 tablet by mouth Daily. Controlled Release 90 tablet 3   • [DISCONTINUED] Condoms - Male misc Use as directed 36 each 12   • [DISCONTINUED] gabapentin (NEURONTIN) 400 MG capsule Take 1 capsule by mouth 3 (Three) Times a Day. 90 capsule 2   • [DISCONTINUED] glucose monitor monitoring kit 1 each Daily. Dx: E11.9 patient checks sugar once a day 1 each 0   • [DISCONTINUED] ipratropium-albuterol (DUO-NEB) 0.5-2.5 mg/3 ml nebulizer USE 1 AMPULE IN NEBULIZER EVERY 4 TO 6 HOURS     • [DISCONTINUED] triamcinolone (KENALOG) 0.1 % cream Apply  topically to the appropriate area as directed 3 (Three) Times a Day. 80 g 0     No current facility-administered medications on file prior to visit.     Current outpatient and discharge medications have been reconciled for the patient.  Reviewed by: Eric Rosenberg MD      Allergies   Allergen Reactions   • Penicillin G Anaphylaxis   • Penicillins Anaphylaxis   • Sulfa Antibiotics Hives   • Allopurinol Rash       Review of Systems   Constitutional: Positive for fatigue. Negative for activity change, appetite change and fever.   HENT: Negative for ear pain, swollen glands and voice change.    Eyes: Negative for visual  "disturbance.   Respiratory: Negative for shortness of breath and wheezing.    Cardiovascular: Negative for chest pain and leg swelling.   Gastrointestinal: Negative for abdominal pain, blood in stool, constipation, diarrhea, nausea and vomiting.   Endocrine: Negative for polydipsia and polyuria.   Genitourinary: Negative for dysuria, frequency and hematuria.   Musculoskeletal: Positive for arthralgias. Negative for joint swelling, neck pain and neck stiffness.   Skin: Negative for rash and wound.   Neurological: Negative for weakness, numbness and headache.   Psychiatric/Behavioral: Negative for suicidal ideas and depressed mood.     I have reviewed and confirmed the accuracy of the ROS as documented by the MA/LPN/RN Eric Rosenberg MD    Objective   Visit Vitals  /94 (BP Location: Right arm, Patient Position: Sitting, Cuff Size: Adult)   Pulse 105   Temp 97.1 °F (36.2 °C)   Resp 20   Ht 193 cm (75.98\")   Wt 131 kg (289 lb 12.8 oz)   SpO2 98%   BMI 35.29 kg/m²       Physical Exam  Derm Physical Exam    Assessment/Plan .    Diagnoses and all orders for this visit:    1. Gout, unspecified cause, unspecified chronicity, unspecified site (Primary)  -     Discontinue: colchicine-probenecid (COL-BENEMID) 0.5-500 MG per tablet; Take 1 tablet by mouth Daily.  Dispense: 30 tablet; Refill: 5  -     colchicine-probenecid (COL-BENEMID) 0.5-500 MG per tablet; Take 1 tablet by mouth Daily.  Dispense: 30 tablet; Refill: 5    2. Morbid (severe) obesity due to excess calories (HCC)    3. Lumbosacral spinal stenosis  -     Discontinue: Diclofenac Epolamine (FLECTOR) 1.3 % patch patch; Apply 1 patch topically to the appropriate area as directed 2 (Two) Times a Day.  Dispense: 60 patch; Refill: 12  -     Diclofenac Epolamine (FLECTOR) 1.3 % patch patch; Apply 1 patch topically to the appropriate area as directed 2 (Two) Times a Day.  Dispense: 60 patch; Refill: 12    4. Chronic fatigue  -     Discontinue: Cyanocobalamin ER " 1000 MCG tablet controlled-release; Take 1 tablet by mouth Daily.  Dispense: 90 each; Refill: 3  -     Cyanocobalamin ER 1000 MCG tablet controlled-release; Take 1 tablet by mouth Daily.  Dispense: 90 each; Refill: 3    5. Hypertension, benign  Overview:  Followed by Cardiology Dr Tong who placed pt on different BP med. .    Proteinuria/creatinine noted    Orders:  -     amLODIPine (NORVASC) 10 MG tablet; Take 1 tablet by mouth Daily.  Dispense: 90 tablet; Refill: 3  -     chlorthalidone (HYGROTEN) 50 MG tablet; Take 1 tablet by mouth Daily.  Dispense: 90 tablet; Refill: 3    6. Vitamin D deficiency  -     Cholecalciferol (Vitamin D) 50 MCG (2000 UT) tablet; Take 2,000 Units by mouth Daily for 90 days.  Dispense: 30 tablet; Refill: 12    7. Other conjunctivitis of both eyes  -     erythromycin (ROMYCIN) 5 MG/GM ophthalmic ointment; Administer  to both eyes Every Night.  Dispense: 1 each; Refill: 3    8. Mood disorder (HCC)  -     FLUoxetine (PROzac) 40 MG capsule; Take 1 capsule by mouth 2 (Two) Times a Day.  Dispense: 180 capsule; Refill: 3    9. Other seasonal allergic rhinitis  Overview:  Over-the-counter medication indications, dosage, and precautions discussed.    Orders:  -     fluticasone (FLONASE) 50 MCG/ACT nasal spray; 1 spray into the nostril(s) as directed by provider Daily.  Dispense: 16 g; Refill: 3  -     loratadine (EQ Loratadine) 10 MG tablet; Take 1 tablet by mouth Daily.  Dispense: 90 tablet; Refill: 3  -     sodium chloride (Ocean Nasal Spray) 0.65 % nasal spray; 2 sprays into the nostril(s) as directed by provider As Needed for Congestion.  Dispense: 88 mL; Refill: 12    10. Type 2 diabetes mellitus with other neurologic complication, without long-term current use of insulin (HCC)  -     ReliOn Ultra Thin Lancets 30G misc; 1 each Daily.  Dispense: 100 each; Refill: 4    11. Other eczema  -     hydrocortisone 1 % cream; Apply  topically to the appropriate area as directed 2 (Two) Times a Day.   Dispense: 60 g; Refill: 0    12. Rash  -     hydrOXYzine (ATARAX) 25 MG tablet; Take 1 tablet by mouth 4 (Four) Times a Day As Needed for Itching.  Dispense: 30 tablet; Refill: 2    13. Itching  -     hydrOXYzine (ATARAX) 25 MG tablet; Take 1 tablet by mouth 4 (Four) Times a Day As Needed for Itching.  Dispense: 30 tablet; Refill: 2    14. Non-seasonal allergic rhinitis due to other allergic trigger  -     montelukast (SINGULAIR) 10 MG tablet; Take 1 tablet by mouth Every Night.  Dispense: 90 tablet; Refill: 0    15. Folliculitis  -     mupirocin (BACTROBAN) 2 % ointment; Apply  topically to the appropriate area as directed Daily.  Dispense: 1 each; Refill: 3    16. [place] ([facility])  -     oxybutynin XL (DITROPAN XL) 15 MG 24 hr tablet; Take 1 tablet by mouth Daily.  Dispense: 30 tablet; Refill: 12    17. GERD (gastroesophageal reflux disease)  -     pantoprazole (Protonix) 40 MG EC tablet; Take 1 tablet by mouth Daily.  Dispense: 30 tablet; Refill: 12    18. Nausea  -     promethazine (PHENERGAN) 25 MG tablet; Take 1 tablet by mouth Every 8 (Eight) Hours As Needed for Nausea or Vomiting.  Dispense: 90 tablet; Refill: 5    19. Erectile dysfunction, unspecified erectile dysfunction type  -     sildenafil (REVATIO) 20 MG tablet; Take 1 tablet by mouth Daily As Needed (ed).  Dispense: 30 tablet; Refill: 3    20. Type 2 diabetes mellitus without complication, without long-term current use of insulin ([facility])  -     sitaGLIPtin-metFORMIN (Janumet)  MG per tablet; Take 1 tablet by mouth 2 (Two) Times a Day With Meals.  Dispense: 180 tablet; Refill: 3    Other orders  -     Diclofenac Epolamine (FLECTOR) 1.3 % patch patch; Apply 1 patch topically to the appropriate area as directed 2 (Two) Times a Day As Needed (back pain).  Dispense: 60 patch; Refill: 2   Findings discussed. All questions answered.  Medication and medication adverse effects discussed.  Drug education given and explained to patient. Patient  verbalized understanding.  Follow-up for routine health maintenance as directed     I wore protective equipment throughout this patient encounter to include mask and gloves. Hand hygiene was performed before donning protective equipment and after removal when leaving the room

## 2022-03-01 RX ORDER — METHYLPREDNISOLONE 4 MG/1
TABLET ORAL
Qty: 21 TABLET | Refills: 0 | Status: SHIPPED | OUTPATIENT
Start: 2022-03-01 | End: 2022-04-05 | Stop reason: SDUPTHER

## 2022-03-09 ENCOUNTER — PATIENT MESSAGE (OUTPATIENT)
Dept: FAMILY MEDICINE CLINIC | Facility: CLINIC | Age: 42
End: 2022-03-09

## 2022-03-11 ENCOUNTER — TELEPHONE (OUTPATIENT)
Dept: FAMILY MEDICINE CLINIC | Facility: CLINIC | Age: 42
End: 2022-03-11

## 2022-03-11 ENCOUNTER — PATIENT MESSAGE (OUTPATIENT)
Dept: FAMILY MEDICINE CLINIC | Facility: CLINIC | Age: 42
End: 2022-03-11

## 2022-03-11 NOTE — TELEPHONE ENCOUNTER
Caller: Barrett Laguerre    Relationship: Self    Best call back number: [phone]*    What form or medical record are you requesting: MEDICATION LIST    Who is requesting this form or medical record from you: [facility] WILL BE CALLING TO REQUEST THE PATIENT'S MEDICATION LIST. THE PATIENT STATES HE HAS AN APPOINTMENT WITH THEM TODAY AT 1PM. THE PATIENT STATES HE IS GIVING AUTHORIZATION TO GIVE THIS INFORMATION TO [facility].    Timeframe paperwork needed: ASAP    Additional notes: [facility] TELEPHONE#:  [phone]

## 2022-03-14 NOTE — TELEPHONE ENCOUNTER
Slight elevations are usually not clinically significant and often related to fatty liver or elevations in lipids. As long as the numbers do not steadily increase then we monitor

## 2022-03-15 DIAGNOSIS — E11.49 TYPE 2 DIABETES MELLITUS WITH OTHER NEUROLOGIC COMPLICATION, WITHOUT LONG-TERM CURRENT USE OF INSULIN: ICD-10-CM

## 2022-03-15 RX ORDER — LANCETS 30 GAUGE
1 EACH MISCELLANEOUS DAILY
Qty: 100 EACH | Refills: 12 | Status: SHIPPED | OUTPATIENT
Start: 2022-03-15 | End: 2022-06-14 | Stop reason: SDUPTHER

## 2022-03-22 ENCOUNTER — TELEPHONE (OUTPATIENT)
Dept: FAMILY MEDICINE CLINIC | Facility: CLINIC | Age: 42
End: 2022-03-22

## 2022-03-22 NOTE — TELEPHONE ENCOUNTER
Received fax from [facility] in [place] requesting PA on Diclofenac 1.3% patches. PA submitted online thru Covermymeds. Med approved. PA Case: [id number], Status: Approved, Coverage Starts on: 3/22/2022 12:00:00 AM, Coverage Ends on: 3/22/2023 12:00:00 AM. Notified [facility].

## 2022-03-26 ENCOUNTER — APPOINTMENT (OUTPATIENT)
Dept: CT IMAGING | Facility: HOSPITAL | Age: 42
End: 2022-03-26

## 2022-03-26 ENCOUNTER — HOSPITAL ENCOUNTER (EMERGENCY)
Facility: HOSPITAL | Age: 42
Discharge: HOME | End: 2022-03-26
Attending: EMERGENCY MEDICINE | Admitting: EMERGENCY MEDICINE

## 2022-03-26 VITALS
WEIGHT: 290 LBS | BODY MASS INDEX: 35.31 KG/M2 | DIASTOLIC BLOOD PRESSURE: 80 MMHG | OXYGEN SATURATION: 98 % | SYSTOLIC BLOOD PRESSURE: 140 MMHG | RESPIRATION RATE: 20 BRPM | HEART RATE: 84 BPM | HEIGHT: 76 IN | TEMPERATURE: 97.6 F
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
## 2022-03-26 DIAGNOSIS — S09.90XA INJURY OF HEAD, INITIAL ENCOUNTER: ICD-10-CM

## 2022-03-26 DIAGNOSIS — M54.50 ACUTE BILATERAL LOW BACK PAIN, UNSPECIFIED WHETHER SCIATICA PRESENT: ICD-10-CM

## 2022-03-26 DIAGNOSIS — M54.2 NECK PAIN: ICD-10-CM

## 2022-03-26 PROCEDURE — 72125 CT NECK SPINE W/O DYE: CPT

## 2022-03-26 PROCEDURE — 99282 EMERGENCY DEPT VISIT SF MDM: CPT

## 2022-03-26 PROCEDURE — 72131 CT LUMBAR SPINE W/O DYE: CPT

## 2022-03-26 PROCEDURE — 70450 CT HEAD/BRAIN W/O DYE: CPT

## 2022-03-26 RX ORDER — METHOCARBAMOL 750 MG/1
750 TABLET, FILM COATED ORAL 3 TIMES DAILY PRN
Qty: 10 TABLET | Refills: 0 | Status: SHIPPED | OUTPATIENT
Start: 2022-03-26 | End: 2022-04-05 | Stop reason: SDUPTHER

## 2022-03-26 RX ORDER — METHOCARBAMOL 750 MG/1
750 TABLET, FILM COATED ORAL 3 TIMES DAILY PRN
Qty: 10 TABLET | Refills: 0 | Status: SHIPPED | OUTPATIENT
Start: 2022-03-26 | End: 2022-03-26 | Stop reason: SDUPTHER

## 2022-03-28 ENCOUNTER — TELEPHONE (OUTPATIENT)
Dept: PAIN MEDICINE | Facility: CLINIC | Age: 42
End: 2022-03-28
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
## 2022-04-04 ENCOUNTER — OFFICE VISIT (OUTPATIENT)
Dept: FAMILY MEDICINE CLINIC | Facility: CLINIC | Age: 42
End: 2022-04-04

## 2022-04-04 VITALS
OXYGEN SATURATION: 98 % | BODY MASS INDEX: 36.04 KG/M2 | TEMPERATURE: 97.3 F | HEIGHT: 76 IN | RESPIRATION RATE: 18 BRPM | WEIGHT: 296 LBS | DIASTOLIC BLOOD PRESSURE: 82 MMHG | SYSTOLIC BLOOD PRESSURE: 148 MMHG | HEART RATE: 100 BPM

## 2022-04-04 DIAGNOSIS — J30.2 OTHER SEASONAL ALLERGIC RHINITIS: Primary | ICD-10-CM

## 2022-04-04 PROCEDURE — 99213 OFFICE O/P EST LOW 20 MIN: CPT | Performed by: FAMILY MEDICINE

## 2022-04-04 RX ORDER — AZELASTINE 1 MG/ML
2 SPRAY, METERED NASAL 2 TIMES DAILY
Qty: 1 EACH | Refills: 12 | Status: SHIPPED | OUTPATIENT
Start: 2022-04-04 | End: 2022-10-04 | Stop reason: SDUPTHER

## 2022-04-04 RX ORDER — DOXYCYCLINE 100 MG/1
100 CAPSULE ORAL 2 TIMES DAILY WITH MEALS
COMMUNITY
Start: 2022-03-11 | End: 2022-10-29 | Stop reason: HOSPADM

## 2022-04-04 NOTE — PROGRESS NOTES
Subjective   Barrett Laguerre is a 42 y.o. male.   Chief Complaint   Patient presents with   • Rash       Rash  This is a recurrent problem. The rash is characterized by itchiness, redness and dryness. He was exposed to nothing. Pertinent negatives include no diarrhea, fatigue, fever, shortness of breath or vomiting.        The following portions of the patient's history were reviewed and updated as appropriate: allergies, current medications, past family history, past medical history, past social history, past surgical history and problem list.    Patient Active Problem List   Diagnosis   • Family history of diabetes mellitus   • Gout   • Lumbosacral spinal stenosis   • Memory impairment   • Acquired flexible flat foot   • Chronic low back pain   • Other specified dorsopathies, site unspecified   • Guillain-[place] ([facility])   • Sleep apnea   • Attention deficit disorder   • Diabetes ([facility])   • Other seasonal allergic rhinitis   • Mixed obsessional thoughts and acts   • Hypertension, benign   • Disorder of lipid metabolism   • CIDP (chronic inflammatory demyelinating polyneuropathy) ([facility])   • Lumbar radiculopathy   • Pain of right lower extremity   • Onychomycosis   • Blister   • Over weight   • Annual physical exam   • Upper respiratory tract infection   • Morbid (severe) obesity due to excess calories ([facility])       Current Outpatient Medications on File Prior to Visit   Medication Sig Dispense Refill   • acetaminophen-codeine (TYLENOL/CODEINE #3) 300-30 MG per tablet Take 1 tablet by mouth Every 6 (Six) Hours As Needed for Moderate Pain . 28 tablet 5   • amantadine (SYMMETREL) 100 MG tablet      • amLODIPine (NORVASC) 10 MG tablet Take 1 tablet by mouth Daily. 90 tablet 3   • chlorthalidone (HYGROTEN) 50 MG tablet Take 1 tablet by mouth Daily. 90 tablet 3   • Cholecalciferol (Vitamin D) 50 MCG (2000 UT) tablet Take 2,000 Units by mouth Daily for 90 days. 30 tablet 12   • colchicine-probenecid (COL-BENEMID) 0.5-500 MG per  tablet Take 1 tablet by mouth Daily. 30 tablet 5   • Cyanocobalamin ER 1000 MCG tablet controlled-release Take 1 tablet by mouth Daily. 90 each 3   • Diclofenac Epolamine (FLECTOR) 1.3 % patch patch Apply 1 patch topically to the appropriate area as directed 2 (Two) Times a Day As Needed (back pain). 60 patch 2   • Diclofenac Epolamine (FLECTOR) 1.3 % patch patch Apply 1 patch topically to the appropriate area as directed 2 (Two) Times a Day. 60 patch 12   • erythromycin (ROMYCIN) 5 MG/GM ophthalmic ointment Administer  to both eyes Every Night. 1 each 3   • FLUoxetine (PROzac) 40 MG capsule Take 1 capsule by mouth 2 (Two) Times a Day. 180 capsule 3   • fluticasone (FLONASE) 50 MCG/ACT nasal spray 1 spray into the nostril(s) as directed by provider Daily. 16 g 3   • gabapentin (NEURONTIN) 300 MG capsule Take 1 capsule by mouth 3 (Three) Times a Day. 90 capsule 1   • glucose blood test strip 1 each by Other route Daily. Once daily Dx: E11.9 patient checks sugar once a day 100 each 12   • hydrocortisone 1 % cream Apply  topically to the appropriate area as directed 2 (Two) Times a Day. 60 g 0   • hydrOXYzine (ATARAX) 25 MG tablet Take 1 tablet by mouth 4 (Four) Times a Day As Needed for Itching. 30 tablet 2   • lidocaine-prilocaine (EMLA) 2.5-2.5 % cream      • loratadine (EQ Loratadine) 10 MG tablet Take 1 tablet by mouth Daily. 90 tablet 3   • methocarbamol (ROBAXIN) 750 MG tablet Take 1 tablet by mouth 3 (Three) Times a Day As Needed for Muscle Spasms. 10 tablet 0   • methylPREDNISolone (MEDROL) 4 MG dose pack TAKE BY MOUTH AS DIRECTED ON INSIDE OF PACKAGE 21 tablet 0   • montelukast (SINGULAIR) 10 MG tablet Take 1 tablet by mouth Every Night. 90 tablet 0   • mupirocin (BACTROBAN) 2 % ointment Apply  topically to the appropriate area as directed Daily. 1 each 3   • oxybutynin XL (DITROPAN XL) 15 MG 24 hr tablet Take 1 tablet by mouth Daily. 30 tablet 12   • pantoprazole (Protonix) 40 MG EC tablet Take 1 tablet by  mouth Daily. 30 tablet 12   • promethazine (PHENERGAN) 25 MG tablet Take 1 tablet by mouth Every 8 (Eight) Hours As Needed for Nausea or Vomiting. 90 tablet 5   • ReliOn Ultra Thin Lancets 30G misc 1 each Daily. Dx: E11.49 Patient checks sugar once a day 100 each 12   • sildenafil (REVATIO) 20 MG tablet Take 1 tablet by mouth Daily As Needed (ed). 30 tablet 3   • sitaGLIPtin-metFORMIN (Janumet)  MG per tablet Take 1 tablet by mouth 2 (Two) Times a Day With Meals. 180 tablet 3   • sodium chloride (Ocean Nasal Spray) 0.65 % nasal spray 2 sprays into the nostril(s) as directed by provider As Needed for Congestion. 88 mL 12   • doxycycline (MONODOX) 100 MG capsule Take 100 mg by mouth 2 (Two) Times a Day With Meals.       No current facility-administered medications on file prior to visit.     Current outpatient and discharge medications have been reconciled for the patient.  Reviewed by: Eric Rosenberg MD      Allergies   Allergen Reactions   • Penicillin G Anaphylaxis   • Penicillins Anaphylaxis   • Sulfa Antibiotics Hives   • Allopurinol Rash       Review of Systems   Constitutional: Negative for activity change, appetite change, fatigue and fever.   HENT: Negative for ear pain, swollen glands and voice change.    Eyes: Negative for visual disturbance.   Respiratory: Negative for shortness of breath and wheezing.    Cardiovascular: Negative for chest pain and leg swelling.   Gastrointestinal: Negative for abdominal pain, blood in stool, constipation, diarrhea, nausea and vomiting.   Endocrine: Negative for polydipsia and polyuria.   Genitourinary: Negative for dysuria, frequency and hematuria.   Musculoskeletal: Negative for joint swelling, neck pain and neck stiffness.   Skin: Positive for rash. Negative for wound.   Neurological: Negative for weakness, numbness and headache.   Psychiatric/Behavioral: Negative for suicidal ideas and depressed mood.     I have reviewed and confirmed the accuracy of the  "ROS as documented by the MA/LPN/RN Eric Rosenberg MD    Objective   Visit Vitals  /82 (BP Location: Right arm, Patient Position: Sitting, Cuff Size: Adult)   Pulse 100   Temp 97.3 °F (36.3 °C)   Resp 18   Ht 193 cm (76\")   Wt 134 kg (296 lb)   SpO2 98%   BMI 36.03 kg/m²       Physical Exam  Constitutional:       Appearance: He is well-developed.   HENT:      Head: Normocephalic and atraumatic.      Right Ear: External ear normal.      Left Ear: External ear normal.      Nose: Nose normal.   Eyes:      Pupils: Pupils are equal, round, and reactive to light.   Cardiovascular:      Rate and Rhythm: Normal rate and regular rhythm.      Heart sounds: Normal heart sounds.   Pulmonary:      Effort: Pulmonary effort is normal.      Breath sounds: Normal breath sounds.   Abdominal:      General: Bowel sounds are normal.      Palpations: Abdomen is soft.   Musculoskeletal:         General: Normal range of motion.      Cervical back: Normal range of motion and neck supple.   Skin:     General: Skin is warm and dry.   Neurological:      Mental Status: He is alert and oriented to person, place, and time.   Psychiatric:         Behavior: Behavior normal.         Thought Content: Thought content normal.         Judgment: Judgment normal.       Derm Physical Exam    Assessment/Plan .    Diagnoses and all orders for this visit:    1. Other seasonal allergic rhinitis (Primary)  Overview:  Over-the-counter medication indications, dosage, and precautions discussed.    Orders:  -     azelastine (ASTELIN) 0.1 % nasal spray; 2 sprays into the nostril(s) as directed by provider 2 (Two) Times a Day. Use in each nostril as directed  Dispense: 1 each; Refill: 12   restart singulair   Medication and medication adverse effects discussed.  Drug education given and explained to patient. Patient verbalized understanding.  Follow-up for routine health maintenance as directed   Follow up in 3 months for recheck, sooner for concerns. "     I wore protective equipment throughout this patient encounter to include mask and gloves. Hand hygiene was performed before donning protective equipment and after removal when leaving the room

## 2022-04-05 ENCOUNTER — OFFICE VISIT (OUTPATIENT)
Dept: PAIN MEDICINE | Facility: CLINIC | Age: 42
End: 2022-04-05

## 2022-04-05 VITALS
HEIGHT: 76 IN | DIASTOLIC BLOOD PRESSURE: 98 MMHG | OXYGEN SATURATION: 96 % | RESPIRATION RATE: 16 BRPM | WEIGHT: 296 LBS | HEART RATE: 95 BPM | BODY MASS INDEX: 36.04 KG/M2 | SYSTOLIC BLOOD PRESSURE: 153 MMHG

## 2022-04-05 DIAGNOSIS — G89.29 CHRONIC RIGHT-SIDED LOW BACK PAIN WITH RIGHT-SIDED SCIATICA: Primary | ICD-10-CM

## 2022-04-05 DIAGNOSIS — M48.07 LUMBOSACRAL SPINAL STENOSIS: ICD-10-CM

## 2022-04-05 DIAGNOSIS — M54.41 CHRONIC RIGHT-SIDED LOW BACK PAIN WITH RIGHT-SIDED SCIATICA: Primary | ICD-10-CM

## 2022-04-05 DIAGNOSIS — M79.604 PAIN OF RIGHT LOWER EXTREMITY: ICD-10-CM

## 2022-04-05 DIAGNOSIS — M54.16 LUMBAR RADICULOPATHY: ICD-10-CM

## 2022-04-05 PROCEDURE — 99214 OFFICE O/P EST MOD 30 MIN: CPT | Performed by: PHYSICAL MEDICINE & REHABILITATION

## 2022-04-05 RX ORDER — METHYLPREDNISOLONE 4 MG/1
TABLET ORAL
Qty: 1 EACH | Refills: 0 | Status: SHIPPED | OUTPATIENT
Start: 2022-04-05 | End: 2022-06-03

## 2022-04-05 RX ORDER — METHOCARBAMOL 750 MG/1
750 TABLET, FILM COATED ORAL 3 TIMES DAILY PRN
Qty: 90 TABLET | Refills: 2 | Status: SHIPPED | OUTPATIENT
Start: 2022-04-05 | End: 2022-10-29 | Stop reason: HOSPADM

## 2022-04-05 RX ORDER — ACETAMINOPHEN AND CODEINE PHOSPHATE 300; 60 MG/1; MG/1
1 TABLET ORAL EVERY 6 HOURS PRN
Qty: 120 TABLET | Refills: 5 | Status: SHIPPED | OUTPATIENT
Start: 2022-04-05 | End: 2022-06-03 | Stop reason: SDUPTHER
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
## 2022-05-06 ENCOUNTER — OFFICE VISIT (OUTPATIENT)
Dept: PODIATRY | Facility: CLINIC | Age: 42
End: 2022-05-06

## 2022-05-06 VITALS
DIASTOLIC BLOOD PRESSURE: 91 MMHG | HEART RATE: 91 BPM | SYSTOLIC BLOOD PRESSURE: 155 MMHG | BODY MASS INDEX: 36.04 KG/M2 | HEIGHT: 76 IN | WEIGHT: 296 LBS

## 2022-05-06 DIAGNOSIS — L60.3 ONYCHODYSTROPHY: Primary | ICD-10-CM

## 2022-05-06 DIAGNOSIS — E11.42 DM TYPE 2 WITH DIABETIC PERIPHERAL NEUROPATHY: ICD-10-CM

## 2022-05-06 DIAGNOSIS — E11.65 TYPE 2 DIABETES MELLITUS WITH HYPERGLYCEMIA, WITHOUT LONG-TERM CURRENT USE OF INSULIN: ICD-10-CM

## 2022-05-06 PROCEDURE — 99213 OFFICE O/P EST LOW 20 MIN: CPT

## 2022-05-06 PROCEDURE — 11721 DEBRIDE NAIL 6 OR MORE: CPT

## 2022-05-06 RX ORDER — LOSARTAN POTASSIUM 50 MG/1
50 TABLET ORAL DAILY
COMMUNITY
Start: 2022-04-14 | End: 2022-10-04 | Stop reason: SDUPTHER

## 2022-05-06 NOTE — PROGRESS NOTES
05/06/2022  Foot and Ankle Surgery - Established Patient/Follow-up  Provider: ANASTASIA Chan   Location: [facility]    Subjective:  Barrett Laguerre is a 42 y.o. male.     Chief Complaint   Patient presents with   • Right Foot - Follow-up     DM FOOT CARE   • Left Foot - Follow-up     DM FOOT CARE   • Initial Evaluation     LAST PCP APPT WITH CYRUS RIVERA MD        HPI: Patient presents to office today diabetic foot check.  Patient is requesting nail trimming today as he has significant back problems and is unable to get toenails to cut them safely.  Patient denies any new problems to his feet today.  He reports that his blood sugars have been high lately.    Allergies   Allergen Reactions   • Penicillin G Anaphylaxis   • Penicillins Anaphylaxis   • Sulfa Antibiotics Hives   • Allopurinol Rash       Current Outpatient Medications on File Prior to Visit   Medication Sig Dispense Refill   • acetaminophen-codeine (TYLENOL with CODEINE #4) 300-60 MG per tablet Take 1 tablet by mouth Every 6 (Six) Hours As Needed for Moderate Pain . 120 tablet 5   • acetaminophen-codeine (TYLENOL/CODEINE #3) 300-30 MG per tablet Take 1 tablet by mouth Every 6 (Six) Hours As Needed for Moderate Pain . 28 tablet 5   • amantadine (SYMMETREL) 100 MG tablet      • amLODIPine (NORVASC) 10 MG tablet Take 1 tablet by mouth Daily. 90 tablet 3   • azelastine (ASTELIN) 0.1 % nasal spray 2 sprays into the nostril(s) as directed by provider 2 (Two) Times a Day. Use in each nostril as directed 1 each 12   • chlorthalidone (HYGROTEN) 50 MG tablet Take 1 tablet by mouth Daily. 90 tablet 3   • Cholecalciferol (Vitamin D) 50 MCG (2000 UT) tablet Take 2,000 Units by mouth Daily for 90 days. 30 tablet 12   • colchicine-probenecid (COL-BENEMID) 0.5-500 MG per tablet Take 1 tablet by mouth Daily. 30 tablet 5   • Cyanocobalamin ER 1000 MCG tablet controlled-release Take 1 tablet by mouth Daily. 90 each 3   • Diclofenac Epolamine (FLECTOR) 1.3 %  patch patch Apply 1 patch topically to the appropriate area as directed 2 (Two) Times a Day As Needed (back pain). 60 patch 2   • Diclofenac Epolamine (FLECTOR) 1.3 % patch patch Apply 1 patch topically to the appropriate area as directed 2 (Two) Times a Day. 60 patch 12   • doxycycline (MONODOX) 100 MG capsule Take 100 mg by mouth 2 (Two) Times a Day With Meals.     • erythromycin (ROMYCIN) 5 MG/GM ophthalmic ointment Administer  to both eyes Every Night. 1 each 3   • FLUoxetine (PROzac) 40 MG capsule Take 1 capsule by mouth 2 (Two) Times a Day. 180 capsule 3   • fluticasone (FLONASE) 50 MCG/ACT nasal spray 1 spray into the nostril(s) as directed by provider Daily. 16 g 3   • gabapentin (NEURONTIN) 300 MG capsule Take 1 capsule by mouth 3 (Three) Times a Day. 90 capsule 1   • glucose blood test strip 1 each by Other route Daily. Once daily Dx: E11.9 patient checks sugar once a day 100 each 12   • hydrocortisone 1 % cream Apply  topically to the appropriate area as directed 2 (Two) Times a Day. 60 g 0   • hydrOXYzine (ATARAX) 25 MG tablet Take 1 tablet by mouth 4 (Four) Times a Day As Needed for Itching. 30 tablet 2   • lidocaine-prilocaine (EMLA) 2.5-2.5 % cream      • loratadine (EQ Loratadine) 10 MG tablet Take 1 tablet by mouth Daily. 90 tablet 3   • losartan (COZAAR) 50 MG tablet Take 50 mg by mouth Daily.     • methocarbamol (ROBAXIN) 750 MG tablet Take 1 tablet by mouth 3 (Three) Times a Day As Needed for Muscle Spasms. 90 tablet 2   • methylPREDNISolone (MEDROL) 4 MG dose pack TAKE BY MOUTH AS DIRECTED ON INSIDE OF PACKAGE 1 each 0   • montelukast (SINGULAIR) 10 MG tablet Take 1 tablet by mouth Every Night. 90 tablet 0   • mupirocin (BACTROBAN) 2 % ointment Apply  topically to the appropriate area as directed Daily. 1 each 3   • oxybutynin XL (DITROPAN XL) 15 MG 24 hr tablet Take 1 tablet by mouth Daily. 30 tablet 12   • pantoprazole (Protonix) 40 MG EC tablet Take 1 tablet by mouth Daily. 30 tablet 12   •  "promethazine (PHENERGAN) 25 MG tablet Take 1 tablet by mouth Every 8 (Eight) Hours As Needed for Nausea or Vomiting. 90 tablet 5   • ReliOn Ultra Thin Lancets 30G misc 1 each Daily. Dx: E11.49 Patient checks sugar once a day 100 each 12   • sildenafil (REVATIO) 20 MG tablet Take 1 tablet by mouth Daily As Needed (ed). 30 tablet 3   • sitaGLIPtin-metFORMIN (Janumet)  MG per tablet Take 1 tablet by mouth 2 (Two) Times a Day With Meals. 180 tablet 3   • sodium chloride (Ocean Nasal Spray) 0.65 % nasal spray 2 sprays into the nostril(s) as directed by provider As Needed for Congestion. 88 mL 12     No current facility-administered medications on file prior to visit.       Objective   /91   Pulse 91   Ht 193 cm (75.98\")   Wt 134 kg (296 lb)   BMI 36.05 kg/m²     Foot/Ankle Exam:       General:   Diabetic Foot Exam Performed    Appearance: appears stated age and healthy    Orientation: AAOx3    Affect: appropriate    Gait: unimpaired    Assistance: independent    Shoe Gear:  Casual shoes and socks    VASCULAR      Right Foot Vascularity   Dorsalis pedis:  2+  Skin Temperature: warm    Edema Grading:  None  CFT:  < 3 seconds  Varicosities: none       Left Foot Vascularity   Dorsalis pedis:  2+  Skin Temperature: warm    Edema Grading:  None  CFT:  < 3 seconds  Varicosities: none        NEUROLOGIC     Right Foot Neurologic   Light touch sensation:  Normal  Protective Sensation using [place] Monofilament:  Diminished     Left Foot Neurologic   Light touch sensation:  Normal  Protective Sensation using [place] Monofilament:  Diminished     MUSCULOSKELETAL      Right Foot Musculoskeletal   Ecchymosis:  None  Tenderness: none       Left Foot Musculoskeletal   Ecchymosis:  None  Tenderness: none       DERMATOLOGIC     Right Foot Dermatologic   Skin: skin intact    Nails: dystrophic nails       Left Foot Dermatologic   Skin: skin intact    Nails: dystrophic nails        Assessment/Plan "   Diagnoses and all orders for this visit:    1. Onychodystrophy (Primary)    2. DM type 2 with diabetic peripheral neuropathy (HCC)    3. Type 2 diabetes mellitus with hyperglycemia, without long-term current use of insulin (HCC)      On exam she has bilateral feet appear stable.  No open wounds or signs of active infection are noted.  Patient's nails are elongated and dystrophic and needing debridement today.  Chart review reveals patient's last A1c was 7.2% 2 months ago.  Do feel patient is at mild to moderate risk for pedal complications due to diabetes.Explained importance of diabetic foot care, daily foot checks, and glycemic control. Patient should check both feet on a daily basis, monitor and control blood sugars, make sure that both feet and in between toes are towel dried after baths or showers. Avoid barefoot walking at all times. Check shoes before putting them on.   Patient was given information on proper foot care. Call the office at the first signs of a wound or with signs of infection.    Nail debridement: Both feet x10    Consent and time out was performed before proceeding with the procedure. Nails were debrided with a nail nipper without complication.  No anesthesia was required.  Indications for procedure were thickened, dystrophic, and fungal appearing nails which are difficult to trim.  Proper self-care and technique was discussed with patient.  Patient was stable after procedure.    Would like for patient to follow-up in 2 months for routine diabetic foot check.  Patient verbalized understanding and is agreeable to plan at this time.    No orders of the defined types were placed in this encounter.         Note is dictated utilizing voice recognition software. Unfortunately this leads to occasional typographical errors. I apologize in advance if the situation occurs. If questions occur please do not hesitate to call our office.

## 2022-06-03 ENCOUNTER — OFFICE VISIT (OUTPATIENT)
Dept: PAIN MEDICINE | Facility: CLINIC | Age: 42
End: 2022-06-03

## 2022-06-03 VITALS
DIASTOLIC BLOOD PRESSURE: 84 MMHG | SYSTOLIC BLOOD PRESSURE: 151 MMHG | WEIGHT: 296 LBS | OXYGEN SATURATION: 98 % | HEIGHT: 75 IN | BODY MASS INDEX: 36.8 KG/M2 | HEART RATE: 88 BPM | RESPIRATION RATE: 16 BRPM

## 2022-06-03 DIAGNOSIS — M79.604 PAIN OF RIGHT LOWER EXTREMITY: ICD-10-CM

## 2022-06-03 DIAGNOSIS — G89.29 CHRONIC RIGHT-SIDED LOW BACK PAIN WITH RIGHT-SIDED SCIATICA: Primary | ICD-10-CM

## 2022-06-03 DIAGNOSIS — M54.16 LUMBAR RADICULOPATHY: ICD-10-CM

## 2022-06-03 DIAGNOSIS — M54.41 CHRONIC RIGHT-SIDED LOW BACK PAIN WITH RIGHT-SIDED SCIATICA: Primary | ICD-10-CM

## 2022-06-03 DIAGNOSIS — M48.07 LUMBOSACRAL SPINAL STENOSIS: ICD-10-CM

## 2022-06-03 PROCEDURE — 99214 OFFICE O/P EST MOD 30 MIN: CPT | Performed by: PHYSICAL MEDICINE & REHABILITATION

## 2022-06-03 RX ORDER — ACETAMINOPHEN AND CODEINE PHOSPHATE 300; 60 MG/1; MG/1
1 TABLET ORAL EVERY 6 HOURS PRN
Qty: 120 TABLET | Refills: 5 | Status: SHIPPED | OUTPATIENT
Start: 2022-06-03 | End: 2022-08-26 | Stop reason: SDUPTHER
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
## 2022-06-08 ENCOUNTER — TELEPHONE (OUTPATIENT)
Dept: FAMILY MEDICINE CLINIC | Facility: CLINIC | Age: 42
End: 2022-06-08

## 2022-06-08 NOTE — TELEPHONE ENCOUNTER
PATIENT CALLED STATING THAT HE HAD LABS DONE AT HIS DERMATOLOGIST OFFICE AND IS WORRIED ABOUT THE RESULTS POSSIBLY INDICATING THAT HE HAS AN ISSUE WITH HIS LIVER.    PATIENT WOULD LIKE DR. RIVERA'S OFFICE TO REQUEST THE LABS RESULTS AND REVIEW THEM TO SEE WHAT STEPS DR. RIVERA THINKS HE SHOULD TAKE.    [facility]  [facility] [place] [address]  [phone]    PLEASE ADVISE  [phone]

## 2022-06-10 ENCOUNTER — TELEPHONE (OUTPATIENT)
Dept: FAMILY MEDICINE CLINIC | Facility: CLINIC | Age: 42
End: 2022-06-10

## 2022-06-10 NOTE — TELEPHONE ENCOUNTER
Patient called and said that he had labs done by [email or web address] on 6-6 and they were abnormal. Please contact him at [phone]. Thanks Portia

## 2022-06-13 DIAGNOSIS — J30.89 NON-SEASONAL ALLERGIC RHINITIS DUE TO OTHER ALLERGIC TRIGGER: ICD-10-CM

## 2022-06-13 DIAGNOSIS — G61.0 GUILLAIN-BARRE: ICD-10-CM

## 2022-06-13 RX ORDER — MONTELUKAST SODIUM 10 MG/1
TABLET ORAL
Qty: 30 TABLET | Refills: 0 | Status: SHIPPED | OUTPATIENT
Start: 2022-06-13 | End: 2022-06-14 | Stop reason: SDUPTHER

## 2022-06-13 RX ORDER — GABAPENTIN 300 MG/1
CAPSULE ORAL
Qty: 90 CAPSULE | Refills: 0 | Status: SHIPPED | OUTPATIENT
Start: 2022-06-13 | End: 2022-06-14 | Stop reason: SDUPTHER

## 2022-06-14 ENCOUNTER — OFFICE VISIT (OUTPATIENT)
Dept: FAMILY MEDICINE CLINIC | Facility: CLINIC | Age: 42
End: 2022-06-14

## 2022-06-14 VITALS
SYSTOLIC BLOOD PRESSURE: 116 MMHG | DIASTOLIC BLOOD PRESSURE: 78 MMHG | BODY MASS INDEX: 36.8 KG/M2 | OXYGEN SATURATION: 97 % | TEMPERATURE: 97.3 F | HEIGHT: 75 IN | RESPIRATION RATE: 18 BRPM | HEART RATE: 92 BPM | WEIGHT: 296 LBS

## 2022-06-14 DIAGNOSIS — E11.9 TYPE 2 DIABETES MELLITUS WITHOUT COMPLICATION, WITHOUT LONG-TERM CURRENT USE OF INSULIN: ICD-10-CM

## 2022-06-14 DIAGNOSIS — M10.9 GOUT, UNSPECIFIED CAUSE, UNSPECIFIED CHRONICITY, UNSPECIFIED SITE: ICD-10-CM

## 2022-06-14 DIAGNOSIS — G61.0 GUILLAIN-BARRE: ICD-10-CM

## 2022-06-14 DIAGNOSIS — E11.49 TYPE 2 DIABETES MELLITUS WITH OTHER NEUROLOGIC COMPLICATION, WITHOUT LONG-TERM CURRENT USE OF INSULIN: ICD-10-CM

## 2022-06-14 DIAGNOSIS — I10 HYPERTENSION, BENIGN: Primary | ICD-10-CM

## 2022-06-14 DIAGNOSIS — J30.89 NON-SEASONAL ALLERGIC RHINITIS DUE TO OTHER ALLERGIC TRIGGER: ICD-10-CM

## 2022-06-14 PROBLEM — J06.9 UPPER RESPIRATORY TRACT INFECTION: Status: RESOLVED | Noted: 2021-08-15 | Resolved: 2022-06-14

## 2022-06-14 PROBLEM — M54.50 CHRONIC LOW BACK PAIN: Status: RESOLVED | Noted: 2017-03-21 | Resolved: 2022-06-14

## 2022-06-14 PROBLEM — T14.8XXA BLISTER: Status: RESOLVED | Noted: 2021-01-11 | Resolved: 2022-06-14

## 2022-06-14 PROBLEM — M79.604 PAIN OF RIGHT LOWER EXTREMITY: Status: RESOLVED | Noted: 2020-07-27 | Resolved: 2022-06-14

## 2022-06-14 PROBLEM — G89.29 CHRONIC LOW BACK PAIN: Status: RESOLVED | Noted: 2017-03-21 | Resolved: 2022-06-14

## 2022-06-14 PROCEDURE — 99214 OFFICE O/P EST MOD 30 MIN: CPT | Performed by: FAMILY MEDICINE

## 2022-06-14 RX ORDER — MONTELUKAST SODIUM 10 MG/1
10 TABLET ORAL NIGHTLY
Qty: 30 TABLET | Refills: 12 | Status: SHIPPED | OUTPATIENT
Start: 2022-06-14 | End: 2022-10-04 | Stop reason: SDUPTHER

## 2022-06-14 RX ORDER — MONTELUKAST SODIUM 10 MG/1
10 TABLET ORAL NIGHTLY
Qty: 30 TABLET | Refills: 12 | Status: SHIPPED | OUTPATIENT
Start: 2022-06-14 | End: 2022-06-14 | Stop reason: SDUPTHER

## 2022-06-14 RX ORDER — AMLODIPINE BESYLATE 10 MG/1
10 TABLET ORAL DAILY
Qty: 30 TABLET | Refills: 12 | Status: SHIPPED | OUTPATIENT
Start: 2022-06-14 | End: 2022-09-09 | Stop reason: SDUPTHER

## 2022-06-14 RX ORDER — OXYBUTYNIN CHLORIDE 15 MG/1
15 TABLET, EXTENDED RELEASE ORAL DAILY
Qty: 30 TABLET | Refills: 12 | Status: SHIPPED | OUTPATIENT
Start: 2022-06-14

## 2022-06-14 RX ORDER — AMLODIPINE BESYLATE 10 MG/1
10 TABLET ORAL DAILY
Qty: 30 TABLET | Refills: 12 | Status: SHIPPED | OUTPATIENT
Start: 2022-06-14 | End: 2022-06-14 | Stop reason: SDUPTHER

## 2022-06-14 RX ORDER — LANCETS 30 GAUGE
1 EACH MISCELLANEOUS DAILY
Qty: 100 EACH | Refills: 12 | Status: ON HOLD | OUTPATIENT
Start: 2022-06-14 | End: 2022-10-28 | Stop reason: SDUPTHER

## 2022-06-14 RX ORDER — GABAPENTIN 300 MG/1
300 CAPSULE ORAL 3 TIMES DAILY
Qty: 90 CAPSULE | Refills: 5 | Status: SHIPPED | OUTPATIENT
Start: 2022-06-14 | End: 2022-07-22 | Stop reason: SDUPTHER

## 2022-06-14 RX ORDER — SITAGLIPTIN AND METFORMIN HYDROCHLORIDE 1000; 50 MG/1; MG/1
1 TABLET, FILM COATED ORAL 2 TIMES DAILY WITH MEALS
Qty: 180 TABLET | Refills: 3 | Status: SHIPPED | OUTPATIENT
Start: 2022-06-14 | End: 2022-10-04 | Stop reason: SDUPTHER

## 2022-06-14 RX ORDER — PROBENECID AND COLCHICINE 500; .5 MG/1; MG/1
1 TABLET ORAL DAILY
Qty: 30 TABLET | Refills: 12 | Status: SHIPPED | OUTPATIENT
Start: 2022-06-14 | End: 2022-10-29 | Stop reason: HOSPADM

## 2022-06-14 NOTE — PROGRESS NOTES
Subjective   Barrett Laguerre is a 42 y.o. male.   Chief Complaint   Patient presents with   • Hypertension       Pt needs refill on multiple meds. Overall doing well. Labs reviewed and stable     Hypertension  This is a chronic problem. The current episode started more than 1 year ago. The problem is controlled. Pertinent negatives include no chest pain, neck pain or shortness of breath. Risk factors for coronary artery disease include male gender, obesity, diabetes mellitus and dyslipidemia. Current antihypertension treatment includes calcium channel blockers and angiotensin blockers.        The following portions of the patient's history were reviewed and updated as appropriate: allergies, current medications, past family history, past medical history, past social history, past surgical history and problem list.    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)   • Sleep apnea   • Attention deficit disorder   • Diabetes ([facility])   • Other seasonal allergic rhinitis   • Mixed obsessional thoughts and acts   • Hypertension, benign   • Disorder of lipid metabolism   • CIDP (chronic inflammatory demyelinating polyneuropathy) ([facility])   • Lumbar radiculopathy   • Onychomycosis   • Annual physical exam   • Morbid (severe) obesity due to excess calories ([facility])       Current Outpatient Medications on File Prior to Visit   Medication Sig Dispense Refill   • acetaminophen-codeine (TYLENOL with CODEINE #4) 300-60 MG per tablet Take 1 tablet by mouth Every 6 (Six) Hours As Needed for Moderate Pain . 120 tablet 5   • amantadine (SYMMETREL) 100 MG tablet      • azelastine (ASTELIN) 0.1 % nasal spray 2 sprays into the nostril(s) as directed by provider 2 (Two) Times a Day. Use in each nostril as directed 1 each 12   • chlorthalidone  (HYGROTEN) 50 MG tablet Take 1 tablet by mouth Daily. 90 tablet 3   • Cyanocobalamin ER 1000 MCG tablet controlled-release Take 1 tablet by mouth Daily. 90 each 3   • Diclofenac Epolamine (FLECTOR) 1.3 % patch patch Apply 1 patch topically to the appropriate area as directed 2 (Two) Times a Day As Needed (back pain). 60 patch 2   • Diclofenac Epolamine (FLECTOR) 1.3 % patch patch Apply 1 patch topically to the appropriate area as directed 2 (Two) Times a Day. 60 patch 12   • doxycycline (MONODOX) 100 MG capsule Take 100 mg by mouth 2 (Two) Times a Day With Meals.     • erythromycin (ROMYCIN) 5 MG/GM ophthalmic ointment Administer  to both eyes Every Night. 1 each 3   • FLUoxetine (PROzac) 40 MG capsule Take 1 capsule by mouth 2 (Two) Times a Day. 180 capsule 3   • fluticasone (FLONASE) 50 MCG/ACT nasal spray 1 spray into the nostril(s) as directed by provider Daily. 16 g 3   • hydrocortisone 1 % cream Apply  topically to the appropriate area as directed 2 (Two) Times a Day. 60 g 0   • hydrOXYzine (ATARAX) 25 MG tablet Take 1 tablet by mouth 4 (Four) Times a Day As Needed for Itching. 30 tablet 2   • lidocaine-prilocaine (EMLA) 2.5-2.5 % cream      • loratadine (EQ Loratadine) 10 MG tablet Take 1 tablet by mouth Daily. 90 tablet 3   • losartan (COZAAR) 50 MG tablet Take 50 mg by mouth Daily.     • methocarbamol (ROBAXIN) 750 MG tablet Take 1 tablet by mouth 3 (Three) Times a Day As Needed for Muscle Spasms. 90 tablet 2   • mupirocin (BACTROBAN) 2 % ointment Apply  topically to the appropriate area as directed Daily. 1 each 3   • pantoprazole (Protonix) 40 MG EC tablet Take 1 tablet by mouth Daily. 30 tablet 12   • promethazine (PHENERGAN) 25 MG tablet Take 1 tablet by mouth Every 8 (Eight) Hours As Needed for Nausea or Vomiting. 90 tablet 5   • sildenafil (REVATIO) 20 MG tablet Take 1 tablet by mouth Daily As Needed (ed). 30 tablet 3   • sodium chloride (Ocean Nasal Spray) 0.65 % nasal spray 2 sprays into the  nostril(s) as directed by provider As Needed for Congestion. 88 mL 12   • [DISCONTINUED] amLODIPine (NORVASC) 10 MG tablet Take 1 tablet by mouth Daily. 90 tablet 3   • [DISCONTINUED] colchicine-probenecid (COL-BENEMID) 0.5-500 MG per tablet Take 1 tablet by mouth Daily. 30 tablet 5   • [DISCONTINUED] gabapentin (NEURONTIN) 300 MG capsule TAKE 1 CAPSULE BY MOUTH THREE TIMES DAILY 90 capsule 0   • [DISCONTINUED] montelukast (SINGULAIR) 10 MG tablet TAKE 1 TABLET BY MOUTH ONCE DAILY AT NIGHT 30 tablet 0   • [DISCONTINUED] oxybutynin XL (DITROPAN XL) 15 MG 24 hr tablet Take 1 tablet by mouth Daily. 30 tablet 12   • [DISCONTINUED] ReliOn Ultra Thin Lancets 30G misc 1 each Daily. Dx: E11.49 Patient checks sugar once a day 100 each 12   • [DISCONTINUED] sitaGLIPtin-metFORMIN (Janumet)  MG per tablet Take 1 tablet by mouth 2 (Two) Times a Day With Meals. 180 tablet 3   • triamcinolone (KENALOG) 0.1 % ointment APPLY OINTMENT TOPICALLY TO AFFECTED AREA TWICE DAILY FOR 2 WEEKS, THEN AS NEEDED.       No current facility-administered medications on file prior to visit.     Current outpatient and discharge medications have been reconciled for the patient.  Reviewed by: Eric Rosenberg MD      Allergies   Allergen Reactions   • Penicillin G Anaphylaxis   • Penicillins Anaphylaxis   • Sulfa Antibiotics Hives   • Allopurinol Rash       Review of Systems   Constitutional: Negative for activity change, appetite change, fatigue and fever.   HENT: Negative for ear pain, swollen glands and voice change.    Eyes: Negative for visual disturbance.   Respiratory: Negative for shortness of breath and wheezing.    Cardiovascular: Negative for chest pain and leg swelling.   Gastrointestinal: Negative for abdominal pain, blood in stool, constipation, diarrhea, nausea and vomiting.   Endocrine: Negative for polydipsia and polyuria.   Genitourinary: Negative for dysuria, frequency and hematuria.   Musculoskeletal: Negative for joint  "swelling, neck pain and neck stiffness.   Skin: Negative for rash and wound.   Neurological: Negative for weakness, numbness and headache.   Psychiatric/Behavioral: Negative for suicidal ideas and depressed mood.     I have reviewed and confirmed the accuracy of the ROS as documented by the MA/LPN/RN Eric Rosenberg MD    Objective   Visit Vitals  /78 (BP Location: Right arm, Patient Position: Sitting, Cuff Size: Adult)   Pulse 92   Temp 97.3 °F (36.3 °C)   Resp 18   Ht 190.5 cm (75\")   Wt 134 kg (296 lb)   SpO2 97%   BMI 37.00 kg/m²       Physical Exam  Constitutional:       Appearance: He is well-developed.   HENT:      Head: Normocephalic and atraumatic.      Right Ear: External ear normal.      Left Ear: External ear normal.      Nose: Nose normal.   Eyes:      Pupils: Pupils are equal, round, and reactive to light.   Cardiovascular:      Rate and Rhythm: Normal rate and regular rhythm.      Heart sounds: Normal heart sounds.   Pulmonary:      Effort: Pulmonary effort is normal.      Breath sounds: Normal breath sounds.   Abdominal:      General: Bowel sounds are normal.      Palpations: Abdomen is soft.   Musculoskeletal:         General: Normal range of motion.      Cervical back: Normal range of motion and neck supple.   Skin:     General: Skin is warm and dry.   Neurological:      Mental Status: He is alert and oriented to person, place, and time.   Psychiatric:         Behavior: Behavior normal.         Thought Content: Thought content normal.         Judgment: Judgment normal.       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 .    Proteinuria/creatinine noted           Relevant Medications    amLODIPine (NORVASC) 10 MG tablet      Other Visit Diagnoses     Non-seasonal allergic rhinitis due to other allergic trigger        Relevant Medications    montelukast (SINGULAIR) 10 MG tablet        Findings discussed. All questions answered.  Medication and medication adverse effects discussed.  Drug education given and explained to patient. Patient verbalized understanding.  Follow up in 6 months for recheck, sooner for worsening symptoms or any concerns.    I wore protective equipment throughout this patient encounter to include mask and eye protection. Hand hygiene was performed before donning protective equipment and after removal when leaving the room

## 2022-06-30 ENCOUNTER — TELEPHONE (OUTPATIENT)
Dept: PAIN MEDICINE | Facility: CLINIC | Age: 42
End: 2022-06-30

## 2022-06-30 NOTE — TELEPHONE ENCOUNTER
Spoke with patient, Dr. Lora added new information to last office note , notified hospital insurance staff that office note has been updated and requested new PA to be sent to [facility]. I attempted to complete P2P myself was told by insurance P2P must be completed per MD,PA, or NP.

## 2022-06-30 NOTE — TELEPHONE ENCOUNTER
Hub staff attempted to follow warm transfer process and was unsuccessful     Caller: COBY    Relationship to patient:SELF     Best call back number:  [phone]    Patient is needing: PT INSURANCE DECLINED EPIDURAL , PT UNSURE IF HE WILL NEED FOLLOW UP APPT TO SPEAK WITH DR PATTERSON REGARDING THIS. PT STATES THERE IS SPECIFIC THINGS NEEDING TO BE DONE TO APPEAL IT , PLEASE ADVISE

## 2022-07-05 ENCOUNTER — HOSPITAL ENCOUNTER (OUTPATIENT)
Dept: PAIN MEDICINE | Facility: HOSPITAL | Age: 42
Discharge: HOME | End: 2022-07-05

## 2022-07-05 VITALS
WEIGHT: 296 LBS | BODY MASS INDEX: 36.8 KG/M2 | HEART RATE: 83 BPM | OXYGEN SATURATION: 93 % | DIASTOLIC BLOOD PRESSURE: 85 MMHG | HEIGHT: 75 IN | SYSTOLIC BLOOD PRESSURE: 140 MMHG | TEMPERATURE: 96.8 F | RESPIRATION RATE: 18 BRPM

## 2022-07-05 DIAGNOSIS — G89.29 CHRONIC RIGHT-SIDED LOW BACK PAIN WITH RIGHT-SIDED SCIATICA: ICD-10-CM

## 2022-07-05 DIAGNOSIS — M48.07 LUMBOSACRAL SPINAL STENOSIS: Primary | ICD-10-CM

## 2022-07-05 DIAGNOSIS — M54.41 CHRONIC RIGHT-SIDED LOW BACK PAIN WITH RIGHT-SIDED SCIATICA: ICD-10-CM

## 2022-07-05 DIAGNOSIS — R52 PAIN: ICD-10-CM

## 2022-07-05 DIAGNOSIS — M54.16 LUMBAR RADICULOPATHY: ICD-10-CM

## 2022-07-05 PROBLEM — M54.42 CHRONIC MIDLINE LOW BACK PAIN WITH BILATERAL SCIATICA: Status: ACTIVE | Noted: 2017-03-21

## 2022-07-05 PROCEDURE — 25010000002 METHYLPREDNISOLONE PER 80 MG: Performed by: PHYSICAL MEDICINE & REHABILITATION

## 2022-07-05 PROCEDURE — 0 IOPAMIDOL 41 % SOLUTION: Performed by: PHYSICAL MEDICINE & REHABILITATION

## 2022-07-05 PROCEDURE — 62323 NJX INTERLAMINAR LMBR/SAC: CPT | Performed by: PHYSICAL MEDICINE & REHABILITATION

## 2022-07-05 PROCEDURE — 77003 FLUOROGUIDE FOR SPINE INJECT: CPT

## 2022-07-05 RX ORDER — CLINDAMYCIN PHOSPHATE 11.9 MG/ML
1 SOLUTION TOPICAL 2 TIMES DAILY
COMMUNITY
Start: 2022-06-13 | End: 2022-10-29 | Stop reason: HOSPADM

## 2022-07-05 RX ORDER — METHYLPREDNISOLONE ACETATE 80 MG/ML
80 INJECTION, SUSPENSION INTRA-ARTICULAR; INTRALESIONAL; INTRAMUSCULAR; SOFT TISSUE ONCE
Status: COMPLETED | OUTPATIENT
Start: 2022-07-05 | End: 2022-07-05

## 2022-07-05 RX ADMIN — IOPAMIDOL 0.5 ML: 408 INJECTION, SOLUTION INTRATHECAL at 12:14

## 2022-07-05 RX ADMIN — METHYLPREDNISOLONE ACETATE 80 MG: 80 INJECTION, SUSPENSION INTRA-ARTICULAR; INTRALESIONAL; INTRAMUSCULAR; SOFT TISSUE at 12:14

## 2022-07-05 NOTE — DISCHARGE INSTRUCTIONS
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
## 2022-07-06 ENCOUNTER — TELEPHONE (OUTPATIENT)
Dept: PAIN MEDICINE | Facility: HOSPITAL | Age: 42
End: 2022-07-06

## 2022-07-06 ENCOUNTER — OFFICE VISIT (OUTPATIENT)
Dept: PODIATRY | Facility: CLINIC | Age: 42
End: 2022-07-06

## 2022-07-06 VITALS
HEART RATE: 92 BPM | SYSTOLIC BLOOD PRESSURE: 134 MMHG | DIASTOLIC BLOOD PRESSURE: 77 MMHG | HEIGHT: 75 IN | WEIGHT: 296 LBS | BODY MASS INDEX: 36.8 KG/M2

## 2022-07-06 DIAGNOSIS — E11.65 TYPE 2 DIABETES MELLITUS WITH HYPERGLYCEMIA, WITHOUT LONG-TERM CURRENT USE OF INSULIN: ICD-10-CM

## 2022-07-06 DIAGNOSIS — L60.3 ONYCHODYSTROPHY: Primary | ICD-10-CM

## 2022-07-06 DIAGNOSIS — E11.42 DM TYPE 2 WITH DIABETIC PERIPHERAL NEUROPATHY: ICD-10-CM

## 2022-07-06 PROCEDURE — 11721 DEBRIDE NAIL 6 OR MORE: CPT

## 2022-07-06 PROCEDURE — 99213 OFFICE O/P EST LOW 20 MIN: CPT

## 2022-07-07 ENCOUNTER — TELEPHONE (OUTPATIENT)
Dept: PAIN MEDICINE | Facility: CLINIC | Age: 42
End: 2022-07-07

## 2022-07-07 NOTE — TELEPHONE ENCOUNTER
Caller: COBY Emerson call back number: [phone]    Requested Prescriptions:   COMPOUND FOOT CREAM     Pharmacy where request should be sent:    [facility]  [address] [place] [address]  PHONE: [phone]     Additional details provided by patient:   NEEDS TO BE FAXED AS THEY DO NOT HAVE COMPUTER   [phone]    Does the patient have less than a 3 day supply:  [x] Yes  [] No    Radha Sterling Rep   07/07/22 15:07 EDT

## 2022-07-07 NOTE — TELEPHONE ENCOUNTER
CALLED PATIENT TO GET A BETTER UNDERSTANDING OF HIS NEEDS. PATIENT WAS PRESCRIBED A COMPOUND CREAM YESTERDAY DURING HIS VISIT WITH ABRAHAN. DUE TO INSURANCE REASONS THE MEDICATION HAS TO BE SENT TO [facility]. PATIENT IS NEEDING SCRIPT THAT WAS ORIGINALLY SENT TO [facility] TO BE SENT TO [facility]. [facility] INFO IS IN THE PREVIOUS NOTES. PLEASE ADVISE.

## 2022-07-08 NOTE — PROGRESS NOTES
07/06/2022  Foot and Ankle Surgery - Established Patient/Follow-up  Provider: ANASTASIA Chan   Location: [facility]    Subjective:  Barrett Laguerre is a 42 y.o. male.     Chief Complaint   Patient presents with   • Right Foot - Follow-up     Dm foot check- skin is dry pe rpatient   • Left Foot - Follow-up     Dm foot check- skin is dry per patient   • Follow-up     CYRUS Rosenberg md 6/14/2022       HPI: Patient presents to office today for routine diabetic foot check.  Patient is requesting nail debridement today as his nails are too difficult for him to cut and he is unable to get to them to get them safely.  Patient reports that his blood sugars have been well controlled.  Denies any new issues with his feet today.  Chart review reveals patient's last A1c 4 months ago was 7.2%.    Allergies   Allergen Reactions   • Penicillin G Anaphylaxis   • Penicillins Anaphylaxis   • Sulfa Antibiotics Hives   • Allopurinol Rash       Current Outpatient Medications on File Prior to Visit   Medication Sig Dispense Refill   • acetaminophen-codeine (TYLENOL with CODEINE #4) 300-60 MG per tablet Take 1 tablet by mouth Every 6 (Six) Hours As Needed for Moderate Pain . 120 tablet 5   • amantadine (SYMMETREL) 100 MG tablet      • amLODIPine (NORVASC) 10 MG tablet Take 1 tablet by mouth Daily. 30 tablet 12   • azelastine (ASTELIN) 0.1 % nasal spray 2 sprays into the nostril(s) as directed by provider 2 (Two) Times a Day. Use in each nostril as directed 1 each 12   • chlorthalidone (HYGROTEN) 50 MG tablet Take 1 tablet by mouth Daily. 90 tablet 3   • clindamycin (CLEOCIN T) 1 % external solution      • colchicine-probenecid (COL-BENEMID) 0.5-500 MG per tablet Take 1 tablet by mouth Daily. 30 tablet 12   • Cyanocobalamin ER 1000 MCG tablet controlled-release Take 1 tablet by mouth Daily. 90 each 3   • Diclofenac Epolamine (FLECTOR) 1.3 % patch patch Apply 1 patch topically to the appropriate area as directed 2 (Two) Times  a Day As Needed (back pain). 60 patch 2   • Diclofenac Epolamine (FLECTOR) 1.3 % patch patch Apply 1 patch topically to the appropriate area as directed 2 (Two) Times a Day. 60 patch 12   • doxycycline (MONODOX) 100 MG capsule Take 100 mg by mouth 2 (Two) Times a Day With Meals.     • erythromycin (ROMYCIN) 5 MG/GM ophthalmic ointment Administer  to both eyes Every Night. 1 each 3   • FLUoxetine (PROzac) 40 MG capsule Take 1 capsule by mouth 2 (Two) Times a Day. 180 capsule 3   • fluticasone (FLONASE) 50 MCG/ACT nasal spray 1 spray into the nostril(s) as directed by provider Daily. 16 g 3   • gabapentin (NEURONTIN) 300 MG capsule Take 1 capsule by mouth 3 (Three) Times a Day. 90 capsule 5   • hydrocortisone 1 % cream Apply  topically to the appropriate area as directed 2 (Two) Times a Day. 60 g 0   • hydrOXYzine (ATARAX) 25 MG tablet Take 1 tablet by mouth 4 (Four) Times a Day As Needed for Itching. 30 tablet 2   • lidocaine-prilocaine (EMLA) 2.5-2.5 % cream      • loratadine (EQ Loratadine) 10 MG tablet Take 1 tablet by mouth Daily. 90 tablet 3   • losartan (COZAAR) 50 MG tablet Take 50 mg by mouth Daily.     • methocarbamol (ROBAXIN) 750 MG tablet Take 1 tablet by mouth 3 (Three) Times a Day As Needed for Muscle Spasms. 90 tablet 2   • montelukast (SINGULAIR) 10 MG tablet Take 1 tablet by mouth Every Night. 30 tablet 12   • mupirocin (BACTROBAN) 2 % ointment Apply  topically to the appropriate area as directed Daily. 1 each 3   • oxybutynin XL (DITROPAN XL) 15 MG 24 hr tablet Take 1 tablet by mouth Daily. 30 tablet 12   • pantoprazole (Protonix) 40 MG EC tablet Take 1 tablet by mouth Daily. 30 tablet 12   • promethazine (PHENERGAN) 25 MG tablet Take 1 tablet by mouth Every 8 (Eight) Hours As Needed for Nausea or Vomiting. 90 tablet 5   • ReliOn Ultra Thin Lancets 30G misc 1 each Daily. Dx: E11.49 Patient checks sugar once a day 100 each 12   • sildenafil (REVATIO) 20 MG tablet Take 1 tablet by mouth Daily As  "Needed (ed). 30 tablet 3   • sitaGLIPtin-metFORMIN (Janumet)  MG per tablet Take 1 tablet by mouth 2 (Two) Times a Day With Meals. 180 tablet 3   • sodium chloride (Ocean Nasal Spray) 0.65 % nasal spray 2 sprays into the nostril(s) as directed by provider As Needed for Congestion. 88 mL 12   • triamcinolone (KENALOG) 0.1 % ointment APPLY OINTMENT TOPICALLY TO AFFECTED AREA TWICE DAILY FOR 2 WEEKS, THEN AS NEEDED.       No current facility-administered medications on file prior to visit.       Objective   /77   Pulse 92   Ht 190.5 cm (75\")   Wt 134 kg (296 lb)   BMI 37.00 kg/m²     Foot/Ankle Exam        General:   Diabetic Foot Exam Performed    Appearance: appears stated age and healthy    Orientation: AAOx3    Affect: appropriate    Gait: unimpaired    Assistance: independent    Shoe Gear:  Casual shoes and socks     VASCULAR       Right Foot Vascularity   Dorsalis pedis:  2+  Skin Temperature: warm    Edema Grading:  None  CFT:  < 3 seconds  Varicosities: none        Left Foot Vascularity   Dorsalis pedis:  2+  Skin Temperature: warm    Edema Grading:  None  CFT:  < 3 seconds  Varicosities: none        NEUROLOGIC      Right Foot Neurologic   Light touch sensation:  Normal  Protective Sensation using [place] Monofilament:  Diminished      Left Foot Neurologic   Light touch sensation:  Normal  Protective Sensation using [place] Monofilament:  Diminished      MUSCULOSKELETAL       Right Foot Musculoskeletal   Ecchymosis:  None  Tenderness: none        Left Foot Musculoskeletal   Ecchymosis:  None  Tenderness: none        DERMATOLOGIC      Right Foot Dermatologic   Skin: skin intact    Nails: dystrophic nails        Left Foot Dermatologic   Skin: skin intact    Nails: dystrophic nails          Assessment & Plan   Diagnoses and all orders for this visit:    1. Onychodystrophy (Primary)    2. DM type 2 with diabetic peripheral neuropathy (HCC)    3. Type 2 diabetes mellitus with " hyperglycemia, without long-term current use of insulin (HCC)      On exam patient's feet appear stable.  No open wounds or signs of active acute infection noted.  Patient's toenails are elongated and dystrophic.  Patient is requesting nail debridement today as he has significant back pain and is unable to get to his nails to cut them safely.  Patient denies needing diabetic shoes at this time.  Do feel patient is at moderate risk for pedal complications due to diabetes.Explained importance of diabetic foot care, daily foot checks, and glycemic control. Patient should check both feet on a daily basis, monitor and control blood sugars, make sure that both feet and in between toes are towel dried after baths or showers. Avoid barefoot walking at all times. Check shoes before putting them on.   Patient was given information on proper foot care. Call the office at the first signs of a wound or with signs of infection.    Nail debridement: Both feet x10    Consent and time out was performed before proceeding with the procedure. Nails were debrided with a nail nipper without complication.  No anesthesia was required.  Indications for procedure were thickened, dystrophic, and fungal appearing nails which are difficult to trim.  Proper self-care and technique was discussed with patient.  Patient was stable after procedure.    No orders of the defined types were placed in this encounter.         Note is dictated utilizing voice recognition software. Unfortunately this leads to occasional typographical errors. I apologize in advance if the situation occurs. If questions occur please do not hesitate to call our office.

## 2022-07-22 DIAGNOSIS — F39 MOOD DISORDER: ICD-10-CM

## 2022-07-22 DIAGNOSIS — G61.0 GUILLAIN-BARRE: ICD-10-CM

## 2022-07-22 RX ORDER — FLUOXETINE HYDROCHLORIDE 40 MG/1
40 CAPSULE ORAL 2 TIMES DAILY
Qty: 180 CAPSULE | Refills: 3 | Status: SHIPPED | OUTPATIENT
Start: 2022-07-22 | End: 2022-07-22 | Stop reason: SDUPTHER

## 2022-07-22 RX ORDER — GABAPENTIN 300 MG/1
300 CAPSULE ORAL 3 TIMES DAILY
Qty: 90 CAPSULE | Refills: 5 | Status: SHIPPED | OUTPATIENT
Start: 2022-07-22 | End: 2022-10-04 | Stop reason: SDUPTHER

## 2022-07-22 RX ORDER — FLUOXETINE HYDROCHLORIDE 40 MG/1
40 CAPSULE ORAL 2 TIMES DAILY
Qty: 180 CAPSULE | Refills: 3 | Status: SHIPPED | OUTPATIENT
Start: 2022-07-22 | End: 2022-10-04 | Stop reason: SDUPTHER

## 2022-08-12 ENCOUNTER — TELEPHONE (OUTPATIENT)
Dept: FAMILY MEDICINE CLINIC | Facility: CLINIC | Age: 42
End: 2022-08-12

## 2022-08-12 NOTE — TELEPHONE ENCOUNTER
Received fax from [facility] requesting PA on Pantoprazole 40mg tabs. Pa submitted online thru covermymeds. Med approved. PA Case: [id number], Status: Approved, Coverage Starts on: 8/12/2022 12:00:00 AM, Coverage Ends on: 8/12/2023 12:00:00 AM. Notified pharmacy.

## 2022-08-12 NOTE — TELEPHONE ENCOUNTER
Caller: Barrett Laguerre    Relationship to patient: Self    Best call back number: [phone]     Patient is needing HAVING TROUBLE GETTING THIS MEDICATION FILLED INSURANCE NEEDS AN APPROVAL pantoprazole 40MG THEY ARE TRYING TO PUSH SUBSTITUTIONS BUT HES BEEN ON THIS MEDICATION FOR A LONG TIME PLEASE ADVISE PATIENT DOESN'T WANT TO CHANGE WHATS WORKING

## 2022-08-26 ENCOUNTER — OFFICE VISIT (OUTPATIENT)
Dept: PAIN MEDICINE | Facility: CLINIC | Age: 42
End: 2022-08-26

## 2022-08-26 VITALS
HEART RATE: 95 BPM | SYSTOLIC BLOOD PRESSURE: 147 MMHG | OXYGEN SATURATION: 97 % | DIASTOLIC BLOOD PRESSURE: 92 MMHG | RESPIRATION RATE: 16 BRPM

## 2022-08-26 DIAGNOSIS — M54.41 CHRONIC MIDLINE LOW BACK PAIN WITH BILATERAL SCIATICA: Primary | ICD-10-CM

## 2022-08-26 DIAGNOSIS — M48.07 LUMBOSACRAL SPINAL STENOSIS: ICD-10-CM

## 2022-08-26 DIAGNOSIS — M54.41 CHRONIC RIGHT-SIDED LOW BACK PAIN WITH RIGHT-SIDED SCIATICA: ICD-10-CM

## 2022-08-26 DIAGNOSIS — M54.16 LUMBAR RADICULOPATHY: ICD-10-CM

## 2022-08-26 DIAGNOSIS — G89.29 CHRONIC RIGHT-SIDED LOW BACK PAIN WITH RIGHT-SIDED SCIATICA: ICD-10-CM

## 2022-08-26 DIAGNOSIS — G89.29 CHRONIC MIDLINE LOW BACK PAIN WITH BILATERAL SCIATICA: Primary | ICD-10-CM

## 2022-08-26 DIAGNOSIS — M54.42 CHRONIC MIDLINE LOW BACK PAIN WITH BILATERAL SCIATICA: Primary | ICD-10-CM

## 2022-08-26 PROCEDURE — 99214 OFFICE O/P EST MOD 30 MIN: CPT | Performed by: PHYSICAL MEDICINE & REHABILITATION

## 2022-08-26 RX ORDER — ACETAMINOPHEN AND CODEINE PHOSPHATE 300; 60 MG/1; MG/1
1 TABLET ORAL EVERY 6 HOURS PRN
Qty: 120 TABLET | Refills: 5 | Status: SHIPPED | OUTPATIENT
Start: 2022-08-26 | End: 2022-10-11 | Stop reason: SDUPTHER
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
## 2022-09-06 ENCOUNTER — OFFICE VISIT (OUTPATIENT)
Dept: PODIATRY | Facility: CLINIC | Age: 42
End: 2022-09-06

## 2022-09-06 VITALS — BODY MASS INDEX: 36.8 KG/M2 | WEIGHT: 296 LBS | HEIGHT: 75 IN

## 2022-09-06 DIAGNOSIS — L60.3 ONYCHODYSTROPHY: Primary | ICD-10-CM

## 2022-09-06 DIAGNOSIS — E11.42 DM TYPE 2 WITH DIABETIC PERIPHERAL NEUROPATHY: ICD-10-CM

## 2022-09-06 DIAGNOSIS — E11.65 TYPE 2 DIABETES MELLITUS WITH HYPERGLYCEMIA, WITHOUT LONG-TERM CURRENT USE OF INSULIN: ICD-10-CM

## 2022-09-06 PROCEDURE — 11721 DEBRIDE NAIL 6 OR MORE: CPT

## 2022-09-06 NOTE — PROGRESS NOTES
09/06/2022  Foot and Ankle Surgery - Established Patient/Follow-up  Provider: ANASTASIA Chan   Location: [facility]    Subjective:  Barrett Laguerre is a 42 y.o. male.     Chief Complaint   Patient presents with   • Left Foot - Follow-up     [facility]   • Right Foot - Follow-up     [facility]   • Follow-up     CYRUS Rosenberg md  6/14/2022       HPI: Patient presents to office today for routine diabetic foot check.  Patient is requesting nail debridement today reports nails are too thick and he is unable to get to them to trim them safely.  Patient is not sure how blood sugars have been.    Allergies   Allergen Reactions   • Penicillin G Anaphylaxis   • Penicillins Anaphylaxis   • Sulfa Antibiotics Hives   • Allopurinol Rash       Current Outpatient Medications on File Prior to Visit   Medication Sig Dispense Refill   • acetaminophen-codeine (TYLENOL with CODEINE #4) 300-60 MG per tablet Take 1 tablet by mouth Every 6 (Six) Hours As Needed for Moderate Pain . 120 tablet 5   • amantadine (SYMMETREL) 100 MG tablet      • amLODIPine (NORVASC) 10 MG tablet Take 1 tablet by mouth Daily. 30 tablet 12   • azelastine (ASTELIN) 0.1 % nasal spray 2 sprays into the nostril(s) as directed by provider 2 (Two) Times a Day. Use in each nostril as directed 1 each 12   • chlorthalidone (HYGROTEN) 50 MG tablet Take 1 tablet by mouth Daily. 90 tablet 3   • clindamycin (CLEOCIN T) 1 % external solution      • colchicine-probenecid (COL-BENEMID) 0.5-500 MG per tablet Take 1 tablet by mouth Daily. 30 tablet 12   • Cyanocobalamin ER 1000 MCG tablet controlled-release Take 1 tablet by mouth Daily. 90 each 3   • Diclofenac Epolamine (FLECTOR) 1.3 % patch patch Apply 1 patch topically to the appropriate area as directed 2 (Two) Times a Day As Needed (back pain). 60 patch 2   • Diclofenac Epolamine (FLECTOR) 1.3 % patch patch Apply 1 patch topically to the appropriate area as directed 2 (Two) Times a Day. 60 patch 12   •  erythromycin (ROMYCIN) 5 MG/GM ophthalmic ointment Administer  to both eyes Every Night. 1 each 3   • FLUoxetine (PROzac) 40 MG capsule Take 1 capsule by mouth 2 (Two) Times a Day. 180 capsule 3   • fluticasone (FLONASE) 50 MCG/ACT nasal spray 1 spray into the nostril(s) as directed by provider Daily. 16 g 3   • gabapentin (NEURONTIN) 300 MG capsule Take 1 capsule by mouth 3 (Three) Times a Day. 90 capsule 5   • hydrocortisone 1 % cream Apply  topically to the appropriate area as directed 2 (Two) Times a Day. 60 g 0   • hydrOXYzine (ATARAX) 25 MG tablet Take 1 tablet by mouth 4 (Four) Times a Day As Needed for Itching. 30 tablet 2   • lidocaine-prilocaine (EMLA) 2.5-2.5 % cream      • loratadine (EQ Loratadine) 10 MG tablet Take 1 tablet by mouth Daily. 90 tablet 3   • losartan (COZAAR) 50 MG tablet Take 50 mg by mouth Daily.     • methocarbamol (ROBAXIN) 750 MG tablet Take 1 tablet by mouth 3 (Three) Times a Day As Needed for Muscle Spasms. 90 tablet 2   • montelukast (SINGULAIR) 10 MG tablet Take 1 tablet by mouth Every Night. 30 tablet 12   • mupirocin (BACTROBAN) 2 % ointment Apply  topically to the appropriate area as directed Daily. 1 each 3   • oxybutynin XL (DITROPAN XL) 15 MG 24 hr tablet Take 1 tablet by mouth Daily. 30 tablet 12   • pantoprazole (Protonix) 40 MG EC tablet Take 1 tablet by mouth Daily. 30 tablet 12   • promethazine (PHENERGAN) 25 MG tablet Take 1 tablet by mouth Every 8 (Eight) Hours As Needed for Nausea or Vomiting. 90 tablet 5   • ReliOn Ultra Thin Lancets 30G misc 1 each Daily. Dx: E11.49 Patient checks sugar once a day 100 each 12   • sildenafil (REVATIO) 20 MG tablet Take 1 tablet by mouth Daily As Needed (ed). 30 tablet 3   • sitaGLIPtin-metFORMIN (Janumet)  MG per tablet Take 1 tablet by mouth 2 (Two) Times a Day With Meals. 180 tablet 3   • sodium chloride (Ocean Nasal Spray) 0.65 % nasal spray 2 sprays into the nostril(s) as directed by provider As Needed for Congestion. 88  "mL 12   • triamcinolone (KENALOG) 0.1 % ointment APPLY OINTMENT TOPICALLY TO AFFECTED AREA TWICE DAILY FOR 2 WEEKS, THEN AS NEEDED.     • doxycycline (MONODOX) 100 MG capsule Take 100 mg by mouth 2 (Two) Times a Day With Meals.       No current facility-administered medications on file prior to visit.       Objective   Ht 190.5 cm (75\")   Wt 134 kg (296 lb)   BMI 37.00 kg/m²     Foot/Ankle Exam     General:   Diabetic Foot Exam Performed    Appearance: appears stated age and healthy    Orientation: AAOx3    Affect: appropriate    Gait: unimpaired    Assistance: independent    Shoe Gear:  Casual shoes and socks     VASCULAR       Right Foot Vascularity   Dorsalis pedis:  2+  Skin Temperature: warm    Edema Grading:  None  CFT:  < 3 seconds  Varicosities: none        Left Foot Vascularity   Dorsalis pedis:  2+  Skin Temperature: warm    Edema Grading:  None  CFT:  < 3 seconds  Varicosities: none        NEUROLOGIC      Right Foot Neurologic   Light touch sensation:  Normal  Protective Sensation using [place] Monofilament:  Diminished      Left Foot Neurologic   Light touch sensation:  Normal  Protective Sensation using [place] Monofilament:  Diminished      MUSCULOSKELETAL       Right Foot Musculoskeletal   Ecchymosis:  None  Tenderness: none        Left Foot Musculoskeletal   Ecchymosis:  None  Tenderness: none        DERMATOLOGIC      Right Foot Dermatologic   Skin: skin intact    Nails: dystrophic nails        Left Foot Dermatologic   Skin: skin intact    Nails: dystrophic nails                  Assessment & Plan   Diagnoses and all orders for this visit:    1. Onychodystrophy (Primary)    2. Type 2 diabetes mellitus with hyperglycemia, without long-term current use of insulin (HCC)    3. DM type 2 with diabetic peripheral neuropathy (HCC)      On exam bilateral feet appear stable.  No open wounds or signs of infection noted today.  Patient does have decreased protective sensation with " monofilament testing.  Do feel patient is at moderate risk for pedal complications due to diabetes.Explained importance of diabetic foot care, daily foot checks, and glycemic control. Patient should check both feet on a daily basis, monitor and control blood sugars, make sure that both feet and in between toes are towel dried after baths or showers. Avoid barefoot walking at all times. Check shoes before putting them on.   Patient was given information on proper foot care. Call the office at the first signs of a wound or with signs of infection.    Nail debridement: Both feet x10    Consent and time out was performed before proceeding with the procedure. Nails were debrided with a nail nipper without complication.  No anesthesia was required.  Indications for procedure were thickened, dystrophic, and fungal appearing nails which are difficult to trim.  Proper self-care and technique was discussed with patient.  Patient was stable after procedure.    Would like for patient to follow-up in 2 months for routine diabetic foot check and call with any questions or concerns should they arise before scheduled appointment.  Patient verbalized understanding and agreeable to plan at this time.    No orders of the defined types were placed in this encounter.         Note is dictated utilizing voice recognition software. Unfortunately this leads to occasional typographical errors. I apologize in advance if the situation occurs. If questions occur please do not hesitate to call our office.

## 2022-09-09 DIAGNOSIS — I10 HYPERTENSION, BENIGN: ICD-10-CM

## 2022-09-09 RX ORDER — AMLODIPINE BESYLATE 10 MG/1
10 TABLET ORAL DAILY
Qty: 30 TABLET | Refills: 12 | Status: SHIPPED | OUTPATIENT
Start: 2022-09-09 | End: 2022-10-04 | Stop reason: SDUPTHER

## 2022-10-03 NOTE — PROGRESS NOTES
Subjective   Barrett Laguerre is a 42 y.o. male.   Chief Complaint   Patient presents with   • URI       History of Present Illness  Pt needs refill on multiple meds. Overall doing well. Labs reviewed and stable     Some stuffy nose. No fever   Hypertension  This is a chronic problem. The current episode started more than 1 year ago. The problem is controlled. Pertinent negatives include no chest pain, headaches, neck pain or shortness of breath. Risk factors for coronary artery disease include male gender, obesity, diabetes mellitus and dyslipidemia. Current antihypertension treatment includes calcium channel blockers and angiotensin blockers.   URI   This is a recurrent problem. The current episode started in the past 7 days. There has been no fever. Associated symptoms include congestion, coughing, rhinorrhea and sinus pain. Pertinent negatives include no chest pain, diarrhea, headaches, neck pain or vomiting. Treatments tried: flonase. The treatment provided no relief.        The following portions of the patient's history were reviewed and updated as appropriate: allergies, current medications, past family history, past medical history, past social history, past surgical history and problem list.    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)   • Sleep apnea   • Attention deficit disorder   • Diabetes ([facility])   • Other seasonal allergic rhinitis   • Mixed obsessional thoughts and acts   • Hypertension, benign   • Disorder of lipid metabolism   • CIDP (chronic inflammatory demyelinating polyneuropathy) ([facility])   • Lumbar radiculopathy   • Onychomycosis   • Annual physical exam   • Class 2 severe obesity due to excess calories with serious comorbidity and  body mass index (BMI) of 36.0 to 36.9 in adult (HCC)       Current Outpatient Medications on File Prior to Visit   Medication Sig Dispense Refill   • amantadine (SYMMETREL) 100 MG tablet      • clindamycin (CLEOCIN T) 1 % external solution      • colchicine-probenecid (COL-BENEMID) 0.5-500 MG per tablet Take 1 tablet by mouth Daily. 30 tablet 12   • Diclofenac Epolamine (FLECTOR) 1.3 % patch patch Apply 1 patch topically to the appropriate area as directed 2 (Two) Times a Day As Needed (back pain). 60 patch 2   • Diclofenac Epolamine (FLECTOR) 1.3 % patch patch Apply 1 patch topically to the appropriate area as directed 2 (Two) Times a Day. 60 patch 12   • doxycycline (MONODOX) 100 MG capsule Take 100 mg by mouth 2 (Two) Times a Day With Meals.     • erythromycin (ROMYCIN) 5 MG/GM ophthalmic ointment Administer  to both eyes Every Night. 1 each 3   • lidocaine-prilocaine (EMLA) 2.5-2.5 % cream      • loratadine (EQ Loratadine) 10 MG tablet Take 1 tablet by mouth Daily. 90 tablet 3   • methocarbamol (ROBAXIN) 750 MG tablet Take 1 tablet by mouth 3 (Three) Times a Day As Needed for Muscle Spasms. 90 tablet 2   • mupirocin (BACTROBAN) 2 % ointment Apply  topically to the appropriate area as directed Daily. 1 each 3   • oxybutynin XL (DITROPAN XL) 15 MG 24 hr tablet Take 1 tablet by mouth Daily. 30 tablet 12   • promethazine (PHENERGAN) 25 MG tablet Take 1 tablet by mouth Every 8 (Eight) Hours As Needed for Nausea or Vomiting. 90 tablet 5   • ReliOn Ultra Thin Lancets 30G misc 1 each Daily. Dx: E11.49 Patient checks sugar once a day 100 each 12   • sildenafil (REVATIO) 20 MG tablet Take 1 tablet by mouth Daily As Needed (ed). 30 tablet 3   • triamcinolone (KENALOG) 0.1 % ointment APPLY OINTMENT TOPICALLY TO AFFECTED AREA TWICE DAILY FOR 2 WEEKS, THEN AS NEEDED.     • [DISCONTINUED] amLODIPine (NORVASC) 10 MG tablet Take 1 tablet by mouth Daily. 30 tablet 12   • [DISCONTINUED] azelastine (ASTELIN) 0.1 % nasal spray 2  sprays into the nostril(s) as directed by provider 2 (Two) Times a Day. Use in each nostril as directed 1 each 12   • [DISCONTINUED] chlorthalidone (HYGROTEN) 50 MG tablet Take 1 tablet by mouth Daily. 90 tablet 3   • [DISCONTINUED] Cyanocobalamin ER 1000 MCG tablet controlled-release Take 1 tablet by mouth Daily. 90 each 3   • [DISCONTINUED] FLUoxetine (PROzac) 40 MG capsule Take 1 capsule by mouth 2 (Two) Times a Day. 180 capsule 3   • [DISCONTINUED] fluticasone (FLONASE) 50 MCG/ACT nasal spray 1 spray into the nostril(s) as directed by provider Daily. 16 g 3   • [DISCONTINUED] gabapentin (NEURONTIN) 300 MG capsule Take 1 capsule by mouth 3 (Three) Times a Day. 90 capsule 5   • [DISCONTINUED] hydrocortisone 1 % cream Apply  topically to the appropriate area as directed 2 (Two) Times a Day. 60 g 0   • [DISCONTINUED] hydrOXYzine (ATARAX) 25 MG tablet Take 1 tablet by mouth 4 (Four) Times a Day As Needed for Itching. 30 tablet 2   • [DISCONTINUED] losartan (COZAAR) 50 MG tablet Take 50 mg by mouth Daily.     • [DISCONTINUED] montelukast (SINGULAIR) 10 MG tablet Take 1 tablet by mouth Every Night. 30 tablet 12   • [DISCONTINUED] pantoprazole (Protonix) 40 MG EC tablet Take 1 tablet by mouth Daily. 30 tablet 12   • [DISCONTINUED] sitaGLIPtin-metFORMIN (Janumet)  MG per tablet Take 1 tablet by mouth 2 (Two) Times a Day With Meals. 180 tablet 3   • [DISCONTINUED] sodium chloride (Ocean Nasal Spray) 0.65 % nasal spray 2 sprays into the nostril(s) as directed by provider As Needed for Congestion. 88 mL 12   • acetaminophen-codeine (TYLENOL with CODEINE #4) 300-60 MG per tablet Take 1 tablet by mouth Every 6 (Six) Hours As Needed for Moderate Pain . 120 tablet 5     No current facility-administered medications on file prior to visit.     Current outpatient and discharge medications have been reconciled for the patient.  Reviewed by: Eric Rosenberg MD      Allergies   Allergen Reactions   • Penicillin G  "Anaphylaxis   • Penicillins Anaphylaxis   • Sulfa Antibiotics Hives   • Allopurinol Rash       Review of Systems   Constitutional: Negative for activity change, appetite change, fever and unexpected weight gain.   HENT: Positive for congestion and rhinorrhea.    Respiratory: Positive for cough. Negative for shortness of breath.    Cardiovascular: Negative for chest pain.   Gastrointestinal: Negative for abdominal distention, blood in stool, constipation, diarrhea and vomiting.   Musculoskeletal: Negative for neck pain.   Skin: Positive for color change.   Psychiatric/Behavioral: Negative for behavioral problems.     I have reviewed and confirmed the accuracy of the ROS as documented by the MA/LPN/RN Eric Rosenberg MD    Objective   Visit Vitals  /82 (BP Location: Right arm, Patient Position: Sitting, Cuff Size: Adult)   Pulse 106   Temp 97.7 °F (36.5 °C)   Resp 18   Ht 190.5 cm (75\")   Wt 132 kg (290 lb 12.8 oz)   SpO2 96%   BMI 36.35 kg/m²       Physical Exam  Constitutional:       Appearance: He is well-developed.   HENT:      Head: Normocephalic and atraumatic.      Right Ear: External ear normal.      Left Ear: External ear normal.      Nose: Nose normal.   Eyes:      Pupils: Pupils are equal, round, and reactive to light.   Cardiovascular:      Rate and Rhythm: Normal rate and regular rhythm.      Heart sounds: Normal heart sounds.   Pulmonary:      Effort: Pulmonary effort is normal.      Breath sounds: Normal breath sounds.   Abdominal:      General: Bowel sounds are normal.      Palpations: Abdomen is soft.   Musculoskeletal:         General: Normal range of motion.      Cervical back: Normal range of motion and neck supple.   Skin:     General: Skin is warm and dry.   Neurological:      Mental Status: He is alert and oriented to person, place, and time.   Psychiatric:         Behavior: Behavior normal.         Thought Content: Thought content normal.         Judgment: Judgment normal. "       Derm Physical Exam    Problem List Items Addressed This Visit     Guillain-[place] ([facility])    Relevant Medications    gabapentin (NEURONTIN) 300 MG capsule    Diabetes ([facility])    Relevant Medications    sitaGLIPtin-metFORMIN (Janumet)  MG per tablet    Other Relevant Orders    Hemoglobin A1c    Other seasonal allergic rhinitis    Overview     Over-the-counter medication indications, dosage, and precautions discussed.         Relevant Medications    azelastine (ASTELIN) 0.1 % nasal spray    fluticasone (FLONASE) 50 MCG/ACT nasal spray    sodium chloride (Ocean Nasal Spray) 0.65 % nasal spray    Hypertension, benign    Overview     Followed by Cardiology Dr Tong who placed pt on different BP med. .    Proteinuria/creatinine noted         Relevant Medications    amLODIPine (NORVASC) 10 MG tablet    chlorthalidone (HYGROTEN) 50 MG tablet    losartan (COZAAR) 50 MG tablet    Other Relevant Orders    Comprehensive Metabolic Panel    CBC & Differential    Disorder of lipid metabolism    Relevant Orders    Comprehensive Metabolic Panel    Lipid Panel With / Chol / HDL Ratio    TSH    Class 2 severe obesity due to excess calories with serious comorbidity and body mass index (BMI) of 36.0 to 36.9 in adult ([facility])      Other Visit Diagnoses     Upper respiratory tract infection, unspecified type    -  Primary    Non-seasonal allergic rhinitis due to other allergic trigger        Relevant Medications    montelukast (SINGULAIR) 10 MG tablet    Chronic fatigue        Relevant Medications    Cyanocobalamin ER 1000 MCG tablet controlled-release    Mood disorder ([facility])        Relevant Medications    FLUoxetine (PROzac) 40 MG capsule    hydrOXYzine (ATARAX) 25 MG tablet    Other eczema        Relevant Medications    hydrocortisone 1 % cream    Rash        Relevant Medications    hydrocortisone 1 % cream    hydrOXYzine (ATARAX) 25 MG tablet    Itching        Relevant Medications    hydrOXYzine (ATARAX) 25 MG tablet    GERD  (gastroesophageal reflux disease)        Relevant Medications    pantoprazole (Protonix) 40 MG EC tablet      Findings discussed. All questions answered.  Medication and medication adverse effects discussed.  Drug education given and explained to patient. Patient verbalized understanding.  Follow up in 3 months for recheck, sooner for concerns.     Expected course, medications, and adverse effects discussed as appropriate.  Call or return if worsening or persistent symptoms.  I wore protective equipment throughout this patient encounter to include mask and eye protection. Hand hygiene was performed before donning protective equipment and after removal when leaving the room.       This document is intended for medical expert use only. Reading of this document by patients and/or patient's family without participating medical staff guidance may result in misinterpretation and unintended morbidity. Any interpretation of such data is the responsibility of the patient and/or family member responsible for the patient in concert with their primary or specialist providers, not to be left for sources of online searches such as [facility], [place] or similar queries. Relying on these approaches to knowledge may result in misinterpretation, misguided goals of care and even death should patients or family members try recommendations outside of the realm of professional medical care.

## 2022-10-04 ENCOUNTER — TELEPHONE (OUTPATIENT)
Dept: FAMILY MEDICINE CLINIC | Facility: CLINIC | Age: 42
End: 2022-10-04

## 2022-10-04 ENCOUNTER — OFFICE VISIT (OUTPATIENT)
Dept: FAMILY MEDICINE CLINIC | Facility: CLINIC | Age: 42
End: 2022-10-04

## 2022-10-04 VITALS
DIASTOLIC BLOOD PRESSURE: 82 MMHG | HEART RATE: 106 BPM | RESPIRATION RATE: 18 BRPM | WEIGHT: 290.8 LBS | TEMPERATURE: 97.7 F | SYSTOLIC BLOOD PRESSURE: 134 MMHG | BODY MASS INDEX: 36.16 KG/M2 | OXYGEN SATURATION: 96 % | HEIGHT: 75 IN

## 2022-10-04 DIAGNOSIS — L29.9 ITCHING: ICD-10-CM

## 2022-10-04 DIAGNOSIS — J06.9 UPPER RESPIRATORY TRACT INFECTION, UNSPECIFIED TYPE: Primary | ICD-10-CM

## 2022-10-04 DIAGNOSIS — E11.49 TYPE 2 DIABETES MELLITUS WITH OTHER NEUROLOGIC COMPLICATION, WITHOUT LONG-TERM CURRENT USE OF INSULIN: ICD-10-CM

## 2022-10-04 DIAGNOSIS — E78.9 DISORDER OF LIPID METABOLISM: ICD-10-CM

## 2022-10-04 DIAGNOSIS — R53.82 CHRONIC FATIGUE: ICD-10-CM

## 2022-10-04 DIAGNOSIS — E11.9 TYPE 2 DIABETES MELLITUS WITHOUT COMPLICATION, WITHOUT LONG-TERM CURRENT USE OF INSULIN: ICD-10-CM

## 2022-10-04 DIAGNOSIS — L30.8 OTHER ECZEMA: ICD-10-CM

## 2022-10-04 DIAGNOSIS — J30.2 OTHER SEASONAL ALLERGIC RHINITIS: ICD-10-CM

## 2022-10-04 DIAGNOSIS — K21.9 GERD (GASTROESOPHAGEAL REFLUX DISEASE): ICD-10-CM

## 2022-10-04 DIAGNOSIS — G61.0 GUILLAIN-BARRE: ICD-10-CM

## 2022-10-04 DIAGNOSIS — R21 RASH: ICD-10-CM

## 2022-10-04 DIAGNOSIS — F39 MOOD DISORDER: ICD-10-CM

## 2022-10-04 DIAGNOSIS — I10 HYPERTENSION, BENIGN: ICD-10-CM

## 2022-10-04 DIAGNOSIS — E66.01 CLASS 2 SEVERE OBESITY DUE TO EXCESS CALORIES WITH SERIOUS COMORBIDITY AND BODY MASS INDEX (BMI) OF 36.0 TO 36.9 IN ADULT: ICD-10-CM

## 2022-10-04 DIAGNOSIS — J30.89 NON-SEASONAL ALLERGIC RHINITIS DUE TO OTHER ALLERGIC TRIGGER: ICD-10-CM

## 2022-10-04 PROBLEM — E66.812 CLASS 2 SEVERE OBESITY DUE TO EXCESS CALORIES WITH SERIOUS COMORBIDITY AND BODY MASS INDEX (BMI) OF 36.0 TO 36.9 IN ADULT: Status: ACTIVE | Noted: 2022-02-28

## 2022-10-04 PROCEDURE — 99214 OFFICE O/P EST MOD 30 MIN: CPT | Performed by: FAMILY MEDICINE

## 2022-10-04 RX ORDER — HYDROXYZINE HYDROCHLORIDE 25 MG/1
25 TABLET, FILM COATED ORAL 4 TIMES DAILY PRN
Qty: 360 TABLET | Refills: 3 | Status: SHIPPED | OUTPATIENT
Start: 2022-10-04

## 2022-10-04 RX ORDER — LOSARTAN POTASSIUM 50 MG/1
50 TABLET ORAL DAILY
Qty: 90 TABLET | Refills: 3 | Status: SHIPPED | OUTPATIENT
Start: 2022-10-04 | End: 2023-02-22 | Stop reason: HOSPADM

## 2022-10-04 RX ORDER — GABAPENTIN 300 MG/1
300 CAPSULE ORAL 3 TIMES DAILY
Qty: 270 CAPSULE | Refills: 3 | Status: SHIPPED | OUTPATIENT
Start: 2022-10-04 | End: 2023-01-05 | Stop reason: SDUPTHER

## 2022-10-04 RX ORDER — DIAPER,BRIEF,INFANT-TODD,DISP
EACH MISCELLANEOUS 2 TIMES DAILY
Qty: 60 G | Refills: 3 | Status: SHIPPED | OUTPATIENT
Start: 2022-10-04 | End: 2023-03-10 | Stop reason: SDUPTHER

## 2022-10-04 RX ORDER — SITAGLIPTIN AND METFORMIN HYDROCHLORIDE 1000; 50 MG/1; MG/1
1 TABLET, FILM COATED ORAL 2 TIMES DAILY WITH MEALS
Qty: 180 TABLET | Refills: 3 | Status: SHIPPED | OUTPATIENT
Start: 2022-10-04 | End: 2023-01-05 | Stop reason: SDUPTHER

## 2022-10-04 RX ORDER — MONTELUKAST SODIUM 10 MG/1
10 TABLET ORAL NIGHTLY
Qty: 30 TABLET | Refills: 0 | Status: SHIPPED | OUTPATIENT
Start: 2022-10-04 | End: 2022-10-04 | Stop reason: SDUPTHER

## 2022-10-04 RX ORDER — FLUTICASONE PROPIONATE 50 MCG
1 SPRAY, SUSPENSION (ML) NASAL DAILY
Qty: 16 G | Refills: 3 | Status: SHIPPED | OUTPATIENT
Start: 2022-10-04 | End: 2023-01-02

## 2022-10-04 RX ORDER — AMLODIPINE BESYLATE 10 MG/1
10 TABLET ORAL DAILY
Qty: 30 TABLET | Refills: 0 | Status: SHIPPED | OUTPATIENT
Start: 2022-10-04 | End: 2022-10-04

## 2022-10-04 RX ORDER — CHLORTHALIDONE 50 MG/1
50 TABLET ORAL DAILY
Qty: 90 TABLET | Refills: 3 | Status: SHIPPED | OUTPATIENT
Start: 2022-10-04 | End: 2023-01-05 | Stop reason: SDUPTHER

## 2022-10-04 RX ORDER — AMLODIPINE BESYLATE 10 MG/1
10 TABLET ORAL DAILY
Qty: 90 TABLET | Refills: 3 | Status: SHIPPED | OUTPATIENT
Start: 2022-10-04 | End: 2022-11-14 | Stop reason: SDUPTHER

## 2022-10-04 RX ORDER — FLUOXETINE HYDROCHLORIDE 40 MG/1
40 CAPSULE ORAL 2 TIMES DAILY
Qty: 180 CAPSULE | Refills: 3 | Status: SHIPPED | OUTPATIENT
Start: 2022-10-04

## 2022-10-04 RX ORDER — ECHINACEA PURPUREA EXTRACT 125 MG
2 TABLET ORAL AS NEEDED
Qty: 216 ML | Refills: 3 | Status: SHIPPED | OUTPATIENT
Start: 2022-10-04 | End: 2022-10-17 | Stop reason: SDUPTHER

## 2022-10-04 RX ORDER — AZELASTINE 1 MG/ML
2 SPRAY, METERED NASAL 2 TIMES DAILY
Qty: 3 EACH | Refills: 3 | Status: SHIPPED | OUTPATIENT
Start: 2022-10-04

## 2022-10-04 RX ORDER — MONTELUKAST SODIUM 10 MG/1
10 TABLET ORAL NIGHTLY
Qty: 90 TABLET | Refills: 3 | Status: SHIPPED | OUTPATIENT
Start: 2022-10-04 | End: 2022-11-14 | Stop reason: SDUPTHER

## 2022-10-04 RX ORDER — MONTELUKAST SODIUM 10 MG/1
10 TABLET ORAL NIGHTLY
Qty: 30 TABLET | Refills: 0 | Status: SHIPPED | OUTPATIENT
Start: 2022-10-04 | End: 2022-10-04

## 2022-10-04 RX ORDER — PANTOPRAZOLE SODIUM 40 MG/1
40 TABLET, DELAYED RELEASE ORAL DAILY
Qty: 90 TABLET | Refills: 3 | Status: SHIPPED | OUTPATIENT
Start: 2022-10-04 | End: 2023-03-23 | Stop reason: SDUPTHER

## 2022-10-04 NOTE — TELEPHONE ENCOUNTER
Pharmacy Name:  [facility]     Pharmacy representative name: GLORIA     Pharmacy representative phone number: [phone]    What medication are you calling in regards to: fluticasone (FLONASE) 50 MCG/ACT nasal spray    What question does the pharmacy have: NEEDS TO KNOW HOW MANY TIMES A DAY TO USE    Who is the provider that prescribed the medication: DR. RIVEAR     Additional notes:  GLORIA FROM [facility] STATES A NEW PRESCRIPTION NEEDS TO BE SENT OVER BECAUSE OF MEDICAID.

## 2022-10-04 NOTE — TELEPHONE ENCOUNTER
Pharmacy Name:  [facility]    Pharmacy representative name: [facility]    Pharmacy representative phone number: [phone]    What medication are you calling in regards to: sodium chloride (Ocean Nasal Spray) 0.65 % nasal spray    What question does the pharmacy have: NEEDS TO KNOW HOW MANY TIMES A DAY TO USE    Who is the provider that prescribed the medication: MIGUEL    PLEASE RESUBMIT.

## 2022-10-05 ENCOUNTER — TELEPHONE (OUTPATIENT)
Dept: FAMILY MEDICINE CLINIC | Facility: CLINIC | Age: 42
End: 2022-10-05

## 2022-10-05 NOTE — TELEPHONE ENCOUNTER
Pharmacy Name: [facility] - [address] - [phone] [facility] [phone]      Pharmacy representative name: RENO    Pharmacy representative phone number: [phone]    What medication are you calling in regards to: sodium chloride (Ocean Nasal Spray) 0.65 % nasal spray    What question does the pharmacy have: HOW MANY NASAL SPRAYS PER DAY    Who is the provider that prescribed the medication: DR. RIVERA    Additional notes: PHARMACY NEEDS TO KNOW FREQUENCY FOR INSURANCE PURPOSES.

## 2022-10-05 NOTE — TELEPHONE ENCOUNTER
Caller: [facility] [address] [place] [address] [place] - [address] [place] [address] - [phone]  - [phone]    Relationship: Pharmacy    Best call back number: [phone]      What was the call regarding: RAMIREZ IS CALLING TO SEE HOW FREQUENT THE PATIENT IS SUPPOSED TO BE USING THE  fluticasone (FLONASE) 50 MCG/ACT nasal spray    PLEASE ADVISE     Do you require a callback: YES

## 2022-10-11 ENCOUNTER — HOSPITAL ENCOUNTER (OUTPATIENT)
Dept: PAIN MEDICINE | Facility: HOSPITAL | Age: 42
Discharge: HOME | End: 2022-10-11

## 2022-10-11 VITALS
OXYGEN SATURATION: 97 % | SYSTOLIC BLOOD PRESSURE: 142 MMHG | WEIGHT: 290 LBS | BODY MASS INDEX: 36.06 KG/M2 | RESPIRATION RATE: 16 BRPM | TEMPERATURE: 96.8 F | DIASTOLIC BLOOD PRESSURE: 85 MMHG | HEIGHT: 75 IN | HEART RATE: 84 BPM

## 2022-10-11 DIAGNOSIS — G89.29 CHRONIC MIDLINE LOW BACK PAIN WITH BILATERAL SCIATICA: ICD-10-CM

## 2022-10-11 DIAGNOSIS — M54.16 LUMBAR RADICULOPATHY: ICD-10-CM

## 2022-10-11 DIAGNOSIS — R52 PAIN: ICD-10-CM

## 2022-10-11 DIAGNOSIS — M54.42 CHRONIC MIDLINE LOW BACK PAIN WITH BILATERAL SCIATICA: ICD-10-CM

## 2022-10-11 DIAGNOSIS — M54.41 CHRONIC RIGHT-SIDED LOW BACK PAIN WITH RIGHT-SIDED SCIATICA: ICD-10-CM

## 2022-10-11 DIAGNOSIS — G89.29 CHRONIC RIGHT-SIDED LOW BACK PAIN WITH RIGHT-SIDED SCIATICA: ICD-10-CM

## 2022-10-11 DIAGNOSIS — M48.07 LUMBOSACRAL SPINAL STENOSIS: Primary | ICD-10-CM

## 2022-10-11 DIAGNOSIS — M54.41 CHRONIC MIDLINE LOW BACK PAIN WITH BILATERAL SCIATICA: ICD-10-CM

## 2022-10-11 PROCEDURE — 25010000002 METHYLPREDNISOLONE PER 80 MG: Performed by: PHYSICAL MEDICINE & REHABILITATION

## 2022-10-11 PROCEDURE — 77003 FLUOROGUIDE FOR SPINE INJECT: CPT

## 2022-10-11 PROCEDURE — 0 IOPAMIDOL 41 % SOLUTION: Performed by: PHYSICAL MEDICINE & REHABILITATION

## 2022-10-11 PROCEDURE — 62323 NJX INTERLAMINAR LMBR/SAC: CPT | Performed by: PHYSICAL MEDICINE & REHABILITATION

## 2022-10-11 RX ORDER — METHYLPREDNISOLONE ACETATE 80 MG/ML
80 INJECTION, SUSPENSION INTRA-ARTICULAR; INTRALESIONAL; INTRAMUSCULAR; SOFT TISSUE ONCE
Status: COMPLETED | OUTPATIENT
Start: 2022-10-11 | End: 2022-10-11

## 2022-10-11 RX ORDER — ACETAMINOPHEN AND CODEINE PHOSPHATE 300; 60 MG/1; MG/1
1 TABLET ORAL EVERY 6 HOURS PRN
Qty: 120 TABLET | Refills: 5 | Status: SHIPPED | OUTPATIENT
Start: 2022-10-11

## 2022-10-11 RX ADMIN — METHYLPREDNISOLONE ACETATE 80 MG: 80 INJECTION, SUSPENSION INTRA-ARTICULAR; INTRALESIONAL; INTRAMUSCULAR; SOFT TISSUE at 11:10

## 2022-10-11 RX ADMIN — IOPAMIDOL 1 ML: 408 INJECTION, SOLUTION INTRATHECAL at 11:09
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
## 2022-10-11 NOTE — DISCHARGE INSTRUCTIONS

## 2022-10-12 ENCOUNTER — TELEPHONE (OUTPATIENT)
Dept: PAIN MEDICINE | Facility: HOSPITAL | Age: 42
End: 2022-10-12

## 2022-10-13 NOTE — TELEPHONE ENCOUNTER
Caller: Barrett Laguerre    Relationship to patient: Self    Best call back number: [phone]    Patient is needing: PATIENT WAS CALLING BACK TO DISCUSS THE PROCEDURE HE RECEIVED BY DR. PATTERSON ON 10.11.22. PATIENT STATED HE HAS RECEIVED MULTIPLE EPIDURALS BY DR. PATTERSON  BUT THIS LAST EPIDURAL DID NOT WORK THE SAME. PATIENT STATED HE IS NOT REALLY GETTING ANY RELIEF AND WANTED TO DISCUSS THAT WITH THE CLINICAL STAFF. I ATTEMPTED TO WARM TRANSFER CALL TO THE CLINICAL LINE BUT RECEIVED NO ANSWER. THANK YOU!

## 2022-10-17 DIAGNOSIS — J30.2 OTHER SEASONAL ALLERGIC RHINITIS: ICD-10-CM

## 2022-10-17 RX ORDER — ECHINACEA PURPUREA EXTRACT 125 MG
TABLET ORAL
Qty: 216 ML | Refills: 3 | Status: SHIPPED | OUTPATIENT
Start: 2022-10-17

## 2022-10-24 NOTE — TELEPHONE ENCOUNTER
Caller: Barrett Laguerre    Relationship to patient: Self    Best call back number: [phone]    Patient is needing: PATIENT CALLED AND SENT A TELEPHONE MESSAGE 10/12/2022 REGARDING THE EPIDURAL STILL GIVING THE PATIENT PAIN, THE PATIENT NEVER RECEIVED A CALL BACK

## 2022-10-25 NOTE — TELEPHONE ENCOUNTER
PATIENT CALLED BACK TODAY STILL IN A LOT OF PAIN FROM EPIDURAL PAIN IS RUNNING DOWN LEG AND CAN'T HARDLY STAND DUE TO THE PAIN CANT GO TO WORK. PLEASE ADVISE

## 2022-10-26 ENCOUNTER — HOSPITAL ENCOUNTER (OUTPATIENT)
Facility: HOSPITAL | Age: 42
Setting detail: OBSERVATION
Discharge: HOME | End: 2022-10-29
Attending: EMERGENCY MEDICINE | Admitting: STUDENT IN AN ORGANIZED HEALTH CARE EDUCATION/TRAINING PROGRAM

## 2022-10-26 ENCOUNTER — APPOINTMENT (OUTPATIENT)
Dept: MRI IMAGING | Facility: HOSPITAL | Age: 42
End: 2022-10-26

## 2022-10-26 DIAGNOSIS — E11.49 TYPE 2 DIABETES MELLITUS WITH OTHER NEUROLOGIC COMPLICATION, WITHOUT LONG-TERM CURRENT USE OF INSULIN: ICD-10-CM

## 2022-10-26 DIAGNOSIS — M54.42 ACUTE MIDLINE LOW BACK PAIN WITH LEFT-SIDED SCIATICA: Primary | ICD-10-CM

## 2022-10-26 DIAGNOSIS — M51.26 PROTRUSION OF LUMBAR INTERVERTEBRAL DISC: ICD-10-CM

## 2022-10-26 LAB
ANION GAP SERPL CALCULATED.3IONS-SCNC: 14 MMOL/L (ref 5–15)
BASOPHILS # BLD AUTO: 0.1 10*3/MM3 (ref 0–0.2)
BASOPHILS NFR BLD AUTO: 0.7 % (ref 0–1.5)
BUN SERPL-MCNC: 23 MG/DL (ref 6–20)
BUN/CREAT SERPL: 20.2 (ref 7–25)
CALCIUM SPEC-SCNC: 9.5 MG/DL (ref 8.6–10.5)
CHLORIDE SERPL-SCNC: 95 MMOL/L (ref 98–107)
CO2 SERPL-SCNC: 22 MMOL/L (ref 22–29)
CREAT SERPL-MCNC: 1.14 MG/DL (ref 0.76–1.27)
DEPRECATED RDW RBC AUTO: 42 FL (ref 37–54)
EGFRCR SERPLBLD CKD-EPI 2021: 82.9 ML/MIN/1.73
EOSINOPHIL # BLD AUTO: 0.1 10*3/MM3 (ref 0–0.4)
EOSINOPHIL NFR BLD AUTO: 0.6 % (ref 0.3–6.2)
ERYTHROCYTE [DISTWIDTH] IN BLOOD BY AUTOMATED COUNT: 14.8 % (ref 12.3–15.4)
GLUCOSE BLDC GLUCOMTR-MCNC: 534 MG/DL (ref 70–105)
GLUCOSE SERPL-MCNC: 482 MG/DL (ref 65–99)
HCT VFR BLD AUTO: 43.8 % (ref 37.5–51)
HGB BLD-MCNC: 14.5 G/DL (ref 13–17.7)
LYMPHOCYTES # BLD AUTO: 1.2 10*3/MM3 (ref 0.7–3.1)
LYMPHOCYTES NFR BLD AUTO: 8.2 % (ref 19.6–45.3)
MCH RBC QN AUTO: 27 PG (ref 26.6–33)
MCHC RBC AUTO-ENTMCNC: 33 G/DL (ref 31.5–35.7)
MCV RBC AUTO: 81.8 FL (ref 79–97)
MONOCYTES # BLD AUTO: 0.4 10*3/MM3 (ref 0.1–0.9)
MONOCYTES NFR BLD AUTO: 2.5 % (ref 5–12)
NEUTROPHILS NFR BLD AUTO: 12.8 10*3/MM3 (ref 1.7–7)
NEUTROPHILS NFR BLD AUTO: 88 % (ref 42.7–76)
NRBC BLD AUTO-RTO: 0 /100 WBC (ref 0–0.2)
PLATELET # BLD AUTO: 293 10*3/MM3 (ref 140–450)
PMV BLD AUTO: 9.3 FL (ref 6–12)
POTASSIUM SERPL-SCNC: 4.9 MMOL/L (ref 3.5–5.2)
RBC # BLD AUTO: 5.35 10*6/MM3 (ref 4.14–5.8)
SODIUM SERPL-SCNC: 131 MMOL/L (ref 136–145)
WBC NRBC COR # BLD: 14.5 10*3/MM3 (ref 3.4–10.8)

## 2022-10-26 PROCEDURE — 82962 GLUCOSE BLOOD TEST: CPT

## 2022-10-26 PROCEDURE — G0378 HOSPITAL OBSERVATION PER HR: HCPCS

## 2022-10-26 PROCEDURE — 63710000001 INSULIN LISPRO (HUMAN) PER 5 UNITS: Performed by: INTERNAL MEDICINE

## 2022-10-26 PROCEDURE — 25010000002 MORPHINE PER 10 MG: Performed by: PHYSICIAN ASSISTANT

## 2022-10-26 PROCEDURE — 80048 BASIC METABOLIC PNL TOTAL CA: CPT | Performed by: PHYSICIAN ASSISTANT

## 2022-10-26 PROCEDURE — 99284 EMERGENCY DEPT VISIT MOD MDM: CPT

## 2022-10-26 PROCEDURE — 63710000001 INSULIN GLARGINE PER 5 UNITS: Performed by: INTERNAL MEDICINE

## 2022-10-26 PROCEDURE — 72148 MRI LUMBAR SPINE W/O DYE: CPT

## 2022-10-26 PROCEDURE — 25010000002 METHYLPREDNISOLONE PER 125 MG: Performed by: PHYSICIAN ASSISTANT

## 2022-10-26 PROCEDURE — 85025 COMPLETE CBC W/AUTO DIFF WBC: CPT | Performed by: PHYSICIAN ASSISTANT

## 2022-10-26 PROCEDURE — 63710000001 ONDANSETRON ODT 4 MG TABLET DISPERSIBLE: Performed by: PHYSICIAN ASSISTANT

## 2022-10-26 PROCEDURE — 96372 THER/PROPH/DIAG INJ SC/IM: CPT

## 2022-10-26 RX ORDER — PROMETHAZINE HYDROCHLORIDE 25 MG/1
25 TABLET ORAL EVERY 8 HOURS PRN
Status: DISCONTINUED | OUTPATIENT
Start: 2022-10-26 | End: 2022-10-29 | Stop reason: HOSPADM

## 2022-10-26 RX ORDER — LANOLIN ALCOHOL/MO/W.PET/CERES
1000 CREAM (GRAM) TOPICAL DAILY
Status: DISCONTINUED | OUTPATIENT
Start: 2022-10-27 | End: 2022-10-29 | Stop reason: HOSPADM

## 2022-10-26 RX ORDER — MONTELUKAST SODIUM 10 MG/1
10 TABLET ORAL NIGHTLY
Status: DISCONTINUED | OUTPATIENT
Start: 2022-10-26 | End: 2022-10-29 | Stop reason: HOSPADM

## 2022-10-26 RX ORDER — FLUOXETINE HYDROCHLORIDE 20 MG/1
40 CAPSULE ORAL 2 TIMES DAILY
Status: DISCONTINUED | OUTPATIENT
Start: 2022-10-27 | End: 2022-10-29 | Stop reason: HOSPADM

## 2022-10-26 RX ORDER — CHLORTHALIDONE 25 MG/1
50 TABLET ORAL DAILY
Status: DISCONTINUED | OUTPATIENT
Start: 2022-10-27 | End: 2022-10-29 | Stop reason: HOSPADM

## 2022-10-26 RX ORDER — ECHINACEA PURPUREA EXTRACT 125 MG
1 TABLET ORAL AS NEEDED
Status: DISCONTINUED | OUTPATIENT
Start: 2022-10-26 | End: 2022-10-29 | Stop reason: HOSPADM

## 2022-10-26 RX ORDER — AZELASTINE 1 MG/ML
2 SPRAY, METERED NASAL 2 TIMES DAILY
Status: DISCONTINUED | OUTPATIENT
Start: 2022-10-27 | End: 2022-10-29 | Stop reason: HOSPADM

## 2022-10-26 RX ORDER — OLANZAPINE 10 MG/2ML
1 INJECTION, POWDER, LYOPHILIZED, FOR SOLUTION INTRAMUSCULAR
Status: DISCONTINUED | OUTPATIENT
Start: 2022-10-26 | End: 2022-10-28

## 2022-10-26 RX ORDER — INSULIN LISPRO 100 [IU]/ML
1-200 INJECTION, SOLUTION INTRAVENOUS; SUBCUTANEOUS
Status: DISCONTINUED | OUTPATIENT
Start: 2022-10-26 | End: 2022-10-28

## 2022-10-26 RX ORDER — METHOCARBAMOL 750 MG/1
750 TABLET, FILM COATED ORAL 3 TIMES DAILY PRN
Status: DISCONTINUED | OUTPATIENT
Start: 2022-10-26 | End: 2022-10-28

## 2022-10-26 RX ORDER — GABAPENTIN 300 MG/1
300 CAPSULE ORAL 3 TIMES DAILY
Status: DISCONTINUED | OUTPATIENT
Start: 2022-10-27 | End: 2022-10-29 | Stop reason: HOSPADM

## 2022-10-26 RX ORDER — ONDANSETRON 4 MG/1
4 TABLET, ORALLY DISINTEGRATING ORAL ONCE
Status: COMPLETED | OUTPATIENT
Start: 2022-10-26 | End: 2022-10-26

## 2022-10-26 RX ORDER — AMLODIPINE BESYLATE 5 MG/1
10 TABLET ORAL DAILY
Status: DISCONTINUED | OUTPATIENT
Start: 2022-10-27 | End: 2022-10-29 | Stop reason: HOSPADM

## 2022-10-26 RX ORDER — PANTOPRAZOLE SODIUM 40 MG/1
40 TABLET, DELAYED RELEASE ORAL DAILY
Status: DISCONTINUED | OUTPATIENT
Start: 2022-10-27 | End: 2022-10-29 | Stop reason: HOSPADM

## 2022-10-26 RX ORDER — DEXTROSE MONOHYDRATE 25 G/50ML
10-50 INJECTION, SOLUTION INTRAVENOUS
Status: DISCONTINUED | OUTPATIENT
Start: 2022-10-26 | End: 2022-10-28

## 2022-10-26 RX ORDER — METHYLPREDNISOLONE SODIUM SUCCINATE 125 MG/2ML
80 INJECTION, POWDER, LYOPHILIZED, FOR SOLUTION INTRAMUSCULAR; INTRAVENOUS ONCE
Status: COMPLETED | OUTPATIENT
Start: 2022-10-26 | End: 2022-10-26

## 2022-10-26 RX ORDER — LOSARTAN POTASSIUM 25 MG/1
50 TABLET ORAL DAILY
Status: DISCONTINUED | OUTPATIENT
Start: 2022-10-27 | End: 2022-10-29 | Stop reason: HOSPADM

## 2022-10-26 RX ORDER — ACETAMINOPHEN AND CODEINE PHOSPHATE 300; 30 MG/1; MG/1
1 TABLET ORAL EVERY 4 HOURS PRN
Status: DISCONTINUED | OUTPATIENT
Start: 2022-10-26 | End: 2022-10-29 | Stop reason: HOSPADM

## 2022-10-26 RX ORDER — CETIRIZINE HYDROCHLORIDE 10 MG/1
10 TABLET ORAL DAILY
Status: DISCONTINUED | OUTPATIENT
Start: 2022-10-27 | End: 2022-10-29 | Stop reason: HOSPADM

## 2022-10-26 RX ORDER — METHOCARBAMOL 750 MG/1
750 TABLET, FILM COATED ORAL 4 TIMES DAILY
Status: DISCONTINUED | OUTPATIENT
Start: 2022-10-26 | End: 2022-10-29 | Stop reason: HOSPADM

## 2022-10-26 RX ORDER — NICOTINE POLACRILEX 4 MG
15 LOZENGE BUCCAL
Status: DISCONTINUED | OUTPATIENT
Start: 2022-10-26 | End: 2022-10-28

## 2022-10-26 RX ORDER — HYDROXYZINE HYDROCHLORIDE 25 MG/1
25 TABLET, FILM COATED ORAL 4 TIMES DAILY PRN
Status: DISCONTINUED | OUTPATIENT
Start: 2022-10-26 | End: 2022-10-29 | Stop reason: HOSPADM

## 2022-10-26 RX ORDER — DIAPER,BRIEF,INFANT-TODD,DISP
EACH MISCELLANEOUS 2 TIMES DAILY
Status: DISCONTINUED | OUTPATIENT
Start: 2022-10-27 | End: 2022-10-29 | Stop reason: HOSPADM

## 2022-10-26 RX ORDER — LIDOCAINE 40 MG/G
CREAM TOPICAL AS NEEDED
Status: DISCONTINUED | OUTPATIENT
Start: 2022-10-26 | End: 2022-10-29 | Stop reason: HOSPADM

## 2022-10-26 RX ORDER — INSULIN LISPRO 100 [IU]/ML
1-200 INJECTION, SOLUTION INTRAVENOUS; SUBCUTANEOUS AS NEEDED
Status: DISCONTINUED | OUTPATIENT
Start: 2022-10-26 | End: 2022-10-28

## 2022-10-26 RX ORDER — AMANTADINE HYDROCHLORIDE 100 MG/1
100 CAPSULE, GELATIN COATED ORAL DAILY
Status: DISCONTINUED | OUTPATIENT
Start: 2022-10-27 | End: 2022-10-27

## 2022-10-26 RX ORDER — MORPHINE SULFATE 2 MG/ML
1 INJECTION, SOLUTION INTRAMUSCULAR; INTRAVENOUS
Status: DISCONTINUED | OUTPATIENT
Start: 2022-10-26 | End: 2022-10-29 | Stop reason: HOSPADM

## 2022-10-26 RX ORDER — SODIUM CHLORIDE 0.9 % (FLUSH) 0.9 %
10 SYRINGE (ML) INJECTION AS NEEDED
Status: DISCONTINUED | OUTPATIENT
Start: 2022-10-26 | End: 2022-10-29 | Stop reason: HOSPADM

## 2022-10-26 RX ADMIN — METHYLPREDNISOLONE SODIUM SUCCINATE 80 MG: 125 INJECTION, POWDER, FOR SOLUTION INTRAMUSCULAR; INTRAVENOUS at 15:53

## 2022-10-26 RX ADMIN — INSULIN LISPRO 15 UNITS: 100 INJECTION, SOLUTION INTRAVENOUS; SUBCUTANEOUS at 21:53

## 2022-10-26 RX ADMIN — MORPHINE SULFATE 4 MG: 4 INJECTION INTRAVENOUS at 15:53

## 2022-10-26 RX ADMIN — ONDANSETRON 4 MG: 4 TABLET, ORALLY DISINTEGRATING ORAL at 15:53

## 2022-10-26 RX ADMIN — INSULIN GLARGINE 32 UNITS: 100 INJECTION, SOLUTION SUBCUTANEOUS at 21:53

## 2022-10-26 RX ADMIN — METHOCARBAMOL 750 MG: 750 TABLET ORAL at 18:27

## 2022-10-27 ENCOUNTER — APPOINTMENT (OUTPATIENT)
Dept: GENERAL RADIOLOGY | Facility: HOSPITAL | Age: 42
End: 2022-10-27

## 2022-10-27 LAB
ANION GAP SERPL CALCULATED.3IONS-SCNC: 16 MMOL/L (ref 5–15)
BASOPHILS # BLD AUTO: 0.1 10*3/MM3 (ref 0–0.2)
BASOPHILS NFR BLD AUTO: 0.4 % (ref 0–1.5)
BUN SERPL-MCNC: 27 MG/DL (ref 6–20)
BUN/CREAT SERPL: 24.3 (ref 7–25)
CALCIUM SPEC-SCNC: 10 MG/DL (ref 8.6–10.5)
CHLORIDE SERPL-SCNC: 95 MMOL/L (ref 98–107)
CO2 SERPL-SCNC: 22 MMOL/L (ref 22–29)
CREAT SERPL-MCNC: 1.11 MG/DL (ref 0.76–1.27)
CRP SERPL-MCNC: 0.48 MG/DL (ref 0–0.5)
DEPRECATED RDW RBC AUTO: 42 FL (ref 37–54)
EGFRCR SERPLBLD CKD-EPI 2021: 85.6 ML/MIN/1.73
EOSINOPHIL # BLD AUTO: 0 10*3/MM3 (ref 0–0.4)
EOSINOPHIL NFR BLD AUTO: 0 % (ref 0.3–6.2)
ERYTHROCYTE [DISTWIDTH] IN BLOOD BY AUTOMATED COUNT: 14.7 % (ref 12.3–15.4)
ERYTHROCYTE [SEDIMENTATION RATE] IN BLOOD: 49 MM/HR (ref 0–15)
GLUCOSE BLDC GLUCOMTR-MCNC: 354 MG/DL (ref 70–105)
GLUCOSE BLDC GLUCOMTR-MCNC: 405 MG/DL (ref 70–105)
GLUCOSE BLDC GLUCOMTR-MCNC: 430 MG/DL (ref 70–105)
GLUCOSE SERPL-MCNC: 433 MG/DL (ref 65–99)
HCT VFR BLD AUTO: 45.4 % (ref 37.5–51)
HGB BLD-MCNC: 14.8 G/DL (ref 13–17.7)
LYMPHOCYTES # BLD AUTO: 1.9 10*3/MM3 (ref 0.7–3.1)
LYMPHOCYTES NFR BLD AUTO: 12.1 % (ref 19.6–45.3)
MCH RBC QN AUTO: 26.5 PG (ref 26.6–33)
MCHC RBC AUTO-ENTMCNC: 32.6 G/DL (ref 31.5–35.7)
MCV RBC AUTO: 81.2 FL (ref 79–97)
MONOCYTES # BLD AUTO: 0.8 10*3/MM3 (ref 0.1–0.9)
MONOCYTES NFR BLD AUTO: 4.9 % (ref 5–12)
NEUTROPHILS NFR BLD AUTO: 13.2 10*3/MM3 (ref 1.7–7)
NEUTROPHILS NFR BLD AUTO: 82.6 % (ref 42.7–76)
NRBC BLD AUTO-RTO: 0.1 /100 WBC (ref 0–0.2)
PLATELET # BLD AUTO: 310 10*3/MM3 (ref 140–450)
PMV BLD AUTO: 9.4 FL (ref 6–12)
POTASSIUM SERPL-SCNC: 4.4 MMOL/L (ref 3.5–5.2)
RBC # BLD AUTO: 5.58 10*6/MM3 (ref 4.14–5.8)
SODIUM SERPL-SCNC: 133 MMOL/L (ref 136–145)
WBC NRBC COR # BLD: 15.9 10*3/MM3 (ref 3.4–10.8)

## 2022-10-27 PROCEDURE — 82962 GLUCOSE BLOOD TEST: CPT

## 2022-10-27 PROCEDURE — 80048 BASIC METABOLIC PNL TOTAL CA: CPT | Performed by: INTERNAL MEDICINE

## 2022-10-27 PROCEDURE — 63710000001 INSULIN LISPRO (HUMAN) PER 5 UNITS: Performed by: INTERNAL MEDICINE

## 2022-10-27 PROCEDURE — 96375 TX/PRO/DX INJ NEW DRUG ADDON: CPT

## 2022-10-27 PROCEDURE — 25010000002 HEPARIN (PORCINE) PER 1000 UNITS: Performed by: STUDENT IN AN ORGANIZED HEALTH CARE EDUCATION/TRAINING PROGRAM

## 2022-10-27 PROCEDURE — 25010000002 MORPHINE PER 10 MG: Performed by: INTERNAL MEDICINE

## 2022-10-27 PROCEDURE — 85652 RBC SED RATE AUTOMATED: CPT | Performed by: INTERNAL MEDICINE

## 2022-10-27 PROCEDURE — 85025 COMPLETE CBC W/AUTO DIFF WBC: CPT | Performed by: INTERNAL MEDICINE

## 2022-10-27 PROCEDURE — 63710000001 INSULIN GLARGINE PER 5 UNITS: Performed by: INTERNAL MEDICINE

## 2022-10-27 PROCEDURE — 72082 X-RAY EXAM ENTIRE SPI 2/3 VW: CPT

## 2022-10-27 PROCEDURE — 99214 OFFICE O/P EST MOD 30 MIN: CPT | Performed by: NEUROLOGICAL SURGERY

## 2022-10-27 PROCEDURE — 96374 THER/PROPH/DIAG INJ IV PUSH: CPT

## 2022-10-27 PROCEDURE — 86140 C-REACTIVE PROTEIN: CPT | Performed by: INTERNAL MEDICINE

## 2022-10-27 PROCEDURE — 96372 THER/PROPH/DIAG INJ SC/IM: CPT

## 2022-10-27 PROCEDURE — 96376 TX/PRO/DX INJ SAME DRUG ADON: CPT

## 2022-10-27 PROCEDURE — 25010000002 KETOROLAC TROMETHAMINE PER 15 MG: Performed by: STUDENT IN AN ORGANIZED HEALTH CARE EDUCATION/TRAINING PROGRAM

## 2022-10-27 PROCEDURE — 72114 X-RAY EXAM L-S SPINE BENDING: CPT

## 2022-10-27 PROCEDURE — G0378 HOSPITAL OBSERVATION PER HR: HCPCS

## 2022-10-27 RX ORDER — HEPARIN SODIUM 5000 [USP'U]/ML
5000 INJECTION, SOLUTION INTRAVENOUS; SUBCUTANEOUS EVERY 8 HOURS SCHEDULED
Status: DISCONTINUED | OUTPATIENT
Start: 2022-10-27 | End: 2022-10-29 | Stop reason: HOSPADM

## 2022-10-27 RX ORDER — KETOROLAC TROMETHAMINE 15 MG/ML
15 INJECTION, SOLUTION INTRAMUSCULAR; INTRAVENOUS EVERY 6 HOURS PRN
Status: DISCONTINUED | OUTPATIENT
Start: 2022-10-27 | End: 2022-10-29 | Stop reason: HOSPADM

## 2022-10-27 RX ORDER — HYDRALAZINE HYDROCHLORIDE 20 MG/ML
10 INJECTION INTRAMUSCULAR; INTRAVENOUS EVERY 6 HOURS PRN
Status: DISCONTINUED | OUTPATIENT
Start: 2022-10-27 | End: 2022-10-29 | Stop reason: HOSPADM

## 2022-10-27 RX ADMIN — HYDROCORTISONE: 1 CREAM TOPICAL at 08:12

## 2022-10-27 RX ADMIN — OXYBUTYNIN CHLORIDE 15 MG: 10 TABLET, EXTENDED RELEASE ORAL at 08:11

## 2022-10-27 RX ADMIN — PANTOPRAZOLE SODIUM 40 MG: 40 TABLET, DELAYED RELEASE ORAL at 08:12

## 2022-10-27 RX ADMIN — METHOCARBAMOL 750 MG: 750 TABLET ORAL at 12:06

## 2022-10-27 RX ADMIN — GABAPENTIN 300 MG: 300 CAPSULE ORAL at 21:07

## 2022-10-27 RX ADMIN — ACETAMINOPHEN AND CODEINE PHOSPHATE 1 TABLET: 300; 30 TABLET ORAL at 21:42

## 2022-10-27 RX ADMIN — CYANOCOBALAMIN TAB 1000 MCG 1000 MCG: 1000 TAB at 08:12

## 2022-10-27 RX ADMIN — METHOCARBAMOL 750 MG: 750 TABLET ORAL at 21:07

## 2022-10-27 RX ADMIN — HEPARIN SODIUM 5000 UNITS: 5000 INJECTION INTRAVENOUS; SUBCUTANEOUS at 14:06

## 2022-10-27 RX ADMIN — FLUOXETINE 40 MG: 20 CAPSULE ORAL at 21:07

## 2022-10-27 RX ADMIN — HEPARIN SODIUM 5000 UNITS: 5000 INJECTION INTRAVENOUS; SUBCUTANEOUS at 21:07

## 2022-10-27 RX ADMIN — INSULIN LISPRO 23 UNITS: 100 INJECTION, SOLUTION INTRAVENOUS; SUBCUTANEOUS at 18:33

## 2022-10-27 RX ADMIN — Medication 10 ML: at 08:12

## 2022-10-27 RX ADMIN — METHOCARBAMOL 750 MG: 750 TABLET ORAL at 18:37

## 2022-10-27 RX ADMIN — AZELASTINE HYDROCHLORIDE 2 SPRAY: 137 SPRAY, METERED NASAL at 21:08

## 2022-10-27 RX ADMIN — GABAPENTIN 300 MG: 300 CAPSULE ORAL at 16:41

## 2022-10-27 RX ADMIN — INSULIN GLARGINE 42 UNITS: 100 INJECTION, SOLUTION SUBCUTANEOUS at 21:42

## 2022-10-27 RX ADMIN — KETOROLAC TROMETHAMINE 15 MG: 15 INJECTION, SOLUTION INTRAMUSCULAR; INTRAVENOUS at 18:34

## 2022-10-27 RX ADMIN — FLUOXETINE 40 MG: 20 CAPSULE ORAL at 08:11

## 2022-10-27 RX ADMIN — INSULIN LISPRO 20 UNITS: 100 INJECTION, SOLUTION INTRAVENOUS; SUBCUTANEOUS at 08:11

## 2022-10-27 RX ADMIN — MONTELUKAST 10 MG: 10 TABLET, FILM COATED ORAL at 21:07

## 2022-10-27 RX ADMIN — GABAPENTIN 300 MG: 300 CAPSULE ORAL at 08:12

## 2022-10-27 RX ADMIN — CHLORTHALIDONE 50 MG: 25 TABLET ORAL at 08:11

## 2022-10-27 RX ADMIN — LOSARTAN POTASSIUM 50 MG: 25 TABLET, FILM COATED ORAL at 07:04

## 2022-10-27 RX ADMIN — CETIRIZINE HYDROCHLORIDE 10 MG: 10 TABLET, FILM COATED ORAL at 08:12

## 2022-10-27 RX ADMIN — AMLODIPINE BESYLATE 10 MG: 5 TABLET ORAL at 07:04

## 2022-10-27 RX ADMIN — MORPHINE SULFATE 1 MG: 2 INJECTION, SOLUTION INTRAMUSCULAR; INTRAVENOUS at 01:08

## 2022-10-27 RX ADMIN — AZELASTINE HYDROCHLORIDE 2 SPRAY: 137 SPRAY, METERED NASAL at 08:12

## 2022-10-27 RX ADMIN — INSULIN LISPRO 17 UNITS: 100 INJECTION, SOLUTION INTRAVENOUS; SUBCUTANEOUS at 14:06

## 2022-10-27 RX ADMIN — Medication 10 ML: at 21:07

## 2022-10-27 RX ADMIN — METHOCARBAMOL 750 MG: 750 TABLET ORAL at 08:12

## 2022-10-28 ENCOUNTER — TELEPHONE (OUTPATIENT)
Dept: PAIN MEDICINE | Facility: CLINIC | Age: 42
End: 2022-10-28

## 2022-10-28 LAB
ANION GAP SERPL CALCULATED.3IONS-SCNC: 14 MMOL/L (ref 5–15)
BUN SERPL-MCNC: 29 MG/DL (ref 6–20)
BUN/CREAT SERPL: 25.7 (ref 7–25)
CALCIUM SPEC-SCNC: 9.3 MG/DL (ref 8.6–10.5)
CHLORIDE SERPL-SCNC: 96 MMOL/L (ref 98–107)
CO2 SERPL-SCNC: 23 MMOL/L (ref 22–29)
CREAT SERPL-MCNC: 1.13 MG/DL (ref 0.76–1.27)
DEPRECATED RDW RBC AUTO: 43.3 FL (ref 37–54)
EGFRCR SERPLBLD CKD-EPI 2021: 83.7 ML/MIN/1.73
ERYTHROCYTE [DISTWIDTH] IN BLOOD BY AUTOMATED COUNT: 15.3 % (ref 12.3–15.4)
GLUCOSE BLDC GLUCOMTR-MCNC: 281 MG/DL (ref 70–105)
GLUCOSE BLDC GLUCOMTR-MCNC: 296 MG/DL (ref 70–105)
GLUCOSE BLDC GLUCOMTR-MCNC: 343 MG/DL (ref 70–105)
GLUCOSE BLDC GLUCOMTR-MCNC: 351 MG/DL (ref 70–105)
GLUCOSE SERPL-MCNC: 435 MG/DL (ref 65–99)
HBA1C MFR BLD: 12.9 % (ref 3.5–5.6)
HCT VFR BLD AUTO: 44.2 % (ref 37.5–51)
HGB BLD-MCNC: 14.2 G/DL (ref 13–17.7)
MCH RBC QN AUTO: 26.1 PG (ref 26.6–33)
MCHC RBC AUTO-ENTMCNC: 32.1 G/DL (ref 31.5–35.7)
MCV RBC AUTO: 81.3 FL (ref 79–97)
PLATELET # BLD AUTO: 269 10*3/MM3 (ref 140–450)
PMV BLD AUTO: 9.3 FL (ref 6–12)
POTASSIUM SERPL-SCNC: 4.3 MMOL/L (ref 3.5–5.2)
RBC # BLD AUTO: 5.44 10*6/MM3 (ref 4.14–5.8)
SODIUM SERPL-SCNC: 133 MMOL/L (ref 136–145)
WBC NRBC COR # BLD: 12 10*3/MM3 (ref 3.4–10.8)

## 2022-10-28 PROCEDURE — 96376 TX/PRO/DX INJ SAME DRUG ADON: CPT

## 2022-10-28 PROCEDURE — G0378 HOSPITAL OBSERVATION PER HR: HCPCS

## 2022-10-28 PROCEDURE — 80048 BASIC METABOLIC PNL TOTAL CA: CPT | Performed by: STUDENT IN AN ORGANIZED HEALTH CARE EDUCATION/TRAINING PROGRAM

## 2022-10-28 PROCEDURE — G0108 DIAB MANAGE TRN  PER INDIV: HCPCS

## 2022-10-28 PROCEDURE — 96372 THER/PROPH/DIAG INJ SC/IM: CPT

## 2022-10-28 PROCEDURE — 25010000002 MORPHINE PER 10 MG: Performed by: INTERNAL MEDICINE

## 2022-10-28 PROCEDURE — 85027 COMPLETE CBC AUTOMATED: CPT | Performed by: STUDENT IN AN ORGANIZED HEALTH CARE EDUCATION/TRAINING PROGRAM

## 2022-10-28 PROCEDURE — 83036 HEMOGLOBIN GLYCOSYLATED A1C: CPT | Performed by: NURSE PRACTITIONER

## 2022-10-28 PROCEDURE — 97165 OT EVAL LOW COMPLEX 30 MIN: CPT

## 2022-10-28 PROCEDURE — 99214 OFFICE O/P EST MOD 30 MIN: CPT | Performed by: NURSE PRACTITIONER

## 2022-10-28 PROCEDURE — 25010000002 HEPARIN (PORCINE) PER 1000 UNITS: Performed by: STUDENT IN AN ORGANIZED HEALTH CARE EDUCATION/TRAINING PROGRAM

## 2022-10-28 PROCEDURE — 63710000001 INSULIN GLARGINE PER 5 UNITS: Performed by: STUDENT IN AN ORGANIZED HEALTH CARE EDUCATION/TRAINING PROGRAM

## 2022-10-28 PROCEDURE — 63710000001 INSULIN LISPRO (HUMAN) PER 5 UNITS: Performed by: STUDENT IN AN ORGANIZED HEALTH CARE EDUCATION/TRAINING PROGRAM

## 2022-10-28 PROCEDURE — 82962 GLUCOSE BLOOD TEST: CPT

## 2022-10-28 RX ORDER — INSULIN LISPRO 100 [IU]/ML
3-14 INJECTION, SOLUTION INTRAVENOUS; SUBCUTANEOUS
Status: DISCONTINUED | OUTPATIENT
Start: 2022-10-28 | End: 2022-10-29 | Stop reason: HOSPADM

## 2022-10-28 RX ORDER — OLANZAPINE 10 MG/2ML
1 INJECTION, POWDER, LYOPHILIZED, FOR SOLUTION INTRAMUSCULAR
Status: DISCONTINUED | OUTPATIENT
Start: 2022-10-28 | End: 2022-10-29 | Stop reason: HOSPADM

## 2022-10-28 RX ORDER — NICOTINE POLACRILEX 4 MG
15 LOZENGE BUCCAL
Status: DISCONTINUED | OUTPATIENT
Start: 2022-10-28 | End: 2022-10-29 | Stop reason: HOSPADM

## 2022-10-28 RX ORDER — INSULIN LISPRO 100 [IU]/ML
5 INJECTION, SOLUTION INTRAVENOUS; SUBCUTANEOUS
Status: DISCONTINUED | OUTPATIENT
Start: 2022-10-28 | End: 2022-10-28

## 2022-10-28 RX ORDER — DEXTROSE MONOHYDRATE 25 G/50ML
25 INJECTION, SOLUTION INTRAVENOUS
Status: DISCONTINUED | OUTPATIENT
Start: 2022-10-28 | End: 2022-10-29 | Stop reason: HOSPADM

## 2022-10-28 RX ORDER — INSULIN LISPRO 100 [IU]/ML
8 INJECTION, SOLUTION INTRAVENOUS; SUBCUTANEOUS
Status: DISCONTINUED | OUTPATIENT
Start: 2022-10-29 | End: 2022-10-29

## 2022-10-28 RX ADMIN — ACETAMINOPHEN AND CODEINE PHOSPHATE 1 TABLET: 300; 30 TABLET ORAL at 21:38

## 2022-10-28 RX ADMIN — LOSARTAN POTASSIUM 50 MG: 25 TABLET, FILM COATED ORAL at 08:34

## 2022-10-28 RX ADMIN — HEPARIN SODIUM 5000 UNITS: 5000 INJECTION INTRAVENOUS; SUBCUTANEOUS at 21:33

## 2022-10-28 RX ADMIN — INSULIN LISPRO 10 UNITS: 100 INJECTION, SOLUTION INTRAVENOUS; SUBCUTANEOUS at 12:40

## 2022-10-28 RX ADMIN — MONTELUKAST 10 MG: 10 TABLET, FILM COATED ORAL at 21:33

## 2022-10-28 RX ADMIN — AZELASTINE HYDROCHLORIDE 2 SPRAY: 137 SPRAY, METERED NASAL at 08:36

## 2022-10-28 RX ADMIN — INSULIN LISPRO 5 UNITS: 100 INJECTION, SOLUTION INTRAVENOUS; SUBCUTANEOUS at 08:34

## 2022-10-28 RX ADMIN — MORPHINE SULFATE 1 MG: 2 INJECTION, SOLUTION INTRAMUSCULAR; INTRAVENOUS at 06:14

## 2022-10-28 RX ADMIN — FLUOXETINE 40 MG: 20 CAPSULE ORAL at 08:36

## 2022-10-28 RX ADMIN — GABAPENTIN 300 MG: 300 CAPSULE ORAL at 08:37

## 2022-10-28 RX ADMIN — GABAPENTIN 300 MG: 300 CAPSULE ORAL at 15:31

## 2022-10-28 RX ADMIN — INSULIN LISPRO 5 UNITS: 100 INJECTION, SOLUTION INTRAVENOUS; SUBCUTANEOUS at 12:41

## 2022-10-28 RX ADMIN — METHOCARBAMOL 750 MG: 750 TABLET ORAL at 21:33

## 2022-10-28 RX ADMIN — HYDROCORTISONE: 1 CREAM TOPICAL at 08:37

## 2022-10-28 RX ADMIN — INSULIN LISPRO 5 UNITS: 100 INJECTION, SOLUTION INTRAVENOUS; SUBCUTANEOUS at 17:50

## 2022-10-28 RX ADMIN — METHOCARBAMOL 750 MG: 750 TABLET ORAL at 17:50

## 2022-10-28 RX ADMIN — AZELASTINE HYDROCHLORIDE 2 SPRAY: 137 SPRAY, METERED NASAL at 21:35

## 2022-10-28 RX ADMIN — PANTOPRAZOLE SODIUM 40 MG: 40 TABLET, DELAYED RELEASE ORAL at 08:36

## 2022-10-28 RX ADMIN — INSULIN LISPRO 10 UNITS: 100 INJECTION, SOLUTION INTRAVENOUS; SUBCUTANEOUS at 08:36

## 2022-10-28 RX ADMIN — ACETAMINOPHEN AND CODEINE PHOSPHATE 1 TABLET: 300; 30 TABLET ORAL at 14:22

## 2022-10-28 RX ADMIN — METHOCARBAMOL 750 MG: 750 TABLET ORAL at 12:40

## 2022-10-28 RX ADMIN — FLUOXETINE 40 MG: 20 CAPSULE ORAL at 21:33

## 2022-10-28 RX ADMIN — ACETAMINOPHEN AND CODEINE PHOSPHATE 1 TABLET: 300; 30 TABLET ORAL at 02:49

## 2022-10-28 RX ADMIN — INSULIN GLARGINE 25 UNITS: 100 INJECTION, SOLUTION SUBCUTANEOUS at 08:42

## 2022-10-28 RX ADMIN — HEPARIN SODIUM 5000 UNITS: 5000 INJECTION INTRAVENOUS; SUBCUTANEOUS at 06:08

## 2022-10-28 RX ADMIN — OXYBUTYNIN CHLORIDE 15 MG: 10 TABLET, EXTENDED RELEASE ORAL at 08:35

## 2022-10-28 RX ADMIN — METHOCARBAMOL 750 MG: 750 TABLET ORAL at 08:34

## 2022-10-28 RX ADMIN — GABAPENTIN 300 MG: 300 CAPSULE ORAL at 21:33

## 2022-10-28 RX ADMIN — HEPARIN SODIUM 5000 UNITS: 5000 INJECTION INTRAVENOUS; SUBCUTANEOUS at 14:14

## 2022-10-28 RX ADMIN — CETIRIZINE HYDROCHLORIDE 10 MG: 10 TABLET, FILM COATED ORAL at 08:36

## 2022-10-28 RX ADMIN — AMLODIPINE BESYLATE 10 MG: 5 TABLET ORAL at 08:35

## 2022-10-28 RX ADMIN — CHLORTHALIDONE 50 MG: 25 TABLET ORAL at 08:34

## 2022-10-28 RX ADMIN — INSULIN LISPRO 8 UNITS: 100 INJECTION, SOLUTION INTRAVENOUS; SUBCUTANEOUS at 17:50

## 2022-10-28 RX ADMIN — CYANOCOBALAMIN TAB 1000 MCG 1000 MCG: 1000 TAB at 08:34

## 2022-10-28 NOTE — TELEPHONE ENCOUNTER
Provider: DR PATTERSON     Caller: COBY BARTH    Relationship to Patient: SELF     Phone Number: [phone]    Reason for Call: PT WOULD LIKE A CALL BACK TO DISCUSS BACK PAIN. PT IS CURRENTLY AT [facility] DUE TO PAIN. PT HAD MRI 10-26-22 & X-RAY 10-27-22.

## 2022-10-28 NOTE — TELEPHONE ENCOUNTER
The new MRI shows his disc degeneration and nerve root impingement have gotten worse since his last MRI. If he is in this much pain even after the LESI, he should probably talk to a surgeon. Would he like me to place the referral? In the meantime, let's get him scheduled for a f/u visit ASAP to discuss medication changes. Thanks.

## 2022-10-28 NOTE — TELEPHONE ENCOUNTER
Patient is asking for any suggestions on what to do now for his pain. He had epidural about 1 week ago MRI done 2 days ago, patient states he is not able to get up or function he lives on a 3rd floor apartment. He is afraid he well just ended up at ER again.

## 2022-10-29 ENCOUNTER — READMISSION MANAGEMENT (OUTPATIENT)
Dept: CALL CENTER | Facility: HOSPITAL | Age: 42
End: 2022-10-29

## 2022-10-29 VITALS
DIASTOLIC BLOOD PRESSURE: 85 MMHG | WEIGHT: 282.19 LBS | TEMPERATURE: 97.6 F | SYSTOLIC BLOOD PRESSURE: 145 MMHG | HEART RATE: 75 BPM | RESPIRATION RATE: 18 BRPM | BODY MASS INDEX: 35.09 KG/M2 | OXYGEN SATURATION: 97 % | HEIGHT: 75 IN

## 2022-10-29 LAB
ANION GAP SERPL CALCULATED.3IONS-SCNC: 11 MMOL/L (ref 5–15)
BUN SERPL-MCNC: 22 MG/DL (ref 6–20)
BUN/CREAT SERPL: 24.2 (ref 7–25)
CALCIUM SPEC-SCNC: 9 MG/DL (ref 8.6–10.5)
CHLORIDE SERPL-SCNC: 99 MMOL/L (ref 98–107)
CO2 SERPL-SCNC: 23 MMOL/L (ref 22–29)
CREAT SERPL-MCNC: 0.91 MG/DL (ref 0.76–1.27)
DEPRECATED RDW RBC AUTO: 41.6 FL (ref 37–54)
EGFRCR SERPLBLD CKD-EPI 2021: 108.6 ML/MIN/1.73
ERYTHROCYTE [DISTWIDTH] IN BLOOD BY AUTOMATED COUNT: 14.9 % (ref 12.3–15.4)
GLUCOSE BLDC GLUCOMTR-MCNC: 265 MG/DL (ref 70–105)
GLUCOSE BLDC GLUCOMTR-MCNC: 401 MG/DL (ref 70–105)
GLUCOSE SERPL-MCNC: 309 MG/DL (ref 65–99)
HCT VFR BLD AUTO: 44.5 % (ref 37.5–51)
HGB BLD-MCNC: 14.7 G/DL (ref 13–17.7)
MCH RBC QN AUTO: 26.6 PG (ref 26.6–33)
MCHC RBC AUTO-ENTMCNC: 33.1 G/DL (ref 31.5–35.7)
MCV RBC AUTO: 80.5 FL (ref 79–97)
PLATELET # BLD AUTO: 249 10*3/MM3 (ref 140–450)
PMV BLD AUTO: 8.7 FL (ref 6–12)
POTASSIUM SERPL-SCNC: 4.2 MMOL/L (ref 3.5–5.2)
RBC # BLD AUTO: 5.53 10*6/MM3 (ref 4.14–5.8)
SODIUM SERPL-SCNC: 133 MMOL/L (ref 136–145)
WBC NRBC COR # BLD: 11.3 10*3/MM3 (ref 3.4–10.8)

## 2022-10-29 PROCEDURE — 82962 GLUCOSE BLOOD TEST: CPT

## 2022-10-29 PROCEDURE — G0378 HOSPITAL OBSERVATION PER HR: HCPCS

## 2022-10-29 PROCEDURE — 85027 COMPLETE CBC AUTOMATED: CPT | Performed by: STUDENT IN AN ORGANIZED HEALTH CARE EDUCATION/TRAINING PROGRAM

## 2022-10-29 PROCEDURE — 63710000001 INSULIN GLARGINE PER 5 UNITS: Performed by: STUDENT IN AN ORGANIZED HEALTH CARE EDUCATION/TRAINING PROGRAM

## 2022-10-29 PROCEDURE — 25010000002 HEPARIN (PORCINE) PER 1000 UNITS: Performed by: STUDENT IN AN ORGANIZED HEALTH CARE EDUCATION/TRAINING PROGRAM

## 2022-10-29 PROCEDURE — 63710000001 INSULIN LISPRO (HUMAN) PER 5 UNITS: Performed by: STUDENT IN AN ORGANIZED HEALTH CARE EDUCATION/TRAINING PROGRAM

## 2022-10-29 PROCEDURE — 80048 BASIC METABOLIC PNL TOTAL CA: CPT | Performed by: STUDENT IN AN ORGANIZED HEALTH CARE EDUCATION/TRAINING PROGRAM

## 2022-10-29 PROCEDURE — 96372 THER/PROPH/DIAG INJ SC/IM: CPT

## 2022-10-29 RX ORDER — OXYCODONE HYDROCHLORIDE 5 MG/1
5 TABLET ORAL EVERY 6 HOURS PRN
Qty: 15 TABLET | Refills: 0 | Status: SHIPPED | OUTPATIENT
Start: 2022-10-29 | End: 2023-02-22 | Stop reason: HOSPADM

## 2022-10-29 RX ORDER — LANCETS 28 GAUGE
1 EACH MISCELLANEOUS
Qty: 100 EACH | Refills: 2 | Status: SHIPPED | OUTPATIENT
Start: 2022-10-29 | End: 2022-11-14 | Stop reason: SDUPTHER

## 2022-10-29 RX ORDER — ISOPROPYL ALCOHOL 0.7 ML/1
1 SWAB TOPICAL
Qty: 200 EACH | Refills: 2 | Status: SHIPPED | OUTPATIENT
Start: 2022-10-29 | End: 2023-02-20 | Stop reason: SDUPTHER

## 2022-10-29 RX ORDER — PEN NEEDLE, DIABETIC 30 GX3/16"
1 NEEDLE, DISPOSABLE MISCELLANEOUS
Qty: 200 EACH | Refills: 2 | Status: SHIPPED | OUTPATIENT
Start: 2022-10-29 | End: 2023-02-20 | Stop reason: SDUPTHER

## 2022-10-29 RX ORDER — METHOCARBAMOL 750 MG/1
750 TABLET, FILM COATED ORAL 4 TIMES DAILY
Qty: 20 TABLET | Refills: 0 | Status: SHIPPED | OUTPATIENT
Start: 2022-10-29 | End: 2022-11-04 | Stop reason: SDUPTHER

## 2022-10-29 RX ORDER — INSULIN GLARGINE 100 [IU]/ML
30 INJECTION, SOLUTION SUBCUTANEOUS NIGHTLY
Qty: 15 ML | Refills: 2 | Status: SHIPPED | OUTPATIENT
Start: 2022-10-29 | End: 2023-01-05 | Stop reason: SDUPTHER

## 2022-10-29 RX ORDER — INSULIN LISPRO 100 [IU]/ML
10 INJECTION, SOLUTION INTRAVENOUS; SUBCUTANEOUS
Status: DISCONTINUED | OUTPATIENT
Start: 2022-10-29 | End: 2022-10-29 | Stop reason: HOSPADM

## 2022-10-29 RX ORDER — INSULIN LISPRO 100 U/ML
10 INJECTION, SOLUTION SUBCUTANEOUS
Qty: 15 ML | Refills: 2 | Status: SHIPPED | OUTPATIENT
Start: 2022-10-29 | End: 2023-01-05 | Stop reason: SDUPTHER

## 2022-10-29 RX ADMIN — METHOCARBAMOL 750 MG: 750 TABLET ORAL at 08:44

## 2022-10-29 RX ADMIN — SALINE NASAL SPRAY 1 SPRAY: 1.5 SOLUTION NASAL at 08:47

## 2022-10-29 RX ADMIN — CHLORTHALIDONE 50 MG: 25 TABLET ORAL at 08:44

## 2022-10-29 RX ADMIN — FLUOXETINE 40 MG: 20 CAPSULE ORAL at 08:44

## 2022-10-29 RX ADMIN — HEPARIN SODIUM 5000 UNITS: 5000 INJECTION INTRAVENOUS; SUBCUTANEOUS at 05:52

## 2022-10-29 RX ADMIN — OXYBUTYNIN CHLORIDE 15 MG: 10 TABLET, EXTENDED RELEASE ORAL at 08:44

## 2022-10-29 RX ADMIN — INSULIN LISPRO 10 UNITS: 100 INJECTION, SOLUTION INTRAVENOUS; SUBCUTANEOUS at 08:43

## 2022-10-29 RX ADMIN — SALINE NASAL SPRAY 1 SPRAY: 1.5 SOLUTION NASAL at 03:47

## 2022-10-29 RX ADMIN — INSULIN LISPRO 10 UNITS: 100 INJECTION, SOLUTION INTRAVENOUS; SUBCUTANEOUS at 13:35

## 2022-10-29 RX ADMIN — AMLODIPINE BESYLATE 10 MG: 5 TABLET ORAL at 08:44

## 2022-10-29 RX ADMIN — CETIRIZINE HYDROCHLORIDE 10 MG: 10 TABLET, FILM COATED ORAL at 08:44

## 2022-10-29 RX ADMIN — PANTOPRAZOLE SODIUM 40 MG: 40 TABLET, DELAYED RELEASE ORAL at 08:44

## 2022-10-29 RX ADMIN — GABAPENTIN 300 MG: 300 CAPSULE ORAL at 08:44

## 2022-10-29 RX ADMIN — ACETAMINOPHEN AND CODEINE PHOSPHATE 1 TABLET: 300; 30 TABLET ORAL at 03:47

## 2022-10-29 RX ADMIN — METHOCARBAMOL 750 MG: 750 TABLET ORAL at 13:35

## 2022-10-29 RX ADMIN — INSULIN GLARGINE 30 UNITS: 100 INJECTION, SOLUTION SUBCUTANEOUS at 08:43

## 2022-10-29 RX ADMIN — INSULIN LISPRO 14 UNITS: 100 INJECTION, SOLUTION INTRAVENOUS; SUBCUTANEOUS at 13:35

## 2022-10-29 RX ADMIN — ACETAMINOPHEN AND CODEINE PHOSPHATE 1 TABLET: 300; 30 TABLET ORAL at 08:46

## 2022-10-29 RX ADMIN — CYANOCOBALAMIN TAB 1000 MCG 1000 MCG: 1000 TAB at 08:44

## 2022-10-29 RX ADMIN — LOSARTAN POTASSIUM 50 MG: 25 TABLET, FILM COATED ORAL at 08:44

## 2022-10-29 NOTE — OUTREACH NOTE
Prep Survey    Flowsheet Row Responses   [facility] patient discharged from? [facility]   Is LACE score < 7 ? Yes   Emergency Room discharge w/ pulse ox? No   Eligibility [facility]   Date of Admission 10/26/22   Date of Discharge 10/29/22   Discharge Disposition Home or Self Care   Discharge diagnosis Acute midline low back pain with left-sided sciatica    Does the patient have one of the following disease processes/diagnoses(primary or secondary)? Other   Does the patient have Home health ordered? No   Is there a DME ordered? No   Prep survey completed? Yes          CRUZ DAWKINS - Registered Nurse

## 2022-10-31 ENCOUNTER — TRANSITIONAL CARE MANAGEMENT TELEPHONE ENCOUNTER (OUTPATIENT)
Dept: CALL CENTER | Facility: HOSPITAL | Age: 42
End: 2022-10-31

## 2022-10-31 NOTE — OUTREACH NOTE
Call Center TCM Note    Flowsheet Row Responses   [facility] patient discharged from? [facility]   Does the patient have one of the following disease processes/diagnoses(primary or secondary)? Other   TCM attempt successful? Yes  [no verbal release but CM notes indicate mother active with POC]   Call start time 1534   Discharge diagnosis Acute midline low back pain with left-sided sciatica    Meds reviewed with patient/caregiver? Yes   Is the patient having any side effects they believe may be caused by any medication additions or changes? No   Does the patient have all medications ordered at discharge? Yes   Prescription comments Patient questioned discontinuation of Colchicine at discharge--encouraged to f/u with MD regarding resumption   Is the patient taking all medications as directed (includes completed medication regime)? Yes   Medication comments Patient w/continued pain mgmt issues--has an appt on 11/4/22 with pain mgmt.  Also using ice pack for pain   Comments Hospital f/u on 11/10/22,  Pain Mgmt on 11/4/22   Does the patient have an appointment with their PCP within 7 days of discharge? Yes   Has home health visited the patient within 72 hours of discharge? N/A   Psychosocial issues? No   Did the patient receive a copy of their discharge instructions? Yes   Nursing interventions Reviewed instructions with patient   What is the patient's perception of their health status since discharge? Same  [Patient reports continued pain issues despite medication changes--appt with pain mgmt upcoming.  Reports he can't sleep on his back/sides and is limited on his rest options.  Encouraged to check his BG as A1C 12.9.  He is no longer drinking sweet sodas]   Is the patient/caregiver able to teach back signs and symptoms related to disease process for when to call PCP? Yes   Is the patient/caregiver able to teach back signs and symptoms related to disease process for when to call 911? Yes   Is the patient/caregiver  able to teach back the hierarchy of who to call/visit for symptoms/problems? PCP, Specialist, Home health nurse, Urgent Care, ED, 911 Yes   Additional teach back comments Encouraged to bring all BG readings to f/u appts   TCM call completed? Yes          Leatha Garcia RN    10/31/2022, 16:02 EDT

## 2022-10-31 NOTE — OUTREACH NOTE
Call Center TCM Note    Flowsheet Row Responses   [facility] patient discharged from? [facility]   Does the patient have one of the following disease processes/diagnoses(primary or secondary)? Other   TCM attempt successful? No   Unsuccessful attempts Attempt 1   Call Status Left message          Aurea García MA    10/31/2022, 09:25 EDT

## 2022-11-01 ENCOUNTER — TELEPHONE (OUTPATIENT)
Dept: DIABETES SERVICES | Facility: HOSPITAL | Age: 42
End: 2022-11-01

## 2022-11-01 NOTE — TELEPHONE ENCOUNTER
"Pt states he received Lantus and Lispro pens, pen needles and Freestyle test strips and lancets. He states he is taking insulin as prescribed and checking bs as recommended. Mother is helping pt with the bs checks and helping with insulin prep. Pt states he was hoping to have more practice using insulin pen device in hospital before discharge but he did not have additional practice. Educator asked pt if he would like to review procedure over phone call. Pt states, \"My mom is helping me so I am fine.\" Pt with no additional questions.   "

## 2022-11-04 ENCOUNTER — OFFICE VISIT (OUTPATIENT)
Dept: PAIN MEDICINE | Facility: CLINIC | Age: 42
End: 2022-11-04

## 2022-11-04 ENCOUNTER — TELEPHONE (OUTPATIENT)
Dept: PAIN MEDICINE | Facility: CLINIC | Age: 42
End: 2022-11-04

## 2022-11-04 VITALS
SYSTOLIC BLOOD PRESSURE: 134 MMHG | HEART RATE: 95 BPM | DIASTOLIC BLOOD PRESSURE: 73 MMHG | HEIGHT: 75 IN | OXYGEN SATURATION: 96 % | BODY MASS INDEX: 35.27 KG/M2

## 2022-11-04 DIAGNOSIS — G89.29 CHRONIC MIDLINE LOW BACK PAIN WITH BILATERAL SCIATICA: Primary | ICD-10-CM

## 2022-11-04 DIAGNOSIS — M48.07 LUMBOSACRAL SPINAL STENOSIS: ICD-10-CM

## 2022-11-04 DIAGNOSIS — M54.42 CHRONIC MIDLINE LOW BACK PAIN WITH BILATERAL SCIATICA: Primary | ICD-10-CM

## 2022-11-04 DIAGNOSIS — M54.16 LUMBAR RADICULOPATHY: ICD-10-CM

## 2022-11-04 DIAGNOSIS — M54.41 CHRONIC MIDLINE LOW BACK PAIN WITH BILATERAL SCIATICA: Primary | ICD-10-CM

## 2022-11-04 PROCEDURE — 99214 OFFICE O/P EST MOD 30 MIN: CPT | Performed by: PHYSICAL MEDICINE & REHABILITATION

## 2022-11-04 RX ORDER — HYDROCODONE BITARTRATE AND ACETAMINOPHEN 7.5; 325 MG/1; MG/1
1 TABLET ORAL EVERY 6 HOURS PRN
Qty: 120 TABLET | Refills: 0 | Status: SHIPPED | OUTPATIENT
Start: 2022-11-04 | End: 2022-12-02 | Stop reason: SDUPTHER

## 2022-11-04 RX ORDER — PROBENECID AND COLCHICINE 500; .5 MG/1; MG/1
1 TABLET ORAL NIGHTLY
COMMUNITY
Start: 2022-10-31

## 2022-11-04 RX ORDER — CLINDAMYCIN PHOSPHATE 11.9 MG/ML
1 SOLUTION TOPICAL 2 TIMES DAILY
COMMUNITY
Start: 2022-10-31

## 2022-11-04 RX ORDER — ERYTHROMYCIN 5 MG/G
1 OINTMENT OPHTHALMIC AS NEEDED
COMMUNITY
Start: 2022-10-31

## 2022-11-04 RX ORDER — METHOCARBAMOL 750 MG/1
750 TABLET, FILM COATED ORAL 4 TIMES DAILY
Qty: 120 TABLET | Refills: 1 | Status: SHIPPED | OUTPATIENT
Start: 2022-11-04 | End: 2022-12-02 | Stop reason: SDUPTHER
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
## 2022-11-04 NOTE — TELEPHONE ENCOUNTER
Provider: YESENIA  Caller: COBY  Pharmacy: Pharmacy:  [facility] [address] [place] [address] [phone] [facility] [phone]   Phone Number:   [phone]  Reason for Call:  PRIOR AUTH FOR THE MEDICATION HE WAS PUT ON TODAY

## 2022-11-07 ENCOUNTER — OFFICE VISIT (OUTPATIENT)
Dept: PODIATRY | Facility: CLINIC | Age: 42
End: 2022-11-07

## 2022-11-07 VITALS — HEIGHT: 75 IN | HEART RATE: 98 BPM | WEIGHT: 282 LBS | BODY MASS INDEX: 35.06 KG/M2 | OXYGEN SATURATION: 98 %

## 2022-11-07 DIAGNOSIS — L60.3 ONYCHODYSTROPHY: Primary | ICD-10-CM

## 2022-11-07 DIAGNOSIS — E11.42 DM TYPE 2 WITH DIABETIC PERIPHERAL NEUROPATHY: ICD-10-CM

## 2022-11-07 DIAGNOSIS — E11.65 TYPE 2 DIABETES MELLITUS WITH HYPERGLYCEMIA, WITHOUT LONG-TERM CURRENT USE OF INSULIN: ICD-10-CM

## 2022-11-07 PROCEDURE — 99213 OFFICE O/P EST LOW 20 MIN: CPT

## 2022-11-07 PROCEDURE — 11721 DEBRIDE NAIL 6 OR MORE: CPT
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
## 2022-11-14 ENCOUNTER — OFFICE VISIT (OUTPATIENT)
Dept: FAMILY MEDICINE CLINIC | Facility: CLINIC | Age: 42
End: 2022-11-14

## 2022-11-14 VITALS
TEMPERATURE: 97.3 F | HEART RATE: 104 BPM | BODY MASS INDEX: 35.14 KG/M2 | HEIGHT: 75 IN | SYSTOLIC BLOOD PRESSURE: 142 MMHG | OXYGEN SATURATION: 98 % | RESPIRATION RATE: 19 BRPM | WEIGHT: 282.6 LBS | DIASTOLIC BLOOD PRESSURE: 78 MMHG

## 2022-11-14 DIAGNOSIS — E11.49 TYPE 2 DIABETES MELLITUS WITH OTHER NEUROLOGIC COMPLICATION, WITH LONG-TERM CURRENT USE OF INSULIN: ICD-10-CM

## 2022-11-14 DIAGNOSIS — M54.42 CHRONIC MIDLINE LOW BACK PAIN WITH BILATERAL SCIATICA: Primary | ICD-10-CM

## 2022-11-14 DIAGNOSIS — Z79.4 TYPE 2 DIABETES MELLITUS WITH OTHER NEUROLOGIC COMPLICATION, WITH LONG-TERM CURRENT USE OF INSULIN: ICD-10-CM

## 2022-11-14 DIAGNOSIS — M10.9 GOUT, UNSPECIFIED CAUSE, UNSPECIFIED CHRONICITY, UNSPECIFIED SITE: ICD-10-CM

## 2022-11-14 DIAGNOSIS — E66.01 CLASS 2 SEVERE OBESITY DUE TO EXCESS CALORIES WITH SERIOUS COMORBIDITY AND BODY MASS INDEX (BMI) OF 36.0 TO 36.9 IN ADULT: ICD-10-CM

## 2022-11-14 DIAGNOSIS — E11.49 TYPE 2 DIABETES MELLITUS WITH OTHER NEUROLOGIC COMPLICATION, WITHOUT LONG-TERM CURRENT USE OF INSULIN: ICD-10-CM

## 2022-11-14 DIAGNOSIS — J30.89 NON-SEASONAL ALLERGIC RHINITIS DUE TO OTHER ALLERGIC TRIGGER: ICD-10-CM

## 2022-11-14 DIAGNOSIS — I10 HYPERTENSION, BENIGN: ICD-10-CM

## 2022-11-14 DIAGNOSIS — G89.29 CHRONIC MIDLINE LOW BACK PAIN WITH BILATERAL SCIATICA: Primary | ICD-10-CM

## 2022-11-14 DIAGNOSIS — M54.41 CHRONIC MIDLINE LOW BACK PAIN WITH BILATERAL SCIATICA: Primary | ICD-10-CM

## 2022-11-14 PROCEDURE — 99214 OFFICE O/P EST MOD 30 MIN: CPT | Performed by: FAMILY MEDICINE

## 2022-11-14 RX ORDER — MONTELUKAST SODIUM 10 MG/1
10 TABLET ORAL NIGHTLY
Qty: 90 TABLET | Refills: 3 | Status: SHIPPED | OUTPATIENT
Start: 2022-11-14

## 2022-11-14 RX ORDER — LANCETS 28 GAUGE
1 EACH MISCELLANEOUS
Qty: 200 EACH | Refills: 3 | Status: SHIPPED | OUTPATIENT
Start: 2022-11-14 | End: 2023-01-05

## 2022-11-14 RX ORDER — AMLODIPINE BESYLATE 10 MG/1
10 TABLET ORAL DAILY
Qty: 90 TABLET | Refills: 3 | Status: SHIPPED | OUTPATIENT
Start: 2022-11-14 | End: 2023-02-14 | Stop reason: SDUPTHER

## 2022-11-14 NOTE — ASSESSMENT & PLAN NOTE
Patient's (Body mass index is 35.32 kg/m².) indicates that they are obese (BMI >30) with health conditions that include hypertension, diabetes mellitus and dyslipidemias . Weight is unchanged. BMI is is above average; BMI management plan is completed. We discussed portion control and increasing exercise.

## 2022-11-14 NOTE — PROGRESS NOTES
Subjective   Barrett Laguerre is a 42 y.o. male.     History of Present Illness  Patient here for hospital follow up. Admitted to [facility] on 10-26 and discharged on 10-29 with acute midline low back pain with left-sided sciatica. Patient has appointment with pain management (Dr. Lora) on 12-2.  Barrett reports the hospital doctor stopped his clindamycin solution, colchicine-probenecid, doxycylcine, and erythromycin eye solution. Patient doesn't understand why he stopped these. He is having bad eye swelling and problems since stopping that medication. He is also having his gout flare up since his colchicine was stopped.    Patients last a1c in the hospital was 12.9  Back Pain  This is a chronic problem. The problem is unchanged. The pain is present in the lumbar spine. The quality of the pain is described as stabbing, shooting, aching and burning. The pain radiates to the left knee and left thigh. The pain is at a severity of 8/10. The pain is severe. The symptoms are aggravated by bending, lying down, sitting, position, standing and twisting. Stiffness is present all day. Pertinent negatives include no abdominal pain, chest pain, dysuria, fever, numbness or weakness. He has tried muscle relaxant and bed rest for the symptoms. The treatment provided mild relief.   Diabetes  He presents for his follow-up diabetic visit. He has type 2 diabetes mellitus. Associated symptoms include blurred vision and foot paresthesias. Pertinent negatives for diabetes include no chest pain, no fatigue, no polydipsia, no polyuria and no weakness. Risk factors for coronary artery disease include diabetes mellitus, dyslipidemia, hypertension, male sex and obesity. Current diabetic treatment includes oral agent (dual therapy) and insulin injections.        The following portions of the patient's history were reviewed and updated as appropriate: allergies, current medications, past family history, past medical history, past social history, past  surgical history and problem list.    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[facility])   • Sleep apnea   • Attention deficit disorder   • Diabetes ([facility])   • Other seasonal allergic rhinitis   • Mixed obsessional thoughts and acts   • Hypertension, benign   • Disorder of lipid metabolism   • CIDP (chronic inflammatory demyelinating polyneuropathy) ([facility])   • Lumbar radiculopathy   • Onychomycosis   • Annual physical exam   • Class 2 severe obesity due to excess calories with serious comorbidity and body mass index (BMI) of 36.0 to 36.9 in adult ([facility])   • Acute midline low back pain with left-sided sciatica       Current Outpatient Medications on File Prior to Visit   Medication Sig Dispense Refill   • acetaminophen-codeine (TYLENOL with CODEINE #4) 300-60 MG per tablet Take 1 tablet by mouth Every 6 (Six) Hours As Needed for Moderate Pain. 120 tablet 5   • Alcohol Swabs (Alcohol Wipes) 70 % pads Apply 1 each topically 4 (Four) Times a Day Before Meals & at Bedtime. 200 each 2   • azelastine (ASTELIN) 0.1 % nasal spray 2 sprays into the nostril(s) as directed by provider 2 (Two) Times a Day. Use in each nostril as directed 3 each 3   • chlorthalidone (HYGROTEN) 50 MG tablet Take 1 tablet by mouth Daily. 90 tablet 3   • Cyanocobalamin ER 1000 MCG tablet controlled-release Take 1 tablet by mouth Daily. 90 each 3   • Diclofenac Epolamine (FLECTOR) 1.3 % patch patch Apply 1 patch topically to the appropriate area as directed 2 (Two) Times a Day As Needed (back pain). 60 patch 2   • FLUoxetine (PROzac) 40 MG capsule Take 1 capsule by mouth 2 (Two) Times a Day. 180 capsule 3   • fluticasone (FLONASE) 50 MCG/ACT nasal spray 1 spray into the nostril(s) as directed by  provider Daily for 90 days. 16 g 3   • gabapentin (NEURONTIN) 300 MG capsule Take 1 capsule by mouth 3 (Three) Times a Day. 270 capsule 3   • HYDROcodone-acetaminophen (Norco) 7.5-325 MG per tablet Take 1 tablet by mouth Every 6 (Six) Hours As Needed for Moderate Pain. 120 tablet 0   • hydrocortisone 1 % cream Apply  topically to the appropriate area as directed 2 (Two) Times a Day. 60 g 3   • hydrOXYzine (ATARAX) 25 MG tablet Take 1 tablet by mouth 4 (Four) Times a Day As Needed for Itching. 360 tablet 3   • Insulin Glargine (BASAGLAR KWIKPEN) 100 UNIT/ML injection pen Inject 30 Units under the skin into the appropriate area as directed Every Night. 15 mL 2   • Insulin Lispro (ADMELOG SOLOSTAR) 100 UNIT/ML injection pen Inject 10 Units under the skin into the appropriate area as directed 3 (Three) Times a Day With Meals. 15 mL 2   • Insulin Pen Needle (Pen Needles) 32G X 4 MM misc use 1 each 4 (Four) Times a Day Before Meals & at Bedtime. 200 each 2   • loratadine (EQ Loratadine) 10 MG tablet Take 1 tablet by mouth Daily. 90 tablet 3   • losartan (COZAAR) 50 MG tablet Take 1 tablet by mouth Daily. 90 tablet 3   • methocarbamol (ROBAXIN) 750 MG tablet Take 1 tablet by mouth 4 (Four) Times a Day. 120 tablet 1   • oxybutynin XL (DITROPAN XL) 15 MG 24 hr tablet Take 1 tablet by mouth Daily. 30 tablet 12   • oxyCODONE (Roxicodone) 5 MG immediate release tablet Take 1 tablet by mouth Every 6 (Six) Hours As Needed for Moderate Pain or Severe Pain. 15 tablet 0   • pantoprazole (Protonix) 40 MG EC tablet Take 1 tablet by mouth Daily. 90 tablet 3   • promethazine (PHENERGAN) 25 MG tablet Take 1 tablet by mouth Every 8 (Eight) Hours As Needed for Nausea or Vomiting. 90 tablet 5   • sildenafil (REVATIO) 20 MG tablet Take 1 tablet by mouth Daily As Needed (ed). 30 tablet 3   • sitaGLIPtin-metFORMIN (Janumet)  MG per tablet Take 1 tablet by mouth 2 (Two) Times a Day With Meals. 180 tablet 3   • sodium chloride (Ocean  Nasal Spray) 0.65 % nasal spray 1 spray each nostril tid 216 mL 3   • [DISCONTINUED] amLODIPine (NORVASC) 10 MG tablet Take 1 tablet by mouth Daily. 90 tablet 3   • [DISCONTINUED] glucose blood (FREESTYLE LITE) test strip test blood sugar three times daily prior to meals 100 each 2   • [DISCONTINUED] Lancets (freestyle) lancets use 1 each 3 (Three) Times a Day Before Meals. 100 each 2   • [DISCONTINUED] montelukast (SINGULAIR) 10 MG tablet Take 1 tablet by mouth Every Night. 90 tablet 3   • clindamycin (CLEOCIN T) 1 % external solution APPLY SOLUTION TOPICALLY TO AFFECTED AREA TWICE DAILY     • colchicine-probenecid (COL-BENEMID) 0.5-500 MG per tablet Take 1 tablet by mouth Daily.     • erythromycin (ROMYCIN) 5 MG/GM ophthalmic ointment ADMINISTER TO BOTH EYES EVERY NIGHT       No current facility-administered medications on file prior to visit.     Current outpatient and discharge medications have been reconciled for the patient.  Reviewed by: Eric Rosenberg MD      Allergies   Allergen Reactions   • Penicillin G Anaphylaxis   • Penicillins Anaphylaxis   • Sulfa Antibiotics Hives   • Allopurinol Rash       Review of Systems   Constitutional: Negative for activity change, appetite change, fatigue and fever.   HENT: Negative for ear pain, swollen glands and voice change.    Eyes: Positive for blurred vision. Negative for visual disturbance.   Respiratory: Negative for shortness of breath and wheezing.    Cardiovascular: Negative for chest pain and leg swelling.   Gastrointestinal: Negative for abdominal pain, blood in stool, constipation, diarrhea, nausea and vomiting.   Endocrine: Negative for polydipsia and polyuria.   Genitourinary: Negative for dysuria, frequency and hematuria.   Musculoskeletal: Positive for back pain. Negative for joint swelling, neck pain and neck stiffness.   Skin: Negative for rash and wound.   Neurological: Negative for weakness, numbness and headache.   Psychiatric/Behavioral:  "Negative for suicidal ideas and depressed mood.     I have reviewed and confirmed the accuracy of the ROS as documented by the MA/LPN/RN Eric Rosenberg MD    Objective   Visit Vitals  /78 (BP Location: Right arm, Patient Position: Sitting, Cuff Size: Adult)   Pulse 104   Temp 97.3 °F (36.3 °C) (Temporal)   Resp 19   Ht 190.5 cm (75\")   Wt 128 kg (282 lb 9.6 oz)   SpO2 98%   BMI 35.32 kg/m²       Physical Exam  Constitutional:       Appearance: He is well-developed.   HENT:      Head: Normocephalic and atraumatic.      Right Ear: External ear normal.      Left Ear: External ear normal.      Nose: Nose normal.   Eyes:      Pupils: Pupils are equal, round, and reactive to light.   Cardiovascular:      Rate and Rhythm: Normal rate and regular rhythm.      Heart sounds: Normal heart sounds.   Pulmonary:      Effort: Pulmonary effort is normal.      Breath sounds: Normal breath sounds.   Abdominal:      General: Bowel sounds are normal.      Palpations: Abdomen is soft.   Musculoskeletal:      Cervical back: Normal range of motion and neck supple.      Comments: Back pain    Skin:     General: Skin is warm and dry.   Neurological:      Mental Status: He is alert and oriented to person, place, and time.   Psychiatric:         Behavior: Behavior normal.         Thought Content: Thought content normal.         Judgment: Judgment normal.       Derm Physical Exam    Diagnoses and all orders for this visit:    1. 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 Type 2 diabetes mellitus with other neurologic complication, with long-term current use of insulin ([facility])  Comments:  neurogenic claudication  Overview:  Basalglar 30 units daily and Lispro 10 u tid with meals     Orders:  -     glucose blood (FREESTYLE LITE) test strip; 1 each by Other route 3 (Three) Times a Day Before Meals.  Dispense: 200 each; Refill: 3    3. Class 2 severe obesity due to excess calories with serious comorbidity and " body mass index (BMI) of 36.0 to 36.9 in adult ([facility])  Comments:  working on diet  Assessment & Plan:  Patient's (Body mass index is 35.32 kg/m².) indicates that they are obese (BMI >30) with health conditions that include hypertension, diabetes mellitus and dyslipidemias . Weight is unchanged. BMI is is above average; BMI management plan is completed. We discussed portion control and increasing exercise.       4. Type 2 diabetes mellitus with other neurologic complication, without long-term current use of insulin ([facility])  Overview:  Basalglar 30 units daily and Lispro 10 u tid with meals     Orders:  -     Lancets (freestyle) lancets; use 1 each 3 (Three) Times a Day Before Meals.  Dispense: 200 each; Refill: 3    5. Gout, unspecified cause, unspecified chronicity, unspecified site  Overview:  Ok to restart colchicine      6. Hypertension, benign  Overview:  Followed by Cardiology Dr Tong who placed pt on different BP med. .    Proteinuria/creatinine noted    Orders:  -     amLODIPine (NORVASC) 10 MG tablet; Take 1 tablet by mouth Daily.  Dispense: 90 tablet; Refill: 3    7. Non-seasonal allergic rhinitis due to other allergic trigger  -     montelukast (SINGULAIR) 10 MG tablet; Take 1 tablet by mouth Every Night.  Dispense: 90 tablet; Refill: 3   Findings discussed. All questions answered..mmed  Findings discussed. All questions answered.  Medication and medication adverse effects discussed.  Drug education given and explained to patient. Patient verbalized understanding.  Follow up in 3 months for recheck, sooner for concerns.     Expected course, medications, and adverse effects discussed as appropriate.  Call or return if worsening or persistent symptoms.  I wore protective equipment throughout this patient encounter to include mask and eye protection. Hand hygiene was performed before donning protective equipment and after removal when leaving the room.       This document is intended for medical professional  use only.

## 2022-12-02 ENCOUNTER — OFFICE VISIT (OUTPATIENT)
Dept: PAIN MEDICINE | Facility: CLINIC | Age: 42
End: 2022-12-02

## 2022-12-02 VITALS
RESPIRATION RATE: 16 BRPM | OXYGEN SATURATION: 96 % | DIASTOLIC BLOOD PRESSURE: 85 MMHG | HEART RATE: 90 BPM | SYSTOLIC BLOOD PRESSURE: 146 MMHG

## 2022-12-02 DIAGNOSIS — G89.29 CHRONIC MIDLINE LOW BACK PAIN WITH BILATERAL SCIATICA: Primary | ICD-10-CM

## 2022-12-02 DIAGNOSIS — Z79.899 HIGH RISK MEDICATION USE: Primary | ICD-10-CM

## 2022-12-02 DIAGNOSIS — M54.16 LUMBAR RADICULOPATHY: ICD-10-CM

## 2022-12-02 DIAGNOSIS — M54.42 CHRONIC MIDLINE LOW BACK PAIN WITH BILATERAL SCIATICA: Primary | ICD-10-CM

## 2022-12-02 DIAGNOSIS — M48.07 LUMBOSACRAL SPINAL STENOSIS: ICD-10-CM

## 2022-12-02 DIAGNOSIS — M54.41 CHRONIC MIDLINE LOW BACK PAIN WITH BILATERAL SCIATICA: Primary | ICD-10-CM

## 2022-12-02 PROCEDURE — 99213 OFFICE O/P EST LOW 20 MIN: CPT | Performed by: PHYSICAL MEDICINE & REHABILITATION

## 2022-12-02 RX ORDER — HYDROCODONE BITARTRATE AND ACETAMINOPHEN 7.5; 325 MG/1; MG/1
1 TABLET ORAL EVERY 6 HOURS PRN
Qty: 120 TABLET | Refills: 0 | Status: ON HOLD | OUTPATIENT
Start: 2022-12-02 | End: 2023-02-22 | Stop reason: SDUPTHER

## 2022-12-02 RX ORDER — HYDROCODONE BITARTRATE AND ACETAMINOPHEN 7.5; 325 MG/1; MG/1
1 TABLET ORAL EVERY 6 HOURS PRN
Qty: 120 TABLET | Refills: 0 | Status: SHIPPED | OUTPATIENT
Start: 2022-12-02 | End: 2023-02-22 | Stop reason: HOSPADM

## 2022-12-02 RX ORDER — METHOCARBAMOL 750 MG/1
750 TABLET, FILM COATED ORAL 4 TIMES DAILY
Qty: 120 TABLET | Refills: 5 | Status: ON HOLD | OUTPATIENT
Start: 2022-12-02 | End: 2023-02-22 | Stop reason: SDUPTHER
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
## 2022-12-07 ENCOUNTER — TELEPHONE (OUTPATIENT)
Dept: NEUROSURGERY | Facility: CLINIC | Age: 42
End: 2022-12-07

## 2022-12-07 NOTE — TELEPHONE ENCOUNTER
Caller: Barrett Laguerre    Relationship to patient: Self    Best call back number: [phone]    Patient is needing:     PATIENT CALLED TO SCHEDULE APPT TO DISCUSS SX OPTIONS, REPORTS SEEING DR MCKNIGHT/DELMAR RAMIREZ WHILE HOSPITALIZED 10/26-10/29/22.      APPT WAS SCHEDULED AS HOSPITAL F/U BEFORE REALIZING PATIENT WAS ALREADY AN ESTABLISHED PATIENT.    JUST WANTED TO CONFIRM APPT IS OKAY AS SCHEDULED.

## 2022-12-09 NOTE — PROGRESS NOTES
Neurosurgical Consultation      Barrett Laguerre is a 42 y.o. male is here today for a follow-up for acute midline low back pain. In the office today patient reports continued low back with numbness and tingling in his left leg.    Chief Complaint   Patient presents with   • Back Pain     Follow up        Previous treatment :Hydrocodone,Robaxin,Oxycodone    HPI: This is a 42-year-old gentleman who I initially evaluated on November 12, 2021.  At that time he had had approximately 5 months of left-sided radiculopathy most consistent with L5 and S1 nerve roots.  This had initially been managed with epidural steroid injections as well as medications.  His MRI did show a left-sided eccentric L4-L5 and L5-S1 disc herniation with neuroforaminal stenosis.  He had not tried any transforaminal injections.  I recommended completion of this and following up with me in a month.  He was lost to follow-up.  He presented to the emergency department with acute exacerbation of back pain as well as leg pain.  His MRI did not show any significant canal stenosis and he did not have any red flag signs or symptoms.  His hemoglobin A1c was almost 13 and I recommended better control of his hemoglobin A1c and following up with me.  He has not completed a new hemoglobin A1c but does note that his at home glucose levels have been much better controlled.  He is still actively working with physical therapy utilizing aquatic therapy.  He has never had any traction therapy.  He does continue to describe intermittent left leg only pain that is episodic.  He describes it as going down the back of his leg and occasionally into his foot.    Past Medical History:   Diagnosis Date   • Acquired pes planus of both feet     Impression: needs referral for orthotics.   • Acute non-recurrent maxillary sinusitis     Impression: His symptoms were not improving.   • ADD (attention deficit disorder)    • Annual physical exam     Impression: Discussed  anticipatory guidance, diet, exercise, and weight loss. Discussed safety, seatbelts, and routine screening examinations. Discussed self-examinations.   • Asthma     Impression: Stable   • Asymptomatic microscopic hematuria     Impression: recheck neg. Will follow   • Chronic gout, unspecified, without tophus (tophi)     Impression: Recommend rechecking uric acid.   • CIDP (chronic inflammatory demyelinating polyneuropathy) ([facility])     Story: s/p IVIG Guillain-[place] per [facility] diagnosed 2015 gabapentin 600 tid. Impression: Symptoms include: numbness and tingling in hands and feet, difficulty concentrating, drops items, chest pain. dizziness. Pt needs routine follow up with neurology. 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 . Proteinuria/creatinine noted   • Idiopathic chronic gout without tophus     Unspecified site. Impression: stable. Story: UA 8.3 2/2018   • Intervertebral disc stenosis of neural canal of lumbar region     Story: MRI 11/15 showed lumbar disk disease. 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 Pt would like referra to Dr Watkins, has appt Dec 15   • Low back pain    • Lumbar disc disorder with myelopathy     Impression: failed therapy. Pt would like refill on compounded topical med. Rx faxed   • Mixed obsessional thoughts and acts    • Obstructive sleep apnea     Impression: needs cpap mask   • Other seasonal allergic rhinitis     Impression: Over-the-counter medication indications, dosage, and precautions discussed.   • Overactive bladder    • Screening for depression    • Screening for hyperlipidemia    • SI (sacroiliac) joint dysfunction     Impression: Findings discussed. All questions answered. Medication and medication adverse effects discussed. Drug education given and explained to patient. Patient verbalized understanding. Follow-up in 4-6 weeks if not  better. Follow-up sooner for worsening symptoms or for any concerns. Consider chiropractor   • Tobacco use disorder         Past Surgical History:   Procedure Laterality Date   • COLONOSCOPY N/A 12/22/2020    Procedure: COLONOSCOPY with polypectomy x;  Surgeon: Juan Alberto Davis MD;  Location: [facility];  Service: Gastroenterology;  Laterality: N/A;  post: transverse colon polyp   • INGUINAL HERNIA REPAIR     • TYMPANOSTOMY TUBE PLACEMENT          Current Outpatient Medications on File Prior to Visit   Medication Sig Dispense Refill   • acetaminophen-codeine (TYLENOL with CODEINE #4) 300-60 MG per tablet Take 1 tablet by mouth Every 6 (Six) Hours As Needed for Moderate Pain. 120 tablet 5   • Alcohol Swabs (Alcohol Wipes) 70 % pads Apply 1 each topically 4 (Four) Times a Day Before Meals & at Bedtime. 200 each 2   • amLODIPine (NORVASC) 10 MG tablet Take 1 tablet by mouth Daily. 90 tablet 3   • azelastine (ASTELIN) 0.1 % nasal spray 2 sprays into the nostril(s) as directed by provider 2 (Two) Times a Day. Use in each nostril as directed 3 each 3   • chlorthalidone (HYGROTEN) 50 MG tablet Take 1 tablet by mouth Daily. 90 tablet 3   • clindamycin (CLEOCIN T) 1 % external solution APPLY SOLUTION TOPICALLY TO AFFECTED AREA TWICE DAILY     • colchicine-probenecid (COL-BENEMID) 0.5-500 MG per tablet Take 1 tablet by mouth Daily.     • Cyanocobalamin ER 1000 MCG tablet controlled-release Take 1 tablet by mouth Daily. 90 each 3   • Diclofenac Epolamine (FLECTOR) 1.3 % patch patch Apply 1 patch topically to the appropriate area as directed 2 (Two) Times a Day As Needed (back pain). 60 patch 2   • erythromycin (ROMYCIN) 5 MG/GM ophthalmic ointment ADMINISTER TO BOTH EYES EVERY NIGHT     • FLUoxetine (PROzac) 40 MG capsule Take 1 capsule by mouth 2 (Two) Times a Day. 180 capsule 3   • fluticasone (FLONASE) 50 MCG/ACT nasal spray 1 spray into the nostril(s) as directed by provider Daily for 90 days. 16 g 3   •  gabapentin (NEURONTIN) 300 MG capsule Take 1 capsule by mouth 3 (Three) Times a Day. 270 capsule 3   • glucose blood (FREESTYLE LITE) test strip 1 each by Other route 3 (Three) Times a Day Before Meals. 200 each 3   • HYDROcodone-acetaminophen (Norco) 7.5-325 MG per tablet Take 1 tablet by mouth Every 6 (Six) Hours As Needed for Moderate Pain. 120 tablet 0   • HYDROcodone-acetaminophen (Norco) 7.5-325 MG per tablet Take 1 tablet by mouth Every 6 (Six) Hours As Needed for Moderate Pain. 120 tablet 0   • HYDROcodone-acetaminophen (Norco) 7.5-325 MG per tablet Take 1 tablet by mouth Every 6 (Six) Hours As Needed for Moderate Pain. 120 tablet 0   • hydrocortisone 1 % cream Apply  topically to the appropriate area as directed 2 (Two) Times a Day. 60 g 3   • hydrOXYzine (ATARAX) 25 MG tablet Take 1 tablet by mouth 4 (Four) Times a Day As Needed for Itching. 360 tablet 3   • Insulin Glargine (BASAGLAR KWIKPEN) 100 UNIT/ML injection pen Inject 30 Units under the skin into the appropriate area as directed Every Night. 15 mL 2   • Insulin Lispro (ADMELOG SOLOSTAR) 100 UNIT/ML injection pen Inject 10 Units under the skin into the appropriate area as directed 3 (Three) Times a Day With Meals. 15 mL 2   • Insulin Pen Needle (Pen Needles) 32G X 4 MM misc use 1 each 4 (Four) Times a Day Before Meals & at Bedtime. 200 each 2   • Lancets (freestyle) lancets use 1 each 3 (Three) Times a Day Before Meals. 200 each 3   • loratadine (EQ Loratadine) 10 MG tablet Take 1 tablet by mouth Daily. 90 tablet 3   • losartan (COZAAR) 50 MG tablet Take 1 tablet by mouth Daily. 90 tablet 3   • methocarbamol (ROBAXIN) 750 MG tablet Take 1 tablet by mouth 4 (Four) Times a Day. 120 tablet 5   • montelukast (SINGULAIR) 10 MG tablet Take 1 tablet by mouth Every Night. 90 tablet 3   • oxybutynin XL (DITROPAN XL) 15 MG 24 hr tablet Take 1 tablet by mouth Daily. 30 tablet 12   • oxyCODONE (Roxicodone) 5 MG immediate release tablet Take 1 tablet by mouth  "Every 6 (Six) Hours As Needed for Moderate Pain or Severe Pain. 15 tablet 0   • pantoprazole (Protonix) 40 MG EC tablet Take 1 tablet by mouth Daily. 90 tablet 3   • promethazine (PHENERGAN) 25 MG tablet Take 1 tablet by mouth Every 8 (Eight) Hours As Needed for Nausea or Vomiting. 90 tablet 5   • sildenafil (REVATIO) 20 MG tablet Take 1 tablet by mouth Daily As Needed (ed). 30 tablet 3   • sitaGLIPtin-metFORMIN (Janumet)  MG per tablet Take 1 tablet by mouth 2 (Two) Times a Day With Meals. 180 tablet 3   • sodium chloride (Ocean Nasal Spray) 0.65 % nasal spray 1 spray each nostril tid 216 mL 3     No current facility-administered medications on file prior to visit.        Allergies   Allergen Reactions   • Penicillin G Anaphylaxis   • Penicillins Anaphylaxis   • Sulfa Antibiotics Hives   • Allopurinol Rash        Social History     Socioeconomic History   • Marital status: Single   Tobacco Use   • Smoking status: Never   • Smokeless tobacco: Never   Vaping Use   • Vaping Use: Never used   Substance and Sexual Activity   • Alcohol use: Not Currently   • Drug use: Not Currently   • Sexual activity: Defer          Review of Systems   Constitutional: Positive for activity change.   HENT: Negative.    Eyes: Negative.    Respiratory: Negative.    Cardiovascular: Negative.    Gastrointestinal: Negative.    Endocrine: Negative.    Genitourinary: Negative.    Musculoskeletal: Positive for back pain and myalgias.   Skin: Negative.    Allergic/Immunologic: Negative.    Neurological: Positive for numbness.        Tingling   Hematological: Negative.    Psychiatric/Behavioral: Positive for sleep disturbance.        Physical Examination:     Vitals:    12/13/22 1429   BP: 145/94   Pulse: 88   SpO2: 96%   Weight: 126 kg (278 lb)   Height: 190.5 cm (75\")   PainSc:   8        Physical Exam     Neurological Exam   Neurological examination is stable compared to my evaluation in the hospital.  He does not have any new red flag " signs.  He is much more mobile today compared to while in the hospital.    Result Review  The following data was reviewed by: Vahe Pace MD on 12/13/2022:    Data reviewed: Radiologic studies MRI of the lumbar spine shows a right-sided eccentric L3-L4 disc herniation with some moderate central canal stenosis.  There is a left-sided eccentric L4-L5 disc herniation causing lateral recess and some foraminal stenosis.  There is an L5-S1 left-sided eccentric disc herniation causing some lateral recess and neuroforaminal stenosis.     Assessment/plan:  This is a 42-year-old gentleman with at this juncture chronic left sided intermittent radiculopathy.  He is actively engaging with physical therapy and I encouraged him to continue this.  I will order an additional course of physical therapy to maximize his overall health status and leg pain.  I think it is reasonable to consider continued aquatic therapy as well as potential traction therapy.  I will also order him an hemoglobin A1c to assess his glucose control.  He will return to see me in approximately 1 month and at that juncture we may discuss surgical options.  I have encouraged him to call with any questions or concerns.    Diagnoses and all orders for this visit:    1. Acute midline low back pain with left-sided sciatica (Primary)  -     Ambulatory Referral to Physical Therapy Evaluate and treat    2. Type 2 diabetes mellitus with complication (HCC)  -     Hemoglobin A1c; Future         Return in about 4 weeks (around 1/10/2023).            Vahe Pace MD

## 2022-12-12 DIAGNOSIS — J30.2 OTHER SEASONAL ALLERGIC RHINITIS: ICD-10-CM

## 2022-12-12 RX ORDER — LORATADINE 10 MG/1
10 TABLET ORAL DAILY
Qty: 90 TABLET | Refills: 3 | Status: SHIPPED | OUTPATIENT
Start: 2022-12-12 | End: 2022-12-12 | Stop reason: SDUPTHER

## 2022-12-13 ENCOUNTER — OFFICE VISIT (OUTPATIENT)
Dept: NEUROSURGERY | Facility: CLINIC | Age: 42
End: 2022-12-13

## 2022-12-13 VITALS
OXYGEN SATURATION: 96 % | DIASTOLIC BLOOD PRESSURE: 94 MMHG | HEIGHT: 75 IN | SYSTOLIC BLOOD PRESSURE: 145 MMHG | BODY MASS INDEX: 34.57 KG/M2 | WEIGHT: 278 LBS | HEART RATE: 88 BPM

## 2022-12-13 DIAGNOSIS — E11.8 TYPE 2 DIABETES MELLITUS WITH COMPLICATION: ICD-10-CM

## 2022-12-13 DIAGNOSIS — M54.42 ACUTE MIDLINE LOW BACK PAIN WITH LEFT-SIDED SCIATICA: Primary | ICD-10-CM

## 2022-12-13 PROCEDURE — 99214 OFFICE O/P EST MOD 30 MIN: CPT | Performed by: NEUROLOGICAL SURGERY

## 2022-12-13 RX ORDER — LORATADINE 10 MG/1
10 TABLET ORAL DAILY
Qty: 90 TABLET | Refills: 3 | Status: SHIPPED | OUTPATIENT
Start: 2022-12-13

## 2022-12-21 ENCOUNTER — LAB (OUTPATIENT)
Dept: LAB | Facility: HOSPITAL | Age: 42
End: 2022-12-21

## 2022-12-21 DIAGNOSIS — E11.8 TYPE 2 DIABETES MELLITUS WITH COMPLICATION: ICD-10-CM

## 2022-12-21 LAB — HBA1C MFR BLD: 8.5 % (ref 3.5–5.6)

## 2022-12-21 PROCEDURE — 36415 COLL VENOUS BLD VENIPUNCTURE: CPT

## 2022-12-21 PROCEDURE — 83036 HEMOGLOBIN GLYCOSYLATED A1C: CPT

## 2022-12-29 ENCOUNTER — TELEPHONE (OUTPATIENT)
Dept: NEUROSURGERY | Facility: CLINIC | Age: 42
End: 2022-12-29

## 2022-12-29 DIAGNOSIS — E11.8 TYPE 2 DIABETES MELLITUS WITH COMPLICATION: Primary | ICD-10-CM

## 2022-12-29 NOTE — TELEPHONE ENCOUNTER
Vahe Pace MD Mattingly, Kara, MA  This is an individual pending surgical intervention.  His hemoglobin A1c has significantly improved however it must be less than 8 before surgical intervention can be undertaken.  Please schedule him for a repeat hemoglobin A1c check in 6 weeks and we will reevaluate surgical intervention at that juncture.   Vahe       Called and LVM to call the office. We will need to move his appointment out until after the new A1c is drawn. This will need to be done around 2/9/2023. Appointment for 1/3/2023 has been cancelled.      12/30/2022 Patient is aware, appointment scheduled for 6 weeks out and he will obtain A1c 2/1/2023.

## 2023-01-05 ENCOUNTER — OFFICE VISIT (OUTPATIENT)
Dept: FAMILY MEDICINE CLINIC | Facility: CLINIC | Age: 43
End: 2023-01-05
Payer: MEDICAID

## 2023-01-05 VITALS
BODY MASS INDEX: 34.91 KG/M2 | DIASTOLIC BLOOD PRESSURE: 82 MMHG | WEIGHT: 280.8 LBS | SYSTOLIC BLOOD PRESSURE: 152 MMHG | RESPIRATION RATE: 20 BRPM | TEMPERATURE: 97.7 F | OXYGEN SATURATION: 98 % | HEART RATE: 91 BPM | HEIGHT: 75 IN

## 2023-01-05 DIAGNOSIS — G61.0 GUILLAIN-BARRE: ICD-10-CM

## 2023-01-05 DIAGNOSIS — I10 HYPERTENSION, BENIGN: ICD-10-CM

## 2023-01-05 DIAGNOSIS — L72.11 PILAR CYST OF SCALP: ICD-10-CM

## 2023-01-05 DIAGNOSIS — Z79.4 TYPE 2 DIABETES MELLITUS WITH OTHER NEUROLOGIC COMPLICATION, WITH LONG-TERM CURRENT USE OF INSULIN: Primary | ICD-10-CM

## 2023-01-05 DIAGNOSIS — E11.49 TYPE 2 DIABETES MELLITUS WITH OTHER NEUROLOGIC COMPLICATION, WITH LONG-TERM CURRENT USE OF INSULIN: ICD-10-CM

## 2023-01-05 DIAGNOSIS — E11.49 TYPE 2 DIABETES MELLITUS WITH OTHER NEUROLOGIC COMPLICATION, WITH LONG-TERM CURRENT USE OF INSULIN: Primary | ICD-10-CM

## 2023-01-05 DIAGNOSIS — E11.9 TYPE 2 DIABETES MELLITUS WITHOUT COMPLICATION, WITHOUT LONG-TERM CURRENT USE OF INSULIN: ICD-10-CM

## 2023-01-05 DIAGNOSIS — Z79.4 TYPE 2 DIABETES MELLITUS WITH OTHER NEUROLOGIC COMPLICATION, WITH LONG-TERM CURRENT USE OF INSULIN: ICD-10-CM

## 2023-01-05 PROBLEM — E66.01 CLASS 2 SEVERE OBESITY DUE TO EXCESS CALORIES WITH SERIOUS COMORBIDITY AND BODY MASS INDEX (BMI) OF 36.0 TO 36.9 IN ADULT (HCC): Status: RESOLVED | Noted: 2022-02-28 | Resolved: 2023-01-05

## 2023-01-05 PROBLEM — E66.812 CLASS 2 SEVERE OBESITY DUE TO EXCESS CALORIES WITH SERIOUS COMORBIDITY AND BODY MASS INDEX (BMI) OF 36.0 TO 36.9 IN ADULT: Status: RESOLVED | Noted: 2022-02-28 | Resolved: 2023-01-05

## 2023-01-05 PROCEDURE — 99214 OFFICE O/P EST MOD 30 MIN: CPT | Performed by: FAMILY MEDICINE

## 2023-01-05 RX ORDER — SITAGLIPTIN AND METFORMIN HYDROCHLORIDE 1000; 50 MG/1; MG/1
1 TABLET, FILM COATED ORAL 2 TIMES DAILY WITH MEALS
Qty: 180 TABLET | Refills: 3 | Status: SHIPPED | OUTPATIENT
Start: 2023-01-05

## 2023-01-05 RX ORDER — CHLORTHALIDONE 50 MG/1
50 TABLET ORAL DAILY
Qty: 90 TABLET | Refills: 3 | Status: SHIPPED | OUTPATIENT
Start: 2023-01-05

## 2023-01-05 RX ORDER — GABAPENTIN 300 MG/1
300 CAPSULE ORAL 3 TIMES DAILY
Qty: 270 CAPSULE | Refills: 3 | Status: SHIPPED | OUTPATIENT
Start: 2023-01-05

## 2023-01-05 RX ORDER — INSULIN LISPRO 100 U/ML
15 INJECTION, SOLUTION SUBCUTANEOUS
Qty: 15 ML | Refills: 2 | Status: SHIPPED | OUTPATIENT
Start: 2023-01-05 | End: 2023-02-14

## 2023-01-05 RX ORDER — INSULIN GLARGINE 100 [IU]/ML
38 INJECTION, SOLUTION SUBCUTANEOUS NIGHTLY
Qty: 15 ML | Refills: 8 | Status: SHIPPED | OUTPATIENT
Start: 2023-01-05 | End: 2023-02-13

## 2023-01-05 RX ORDER — LANCETS 30 GAUGE
1 EACH MISCELLANEOUS 4 TIMES DAILY
Qty: 360 EACH | Refills: 3 | Status: SHIPPED | OUTPATIENT
Start: 2023-01-05 | End: 2023-03-27 | Stop reason: SDUPTHER

## 2023-01-05 NOTE — PROGRESS NOTES
Subjective   Barrett Laguerre is a 42 y.o. male.   Chief Complaint   Patient presents with   • Diabetes   • Back Pain       History of Present Illness  Cyst on scalp   Fasting Sugars up consistently high 100s  Needs better glycemic control prior to back surgery   Back Pain  This is a chronic problem. The problem is unchanged. The pain is present in the lumbar spine. The quality of the pain is described as stabbing, shooting, aching and burning. The pain radiates to the left knee and left thigh. The pain is at a severity of 8/10. The pain is severe. The symptoms are aggravated by bending, lying down, sitting, position, standing and twisting. Stiffness is present all day. Pertinent negatives include no abdominal pain, chest pain, dysuria, fever, numbness or weakness. He has tried muscle relaxant and bed rest for the symptoms. The treatment provided mild relief.   Diabetes  He presents for his follow-up diabetic visit. He has type 2 diabetes mellitus. Associated symptoms include blurred vision and foot paresthesias. Pertinent negatives for diabetes include no chest pain, no fatigue, no polydipsia, no polyuria and no weakness. Risk factors for coronary artery disease include diabetes mellitus, dyslipidemia, hypertension, male sex and obesity. Current diabetic treatment includes oral agent (dual therapy) and insulin injections.        The following portions of the patient's history were reviewed and updated as appropriate: allergies, current medications, past family history, past medical history, past social history, past surgical history and problem list.    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)   • Sleep apnea   • Attention deficit disorder   •  Diabetes ([facility])   • Other seasonal allergic rhinitis   • Mixed obsessional thoughts and acts   • Hypertension, benign   • Disorder of lipid metabolism   • CIDP (chronic inflammatory demyelinating polyneuropathy) ([facility])   • Lumbar radiculopathy   • Onychomycosis   • Annual physical exam   • Morbid (severe) obesity due to excess calories ([facility])   • Acute midline low back pain with left-sided sciatica       Current Outpatient Medications on File Prior to Visit   Medication Sig Dispense Refill   • acetaminophen-codeine (TYLENOL with CODEINE #4) 300-60 MG per tablet Take 1 tablet by mouth Every 6 (Six) Hours As Needed for Moderate Pain. 120 tablet 5   • Alcohol Swabs (Alcohol Wipes) 70 % pads Apply 1 each topically 4 (Four) Times a Day Before Meals & at Bedtime. 200 each 2   • amLODIPine (NORVASC) 10 MG tablet Take 1 tablet by mouth Daily. 90 tablet 3   • azelastine (ASTELIN) 0.1 % nasal spray 2 sprays into the nostril(s) as directed by provider 2 (Two) Times a Day. Use in each nostril as directed 3 each 3   • clindamycin (CLEOCIN T) 1 % external solution APPLY SOLUTION TOPICALLY TO AFFECTED AREA TWICE DAILY     • colchicine-probenecid (COL-BENEMID) 0.5-500 MG per tablet Take 1 tablet by mouth Daily.     • Cyanocobalamin ER 1000 MCG tablet controlled-release Take 1 tablet by mouth Daily. 90 each 3   • Diclofenac Epolamine (FLECTOR) 1.3 % patch patch Apply 1 patch topically to the appropriate area as directed 2 (Two) Times a Day As Needed (back pain). 60 patch 2   • erythromycin (ROMYCIN) 5 MG/GM ophthalmic ointment ADMINISTER TO BOTH EYES EVERY NIGHT     • FLUoxetine (PROzac) 40 MG capsule Take 1 capsule by mouth 2 (Two) Times a Day. 180 capsule 3   • HYDROcodone-acetaminophen (Norco) 7.5-325 MG per tablet Take 1 tablet by mouth Every 6 (Six) Hours As Needed for Moderate Pain. 120 tablet 0   • HYDROcodone-acetaminophen (Norco) 7.5-325 MG per tablet Take 1 tablet by mouth Every 6 (Six) Hours As Needed for Moderate Pain. 120  tablet 0   • HYDROcodone-acetaminophen (Norco) 7.5-325 MG per tablet Take 1 tablet by mouth Every 6 (Six) Hours As Needed for Moderate Pain. 120 tablet 0   • hydrocortisone 1 % cream Apply  topically to the appropriate area as directed 2 (Two) Times a Day. 60 g 3   • hydrOXYzine (ATARAX) 25 MG tablet Take 1 tablet by mouth 4 (Four) Times a Day As Needed for Itching. 360 tablet 3   • Insulin Pen Needle (Pen Needles) 32G X 4 MM misc use 1 each 4 (Four) Times a Day Before Meals & at Bedtime. 200 each 2   • loratadine (EQ Loratadine) 10 MG tablet Take 1 tablet by mouth Daily. 90 tablet 3   • losartan (COZAAR) 50 MG tablet Take 1 tablet by mouth Daily. 90 tablet 3   • methocarbamol (ROBAXIN) 750 MG tablet Take 1 tablet by mouth 4 (Four) Times a Day. 120 tablet 5   • montelukast (SINGULAIR) 10 MG tablet Take 1 tablet by mouth Every Night. 90 tablet 3   • oxybutynin XL (DITROPAN XL) 15 MG 24 hr tablet Take 1 tablet by mouth Daily. 30 tablet 12   • oxyCODONE (Roxicodone) 5 MG immediate release tablet Take 1 tablet by mouth Every 6 (Six) Hours As Needed for Moderate Pain or Severe Pain. 15 tablet 0   • pantoprazole (Protonix) 40 MG EC tablet Take 1 tablet by mouth Daily. 90 tablet 3   • promethazine (PHENERGAN) 25 MG tablet Take 1 tablet by mouth Every 8 (Eight) Hours As Needed for Nausea or Vomiting. 90 tablet 5   • sildenafil (REVATIO) 20 MG tablet Take 1 tablet by mouth Daily As Needed (ed). 30 tablet 3   • sodium chloride (Ocean Nasal Spray) 0.65 % nasal spray 1 spray each nostril tid 216 mL 3   • [DISCONTINUED] chlorthalidone (HYGROTEN) 50 MG tablet Take 1 tablet by mouth Daily. 90 tablet 3   • [DISCONTINUED] gabapentin (NEURONTIN) 300 MG capsule Take 1 capsule by mouth 3 (Three) Times a Day. 270 capsule 3   • [DISCONTINUED] glucose blood (FREESTYLE LITE) test strip 1 each by Other route 3 (Three) Times a Day Before Meals. 200 each 3   • [DISCONTINUED] Insulin Glargine (BASAGLAR KWIKPEN) 100 UNIT/ML injection pen Inject  30 Units under the skin into the appropriate area as directed Every Night. 15 mL 2   • [DISCONTINUED] Insulin Lispro (ADMELOG SOLOSTAR) 100 UNIT/ML injection pen Inject 10 Units under the skin into the appropriate area as directed 3 (Three) Times a Day With Meals. 15 mL 2   • [DISCONTINUED] Lancets (freestyle) lancets use 1 each 3 (Three) Times a Day Before Meals. 200 each 3   • [DISCONTINUED] sitaGLIPtin-metFORMIN (Janumet)  MG per tablet Take 1 tablet by mouth 2 (Two) Times a Day With Meals. 180 tablet 3   • fluticasone (FLONASE) 50 MCG/ACT nasal spray 1 spray into the nostril(s) as directed by provider Daily for 90 days. 16 g 3     No current facility-administered medications on file prior to visit.     Current outpatient and discharge medications have been reconciled for the patient.  Reviewed by: Eric Rosenberg MD      Allergies   Allergen Reactions   • Penicillin G Anaphylaxis   • Penicillins Anaphylaxis   • Sulfa Antibiotics Hives   • Allopurinol Rash       Review of Systems   Constitutional: Negative for activity change, appetite change, fatigue and fever.   HENT: Negative for ear pain, swollen glands and voice change.    Eyes: Positive for blurred vision. Negative for visual disturbance.   Respiratory: Negative for shortness of breath and wheezing.    Cardiovascular: Negative for chest pain and leg swelling.   Gastrointestinal: Negative for abdominal pain, blood in stool, constipation, diarrhea, nausea and vomiting.   Endocrine: Negative for polydipsia and polyuria.   Genitourinary: Negative for dysuria, frequency and hematuria.   Musculoskeletal: Positive for back pain. Negative for joint swelling, neck pain and neck stiffness.   Skin: Negative for rash and wound.   Neurological: Negative for weakness, numbness and headache.   Psychiatric/Behavioral: Negative for suicidal ideas and depressed mood.     I have reviewed and confirmed the accuracy of the ROS as documented by the MA/LPN/RN Eric  Reji Rosenberg MD    Objective   Visit Vitals  /82 (BP Location: Right arm, Patient Position: Sitting, Cuff Size: Adult)   Pulse 91   Temp 97.7 °F (36.5 °C)   Resp 20   Ht 190.5 cm (75\")   Wt 127 kg (280 lb 12.8 oz)   SpO2 98%   BMI 35.10 kg/m²       Physical Exam  Constitutional:       Appearance: He is well-developed.   HENT:      Head: Normocephalic and atraumatic.      Right Ear: External ear normal.      Left Ear: External ear normal.      Nose: Nose normal.   Eyes:      Pupils: Pupils are equal, round, and reactive to light.   Cardiovascular:      Rate and Rhythm: Normal rate and regular rhythm.      Heart sounds: Normal heart sounds.   Pulmonary:      Effort: Pulmonary effort is normal.      Breath sounds: Normal breath sounds.   Abdominal:      General: Bowel sounds are normal.      Palpations: Abdomen is soft.   Musculoskeletal:         General: Normal range of motion.      Cervical back: Normal range of motion and neck supple.   Skin:     General: Skin is warm and dry.      Comments: approx 0.5 cm cystic lesion on scalp with pit c/w pilar cyst   Neurological:      Mental Status: He is alert and oriented to person, place, and time.   Psychiatric:         Behavior: Behavior normal.         Thought Content: Thought content normal.         Judgment: Judgment normal.       Derm Physical Exam    Diagnoses and all orders for this visit:    1. Type 2 diabetes mellitus with other neurologic complication, with long-term current use of insulin ([facility]) (Primary)  Overview:  Basalglar 30 units daily and Lispro 10 u tid with meals     Orders:  -     Insulin Lispro (ADMELOG SOLOSTAR) 100 UNIT/ML injection pen; Inject 15 Units under the skin into the appropriate area as directed 3 (Three) Times a Day With Meals for 100 days.  Dispense: 15 mL; Refill: 2  -     ReliOn Ultra Thin Lancets 30G misc; 1 each 4 (Four) Times a Day.  Dispense: 360 each; Refill: 3  -     glucose blood (FREESTYLE LITE) test strip; 1 each by  Other route 3 (Three) Times a Day Before Meals.  Dispense: 200 each; Refill: 3  -     Insulin Glargine (BASAGLAR KWIKPEN) 100 UNIT/ML injection pen; Inject 38 Units under the skin into the appropriate area as directed Every Night for 30 days.  Dispense: 15 mL; Refill: 8    2. Type 2 diabetes mellitus with other neurologic complication, with long-term current use of insulin ([facility])  Comments:  neurogenic claudication  Overview:  Basalglar 30 units daily and Lispro 10 u tid with meals     Orders:  -     Insulin Lispro (ADMELOG SOLOSTAR) 100 UNIT/ML injection pen; Inject 15 Units under the skin into the appropriate area as directed 3 (Three) Times a Day With Meals for 100 days.  Dispense: 15 mL; Refill: 2  -     ReliOn Ultra Thin Lancets 30G misc; 1 each 4 (Four) Times a Day.  Dispense: 360 each; Refill: 3  -     glucose blood (FREESTYLE LITE) test strip; 1 each by Other route 3 (Three) Times a Day Before Meals.  Dispense: 200 each; Refill: 3  -     Insulin Glargine (BASAGLAR KWIKPEN) 100 UNIT/ML injection pen; Inject 38 Units under the skin into the appropriate area as directed Every Night for 30 days.  Dispense: 15 mL; Refill: 8    3. Hypertension, benign  Overview:  Followed by Cardiology Dr Tong who placed pt on different BP med. .    Proteinuria/creatinine noted    Orders:  -     chlorthalidone (HYGROTEN) 50 MG tablet; Take 1 tablet by mouth Daily.  Dispense: 90 tablet; Refill: 3    4. Guillain-[place] ([facility])  -     gabapentin (NEURONTIN) 300 MG capsule; Take 1 capsule by mouth 3 (Three) Times a Day.  Dispense: 270 capsule; Refill: 3    5. Type 2 diabetes mellitus without complication, without long-term current use of insulin ([facility])  Overview:  Basalglar 30 units daily and Lispro 10 u tid with meals     Orders:  -     sitaGLIPtin-metFORMIN (Janumet)  MG per tablet; Take 1 tablet by mouth 2 (Two) Times a Day With Meals.  Dispense: 180 tablet; Refill: 3    6. Pilar cyst of scalp  Comments:  excision at his  convenience     Findings discussed. All questions answered.  Medication and medication adverse effects discussed.  Drug education given and explained to patient. Patient verbalized understanding.   Follow-up for routine health maintenance as indicated. Follow up in 2 months for re-evaluation, sooner for concerns.   Expected course, medications, and adverse effects discussed as appropriate.  Call or return if worsening or persistent symptoms.  I wore protective equipment throughout this patient encounter to include mask and eye protection. Hand hygiene was performed before donning protective equipment and after removal when leaving the room.       This document is intended for medical professional use only.

## 2023-01-20 ENCOUNTER — OFFICE VISIT (OUTPATIENT)
Dept: PODIATRY | Facility: CLINIC | Age: 43
End: 2023-01-20
Payer: MEDICAID

## 2023-01-20 VITALS — HEIGHT: 75 IN | HEART RATE: 75 BPM | BODY MASS INDEX: 34.82 KG/M2 | WEIGHT: 280 LBS | OXYGEN SATURATION: 98 %

## 2023-01-20 DIAGNOSIS — L60.3 ONYCHODYSTROPHY: Primary | ICD-10-CM

## 2023-01-20 DIAGNOSIS — E11.42 DM TYPE 2 WITH DIABETIC PERIPHERAL NEUROPATHY: ICD-10-CM

## 2023-01-20 DIAGNOSIS — E11.65 TYPE 2 DIABETES MELLITUS WITH HYPERGLYCEMIA, WITHOUT LONG-TERM CURRENT USE OF INSULIN: ICD-10-CM

## 2023-01-20 PROCEDURE — 11721 DEBRIDE NAIL 6 OR MORE: CPT

## 2023-01-20 PROCEDURE — 99213 OFFICE O/P EST LOW 20 MIN: CPT

## 2023-01-20 NOTE — PROGRESS NOTES
01/20/2023  Foot and Ankle Surgery - Established Patient/Follow-up  Provider: ANASTASIA Chan   Location: [facility]    Subjective:  Barrett Laguerre is a 42 y.o. male.     Chief Complaint   Patient presents with   • Left Foot - Diabetes, Nail Problem     Dm foot care   • Right Foot - Diabetes, Nail Problem     Dm foot care   • Follow-up     ANA Rosenberg MD 01/05/2023       The patient returns to the clinic today for a routine diabetic foot check.     He reports he is doing well. He states his feet are doing well, aside from dryness. He would like his nails trimmed today. He reports he is unable to cut his nails. He reports he has not undergone back surgery, secondary to an elevated A1c. His last A1c was 8.5%, drawn in 12/2022. He notes he will have hisd A1c rechecked in 02/2022. He does wear his diabetic shoes with the inserts.     Allergies   Allergen Reactions   • Penicillin G Anaphylaxis   • Penicillins Anaphylaxis   • Sulfa Antibiotics Hives   • Allopurinol Rash       Current Outpatient Medications on File Prior to Visit   Medication Sig Dispense Refill   • acetaminophen-codeine (TYLENOL with CODEINE #4) 300-60 MG per tablet Take 1 tablet by mouth Every 6 (Six) Hours As Needed for Moderate Pain. 120 tablet 5   • Alcohol Swabs (Alcohol Wipes) 70 % pads Apply 1 each topically 4 (Four) Times a Day Before Meals & at Bedtime. 200 each 2   • amLODIPine (NORVASC) 10 MG tablet Take 1 tablet by mouth Daily. 90 tablet 3   • azelastine (ASTELIN) 0.1 % nasal spray 2 sprays into the nostril(s) as directed by provider 2 (Two) Times a Day. Use in each nostril as directed 3 each 3   • chlorthalidone (HYGROTEN) 50 MG tablet Take 1 tablet by mouth Daily. 90 tablet 3   • clindamycin (CLEOCIN T) 1 % external solution APPLY SOLUTION TOPICALLY TO AFFECTED AREA TWICE DAILY     • colchicine-probenecid (COL-BENEMID) 0.5-500 MG per tablet Take 1 tablet by mouth Daily.     • Cyanocobalamin ER 1000 MCG tablet  controlled-release Take 1 tablet by mouth Daily. 90 each 3   • Diclofenac Epolamine (FLECTOR) 1.3 % patch patch Apply 1 patch topically to the appropriate area as directed 2 (Two) Times a Day As Needed (back pain). 60 patch 2   • erythromycin (ROMYCIN) 5 MG/GM ophthalmic ointment ADMINISTER TO BOTH EYES EVERY NIGHT     • FLUoxetine (PROzac) 40 MG capsule Take 1 capsule by mouth 2 (Two) Times a Day. 180 capsule 3   • gabapentin (NEURONTIN) 300 MG capsule Take 1 capsule by mouth 3 (Three) Times a Day. 270 capsule 3   • glucose blood (FREESTYLE LITE) test strip 1 each by Other route 3 (Three) Times a Day Before Meals. 200 each 3   • HYDROcodone-acetaminophen (Norco) 7.5-325 MG per tablet Take 1 tablet by mouth Every 6 (Six) Hours As Needed for Moderate Pain. 120 tablet 0   • HYDROcodone-acetaminophen (Norco) 7.5-325 MG per tablet Take 1 tablet by mouth Every 6 (Six) Hours As Needed for Moderate Pain. 120 tablet 0   • HYDROcodone-acetaminophen (Norco) 7.5-325 MG per tablet Take 1 tablet by mouth Every 6 (Six) Hours As Needed for Moderate Pain. 120 tablet 0   • hydrocortisone 1 % cream Apply  topically to the appropriate area as directed 2 (Two) Times a Day. 60 g 3   • hydrOXYzine (ATARAX) 25 MG tablet Take 1 tablet by mouth 4 (Four) Times a Day As Needed for Itching. 360 tablet 3   • Insulin Glargine (BASAGLAR KWIKPEN) 100 UNIT/ML injection pen Inject 38 Units under the skin into the appropriate area as directed Every Night for 30 days. 15 mL 8   • Insulin Lispro (ADMELOG SOLOSTAR) 100 UNIT/ML injection pen Inject 15 Units under the skin into the appropriate area as directed 3 (Three) Times a Day With Meals for 100 days. 15 mL 2   • Insulin Pen Needle (Pen Needles) 32G X 4 MM misc use 1 each 4 (Four) Times a Day Before Meals & at Bedtime. 200 each 2   • loratadine (EQ Loratadine) 10 MG tablet Take 1 tablet by mouth Daily. 90 tablet 3   • losartan (COZAAR) 50 MG tablet Take 1 tablet by mouth Daily. 90 tablet 3   •  "methocarbamol (ROBAXIN) 750 MG tablet Take 1 tablet by mouth 4 (Four) Times a Day. 120 tablet 5   • montelukast (SINGULAIR) 10 MG tablet Take 1 tablet by mouth Every Night. 90 tablet 3   • oxybutynin XL (DITROPAN XL) 15 MG 24 hr tablet Take 1 tablet by mouth Daily. 30 tablet 12   • oxyCODONE (Roxicodone) 5 MG immediate release tablet Take 1 tablet by mouth Every 6 (Six) Hours As Needed for Moderate Pain or Severe Pain. 15 tablet 0   • pantoprazole (Protonix) 40 MG EC tablet Take 1 tablet by mouth Daily. 90 tablet 3   • promethazine (PHENERGAN) 25 MG tablet Take 1 tablet by mouth Every 8 (Eight) Hours As Needed for Nausea or Vomiting. 90 tablet 5   • ReliOn Ultra Thin Lancets 30G misc 1 each 4 (Four) Times a Day. 360 each 3   • sildenafil (REVATIO) 20 MG tablet Take 1 tablet by mouth Daily As Needed (ed). 30 tablet 3   • sitaGLIPtin-metFORMIN (Janumet)  MG per tablet Take 1 tablet by mouth 2 (Two) Times a Day With Meals. 180 tablet 3   • sodium chloride (Ocean Nasal Spray) 0.65 % nasal spray 1 spray each nostril tid 216 mL 3   • fluticasone (FLONASE) 50 MCG/ACT nasal spray 1 spray into the nostril(s) as directed by provider Daily for 90 days. 16 g 3     No current facility-administered medications on file prior to visit.       Objective   Pulse 75   Ht 190.5 cm (75\")   Wt 127 kg (280 lb)   SpO2 98%   BMI 35.00 kg/m²     Foot/Ankle Exam:     VASCULAR      Right Foot Vascularity   Normal vascular exam    Dorsalis pedis:  2+  Posterior tibial:  2+  Skin Temperature: warm    Edema Grading:  None  CFT:  < 3 seconds  Pedal Hair Growth:  Present  Varicosities: none       Left Foot Vascularity   Normal vascular exam    Dorsalis pedis:  2+  Posterior tibial:  2+  Skin Temperature: warm    Edema Grading:  None  CFT:  < 3 seconds  Pedal Hair Growth:  Present  Varicosities: none        NEUROLOGIC     Right Foot Neurologic   Protective Sensation using [place] Monofilament:  8     Left Foot Neurologic "   Protective Sensation using [place] Monofilament:  9     MUSCULOSKELETAL      Right Foot Musculoskeletal   Ecchymosis:  None  Tenderness: none       Left Foot Musculoskeletal   Ecchymosis:  None  Tenderness: none       DERMATOLOGIC     Right Foot Dermatologic   Skin: skin intact    Nails: dystrophic nails       Left Foot Dermatologic   Skin: skin intact    Nails: dystrophic nails          Assessment & Plan   Diagnoses and all orders for this visit:    1. Onychodystrophy (Primary)    2. Type 2 diabetes mellitus with hyperglycemia, without long-term current use of insulin (HCC)    3. DM type 2 with diabetic peripheral neuropathy (HCC)      Explained importance of diabetic foot care, daily foot checks, and glycemic control. Patient should check both feet on a daily basis, monitor and control blood sugars, make sure that both feet and in between toes are towel dried after baths or showers. Avoid barefoot walking at all times. Check shoes before putting them on.   Patient was given information on proper foot care. Call the office at the first signs of a wound or with signs of infection.  Nail debridement: Bilateral x10  Consent and time out was performed before proceeding with the procedure. Nails were debrided with a nail nipper without complication.  No anesthesia was required.  Indications for procedure were thickened, dystrophic, and fungal appearing nails which are difficult to trim.  Proper self-care and technique was discussed with patient.  Patient was stable after procedure.  Do feel the patient is at mild to moderate risk for pedal complications related to diabetes. Follow up in 2 months.     No orders of the defined types were placed in this encounter.       Transcribed from ambient dictation for ANASTASIA Alvares by Edward Pierre.  01/20/23   15:17 EST    Patient or patient representative verbalized consent to the visit recording.  I have personally performed the services described in this  document as transcribed by the above individual, and it is both accurate and complete.  Gilda Campbell, APRN  1/20/2023  16:56 EST

## 2023-02-03 NOTE — H&P (VIEW-ONLY)
Neurosurgical Consultation      Barrett Laguerre is a 42 y.o. male is here today for follow-up for low back pain. In the office today patient reports continued low back pain.     Chief Complaint   Patient presents with   • Back Pain     Follow up         Previous treatment: Water therapy, Hydrocodone, Gabapentin, metocarbamol    HPI: This is a 42-year-old gentleman who has chronic left-sided radiculopathy most consistent with an L5 and S1 nerve root distribution.  His MRI does show a left-sided eccentric L4-L5 and L5-S1 disc herniation with lateral recess and neuroforaminal stenosis.  He has associated back pain which is significantly improved with physical therapy.  He did attempt spinal canal injections without significant improvement.  He initially had severely elevated poorly controlled diabetes with a hemoglobin A1c of almost 13 however he has now brought that down to 6.5.  I had an extensive discussion with him about the possibility of surgical decompression of those nerve roots.  I explained that leg pain is the most likely thing to improve.  His back pain may actually worsen.  He expresses understanding.  Of note he does have a history of Guillain-Barré syndrome.    Past Medical History:   Diagnosis Date   • Acquired pes planus of both feet     Impression: needs referral for orthotics.   • Acute non-recurrent maxillary sinusitis     Impression: His symptoms were not improving.   • ADD (attention deficit disorder)    • Annual physical exam     Impression: Discussed anticipatory guidance, diet, exercise, and weight loss. Discussed safety, seatbelts, and routine screening examinations. Discussed self-examinations.   • Asthma     Impression: Stable   • Asymptomatic microscopic hematuria     Impression: recheck neg. Will follow   • Chronic gout, unspecified, without tophus (tophi)     Impression: Recommend rechecking uric acid.   • CIDP (chronic inflammatory demyelinating polyneuropathy) ([facility])     Story: s/p  IVIG Guillain-[employer] per [facility] diagnosed 2015 gabapentin 600 tid. Impression: Symptoms include: numbness and tingling in hands and feet, difficulty concentrating, drops items, chest pain. dizziness. Pt needs routine follow up with neurology. Previously seen by Dr Seipel   • Dyslipidemia    • Guillain-[employer] (HCC)    • Hypertension, benign     Impression: Followed by Cardiology Dr Tong who placed pt on different BP med. . Proteinuria/creatinine noted   • Idiopathic chronic gout without tophus     Unspecified site. Impression: stable. Story: UA 8.3 2/2018   • Intervertebral disc stenosis of neural canal of lumbar region     Story: MRI 11/15 showed lumbar disk disease. 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 Pt would like referra to Dr Watkins, has appt Dec 15   • Low back pain    • Lumbar disc disorder with myelopathy     Impression: failed therapy. Pt would like refill on compounded topical med. Rx faxed   • Mixed obsessional thoughts and acts    • Obstructive sleep apnea     Impression: needs cpap mask   • Other seasonal allergic rhinitis     Impression: Over-the-counter medication indications, dosage, and precautions discussed.   • Overactive bladder    • Screening for depression    • Screening for hyperlipidemia    • SI (sacroiliac) joint dysfunction     Impression: Findings discussed. All questions answered. Medication and medication adverse effects discussed. Drug education given and explained to patient. Patient verbalized understanding. Follow-up in 4-6 weeks if not better. Follow-up sooner for worsening symptoms or for any concerns. Consider chiropractor   • Tobacco use disorder         Past Surgical History:   Procedure Laterality Date   • COLONOSCOPY N/A 12/22/2020    Procedure: COLONOSCOPY with polypectomy x;  Surgeon: Juan Alberto Davis MD;  Location: [facility] ENDOSCOPY;  Service: Gastroenterology;  Laterality: N/A;  post: transverse colon polyp   • INGUINAL HERNIA REPAIR     • TYMPANOSTOMY TUBE  PLACEMENT          Current Outpatient Medications on File Prior to Visit   Medication Sig Dispense Refill   • acetaminophen-codeine (TYLENOL with CODEINE #4) 300-60 MG per tablet Take 1 tablet by mouth Every 6 (Six) Hours As Needed for Moderate Pain. 120 tablet 5   • Alcohol Swabs (Alcohol Wipes) 70 % pads Apply 1 each topically 4 (Four) Times a Day Before Meals & at Bedtime. 200 each 2   • amLODIPine (NORVASC) 10 MG tablet Take 1 tablet by mouth Daily. 90 tablet 3   • azelastine (ASTELIN) 0.1 % nasal spray 2 sprays into the nostril(s) as directed by provider 2 (Two) Times a Day. Use in each nostril as directed 3 each 3   • chlorthalidone (HYGROTEN) 50 MG tablet Take 1 tablet by mouth Daily. 90 tablet 3   • clindamycin (CLEOCIN T) 1 % external solution APPLY SOLUTION TOPICALLY TO AFFECTED AREA TWICE DAILY     • colchicine-probenecid (COL-BENEMID) 0.5-500 MG per tablet Take 1 tablet by mouth Daily.     • Cyanocobalamin ER 1000 MCG tablet controlled-release Take 1 tablet by mouth Daily. 90 each 3   • Diclofenac Epolamine (FLECTOR) 1.3 % patch patch Apply 1 patch topically to the appropriate area as directed 2 (Two) Times a Day As Needed (back pain). 60 patch 2   • erythromycin (ROMYCIN) 5 MG/GM ophthalmic ointment ADMINISTER TO BOTH EYES EVERY NIGHT     • FLUoxetine (PROzac) 40 MG capsule Take 1 capsule by mouth 2 (Two) Times a Day. 180 capsule 3   • gabapentin (NEURONTIN) 300 MG capsule Take 1 capsule by mouth 3 (Three) Times a Day. 270 capsule 3   • glucose blood (FREESTYLE LITE) test strip 1 each by Other route 3 (Three) Times a Day Before Meals. 200 each 3   • HYDROcodone-acetaminophen (Norco) 7.5-325 MG per tablet Take 1 tablet by mouth Every 6 (Six) Hours As Needed for Moderate Pain. 120 tablet 0   • HYDROcodone-acetaminophen (Norco) 7.5-325 MG per tablet Take 1 tablet by mouth Every 6 (Six) Hours As Needed for Moderate Pain. 120 tablet 0   • HYDROcodone-acetaminophen (Norco) 7.5-325 MG per tablet Take 1 tablet  by mouth Every 6 (Six) Hours As Needed for Moderate Pain. 120 tablet 0   • hydrocortisone 1 % cream Apply  topically to the appropriate area as directed 2 (Two) Times a Day. 60 g 3   • hydrOXYzine (ATARAX) 25 MG tablet Take 1 tablet by mouth 4 (Four) Times a Day As Needed for Itching. 360 tablet 3   • Insulin Lispro (ADMELOG SOLOSTAR) 100 UNIT/ML injection pen Inject 15 Units under the skin into the appropriate area as directed 3 (Three) Times a Day With Meals for 100 days. 15 mL 2   • Insulin Pen Needle (Pen Needles) 32G X 4 MM misc use 1 each 4 (Four) Times a Day Before Meals & at Bedtime. 200 each 2   • loratadine (EQ Loratadine) 10 MG tablet Take 1 tablet by mouth Daily. 90 tablet 3   • losartan (COZAAR) 50 MG tablet Take 1 tablet by mouth Daily. 90 tablet 3   • methocarbamol (ROBAXIN) 750 MG tablet Take 1 tablet by mouth 4 (Four) Times a Day. 120 tablet 5   • montelukast (SINGULAIR) 10 MG tablet Take 1 tablet by mouth Every Night. 90 tablet 3   • oxybutynin XL (DITROPAN XL) 15 MG 24 hr tablet Take 1 tablet by mouth Daily. 30 tablet 12   • oxyCODONE (Roxicodone) 5 MG immediate release tablet Take 1 tablet by mouth Every 6 (Six) Hours As Needed for Moderate Pain or Severe Pain. 15 tablet 0   • pantoprazole (Protonix) 40 MG EC tablet Take 1 tablet by mouth Daily. 90 tablet 3   • promethazine (PHENERGAN) 25 MG tablet Take 1 tablet by mouth Every 8 (Eight) Hours As Needed for Nausea or Vomiting. 90 tablet 5   • ReliOn Ultra Thin Lancets 30G misc 1 each 4 (Four) Times a Day. 360 each 3   • sildenafil (REVATIO) 20 MG tablet Take 1 tablet by mouth Daily As Needed (ed). 30 tablet 3   • sitaGLIPtin-metFORMIN (Janumet)  MG per tablet Take 1 tablet by mouth 2 (Two) Times a Day With Meals. 180 tablet 3   • sodium chloride (Ocean Nasal Spray) 0.65 % nasal spray 1 spray each nostril tid 216 mL 3   • fluticasone (FLONASE) 50 MCG/ACT nasal spray 1 spray into the nostril(s) as directed by provider Daily for 90 days. 16 g  "3   • Insulin Glargine (BASAGLAR KWIKPEN) 100 UNIT/ML injection pen Inject 38 Units under the skin into the appropriate area as directed Every Night for 30 days. 15 mL 8     No current facility-administered medications on file prior to visit.        Allergies   Allergen Reactions   • Penicillin G Anaphylaxis   • Penicillins Anaphylaxis   • Sulfa Antibiotics Hives   • Allopurinol Rash        Social History     Socioeconomic History   • Marital status: Single   Tobacco Use   • Smoking status: Never   • Smokeless tobacco: Never   Vaping Use   • Vaping Use: Never used   Substance and Sexual Activity   • Alcohol use: Not Currently   • Drug use: Not Currently   • Sexual activity: Defer          Review of Systems   Constitutional: Positive for activity change.   HENT: Negative.    Eyes: Negative.    Respiratory: Negative.    Cardiovascular: Negative.    Gastrointestinal: Negative.    Endocrine: Negative.    Genitourinary: Negative.    Musculoskeletal: Positive for back pain.        Left buttock and leg   Skin: Negative.    Allergic/Immunologic: Negative.    Neurological: Positive for numbness.        Tingling        Physical Examination:     Vitals:    02/07/23 1404   BP: 137/84   Pulse: 87   SpO2: 96%   Weight: 127 kg (279 lb 12.8 oz)   Height: 190.5 cm (75\")   PainSc:   4        Physical Exam     Neurological Exam   Neurological examination is stable compared to my last evaluation without any new red flag signs.    Result Review  The following data was reviewed by: Vahe Pace MD on 02/07/2023:    Data reviewed: Radiologic studies MRI of the lumbar spine shows a left-sided eccentric L4-L5 and L5-S1 disc bulge causing lateral recess and neuroforaminal stenosis.     Assessment/plan:  This is a 42-year-old gentleman with a history of Guillain-Barré and a BMI of 35 who has chronic left-sided radiculopathy that has failed management with physical therapy and spinal canal injections.  His MRI does provide anatomic " explanation for his symptoms.  I recommend a left-sided L4-L5 and L5-S1 microdiscectomy.  We will work to get this scheduled.  I have encouraged him to continue his adequate glucose control.  I have encouraged him to call with any questions or concerns.    Diagnoses and all orders for this visit:    1. Lumbar radiculopathy (Primary)  -     Case Request; Standing  -     Follow Anesthesia Guidelines / Protocol; Standing  -     Verify NPO Status; Standing  -     Obtain Informed Consent; Standing  -     SCD (Sequential Compression Device) - Place on Patient in Pre-Op; Standing  -     Verify / Perform Chlorhexidine Skin Prep; Standing  -     MRSA Screen, PCR (Inpatient) - Swab, Nares; Standing  -     Type & Screen; Standing  -     Case Request         No follow-ups on file.            Vahe Pace MD

## 2023-02-03 NOTE — PROGRESS NOTES
Neurosurgical Consultation      Barrett Laguerre is a 42 y.o. male is here today for follow-up for low back pain. In the office today patient reports continued low back pain.     Chief Complaint   Patient presents with   • Back Pain     Follow up         Previous treatment: Water therapy, Hydrocodone, Gabapentin, metocarbamol    HPI: This is a 42-year-old gentleman who has chronic left-sided radiculopathy most consistent with an L5 and S1 nerve root distribution.  His MRI does show a left-sided eccentric L4-L5 and L5-S1 disc herniation with lateral recess and neuroforaminal stenosis.  He has associated back pain which is significantly improved with physical therapy.  He did attempt spinal canal injections without significant improvement.  He initially had severely elevated poorly controlled diabetes with a hemoglobin A1c of almost 13 however he has now brought that down to 6.5.  I had an extensive discussion with him about the possibility of surgical decompression of those nerve roots.  I explained that leg pain is the most likely thing to improve.  His back pain may actually worsen.  He expresses understanding.  Of note he does have a history of Guillain-Barré syndrome.    Past Medical History:   Diagnosis Date   • Acquired pes planus of both feet     Impression: needs referral for orthotics.   • Acute non-recurrent maxillary sinusitis     Impression: His symptoms were not improving.   • ADD (attention deficit disorder)    • Annual physical exam     Impression: Discussed anticipatory guidance, diet, exercise, and weight loss. Discussed safety, seatbelts, and routine screening examinations. Discussed self-examinations.   • Asthma     Impression: Stable   • Asymptomatic microscopic hematuria     Impression: recheck neg. Will follow   • Chronic gout, unspecified, without tophus (tophi)     Impression: Recommend rechecking uric acid.   • CIDP (chronic inflammatory demyelinating polyneuropathy) ([facility])     Story: s/p  IVIG Guillain-[place] per [facility] diagnosed 2015 gabapentin 600 tid. Impression: Symptoms include: numbness and tingling in hands and feet, difficulty concentrating, drops items, chest pain. dizziness. Pt needs routine follow up with neurology. Previously seen by Dr Seipel   • Dyslipidemia    • Guillain-[place] (HCC)    • Hypertension, benign     Impression: Followed by Cardiology Dr Tong who placed pt on different BP med. . Proteinuria/creatinine noted   • Idiopathic chronic gout without tophus     Unspecified site. Impression: stable. Story: UA 8.3 2/2018   • Intervertebral disc stenosis of neural canal of lumbar region     Story: MRI 11/15 showed lumbar disk disease. 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 Pt would like referra to Dr Watkins, has appt Dec 15   • Low back pain    • Lumbar disc disorder with myelopathy     Impression: failed therapy. Pt would like refill on compounded topical med. Rx faxed   • Mixed obsessional thoughts and acts    • Obstructive sleep apnea     Impression: needs cpap mask   • Other seasonal allergic rhinitis     Impression: Over-the-counter medication indications, dosage, and precautions discussed.   • Overactive bladder    • Screening for depression    • Screening for hyperlipidemia    • SI (sacroiliac) joint dysfunction     Impression: Findings discussed. All questions answered. Medication and medication adverse effects discussed. Drug education given and explained to patient. Patient verbalized understanding. Follow-up in 4-6 weeks if not better. Follow-up sooner for worsening symptoms or for any concerns. Consider chiropractor   • Tobacco use disorder         Past Surgical History:   Procedure Laterality Date   • COLONOSCOPY N/A 12/22/2020    Procedure: COLONOSCOPY with polypectomy x;  Surgeon: Juan Alberto Davis MD;  Location: [facility] ENDOSCOPY;  Service: Gastroenterology;  Laterality: N/A;  post: transverse colon polyp   • INGUINAL HERNIA REPAIR     • TYMPANOSTOMY TUBE  PLACEMENT          Current Outpatient Medications on File Prior to Visit   Medication Sig Dispense Refill   • acetaminophen-codeine (TYLENOL with CODEINE #4) 300-60 MG per tablet Take 1 tablet by mouth Every 6 (Six) Hours As Needed for Moderate Pain. 120 tablet 5   • Alcohol Swabs (Alcohol Wipes) 70 % pads Apply 1 each topically 4 (Four) Times a Day Before Meals & at Bedtime. 200 each 2   • amLODIPine (NORVASC) 10 MG tablet Take 1 tablet by mouth Daily. 90 tablet 3   • azelastine (ASTELIN) 0.1 % nasal spray 2 sprays into the nostril(s) as directed by provider 2 (Two) Times a Day. Use in each nostril as directed 3 each 3   • chlorthalidone (HYGROTEN) 50 MG tablet Take 1 tablet by mouth Daily. 90 tablet 3   • clindamycin (CLEOCIN T) 1 % external solution APPLY SOLUTION TOPICALLY TO AFFECTED AREA TWICE DAILY     • colchicine-probenecid (COL-BENEMID) 0.5-500 MG per tablet Take 1 tablet by mouth Daily.     • Cyanocobalamin ER 1000 MCG tablet controlled-release Take 1 tablet by mouth Daily. 90 each 3   • Diclofenac Epolamine (FLECTOR) 1.3 % patch patch Apply 1 patch topically to the appropriate area as directed 2 (Two) Times a Day As Needed (back pain). 60 patch 2   • erythromycin (ROMYCIN) 5 MG/GM ophthalmic ointment ADMINISTER TO BOTH EYES EVERY NIGHT     • FLUoxetine (PROzac) 40 MG capsule Take 1 capsule by mouth 2 (Two) Times a Day. 180 capsule 3   • gabapentin (NEURONTIN) 300 MG capsule Take 1 capsule by mouth 3 (Three) Times a Day. 270 capsule 3   • glucose blood (FREESTYLE LITE) test strip 1 each by Other route 3 (Three) Times a Day Before Meals. 200 each 3   • HYDROcodone-acetaminophen (Norco) 7.5-325 MG per tablet Take 1 tablet by mouth Every 6 (Six) Hours As Needed for Moderate Pain. 120 tablet 0   • HYDROcodone-acetaminophen (Norco) 7.5-325 MG per tablet Take 1 tablet by mouth Every 6 (Six) Hours As Needed for Moderate Pain. 120 tablet 0   • HYDROcodone-acetaminophen (Norco) 7.5-325 MG per tablet Take 1 tablet  by mouth Every 6 (Six) Hours As Needed for Moderate Pain. 120 tablet 0   • hydrocortisone 1 % cream Apply  topically to the appropriate area as directed 2 (Two) Times a Day. 60 g 3   • hydrOXYzine (ATARAX) 25 MG tablet Take 1 tablet by mouth 4 (Four) Times a Day As Needed for Itching. 360 tablet 3   • Insulin Lispro (ADMELOG SOLOSTAR) 100 UNIT/ML injection pen Inject 15 Units under the skin into the appropriate area as directed 3 (Three) Times a Day With Meals for 100 days. 15 mL 2   • Insulin Pen Needle (Pen Needles) 32G X 4 MM misc use 1 each 4 (Four) Times a Day Before Meals & at Bedtime. 200 each 2   • loratadine (EQ Loratadine) 10 MG tablet Take 1 tablet by mouth Daily. 90 tablet 3   • losartan (COZAAR) 50 MG tablet Take 1 tablet by mouth Daily. 90 tablet 3   • methocarbamol (ROBAXIN) 750 MG tablet Take 1 tablet by mouth 4 (Four) Times a Day. 120 tablet 5   • montelukast (SINGULAIR) 10 MG tablet Take 1 tablet by mouth Every Night. 90 tablet 3   • oxybutynin XL (DITROPAN XL) 15 MG 24 hr tablet Take 1 tablet by mouth Daily. 30 tablet 12   • oxyCODONE (Roxicodone) 5 MG immediate release tablet Take 1 tablet by mouth Every 6 (Six) Hours As Needed for Moderate Pain or Severe Pain. 15 tablet 0   • pantoprazole (Protonix) 40 MG EC tablet Take 1 tablet by mouth Daily. 90 tablet 3   • promethazine (PHENERGAN) 25 MG tablet Take 1 tablet by mouth Every 8 (Eight) Hours As Needed for Nausea or Vomiting. 90 tablet 5   • ReliOn Ultra Thin Lancets 30G misc 1 each 4 (Four) Times a Day. 360 each 3   • sildenafil (REVATIO) 20 MG tablet Take 1 tablet by mouth Daily As Needed (ed). 30 tablet 3   • sitaGLIPtin-metFORMIN (Janumet)  MG per tablet Take 1 tablet by mouth 2 (Two) Times a Day With Meals. 180 tablet 3   • sodium chloride (Ocean Nasal Spray) 0.65 % nasal spray 1 spray each nostril tid 216 mL 3   • fluticasone (FLONASE) 50 MCG/ACT nasal spray 1 spray into the nostril(s) as directed by provider Daily for 90 days. 16 g  "3   • Insulin Glargine (BASAGLAR KWIKPEN) 100 UNIT/ML injection pen Inject 38 Units under the skin into the appropriate area as directed Every Night for 30 days. 15 mL 8     No current facility-administered medications on file prior to visit.        Allergies   Allergen Reactions   • Penicillin G Anaphylaxis   • Penicillins Anaphylaxis   • Sulfa Antibiotics Hives   • Allopurinol Rash        Social History     Socioeconomic History   • Marital status: Single   Tobacco Use   • Smoking status: Never   • Smokeless tobacco: Never   Vaping Use   • Vaping Use: Never used   Substance and Sexual Activity   • Alcohol use: Not Currently   • Drug use: Not Currently   • Sexual activity: Defer          Review of Systems   Constitutional: Positive for activity change.   HENT: Negative.    Eyes: Negative.    Respiratory: Negative.    Cardiovascular: Negative.    Gastrointestinal: Negative.    Endocrine: Negative.    Genitourinary: Negative.    Musculoskeletal: Positive for back pain.        Left buttock and leg   Skin: Negative.    Allergic/Immunologic: Negative.    Neurological: Positive for numbness.        Tingling        Physical Examination:     Vitals:    02/07/23 1404   BP: 137/84   Pulse: 87   SpO2: 96%   Weight: 127 kg (279 lb 12.8 oz)   Height: 190.5 cm (75\")   PainSc:   4        Physical Exam     Neurological Exam   Neurological examination is stable compared to my last evaluation without any new red flag signs.    Result Review  The following data was reviewed by: Vahe Pace MD on 02/07/2023:    Data reviewed: Radiologic studies MRI of the lumbar spine shows a left-sided eccentric L4-L5 and L5-S1 disc bulge causing lateral recess and neuroforaminal stenosis.     Assessment/plan:  This is a 42-year-old gentleman with a history of Guillain-Barré and a BMI of 35 who has chronic left-sided radiculopathy that has failed management with physical therapy and spinal canal injections.  His MRI does provide anatomic " explanation for his symptoms.  I recommend a left-sided L4-L5 and L5-S1 microdiscectomy.  We will work to get this scheduled.  I have encouraged him to continue his adequate glucose control.  I have encouraged him to call with any questions or concerns.    Diagnoses and all orders for this visit:    1. Lumbar radiculopathy (Primary)  -     Case Request; Standing  -     Follow Anesthesia Guidelines / Protocol; Standing  -     Verify NPO Status; Standing  -     Obtain Informed Consent; Standing  -     SCD (Sequential Compression Device) - Place on Patient in Pre-Op; Standing  -     Verify / Perform Chlorhexidine Skin Prep; Standing  -     MRSA Screen, PCR (Inpatient) - Swab, Nares; Standing  -     Type & Screen; Standing  -     Case Request         No follow-ups on file.            Vahe Pace MD

## 2023-02-06 ENCOUNTER — LAB (OUTPATIENT)
Dept: LAB | Facility: HOSPITAL | Age: 43
End: 2023-02-06
Payer: MEDICAID

## 2023-02-06 DIAGNOSIS — E11.8 TYPE 2 DIABETES MELLITUS WITH COMPLICATION: ICD-10-CM

## 2023-02-06 LAB — HBA1C MFR BLD: 6.5 % (ref 3.5–5.6)

## 2023-02-06 PROCEDURE — 36415 COLL VENOUS BLD VENIPUNCTURE: CPT

## 2023-02-06 PROCEDURE — 83036 HEMOGLOBIN GLYCOSYLATED A1C: CPT

## 2023-02-07 ENCOUNTER — OFFICE VISIT (OUTPATIENT)
Dept: NEUROSURGERY | Facility: CLINIC | Age: 43
End: 2023-02-07
Payer: MEDICAID

## 2023-02-07 VITALS
DIASTOLIC BLOOD PRESSURE: 84 MMHG | OXYGEN SATURATION: 96 % | HEIGHT: 75 IN | BODY MASS INDEX: 34.79 KG/M2 | SYSTOLIC BLOOD PRESSURE: 137 MMHG | HEART RATE: 87 BPM | WEIGHT: 279.8 LBS

## 2023-02-07 DIAGNOSIS — M54.16 LUMBAR RADICULOPATHY: Primary | ICD-10-CM

## 2023-02-07 PROCEDURE — 99214 OFFICE O/P EST MOD 30 MIN: CPT | Performed by: NEUROLOGICAL SURGERY

## 2023-02-13 ENCOUNTER — TELEPHONE (OUTPATIENT)
Dept: NEUROSURGERY | Facility: CLINIC | Age: 43
End: 2023-02-13
Payer: MEDICAID

## 2023-02-13 NOTE — TELEPHONE ENCOUNTER
PAT CALLED.  STATES WHEN SHE SPOKE WITH THE PATIENT HE ASKED IF HIS SX HAD BEEN APPROVED BY INSURANCE AND THE PATIENT WOULD LIKE A CALL BACK.    PLEASE CALL PT  THAN YOU

## 2023-02-14 ENCOUNTER — OFFICE VISIT (OUTPATIENT)
Dept: FAMILY MEDICINE CLINIC | Facility: CLINIC | Age: 43
End: 2023-02-14
Payer: MEDICAID

## 2023-02-14 VITALS
DIASTOLIC BLOOD PRESSURE: 78 MMHG | SYSTOLIC BLOOD PRESSURE: 124 MMHG | HEART RATE: 96 BPM | RESPIRATION RATE: 20 BRPM | WEIGHT: 280 LBS | OXYGEN SATURATION: 98 % | HEIGHT: 77 IN | TEMPERATURE: 97.9 F | BODY MASS INDEX: 33.06 KG/M2

## 2023-02-14 DIAGNOSIS — M54.42 CHRONIC MIDLINE LOW BACK PAIN WITH BILATERAL SCIATICA: ICD-10-CM

## 2023-02-14 DIAGNOSIS — E66.09 CLASS 1 OBESITY DUE TO EXCESS CALORIES WITH SERIOUS COMORBIDITY AND BODY MASS INDEX (BMI) OF 34.0 TO 34.9 IN ADULT: ICD-10-CM

## 2023-02-14 DIAGNOSIS — M54.41 CHRONIC MIDLINE LOW BACK PAIN WITH BILATERAL SCIATICA: ICD-10-CM

## 2023-02-14 DIAGNOSIS — Z79.4 TYPE 2 DIABETES MELLITUS WITH OTHER NEUROLOGIC COMPLICATION, WITH LONG-TERM CURRENT USE OF INSULIN: ICD-10-CM

## 2023-02-14 DIAGNOSIS — E11.49 TYPE 2 DIABETES MELLITUS WITH OTHER NEUROLOGIC COMPLICATION, WITH LONG-TERM CURRENT USE OF INSULIN: ICD-10-CM

## 2023-02-14 DIAGNOSIS — Z00.00 ANNUAL PHYSICAL EXAM: Primary | ICD-10-CM

## 2023-02-14 DIAGNOSIS — I10 HYPERTENSION, BENIGN: ICD-10-CM

## 2023-02-14 DIAGNOSIS — G89.29 CHRONIC MIDLINE LOW BACK PAIN WITH BILATERAL SCIATICA: ICD-10-CM

## 2023-02-14 PROBLEM — Z83.3 FAMILY HISTORY OF DIABETES MELLITUS: Status: RESOLVED | Noted: 2019-06-18 | Resolved: 2023-02-14

## 2023-02-14 PROBLEM — E66.811 CLASS 1 OBESITY DUE TO EXCESS CALORIES WITH SERIOUS COMORBIDITY AND BODY MASS INDEX (BMI) OF 34.0 TO 34.9 IN ADULT: Status: ACTIVE | Noted: 2022-02-28

## 2023-02-14 PROCEDURE — 3044F HG A1C LEVEL LT 7.0%: CPT | Performed by: FAMILY MEDICINE

## 2023-02-14 PROCEDURE — 99214 OFFICE O/P EST MOD 30 MIN: CPT | Performed by: FAMILY MEDICINE

## 2023-02-14 RX ORDER — INSULIN LISPRO 100 U/ML
20 INJECTION, SOLUTION SUBCUTANEOUS
Qty: 60 ML | Refills: 3 | Status: SHIPPED | OUTPATIENT
Start: 2023-02-14 | End: 2023-05-25

## 2023-02-14 RX ORDER — PROCHLORPERAZINE 25 MG/1
SUPPOSITORY RECTAL
Qty: 1 EACH | Refills: 6 | Status: SHIPPED | OUTPATIENT
Start: 2023-02-14

## 2023-02-14 RX ORDER — PROCHLORPERAZINE 25 MG/1
1 SUPPOSITORY RECTAL DAILY
Qty: 1 EACH | Refills: 0 | Status: SHIPPED | OUTPATIENT
Start: 2023-02-14

## 2023-02-14 RX ORDER — PROCHLORPERAZINE 25 MG/1
1 SUPPOSITORY RECTAL TAKE AS DIRECTED
Qty: 6 EACH | Refills: 3 | Status: SHIPPED | OUTPATIENT
Start: 2023-02-14 | End: 2023-03-16

## 2023-02-14 NOTE — TELEPHONE ENCOUNTER
[facility]     Caller: Barrett Laguerre    Relationship: Self    Best call back number:   [phone] (Mobile)    Requested Prescriptions:   Requested Prescriptions     Pending Prescriptions Disp Refills   • Insulin Pen Needle (Pen Needles) 32G X 4 MM misc 200 each 2     Sig: use 1 each 4 (Four) Times a Day Before Meals & at Bedtime.   • Alcohol Swabs (Alcohol Wipes) 70 % pads 200 each 2     Sig: Apply 1 each topically 4 (Four) Times a Day Before Meals & at Bedtime.   • amLODIPine (NORVASC) 10 MG tablet 90 tablet 3     Sig: Take 1 tablet by mouth Daily.        Pharmacy where request should be sent: [facility] [address] [place] [address] [phone] [facility] [phone] FX     Additional details provided by patient:     Does the patient have less than a 3 day supply:  [x] Yes  [] No    Would you like a call back once the refill request has been completed: [] Yes [] No    If the office needs to give you a call back, can they leave a voicemail: [] Yes [] No    Radha Rojas   02/14/23 16:57 EST

## 2023-02-14 NOTE — PROGRESS NOTES
Subjective   Barrett Laguerre is a 42 y.o. male.   Chief Complaint   Patient presents with   • Annual Exam   • Back Pain   • Diabetes       Back Pain  This is a chronic problem. The problem is unchanged. The pain is present in the lumbar spine. The quality of the pain is described as stabbing, shooting, aching and burning. The pain radiates to the left knee and left thigh. The pain is at a severity of 8/10. The pain is severe. The symptoms are aggravated by bending, lying down, sitting, position, standing and twisting. Stiffness is present all day. Pertinent negatives include no abdominal pain, chest pain, dysuria, fever, numbness or weakness. He has tried muscle relaxant and bed rest for the symptoms. The treatment provided mild relief.   Diabetes  He presents for his follow-up diabetic visit. He has type 2 diabetes mellitus. Associated symptoms include blurred vision and foot paresthesias. Pertinent negatives for diabetes include no chest pain, no fatigue, no polydipsia, no polyuria and no weakness. Risk factors for coronary artery disease include diabetes mellitus, dyslipidemia, hypertension, male sex and obesity. Current diabetic treatment includes oral agent (dual therapy) and insulin injections.        The following portions of the patient's history were reviewed and updated as appropriate: allergies, current medications, past family history, past medical history, past social history, past surgical history and problem list.    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)   • Sleep apnea   • Attention deficit disorder   • Diabetes (HCC)   • Other seasonal allergic rhinitis   • Mixed obsessional thoughts and acts   • Hypertension, benign   • Disorder of lipid metabolism   • CIDP (chronic  inflammatory demyelinating polyneuropathy) (HCC)   • Lumbar radiculopathy   • Onychomycosis   • Annual physical exam   • Class 1 obesity due to excess calories with serious comorbidity and body mass index (BMI) of 34.0 to 34.9 in adult   • Acute midline low back pain with left-sided sciatica       Current Outpatient Medications on File Prior to Visit   Medication Sig Dispense Refill   • acetaminophen-codeine (TYLENOL with CODEINE #4) 300-60 MG per tablet Take 1 tablet by mouth Every 6 (Six) Hours As Needed for Moderate Pain. (Patient taking differently: Take 1 tablet by mouth Every 6 (Six) Hours As Needed for Moderate Pain. Alternates with hydrocodone) 120 tablet 5   • Alcohol Swabs (Alcohol Wipes) 70 % pads Apply 1 each topically 4 (Four) Times a Day Before Meals & at Bedtime. 200 each 2   • amLODIPine (NORVASC) 10 MG tablet Take 1 tablet by mouth Daily. (Patient taking differently: Take 10 mg by mouth Every Night.) 90 tablet 3   • azelastine (ASTELIN) 0.1 % nasal spray 2 sprays into the nostril(s) as directed by provider 2 (Two) Times a Day. Use in each nostril as directed (Patient taking differently: 2 sprays into the nostril(s) as directed by provider As Needed. Use in each nostril as directed  may use preop) 3 each 3   • chlorthalidone (HYGROTEN) 50 MG tablet Take 1 tablet by mouth Daily. (Patient taking differently: Take 50 mg by mouth Daily. None preop) 90 tablet 3   • clindamycin (CLEOCIN T) 1 % external solution Apply 1 application topically to the appropriate area as directed 2 (Two) Times a Day. Not currently using     • colchicine-probenecid (COL-BENEMID) 0.5-500 MG per tablet Take 1 tablet by mouth Every Night.     • Cyanocobalamin ER 1000 MCG tablet controlled-release Take 1 tablet by mouth Daily. (Patient taking differently: Take 1 tablet by mouth Daily. Last dose 2/15) 90 each 3   • Diclofenac Epolamine (FLECTOR) 1.3 % patch patch Apply 1 patch topically to the appropriate area as directed 2 (Two)  Times a Day As Needed (back pain). (Patient taking differently: Apply 1 patch topically to the appropriate area as directed As Needed (back pain). Ld 2/15) 60 patch 2   • erythromycin (ROMYCIN) 5 MG/GM ophthalmic ointment Administer 1 application to both eyes As Needed.     • FLUoxetine (PROzac) 40 MG capsule Take 1 capsule by mouth 2 (Two) Times a Day. (Patient taking differently: Take 40 mg by mouth Every Night.) 180 capsule 3   • gabapentin (NEURONTIN) 300 MG capsule Take 1 capsule by mouth 3 (Three) Times a Day. (Patient taking differently: Take 300 mg by mouth 2 (Two) Times a Day. Or 800mg at night    may take preop) 270 capsule 3   • glucose blood (FREESTYLE LITE) test strip 1 each by Other route 3 (Three) Times a Day Before Meals. 200 each 3   • HYDROcodone-acetaminophen (Norco) 7.5-325 MG per tablet Take 1 tablet by mouth Every 6 (Six) Hours As Needed for Moderate Pain. (Patient taking differently: Take 1 tablet by mouth Every 6 (Six) Hours As Needed for Moderate Pain. May take preop if needed   alternates with tylenol 4) 120 tablet 0   • HYDROcodone-acetaminophen (Norco) 7.5-325 MG per tablet Take 1 tablet by mouth Every 6 (Six) Hours As Needed for Moderate Pain. 120 tablet 0   • HYDROcodone-acetaminophen (Norco) 7.5-325 MG per tablet Take 1 tablet by mouth Every 6 (Six) Hours As Needed for Moderate Pain. 120 tablet 0   • hydrocortisone 1 % cream Apply  topically to the appropriate area as directed 2 (Two) Times a Day. (Patient taking differently: Apply 1 application topically to the appropriate area as directed As Needed.) 60 g 3   • hydrOXYzine (ATARAX) 25 MG tablet Take 1 tablet by mouth 4 (Four) Times a Day As Needed for Itching. 360 tablet 3   • Insulin Pen Needle (Pen Needles) 32G X 4 MM misc use 1 each 4 (Four) Times a Day Before Meals & at Bedtime. 200 each 2   • loratadine (EQ Loratadine) 10 MG tablet Take 1 tablet by mouth Daily. (Patient taking differently: Take 10 mg by mouth Every Night.) 90  tablet 3   • losartan (COZAAR) 50 MG tablet Take 1 tablet by mouth Daily. 90 tablet 3   • methocarbamol (ROBAXIN) 750 MG tablet Take 1 tablet by mouth 4 (Four) Times a Day. (Patient taking differently: Take 750 mg by mouth 4 (Four) Times a Day. Dont take preop) 120 tablet 5   • montelukast (SINGULAIR) 10 MG tablet Take 1 tablet by mouth Every Night. 90 tablet 3   • oxybutynin XL (DITROPAN XL) 15 MG 24 hr tablet Take 1 tablet by mouth Daily. (Patient taking differently: Take 15 mg by mouth As Needed. Take preop if needed) 30 tablet 12   • oxyCODONE (Roxicodone) 5 MG immediate release tablet Take 1 tablet by mouth Every 6 (Six) Hours As Needed for Moderate Pain or Severe Pain. 15 tablet 0   • pantoprazole (Protonix) 40 MG EC tablet Take 1 tablet by mouth Daily. (Patient taking differently: Take 40 mg by mouth As Needed. May take preop) 90 tablet 3   • promethazine (PHENERGAN) 25 MG tablet Take 1 tablet by mouth Every 8 (Eight) Hours As Needed for Nausea or Vomiting. (Patient taking differently: Take 25 mg by mouth As Needed for Nausea or Vomiting. Preop if needed) 90 tablet 5   • ReliOn Ultra Thin Lancets 30G misc 1 each 4 (Four) Times a Day. 360 each 3   • sildenafil (REVATIO) 20 MG tablet Take 1 tablet by mouth Daily As Needed (ed). (Patient taking differently: Take 20 mg by mouth Daily As Needed (ed). None within 24 hours of surgery) 30 tablet 3   • sitaGLIPtin-metFORMIN (Janumet)  MG per tablet Take 1 tablet by mouth 2 (Two) Times a Day With Meals. (Patient taking differently: Take 1 tablet by mouth 2 (Two) Times a Day With Meals. Last dose 2/20  0730) 180 tablet 3   • sodium chloride (Ocean Nasal Spray) 0.65 % nasal spray 1 spray each nostril tid (Patient taking differently: 1 spray into the nostril(s) as directed by provider As Needed. Use preop) 216 mL 3   • [DISCONTINUED] Insulin Lispro (ADMELOG SOLOSTAR) 100 UNIT/ML injection pen Inject 15 Units under the skin into the appropriate area as directed 3  "(Three) Times a Day With Meals for 100 days. (Patient taking differently: Inject 15 Units under the skin into the appropriate area as directed 3 (Three) Times a Day With Meals. None am of surgery   will adjust as needed) 15 mL 2   • fluticasone (FLONASE) 50 MCG/ACT nasal spray 1 spray into the nostril(s) as directed by provider Daily for 90 days. 16 g 3   • Insulin Glargine (BASAGLAR KWIKPEN) 100 UNIT/ML injection pen Inject 38 Units under the skin into the appropriate area as directed Every Night for 30 days. 15 mL 8     No current facility-administered medications on file prior to visit.     Current outpatient and discharge medications have been reconciled for the patient.  Reviewed by: Eric Rosenberg MD      Allergies   Allergen Reactions   • Penicillin G Anaphylaxis   • Penicillins Anaphylaxis   • Sulfa Antibiotics Hives   • Allopurinol Rash       Review of Systems   Constitutional: Negative for fatigue and fever.   Eyes: Positive for blurred vision.   Cardiovascular: Negative for chest pain.   Gastrointestinal: Negative for abdominal pain.   Endocrine: Negative for polydipsia and polyuria.   Genitourinary: Negative for dysuria.   Musculoskeletal: Positive for back pain.   Neurological: Negative for weakness and numbness.     I have reviewed and confirmed the accuracy of the ROS as documented by the MA/LPN/RN Eric Rosenberg MD    Objective   Visit Vitals  /78 (BP Location: Right arm, Patient Position: Sitting, Cuff Size: Adult)   Pulse 96   Temp 97.9 °F (36.6 °C)   Resp 20   Ht 195.6 cm (77\")   Wt 127 kg (280 lb)   SpO2 98%   BMI 33.20 kg/m²      **  Physical Exam  Constitutional:       Appearance: He is well-developed.   HENT:      Head: Normocephalic and atraumatic.      Right Ear: External ear normal.      Left Ear: External ear normal.      Nose: Nose normal.   Eyes:      Pupils: Pupils are equal, round, and reactive to light.   Cardiovascular:      Rate and Rhythm: Normal rate and " regular rhythm.      Heart sounds: Normal heart sounds.   Pulmonary:      Effort: Pulmonary effort is normal.      Breath sounds: Normal breath sounds.   Abdominal:      General: Bowel sounds are normal.      Palpations: Abdomen is soft.   Musculoskeletal:         General: Normal range of motion.      Cervical back: Normal range of motion and neck supple.   Skin:     General: Skin is warm and dry.   Neurological:      Mental Status: He is alert and oriented to person, place, and time.   Psychiatric:         Behavior: Behavior normal.         Thought Content: Thought content normal.         Judgment: Judgment normal.       Derm Physical Exam    Diagnoses and all orders for this visit:    1. Annual physical exam (Primary)    2. Chronic midline low back pain with bilateral sciatica    3. Type 2 diabetes mellitus with other neurologic complication, with long-term current use of insulin ([facility])  Comments:  needs Dexcom G6 and supplies   Overview:  Basalglar 30 units daily and Lispro 10 u tid with meals     Orders:  -     Continuous Blood Gluc Transmit (Dexcom G6 Transmitter) misc; 1 each Take As Directed for 30 days.  Dispense: 6 each; Refill: 3  -     Continuous Blood Gluc  (Dexcom G6 ) device; 1 each Daily.  Dispense: 1 each; Refill: 0  -     Continuous Blood Gluc Sensor (Dexcom G6 Sensor); Every 10 (Ten) Days.  Dispense: 1 each; Refill: 6  -     Insulin Lispro (ADMELOG SOLOSTAR) 100 UNIT/ML injection pen; Inject 20 Units under the skin into the appropriate area as directed 3 (Three) Times a Day With Meals for 100 days.  Dispense: 60 mL; Refill: 3    4. Class 1 obesity due to excess calories with serious comorbidity and body mass index (BMI) of 34.0 to 34.9 in adult    5. Type 2 diabetes mellitus with other neurologic complication, with long-term current use of insulin ([facility])  Overview:  Basalglar 30 units daily and Lispro 10 u tid with meals     Orders:  -     Continuous Blood Gluc Transmit (Dexcom G6  Transmitter) misc; 1 each Take As Directed for 30 days.  Dispense: 6 each; Refill: 3  -     Continuous Blood Gluc  (Dexcom G6 ) device; 1 each Daily.  Dispense: 1 each; Refill: 0  -     Continuous Blood Gluc Sensor (Dexcom G6 Sensor); Every 10 (Ten) Days.  Dispense: 1 each; Refill: 6  -     Insulin Lispro (ADMELOG SOLOSTAR) 100 UNIT/ML injection pen; Inject 20 Units under the skin into the appropriate area as directed 3 (Three) Times a Day With Meals for 100 days.  Dispense: 60 mL; Refill: 3     Pt scheduled for back surgery in 2 weeks   Expected course, medications, and adverse effects discussed as appropriate.  Call or return if worsening or persistent symptoms.  I wore protective equipment throughout this patient encounter to include mask and eye protection. Hand hygiene was performed before donning protective equipment and after removal when leaving the room.       This document is intended for medical professional use only.

## 2023-02-16 ENCOUNTER — LAB (OUTPATIENT)
Dept: LAB | Facility: HOSPITAL | Age: 43
End: 2023-02-16
Payer: MEDICAID

## 2023-02-16 ENCOUNTER — HOSPITAL ENCOUNTER (OUTPATIENT)
Dept: CARDIOLOGY | Facility: HOSPITAL | Age: 43
Discharge: HOME | End: 2023-02-16
Payer: MEDICAID

## 2023-02-16 LAB
ABO GROUP BLD: NORMAL
ANION GAP SERPL CALCULATED.3IONS-SCNC: 8 MMOL/L (ref 5–15)
BLD GP AB SCN SERPL QL: NEGATIVE
BUN SERPL-MCNC: 13 MG/DL (ref 6–20)
BUN/CREAT SERPL: 10.7 (ref 7–25)
CALCIUM SPEC-SCNC: 10 MG/DL (ref 8.6–10.5)
CHLORIDE SERPL-SCNC: 100 MMOL/L (ref 98–107)
CO2 SERPL-SCNC: 32 MMOL/L (ref 22–29)
CREAT SERPL-MCNC: 1.21 MG/DL (ref 0.76–1.27)
DEPRECATED RDW RBC AUTO: 38.8 FL (ref 37–54)
EGFRCR SERPLBLD CKD-EPI 2021: 77.1 ML/MIN/1.73
ERYTHROCYTE [DISTWIDTH] IN BLOOD BY AUTOMATED COUNT: 13 % (ref 12.3–15.4)
GLUCOSE SERPL-MCNC: 127 MG/DL (ref 65–99)
HCT VFR BLD AUTO: 43.8 % (ref 37.5–51)
HGB BLD-MCNC: 14.5 G/DL (ref 13–17.7)
MCH RBC QN AUTO: 27.4 PG (ref 26.6–33)
MCHC RBC AUTO-ENTMCNC: 33.1 G/DL (ref 31.5–35.7)
MCV RBC AUTO: 82.6 FL (ref 79–97)
MRSA DNA SPEC QL NAA+PROBE: NORMAL
PLATELET # BLD AUTO: 314 10*3/MM3 (ref 140–450)
PMV BLD AUTO: 10.7 FL (ref 6–12)
POTASSIUM SERPL-SCNC: 4 MMOL/L (ref 3.5–5.2)
RBC # BLD AUTO: 5.3 10*6/MM3 (ref 4.14–5.8)
RH BLD: POSITIVE
SODIUM SERPL-SCNC: 140 MMOL/L (ref 136–145)
T&S EXPIRATION DATE: NORMAL
WBC NRBC COR # BLD: 9.51 10*3/MM3 (ref 3.4–10.8)

## 2023-02-16 PROCEDURE — 85027 COMPLETE CBC AUTOMATED: CPT

## 2023-02-16 PROCEDURE — 86900 BLOOD TYPING SEROLOGIC ABO: CPT

## 2023-02-16 PROCEDURE — 86850 RBC ANTIBODY SCREEN: CPT

## 2023-02-16 PROCEDURE — 86901 BLOOD TYPING SEROLOGIC RH(D): CPT

## 2023-02-16 PROCEDURE — 93010 ELECTROCARDIOGRAM REPORT: CPT | Performed by: INTERNAL MEDICINE

## 2023-02-16 PROCEDURE — 80048 BASIC METABOLIC PNL TOTAL CA: CPT

## 2023-02-16 PROCEDURE — 87641 MR-STAPH DNA AMP PROBE: CPT

## 2023-02-16 PROCEDURE — 93005 ELECTROCARDIOGRAM TRACING: CPT | Performed by: NEUROLOGICAL SURGERY

## 2023-02-16 RX ORDER — AMLODIPINE BESYLATE 10 MG/1
10 TABLET ORAL DAILY
Qty: 90 TABLET | Refills: 3 | Status: SHIPPED | OUTPATIENT
Start: 2023-02-16

## 2023-02-20 RX ORDER — PEN NEEDLE, DIABETIC 30 GX3/16"
1 NEEDLE, DISPOSABLE MISCELLANEOUS
Qty: 200 EACH | Refills: 2 | Status: SHIPPED | OUTPATIENT
Start: 2023-02-20

## 2023-02-20 RX ORDER — CALCIUM POLYCARBOPHIL 625 MG
TABLET ORAL NIGHTLY
COMMUNITY

## 2023-02-20 RX ORDER — ISOPROPYL ALCOHOL 0.7 ML/1
1 SWAB TOPICAL
Qty: 200 EACH | Refills: 2 | Status: SHIPPED | OUTPATIENT
Start: 2023-02-20

## 2023-02-20 NOTE — TELEPHONE ENCOUNTER
Caller: Barrett Laguerre    Relationship: Self    Best call back number: [phone]    Requested Prescriptions:   Requested Prescriptions     Pending Prescriptions Disp Refills   • Insulin Pen Needle (Pen Needles) 32G X 4 MM misc 200 each 2     Sig: use 1 each 4 (Four) Times a Day Before Meals & at Bedtime.   • Alcohol Swabs (Alcohol Wipes) 70 % pads 200 each 2     Sig: Apply 1 each topically 4 (Four) Times a Day Before Meals & at Bedtime.        Pharmacy where request should be sent: [facility] [address] [place] [address] [phone] [facility] [phone] FX     Additional details provided by patient: COMPLETELY OUT OF THESE AT THIS TIME     Does the patient have less than a 3 day supply:  [x] Yes  [] No    Would you like a call back once the refill request has been completed: [x] Yes [] No    If the office needs to give you a call back, can they leave a voicemail: [x] Yes [] No    Radha Avila Rep   02/20/23 12:43 EST

## 2023-02-21 LAB — QT INTERVAL: 397 MS

## 2023-02-22 ENCOUNTER — APPOINTMENT (OUTPATIENT)
Dept: GENERAL RADIOLOGY | Facility: HOSPITAL | Age: 43
End: 2023-02-22
Payer: MEDICAID

## 2023-02-22 ENCOUNTER — HOSPITAL ENCOUNTER (OUTPATIENT)
Facility: HOSPITAL | Age: 43
Setting detail: HOSPITAL OUTPATIENT SURGERY
Discharge: HOME | End: 2023-02-22
Attending: NEUROLOGICAL SURGERY | Admitting: NEUROLOGICAL SURGERY
Payer: MEDICAID

## 2023-02-22 ENCOUNTER — ANESTHESIA (OUTPATIENT)
Dept: PERIOP | Facility: HOSPITAL | Age: 43
End: 2023-02-22
Payer: MEDICAID

## 2023-02-22 ENCOUNTER — ANESTHESIA EVENT (OUTPATIENT)
Dept: PERIOP | Facility: HOSPITAL | Age: 43
End: 2023-02-22
Payer: MEDICAID

## 2023-02-22 VITALS
SYSTOLIC BLOOD PRESSURE: 105 MMHG | WEIGHT: 277 LBS | DIASTOLIC BLOOD PRESSURE: 59 MMHG | RESPIRATION RATE: 18 BRPM | HEART RATE: 83 BPM | OXYGEN SATURATION: 93 % | BODY MASS INDEX: 32.05 KG/M2 | HEIGHT: 78 IN | TEMPERATURE: 99.1 F

## 2023-02-22 DIAGNOSIS — M54.42 CHRONIC MIDLINE LOW BACK PAIN WITH BILATERAL SCIATICA: ICD-10-CM

## 2023-02-22 DIAGNOSIS — M54.16 LUMBAR RADICULOPATHY: ICD-10-CM

## 2023-02-22 DIAGNOSIS — M54.41 CHRONIC MIDLINE LOW BACK PAIN WITH BILATERAL SCIATICA: ICD-10-CM

## 2023-02-22 DIAGNOSIS — G89.29 CHRONIC MIDLINE LOW BACK PAIN WITH BILATERAL SCIATICA: ICD-10-CM

## 2023-02-22 LAB
GLUCOSE BLDC GLUCOMTR-MCNC: 146 MG/DL (ref 70–105)
GLUCOSE BLDC GLUCOMTR-MCNC: 180 MG/DL (ref 70–105)

## 2023-02-22 PROCEDURE — 25010000002 DEXAMETHASONE PER 1 MG: Performed by: NURSE ANESTHETIST, CERTIFIED REGISTERED

## 2023-02-22 PROCEDURE — 25010000002 DIPHENHYDRAMINE PER 50 MG: Performed by: NURSE ANESTHETIST, CERTIFIED REGISTERED

## 2023-02-22 PROCEDURE — 25010000002 METHYLPREDNISOLONE PER 80 MG: Performed by: NEUROLOGICAL SURGERY

## 2023-02-22 PROCEDURE — 25010000002 PHENYLEPHRINE 10 MG/ML SOLUTION 5 ML VIAL: Performed by: NURSE ANESTHETIST, CERTIFIED REGISTERED

## 2023-02-22 PROCEDURE — 25010000002 PROPOFOL 1000 MG/100ML EMULSION: Performed by: NURSE ANESTHETIST, CERTIFIED REGISTERED

## 2023-02-22 PROCEDURE — 25010000002 VANCOMYCIN 1 G RECONSTITUTED SOLUTION: Performed by: NEUROLOGICAL SURGERY

## 2023-02-22 PROCEDURE — 25010000002 PHENYLEPHRINE 10 MG/ML SOLUTION: Performed by: NURSE ANESTHETIST, CERTIFIED REGISTERED

## 2023-02-22 PROCEDURE — 63030 LAMOT DCMPRN NRV RT 1 LMBR: CPT | Performed by: NEUROLOGICAL SURGERY

## 2023-02-22 PROCEDURE — 25010000002 MAGNESIUM SULFATE PER 500 MG OF MAGNESIUM: Performed by: NURSE ANESTHETIST, CERTIFIED REGISTERED

## 2023-02-22 PROCEDURE — 25010000002 SUCCINYLCHOLINE PER 20 MG: Performed by: NURSE ANESTHETIST, CERTIFIED REGISTERED

## 2023-02-22 PROCEDURE — 25010000002 HYDROMORPHONE 1 MG/ML SOLUTION: Performed by: NURSE ANESTHETIST, CERTIFIED REGISTERED

## 2023-02-22 PROCEDURE — 99024 POSTOP FOLLOW-UP VISIT: CPT

## 2023-02-22 PROCEDURE — 63035 LAMOT DCMPRN NRV RT EA ADDL: CPT | Performed by: NEUROLOGICAL SURGERY

## 2023-02-22 PROCEDURE — 63035 LAMOT DCMPRN NRV RT EA ADDL: CPT

## 2023-02-22 PROCEDURE — 63030 LAMOT DCMPRN NRV RT 1 LMBR: CPT

## 2023-02-22 PROCEDURE — 72100 X-RAY EXAM L-S SPINE 2/3 VWS: CPT

## 2023-02-22 PROCEDURE — P9045 ALBUMIN (HUMAN), 5%, 250 ML: HCPCS | Performed by: NURSE ANESTHETIST, CERTIFIED REGISTERED

## 2023-02-22 PROCEDURE — 25010000002 ONDANSETRON PER 1 MG: Performed by: NURSE ANESTHETIST, CERTIFIED REGISTERED

## 2023-02-22 PROCEDURE — 76000 FLUOROSCOPY <1 HR PHYS/QHP: CPT

## 2023-02-22 PROCEDURE — 25010000002 ALBUMIN HUMAN 5% PER 50 ML: Performed by: NURSE ANESTHETIST, CERTIFIED REGISTERED

## 2023-02-22 PROCEDURE — 25010000002 CEFAZOLIN PER 500 MG: Performed by: NEUROLOGICAL SURGERY

## 2023-02-22 PROCEDURE — 82962 GLUCOSE BLOOD TEST: CPT

## 2023-02-22 PROCEDURE — 25010000002 MIDAZOLAM PER 1 MG: Performed by: ANESTHESIOLOGY

## 2023-02-22 DEVICE — DEV CONTRL TISS STRATAFIX SPIRAL MNCRYL UD 3/0 PLS 30CM: Type: IMPLANTABLE DEVICE | Site: SPINE LUMBAR | Status: FUNCTIONAL

## 2023-02-22 DEVICE — FLOSEAL HEMOSTATIC MATRIX, 10ML
Type: IMPLANTABLE DEVICE | Site: SPINE LUMBAR | Status: FUNCTIONAL
Brand: FLOSEAL HEMOSTATIC MATRIX

## 2023-02-22 RX ORDER — PROPOFOL 10 MG/ML
INJECTION, EMULSION INTRAVENOUS CONTINUOUS PRN
Status: DISCONTINUED | OUTPATIENT
Start: 2023-02-22 | End: 2023-02-22 | Stop reason: SURG

## 2023-02-22 RX ORDER — ONDANSETRON 2 MG/ML
4 INJECTION INTRAMUSCULAR; INTRAVENOUS ONCE AS NEEDED
Status: DISCONTINUED | OUTPATIENT
Start: 2023-02-22 | End: 2023-02-22 | Stop reason: HOSPADM

## 2023-02-22 RX ORDER — FENTANYL CITRATE 50 UG/ML
50 INJECTION, SOLUTION INTRAMUSCULAR; INTRAVENOUS
Status: DISCONTINUED | OUTPATIENT
Start: 2023-02-22 | End: 2023-02-22 | Stop reason: HOSPADM

## 2023-02-22 RX ORDER — PROMETHAZINE HYDROCHLORIDE 25 MG/1
25 TABLET ORAL ONCE AS NEEDED
Status: DISCONTINUED | OUTPATIENT
Start: 2023-02-22 | End: 2023-02-22 | Stop reason: HOSPADM

## 2023-02-22 RX ORDER — LIDOCAINE HYDROCHLORIDE 10 MG/ML
0.5 INJECTION, SOLUTION INFILTRATION; PERINEURAL ONCE AS NEEDED
Status: DISCONTINUED | OUTPATIENT
Start: 2023-02-22 | End: 2023-02-22 | Stop reason: HOSPADM

## 2023-02-22 RX ORDER — DIPHENHYDRAMINE HYDROCHLORIDE 50 MG/ML
12.5 INJECTION INTRAMUSCULAR; INTRAVENOUS
Status: DISCONTINUED | OUTPATIENT
Start: 2023-02-22 | End: 2023-02-22 | Stop reason: HOSPADM

## 2023-02-22 RX ORDER — SODIUM CHLORIDE, SODIUM LACTATE, POTASSIUM CHLORIDE, CALCIUM CHLORIDE 600; 310; 30; 20 MG/100ML; MG/100ML; MG/100ML; MG/100ML
1000 INJECTION, SOLUTION INTRAVENOUS CONTINUOUS
Status: DISCONTINUED | OUTPATIENT
Start: 2023-02-22 | End: 2023-02-22 | Stop reason: HOSPADM

## 2023-02-22 RX ORDER — DEXMEDETOMIDINE HYDROCHLORIDE 100 UG/ML
INJECTION, SOLUTION INTRAVENOUS AS NEEDED
Status: DISCONTINUED | OUTPATIENT
Start: 2023-02-22 | End: 2023-02-22 | Stop reason: SURG

## 2023-02-22 RX ORDER — METHYLPREDNISOLONE ACETATE 80 MG/ML
INJECTION, SUSPENSION INTRA-ARTICULAR; INTRALESIONAL; INTRAMUSCULAR; SOFT TISSUE AS NEEDED
Status: DISCONTINUED | OUTPATIENT
Start: 2023-02-22 | End: 2023-02-22 | Stop reason: HOSPADM

## 2023-02-22 RX ORDER — VANCOMYCIN HYDROCHLORIDE 1 G/20ML
INJECTION, POWDER, LYOPHILIZED, FOR SOLUTION INTRAVENOUS AS NEEDED
Status: DISCONTINUED | OUTPATIENT
Start: 2023-02-22 | End: 2023-02-22 | Stop reason: HOSPADM

## 2023-02-22 RX ORDER — FLUMAZENIL 0.1 MG/ML
0.2 INJECTION INTRAVENOUS AS NEEDED
Status: DISCONTINUED | OUTPATIENT
Start: 2023-02-22 | End: 2023-02-22 | Stop reason: HOSPADM

## 2023-02-22 RX ORDER — HYDROCODONE BITARTRATE AND ACETAMINOPHEN 7.5; 325 MG/1; MG/1
TABLET ORAL
Qty: 45 TABLET | Refills: 0 | Status: SHIPPED | OUTPATIENT
Start: 2023-02-22 | End: 2023-02-28

## 2023-02-22 RX ORDER — ONDANSETRON 2 MG/ML
INJECTION INTRAMUSCULAR; INTRAVENOUS AS NEEDED
Status: DISCONTINUED | OUTPATIENT
Start: 2023-02-22 | End: 2023-02-22 | Stop reason: SURG

## 2023-02-22 RX ORDER — SODIUM CHLORIDE 0.9 % (FLUSH) 0.9 %
10 SYRINGE (ML) INJECTION AS NEEDED
Status: DISCONTINUED | OUTPATIENT
Start: 2023-02-22 | End: 2023-02-22 | Stop reason: HOSPADM

## 2023-02-22 RX ORDER — MAGNESIUM SULFATE HEPTAHYDRATE 500 MG/ML
INJECTION, SOLUTION INTRAMUSCULAR; INTRAVENOUS AS NEEDED
Status: DISCONTINUED | OUTPATIENT
Start: 2023-02-22 | End: 2023-02-22 | Stop reason: SURG

## 2023-02-22 RX ORDER — LIDOCAINE HYDROCHLORIDE 20 MG/ML
INJECTION, SOLUTION EPIDURAL; INFILTRATION; INTRACAUDAL; PERINEURAL AS NEEDED
Status: DISCONTINUED | OUTPATIENT
Start: 2023-02-22 | End: 2023-02-22 | Stop reason: SURG

## 2023-02-22 RX ORDER — GABAPENTIN 300 MG/1
600 CAPSULE ORAL ONCE
Status: COMPLETED | OUTPATIENT
Start: 2023-02-22 | End: 2023-02-22

## 2023-02-22 RX ORDER — SUCCINYLCHOLINE CHLORIDE 20 MG/ML
INJECTION INTRAMUSCULAR; INTRAVENOUS AS NEEDED
Status: DISCONTINUED | OUTPATIENT
Start: 2023-02-22 | End: 2023-02-22 | Stop reason: SURG

## 2023-02-22 RX ORDER — DIPHENHYDRAMINE HYDROCHLORIDE 50 MG/ML
INJECTION INTRAMUSCULAR; INTRAVENOUS AS NEEDED
Status: DISCONTINUED | OUTPATIENT
Start: 2023-02-22 | End: 2023-02-22 | Stop reason: SURG

## 2023-02-22 RX ORDER — DEXAMETHASONE SODIUM PHOSPHATE 4 MG/ML
INJECTION, SOLUTION INTRA-ARTICULAR; INTRALESIONAL; INTRAMUSCULAR; INTRAVENOUS; SOFT TISSUE AS NEEDED
Status: DISCONTINUED | OUTPATIENT
Start: 2023-02-22 | End: 2023-02-22 | Stop reason: SURG

## 2023-02-22 RX ORDER — PHENYLEPHRINE HYDROCHLORIDE 10 MG/ML
INJECTION INTRAVENOUS AS NEEDED
Status: DISCONTINUED | OUTPATIENT
Start: 2023-02-22 | End: 2023-02-22 | Stop reason: SURG

## 2023-02-22 RX ORDER — SODIUM CHLORIDE 0.9 % (FLUSH) 0.9 %
3 SYRINGE (ML) INJECTION EVERY 12 HOURS SCHEDULED
Status: DISCONTINUED | OUTPATIENT
Start: 2023-02-22 | End: 2023-02-22 | Stop reason: HOSPADM

## 2023-02-22 RX ORDER — PROMETHAZINE HYDROCHLORIDE 25 MG/1
25 SUPPOSITORY RECTAL ONCE AS NEEDED
Status: DISCONTINUED | OUTPATIENT
Start: 2023-02-22 | End: 2023-02-22 | Stop reason: HOSPADM

## 2023-02-22 RX ORDER — NALOXONE HCL 0.4 MG/ML
0.2 VIAL (ML) INJECTION AS NEEDED
Status: DISCONTINUED | OUTPATIENT
Start: 2023-02-22 | End: 2023-02-22 | Stop reason: HOSPADM

## 2023-02-22 RX ORDER — EPHEDRINE SULFATE 5 MG/ML
INJECTION INTRAVENOUS AS NEEDED
Status: DISCONTINUED | OUTPATIENT
Start: 2023-02-22 | End: 2023-02-22 | Stop reason: SURG

## 2023-02-22 RX ORDER — ACETAMINOPHEN 500 MG
1000 TABLET ORAL ONCE
Status: COMPLETED | OUTPATIENT
Start: 2023-02-22 | End: 2023-02-22

## 2023-02-22 RX ORDER — ROCURONIUM BROMIDE 10 MG/ML
INJECTION, SOLUTION INTRAVENOUS AS NEEDED
Status: DISCONTINUED | OUTPATIENT
Start: 2023-02-22 | End: 2023-02-22 | Stop reason: SURG

## 2023-02-22 RX ORDER — KETAMINE HCL IN NACL, ISO-OSM 100MG/10ML
SYRINGE (ML) INJECTION AS NEEDED
Status: DISCONTINUED | OUTPATIENT
Start: 2023-02-22 | End: 2023-02-22 | Stop reason: SURG

## 2023-02-22 RX ORDER — ALBUMIN, HUMAN INJ 5% 5 %
SOLUTION INTRAVENOUS CONTINUOUS PRN
Status: DISCONTINUED | OUTPATIENT
Start: 2023-02-22 | End: 2023-02-22 | Stop reason: SURG

## 2023-02-22 RX ORDER — MIDAZOLAM HYDROCHLORIDE 1 MG/ML
1 INJECTION INTRAMUSCULAR; INTRAVENOUS
Status: DISCONTINUED | OUTPATIENT
Start: 2023-02-22 | End: 2023-02-22 | Stop reason: HOSPADM

## 2023-02-22 RX ORDER — DIPHENHYDRAMINE HCL 25 MG
25 CAPSULE ORAL
Status: DISCONTINUED | OUTPATIENT
Start: 2023-02-22 | End: 2023-02-22 | Stop reason: HOSPADM

## 2023-02-22 RX ORDER — SODIUM CHLORIDE 0.9 % (FLUSH) 0.9 %
3-10 SYRINGE (ML) INJECTION AS NEEDED
Status: DISCONTINUED | OUTPATIENT
Start: 2023-02-22 | End: 2023-02-22 | Stop reason: HOSPADM

## 2023-02-22 RX ORDER — SODIUM CHLORIDE 9 MG/ML
40 INJECTION, SOLUTION INTRAVENOUS AS NEEDED
Status: DISCONTINUED | OUTPATIENT
Start: 2023-02-22 | End: 2023-02-22 | Stop reason: HOSPADM

## 2023-02-22 RX ORDER — METHOCARBAMOL 750 MG/1
750 TABLET, FILM COATED ORAL 4 TIMES DAILY PRN
Qty: 60 TABLET | Refills: 0 | Status: SHIPPED | OUTPATIENT
Start: 2023-02-22 | End: 2023-02-28 | Stop reason: SDUPTHER

## 2023-02-22 RX ORDER — GLYCOPYRROLATE 0.2 MG/ML
INJECTION INTRAMUSCULAR; INTRAVENOUS AS NEEDED
Status: DISCONTINUED | OUTPATIENT
Start: 2023-02-22 | End: 2023-02-22 | Stop reason: SURG

## 2023-02-22 RX ORDER — LABETALOL HYDROCHLORIDE 5 MG/ML
5 INJECTION, SOLUTION INTRAVENOUS
Status: DISCONTINUED | OUTPATIENT
Start: 2023-02-22 | End: 2023-02-22 | Stop reason: HOSPADM

## 2023-02-22 RX ORDER — BUPIVACAINE HYDROCHLORIDE AND EPINEPHRINE 5; 5 MG/ML; UG/ML
INJECTION, SOLUTION EPIDURAL; INTRACAUDAL; PERINEURAL AS NEEDED
Status: DISCONTINUED | OUTPATIENT
Start: 2023-02-22 | End: 2023-02-22 | Stop reason: HOSPADM

## 2023-02-22 RX ORDER — SODIUM CHLORIDE, SODIUM LACTATE, POTASSIUM CHLORIDE, CALCIUM CHLORIDE 600; 310; 30; 20 MG/100ML; MG/100ML; MG/100ML; MG/100ML
9 INJECTION, SOLUTION INTRAVENOUS CONTINUOUS PRN
Status: DISCONTINUED | OUTPATIENT
Start: 2023-02-22 | End: 2023-02-22 | Stop reason: HOSPADM

## 2023-02-22 RX ORDER — CEFAZOLIN SODIUM IN 0.9 % NACL 3 G/100 ML
3 INTRAVENOUS SOLUTION, PIGGYBACK (ML) INTRAVENOUS ONCE
Status: COMPLETED | OUTPATIENT
Start: 2023-02-22 | End: 2023-02-22

## 2023-02-22 RX ORDER — OXYCODONE HCL 10 MG/1
10 TABLET, FILM COATED, EXTENDED RELEASE ORAL ONCE
Status: COMPLETED | OUTPATIENT
Start: 2023-02-22 | End: 2023-02-22

## 2023-02-22 RX ADMIN — PHENYLEPHRINE HYDROCHLORIDE 100 MCG: 10 INJECTION INTRAVENOUS at 07:48

## 2023-02-22 RX ADMIN — SODIUM CHLORIDE, POTASSIUM CHLORIDE, SODIUM LACTATE AND CALCIUM CHLORIDE 1000 ML: 600; 310; 30; 20 INJECTION, SOLUTION INTRAVENOUS at 07:20

## 2023-02-22 RX ADMIN — ROCURONIUM BROMIDE 50 MG: 50 INJECTION, SOLUTION INTRAVENOUS at 07:47

## 2023-02-22 RX ADMIN — SODIUM CHLORIDE, POTASSIUM CHLORIDE, SODIUM LACTATE AND CALCIUM CHLORIDE: 600; 310; 30; 20 INJECTION, SOLUTION INTRAVENOUS at 09:31

## 2023-02-22 RX ADMIN — Medication 10 MG: at 09:10

## 2023-02-22 RX ADMIN — MIDAZOLAM 2 MG: 1 INJECTION INTRAMUSCULAR; INTRAVENOUS at 07:33

## 2023-02-22 RX ADMIN — SUCCINYLCHOLINE CHLORIDE 200 MG: 20 INJECTION, SOLUTION INTRAMUSCULAR; INTRAVENOUS at 07:38

## 2023-02-22 RX ADMIN — DEXMEDETOMIDINE HYDROCHLORIDE 4 MCG: 100 INJECTION, SOLUTION INTRAVENOUS at 10:31

## 2023-02-22 RX ADMIN — HYDROMORPHONE HYDROCHLORIDE 0.25 MG: 1 INJECTION, SOLUTION INTRAMUSCULAR; INTRAVENOUS; SUBCUTANEOUS at 10:27

## 2023-02-22 RX ADMIN — PHENYLEPHRINE HYDROCHLORIDE 200 MCG: 10 INJECTION INTRAVENOUS at 08:01

## 2023-02-22 RX ADMIN — PHENYLEPHRINE HYDROCHLORIDE 200 MCG: 10 INJECTION INTRAVENOUS at 08:19

## 2023-02-22 RX ADMIN — DEXMEDETOMIDINE HYDROCHLORIDE 4 MCG: 100 INJECTION, SOLUTION INTRAVENOUS at 10:30

## 2023-02-22 RX ADMIN — ROCURONIUM BROMIDE 10 MG: 50 INJECTION, SOLUTION INTRAVENOUS at 07:37

## 2023-02-22 RX ADMIN — ACETAMINOPHEN 1000 MG: 500 TABLET, FILM COATED ORAL at 07:20

## 2023-02-22 RX ADMIN — GLYCOPYRROLATE 0.1 MG: 0.2 INJECTION INTRAMUSCULAR; INTRAVENOUS at 08:09

## 2023-02-22 RX ADMIN — CEFAZOLIN 3 G: 10 INJECTION, POWDER, FOR SOLUTION INTRAVENOUS at 07:50

## 2023-02-22 RX ADMIN — PHENYLEPHRINE HYDROCHLORIDE 100 MCG: 10 INJECTION INTRAVENOUS at 07:46

## 2023-02-22 RX ADMIN — DEXAMETHASONE SODIUM PHOSPHATE 8 MG: 4 INJECTION, SOLUTION INTRAMUSCULAR; INTRAVENOUS at 07:46

## 2023-02-22 RX ADMIN — DEXMEDETOMIDINE HYDROCHLORIDE 4 MCG: 100 INJECTION, SOLUTION INTRAVENOUS at 10:01

## 2023-02-22 RX ADMIN — ALBUMIN (HUMAN): 2.5 SOLUTION INTRAVENOUS at 08:03

## 2023-02-22 RX ADMIN — EPHEDRINE SULFATE 5 MG: 5 INJECTION INTRAVENOUS at 09:24

## 2023-02-22 RX ADMIN — EPHEDRINE SULFATE 5 MG: 5 INJECTION INTRAVENOUS at 08:16

## 2023-02-22 RX ADMIN — GABAPENTIN 600 MG: 300 CAPSULE ORAL at 07:20

## 2023-02-22 RX ADMIN — MAGNESIUM SULFATE HEPTAHYDRATE 1 G: 500 INJECTION, SOLUTION INTRAMUSCULAR; INTRAVENOUS at 08:25

## 2023-02-22 RX ADMIN — DEXMEDETOMIDINE HYDROCHLORIDE 4 MCG: 100 INJECTION, SOLUTION INTRAVENOUS at 10:11

## 2023-02-22 RX ADMIN — ROCURONIUM BROMIDE 40 MG: 50 INJECTION, SOLUTION INTRAVENOUS at 08:48

## 2023-02-22 RX ADMIN — OXYCODONE HYDROCHLORIDE 10 MG: 10 TABLET, FILM COATED, EXTENDED RELEASE ORAL at 07:20

## 2023-02-22 RX ADMIN — ALBUMIN (HUMAN): 2.5 SOLUTION INTRAVENOUS at 09:05

## 2023-02-22 RX ADMIN — PHENYLEPHRINE HYDROCHLORIDE 0.5 MCG/KG/MIN: 10 INJECTION INTRAVENOUS at 08:17

## 2023-02-22 RX ADMIN — EPHEDRINE SULFATE 5 MG: 5 INJECTION INTRAVENOUS at 09:37

## 2023-02-22 RX ADMIN — EPHEDRINE SULFATE 10 MG: 5 INJECTION INTRAVENOUS at 08:01

## 2023-02-22 RX ADMIN — PHENYLEPHRINE HYDROCHLORIDE 0.3 MCG/KG/MIN: 10 INJECTION INTRAVENOUS at 10:24

## 2023-02-22 RX ADMIN — HYDROMORPHONE HYDROCHLORIDE 0.25 MG: 1 INJECTION, SOLUTION INTRAMUSCULAR; INTRAVENOUS; SUBCUTANEOUS at 10:18

## 2023-02-22 RX ADMIN — GLYCOPYRROLATE 0.1 MG: 0.2 INJECTION INTRAMUSCULAR; INTRAVENOUS at 07:45

## 2023-02-22 RX ADMIN — DIPHENHYDRAMINE HYDROCHLORIDE 25 MG: 50 INJECTION, SOLUTION INTRAMUSCULAR; INTRAVENOUS at 08:58

## 2023-02-22 RX ADMIN — Medication 30 MG: at 08:09

## 2023-02-22 RX ADMIN — DEXMEDETOMIDINE HYDROCHLORIDE 4 MCG: 100 INJECTION, SOLUTION INTRAVENOUS at 07:40

## 2023-02-22 RX ADMIN — SUGAMMADEX 400 MG: 100 INJECTION, SOLUTION INTRAVENOUS at 10:16

## 2023-02-22 RX ADMIN — Medication 10 MG: at 10:11

## 2023-02-22 RX ADMIN — PROPOFOL INJECTABLE EMULSION 300 MG: 10 INJECTION, EMULSION INTRAVENOUS at 07:37

## 2023-02-22 RX ADMIN — PHENYLEPHRINE HYDROCHLORIDE 200 MCG: 10 INJECTION INTRAVENOUS at 07:54

## 2023-02-22 RX ADMIN — PROPOFOL INJECTABLE EMULSION 20 MCG/KG/MIN: 10 INJECTION, EMULSION INTRAVENOUS at 07:58

## 2023-02-22 RX ADMIN — HYDROMORPHONE HYDROCHLORIDE 0.5 MG: 1 INJECTION, SOLUTION INTRAMUSCULAR; INTRAVENOUS; SUBCUTANEOUS at 11:39

## 2023-02-22 RX ADMIN — LIDOCAINE HYDROCHLORIDE 100 MG: 20 INJECTION, SOLUTION EPIDURAL; INFILTRATION; INTRACAUDAL; PERINEURAL at 07:35

## 2023-02-22 RX ADMIN — HYDROMORPHONE HYDROCHLORIDE 0.5 MG: 1 INJECTION, SOLUTION INTRAMUSCULAR; INTRAVENOUS; SUBCUTANEOUS at 08:58

## 2023-02-22 RX ADMIN — ONDANSETRON 4 MG: 2 INJECTION INTRAMUSCULAR; INTRAVENOUS at 10:13

## 2023-02-22 NOTE — DISCHARGE SUMMARY
Discharge Summary    Patient: Barrett Laguerre  : 1980    Patient Care Team:  Eric Rosenberg MD as PCP - General    Date of Admit: 2023    Date of Discharge:  2023    Discharge Diagnosis:  Lumbar radiculopathy      Procedures Performed  Procedure(s):  LUMBAR 4-SACRAL 1 LAMINOTOMY, MEDIAL FACETECTOMY, FORAMINOTOMY, AND MICRODISCECTOMY       Complications: None    Consultants:   Consults     No orders found from 2023 to 2023.          Condition on Discharge: stable    Discharge disposition: home    HPI: Barrett Laguerre is a 42 y.o. male who presented with lumbar radiculopathy secondary to stenosis at L4-5 and L5-S1.  Patient failed extensive conservative measures and was taken to the OR for the above procedure with Dr. Pace on 2023.    Hospital Course: Patient underwent surgery as scheduled.  They were transferred to PACU after the procedure.  Patient was observed in recovery until discharge criteria were met at which point he was discharged home to self-care.    Vitals:    23 1145   BP: 126/78   Pulse: 87   Resp: 14   Temp: 98.9 °F (37.2 °C)   SpO2: 92%         Lab Results (last 24 hours)     Procedure Component Value Units Date/Time    POC Glucose Once [[id number]]  (Abnormal) Collected: 23 1055    Specimen: Blood Updated: 23 1057     Glucose 180 mg/dL      Comment: Serial Number: [id number]:  [id number]       POC Glucose Once [[id number]]  (Abnormal) Collected: 23 0725    Specimen: Blood Updated: 23 0732     Glucose 146 mg/dL      Comment: Serial Number: [id number]:  [id number]                                Discharge Physical Exam:    General  - WD/WN male, appears their stated age, awake, cooperative, in no acute distress  HEENT  - Normocephalic, atraumatic, PERRLA, EOM intact  Respiratory  - Normal respiratory rate and effort  Abdomen  - Flat, soft, NT/ND  Musculoskeletal  - Moves all extremities well, no joint  swelling/tenderness  Skin  - Surgical incision well approximated, clean and dry, no swelling, redness, or drainage  NEUROLOGIC  - A/O x3  - Moves all extremities symmetrically and with good strength  - Sensation intact throughout      Discharge Medications  Inspect has been reviewed and narcotic consent is on file in the patient's chart.     Your medication list      CHANGE how you take these medications      Instructions Last Dose Given Next Dose Due   amLODIPine 10 MG tablet  Commonly known as: NORVASC  What changed: when to take this      Take 1 tablet by mouth Daily.       chlorthalidone 50 MG tablet  Commonly known as: HYGROTEN  What changed: when to take this      Take 1 tablet by mouth Daily.       HYDROcodone-acetaminophen 7.5-325 MG per tablet  Commonly known as: Norco  What changed:   · how much to take  · how to take this  · when to take this  · reasons to take this  · additional instructions  · Another medication with the same name was removed. Continue taking this medication, and follow the directions you see here.      Take 1-2 tablets by mouth every 4 hours as needed for moderate to severe pain       loratadine 10 MG tablet  Commonly known as: EQ Loratadine  What changed: when to take this      Take 1 tablet by mouth Daily.       methocarbamol 750 MG tablet  Commonly known as: ROBAXIN  What changed:   · when to take this  · reasons to take this      Take 1 tablet by mouth 4 (Four) Times a Day As Needed for Muscle Spasms.       sodium chloride 0.65 % nasal spray  Commonly known as: Ocean Nasal Spray  What changed:   · how much to take  · how to take this  · when to take this  · reasons to take this  · additional instructions      1 spray each nostril tid          CONTINUE taking these medications      Instructions Last Dose Given Next Dose Due   Alcohol Wipes 70 % pads      Apply 1 each topically 4 (Four) Times a Day Before Meals & at Bedtime.       BASAGLAR KWIKPEN 100 UNIT/ML injection pen       Inject 38 Units under the skin into the appropriate area as directed Every Night for 30 days.       clindamycin 1 % external solution  Commonly known as: CLEOCIN T      Apply 1 application topically to the appropriate area as directed 2 (Two) Times a Day. Not currently using       colchicine-probenecid 0.5-500 MG per tablet  Commonly known as: COL-BENEMID      Take 1 tablet by mouth Every Night.       Dexcom G6  device      1 each Daily.       Dexcom G6 Sensor      Every 10 (Ten) Days.       Dexcom G6 Transmitter misc      1 each Take As Directed for 30 days.       erythromycin 5 MG/GM ophthalmic ointment  Commonly known as: ROMYCIN      Administer 1 application to both eyes As Needed. Not currently using       fluticasone 50 MCG/ACT nasal spray  Commonly known as: FLONASE      1 spray into the nostril(s) as directed by provider Daily for 90 days.       glucose blood test strip  Commonly known as: FREESTYLE LITE      1 each by Other route 3 (Three) Times a Day Before Meals.       montelukast 10 MG tablet  Commonly known as: SINGULAIR      Take 1 tablet by mouth Every Night.       Pen Needles 32G X 4 MM misc      use 1 each 4 (Four) Times a Day Before Meals & at Bedtime.       polycarbophil 625 MG tablet tablet      Take  by mouth Every Night.       ReliOn Ultra Thin Lancets 30G misc      1 each 4 (Four) Times a Day.          STOP taking these medications    losartan 50 MG tablet  Commonly known as: COZAAR        oxyCODONE 5 MG immediate release tablet  Commonly known as: Roxicodone           ASK your doctor about these medications      Instructions Last Dose Given Next Dose Due   acetaminophen-codeine 300-60 MG per tablet  Commonly known as: TYLENOL with CODEINE #4      Take 1 tablet by mouth Every 6 (Six) Hours As Needed for Moderate Pain.       azelastine 0.1 % nasal spray  Commonly known as: ASTELIN      2 sprays into the nostril(s) as directed by provider 2 (Two) Times a Day. Use in each nostril as  directed       Cyanocobalamin ER 1000 MCG tablet controlled-release      Take 1 tablet by mouth Daily.       Diclofenac Epolamine 1.3 % patch patch  Commonly known as: FLECTOR      Apply 1 patch topically to the appropriate area as directed 2 (Two) Times a Day As Needed (back pain).       FLUoxetine 40 MG capsule  Commonly known as: PROzac      Take 1 capsule by mouth 2 (Two) Times a Day.       gabapentin 300 MG capsule  Commonly known as: NEURONTIN      Take 1 capsule by mouth 3 (Three) Times a Day.       hydrocortisone 1 % cream      Apply  topically to the appropriate area as directed 2 (Two) Times a Day.       hydrOXYzine 25 MG tablet  Commonly known as: ATARAX      Take 1 tablet by mouth 4 (Four) Times a Day As Needed for Itching.       Insulin Lispro 100 UNIT/ML injection pen  Commonly known as: ADMELOG SOLOSTAR      Inject 20 Units under the skin into the appropriate area as directed 3 (Three) Times a Day With Meals for 100 days.       Janumet  MG per tablet  Generic drug: sitaGLIPtin-metFORMIN      Take 1 tablet by mouth 2 (Two) Times a Day With Meals.       oxybutynin XL 15 MG 24 hr tablet  Commonly known as: DITROPAN XL      Take 1 tablet by mouth Daily.       pantoprazole 40 MG EC tablet  Commonly known as: Protonix      Take 1 tablet by mouth Daily.       promethazine 25 MG tablet  Commonly known as: PHENERGAN      Take 1 tablet by mouth Every 8 (Eight) Hours As Needed for Nausea or Vomiting.       sildenafil 20 MG tablet  Commonly known as: REVATIO      Take 1 tablet by mouth Daily As Needed (ed).             Where to Get Your Medications      These medications were sent to [facility] [address] [place] [address]    Hours: Mon-Fri 7:00AM-7:00PM Phone: [phone]   · HYDROcodone-acetaminophen 7.5-325 MG per tablet  · methocarbamol 750 MG tablet         Discharge Diet: Regular, advance as tolerated      Activity at Discharge: No bending or twisting at the waist.   No lifting greater than 5 pounds.  No driving for 1 week.  Do not soak or submerge incision underwater for 6 weeks.      Call for: questions or concerns    Follow-up Appointments  Future Appointments   Date Time Provider Department Center   2/28/2023  1:40 PM Collin Lora MD [facility]  [facility]   3/9/2023 10:30 AM Vahe Pace MD [facility] NEURSUR[facility]   3/21/2023  1:00 PM Gilda Campbell APRN [facility] PODIAT[facility]      Follow-up Information     Eric Rosenberg MD .    Specialties: Family Medicine, Urgent Care  Contact information:  [address] [facility] [address]  [place] [address] [phone]             Vahe Pace MD. Go on 3/9/2023.    Specialties: Neurosurgery, Spine Surgery  Contact information:  [address] [facility]  [place] [id number] [phone]                           I discussed the discharge instructions with patient    SHAYNA Ridley  02/22/23  12:05 EST    15 min spent in reviewing records, discussion and examination of the patient and discussion with other members of the patient's medical team.           Part of this note may be an electronic transcription/translation of spoken language to printed text using the Dragon Dictation System.

## 2023-02-22 NOTE — OP NOTE
Neurosurgical operative report:    Patient name: Barrett Laguerre  Medical record number: [id number]  Date of procedure: February 22, 2023    Preoperative diagnosis: Left-sided L4-L5 and L5-S1 disc herniation with resultant multilevel radiculopathy    Postoperative diagnosis: Left-sided L4-L5 and L5-S1 disc herniation with resultant multilevel radiculopathy    Procedure performed: Left-sided L4-L5-S1 laminotomy, medial facetectomy, foraminotomies and microdiscectomy, utilization of intraoperative microscope    Surgeon: Dr. Vahe Pace  Assistant: Gilda Muro  Anesthesia: General endotracheal anesthetic  Specimens: None  Blood loss: 75 cc  Drains: None  Complications: None immediate    Indications: This is a 42-year-old gentleman with a history of Guillain-Barré and a BMI of 35 who has chronic left-sided radiculopathy most consistent with an L5 and S1 nerve root distribution.  He failed extensive conservative therapy including physical therapy and spinal canal injections.  His MRI shows a left-sided L4-L5 and L5-S1 disc herniation likely contacting the traversing nerve roots.  I discussed with him the possibility of surgical decompression and after explanation of the risks and benefits he elected to proceed with an operative intervention.    Description of procedure: The patient was identified in the preoperative holding area via name and medical record number.  His history, physical exam, consent were all reviewed and found to be correct and appropriate for proceeding with surgery.  He was marked over the intended surgical site.  He was brought back to the operating room and handed over to anesthesia for induction of general endotracheal anesthetic.  He was transferred from the hospital bed to the operating room table in the prone position.  His back was shaved of hair and cleaned with chlorhexidine and alcohol.  Incision was marked out based on anatomic and radiographic guidance.  Local anesthetic was  instilled.  At this time the surgeon scrubbed in the area was prepped and draped in usual sterile fashion.  Timeout was performed and all categories were found to be correct and appropriate for proceeding with surgery.  Incision was made with 15 blade and carried down with Bovie electrocautery.  Self-retaining retractor was brought into provide adequate visualization.  The fascia was cleared off with a Loyd instrument.  The fascia was opened up over the midline the spinous processes of L4 and L5.  Left-sided subperiosteal dissection was accomplished along the spinous process and lamina unilaterally.  Radiographic imaging confirmed proper levels.  Self-retaining retractor was brought into provide adequate visualization.  Microscope was brought on the field.  L4 laminotomy, medial facetectomy was accomplished with power drill bit and Kerrison bone punch.  The ligament was taken down.  The thecal sac and nerve root were identified.  This was retracted medially.  The disc base was entered with an 11 blade and discectomy was performed with multiple grasping instruments.  Foraminotomies was accomplished with Kerrison bone punch.  The thecal sac and nerve root were fully decompressed at this level.  Hemostasis was obtained.  I did move down to the lower level and complete a same laminotomy, medial facetectomy, microdiscectomy and foraminotomies at the L5-S1 level.  Full decompression was confirmed.  The wound was copiously irrigated and hemostasis was obtained.  The self-retaining retractor was removed.  Depo-Medrol was placed over the thecal sac and nerve root at both levels.  Vancomycin was placed into the wound.  The fascia was closed with interrupted 0 Vicryl stitches.  The subcutaneous tissue was closed with interrupted 2-0 Vicryl stitches.  The skin was brought together with a running V-Loc Monocryl.  The skin was covered with glue and a covered arm.  The drapes were taken down and the patient was transferred back to  the hospital bed in the supine position.  He was extubated intraoperatively and he was transported to the postanesthesia care unit.  At the time of him being dropped off at that location no complications were evident.  At the end of the case all counts were found to be correct.  I was present and scrubbed for all key portions of the procedure and immediately available at all times.  The assistance of the surgical first assist was essential for successful completion of surgical intervention including assistance with opening, closing, suction, retraction.  End of dictation.  Thank you very much.

## 2023-02-22 NOTE — ANESTHESIA PROCEDURE NOTES
Airway  Urgency: elective    Date/Time: 2/22/2023 7:39 AM  Airway not difficult    General Information and Staff    Patient location during procedure: OR  Anesthesiologist: Jeremiah Post MD  CRNA/CAA: Glo Shearer CRNA    Indications and Patient Condition  Indications for airway management: airway protection    Preoxygenated: yes  MILS maintained throughout  Mask difficulty assessment: 2 - vent by mask + OA or adjuvant +/- NMBA    Final Airway Details  Final airway type: endotracheal airway      Successful airway: ETT and reinforced tube  Cuffed: yes   Successful intubation technique: video laryngoscopy  Facilitating devices/methods: intubating stylet  Endotracheal tube insertion site: oral  Blade: Mcpherson  Blade size: 4  ETT size (mm): 8.0  Cormack-Lehane Classification: grade I - full view of glottis  Placement verified by: capnometry   Cuff volume (mL): 8  Measured from: teeth  ETT/EBT  to teeth (cm): 23  Number of attempts at approach: 1  Assessment: lips, teeth, and gum same as pre-op and atraumatic intubation    Additional Comments  Large neck circumference/TMD >4fb. Preoxygenation, smooth IV induction. EMV with oral airway. Head/neck neutral during induction/intubation.
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
## 2023-02-22 NOTE — ANESTHESIA POSTPROCEDURE EVALUATION
Patient: Barrett Laguerre    Procedure Summary     Date: 02/22/23 Room / Location: [facility] OR 12 / [facility] MAIN OR    Anesthesia Start: 0733 Anesthesia Stop: 1050    Procedure: LUMBAR 4-SACRAL 1 LAMINOTOMY, MEDIAL FACETECTOMY, FORAMINOTOMY, AND MICRODISCECTOMY (Left: Spine Lumbar) Diagnosis:       Lumbar radiculopathy      (Lumbar radiculopathy [M54.16])    Surgeons: Vahe Pace MD Provider: Jeremiah Post MD    Anesthesia Type: general, ERAS Protocol ASA Status: 3          Anesthesia Type: general, ERAS Protocol    Vitals  Vitals Value Taken Time   /78 02/22/23 1145   Temp 98.9 °F (37.2 °C) 02/22/23 1145   Pulse 81 02/22/23 1148   Resp 14 02/22/23 1145   SpO2 94 % 02/22/23 1148   Vitals shown include unvalidated device data.        Post Anesthesia Care and Evaluation    Patient location during evaluation: PACU  Patient participation: complete - patient participated  Level of consciousness: awake  Pain scale: See nurse's notes for pain score.  Pain management: adequate    Airway patency: patent  Anesthetic complications: No anesthetic complications  PONV Status: none  Cardiovascular status: acceptable  Respiratory status: acceptable and spontaneous ventilation  Hydration status: acceptable    Comments: Patient seen and examined postoperatively; vital signs stable; SpO2 greater than or equal to 90%; cardiopulmonary status stable; nausea/vomiting adequately controlled; pain adequately controlled; no apparent anesthesia complications; patient discharged from anesthesia care when discharge criteria were met

## 2023-02-22 NOTE — ANESTHESIA PREPROCEDURE EVALUATION
Anesthesia Evaluation     Patient summary reviewed and Nursing notes reviewed   history of anesthetic complications: PONV  NPO Solid Status: > 8 hours  NPO Liquid Status: > 8 hours           Airway   Mallampati: II  TM distance: >3 FB  Neck ROM: full  No difficulty expected  Dental - normal exam     Pulmonary - normal exam   (+) asthma,sleep apnea,   Cardiovascular - normal exam    (+) hypertension,       Neuro/Psych  (+) neuromuscular disease, numbness, psychiatric history,    GI/Hepatic/Renal/Endo    (+) obesity,  GERD,  diabetes mellitus,     Musculoskeletal     (+) chronic pain,   Abdominal  - normal exam    Bowel sounds: normal.   Substance History      OB/GYN          Other   arthritis,      ROS/Med Hx Other: Guillain-Carson                    Anesthesia Plan    ASA 3     general and ERAS Protocol     intravenous induction     Anesthetic plan, risks, benefits, and alternatives have been provided, discussed and informed consent has been obtained with: patient.    Plan discussed with CRNA.

## 2023-02-28 ENCOUNTER — OFFICE VISIT (OUTPATIENT)
Dept: PAIN MEDICINE | Facility: CLINIC | Age: 43
End: 2023-02-28
Payer: MEDICAID

## 2023-02-28 VITALS
HEART RATE: 87 BPM | RESPIRATION RATE: 16 BRPM | DIASTOLIC BLOOD PRESSURE: 84 MMHG | OXYGEN SATURATION: 96 % | SYSTOLIC BLOOD PRESSURE: 140 MMHG

## 2023-02-28 DIAGNOSIS — M54.41 CHRONIC MIDLINE LOW BACK PAIN WITH BILATERAL SCIATICA: Primary | ICD-10-CM

## 2023-02-28 DIAGNOSIS — M54.42 CHRONIC MIDLINE LOW BACK PAIN WITH BILATERAL SCIATICA: Primary | ICD-10-CM

## 2023-02-28 DIAGNOSIS — M54.16 LUMBAR RADICULOPATHY: ICD-10-CM

## 2023-02-28 DIAGNOSIS — M48.07 LUMBOSACRAL SPINAL STENOSIS: ICD-10-CM

## 2023-02-28 DIAGNOSIS — G61.0 GUILLAIN-BARRE: ICD-10-CM

## 2023-02-28 DIAGNOSIS — G89.29 CHRONIC MIDLINE LOW BACK PAIN WITH BILATERAL SCIATICA: Primary | ICD-10-CM

## 2023-02-28 PROCEDURE — 99214 OFFICE O/P EST MOD 30 MIN: CPT | Performed by: PHYSICAL MEDICINE & REHABILITATION

## 2023-02-28 RX ORDER — HYDROCODONE BITARTRATE AND ACETAMINOPHEN 7.5; 325 MG/1; MG/1
1 TABLET ORAL EVERY 4 HOURS PRN
Qty: 180 TABLET | Refills: 0 | Status: SHIPPED | OUTPATIENT
Start: 2023-02-28 | End: 2023-03-10 | Stop reason: SDUPTHER

## 2023-02-28 RX ORDER — METHOCARBAMOL 750 MG/1
750 TABLET, FILM COATED ORAL 4 TIMES DAILY PRN
Qty: 180 TABLET | Refills: 3 | Status: SHIPPED | OUTPATIENT
Start: 2023-02-28

## 2023-02-28 RX ORDER — HYDROCODONE BITARTRATE AND ACETAMINOPHEN 7.5; 325 MG/1; MG/1
1 TABLET ORAL EVERY 4 HOURS PRN
Qty: 180 TABLET | Refills: 0 | Status: SHIPPED | OUTPATIENT
Start: 2023-02-28
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
## 2023-03-07 NOTE — PROGRESS NOTES
Neurosurgical Consultation      Barrett Laguerre is a 43 y.o. male is here today for follow-up from surgery on 2/22/23. In the office today patient reports some remaining low back pain into his left gluteal area.    Chief Complaint   Patient presents with   • Post-op     Surgery on 2/22/23        Previous treatment: Bilateral L4-L5-S1 laminotomy, medial facetectomy, foraminotomies and microdiscectomy on 2/22/23.    HPI: This is a 43-year-old gentleman who underwent a bilateral L4-S1 laminotomy and discectomy.  He had bilateral L5 and S1 radiculopathies.  He returns for 2-week evaluation.  He is doing very well.  His pain is adequately controlled.  His incision is well-healing.  He has noticed some significant improvement in his radiculopathy.  He does not have any questions or concerns.    Past Medical History:   Diagnosis Date   • Acquired pes planus of both feet     Impression: needs referral for orthotics.   • Acute non-recurrent maxillary sinusitis     Impression: His symptoms were not improving.   • ADD (attention deficit disorder)    • Allergies    • Annual physical exam     Impression: Discussed anticipatory guidance, diet, exercise, and weight loss. Discussed safety, seatbelts, and routine screening examinations. Discussed self-examinations.   • Asthma     Impression: Stable   • Asymptomatic microscopic hematuria     Impression: recheck neg. Will follow   • Bulging lumbar disc    • Chronic gout, unspecified, without tophus (tophi)     Impression: Recommend rechecking uric acid.   • CIDP (chronic inflammatory demyelinating polyneuropathy) (Formerly [facility])     Story: s/p IVIG [place] per [facility] diagnosed 2015 gabapentin 600 tid. Impression: Symptoms include: numbness and tingling in hands and feet, difficulty concentrating, drops items, chest pain. dizziness. Pt needs routine follow up with neurology. 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 . Proteinuria/creatinine noted   • Idiopathic chronic gout without tophus     Unspecified site. Impression: stable. Story: UA 8.3 2/2018   • Intervertebral disc stenosis of neural canal of lumbar region     Story: MRI 11/15 showed lumbar disk disease. 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 Pt would like referra to Dr Watkins, has appt Dec 15   • Low back pain    • Lumbar disc disorder with myelopathy     Impression: failed therapy. Pt would like refill on compounded topical med. Rx faxed   • Mixed obsessional thoughts and acts    • Obstructive sleep apnea     bipap bring dos   • OCD (obsessive compulsive disorder)    • Other seasonal allergic rhinitis     Impression: Over-the-counter medication indications, dosage, and precautions discussed.   • Overactive bladder    • PONV (postoperative nausea and vomiting)    • Pruritus    • Screening for depression    • Screening for hyperlipidemia    • SI (sacroiliac) joint dysfunction     Impression: Findings discussed. All questions answered. Medication and medication adverse effects discussed. Drug education given and explained to patient. Patient verbalized understanding. Follow-up in 4-6 weeks if not better. Follow-up sooner for worsening symptoms or for any concerns. Consider chiropractor   • Tobacco use disorder         Past Surgical History:   Procedure Laterality Date   • COLONOSCOPY N/A 12/22/2020    Procedure: COLONOSCOPY with polypectomy x;  Surgeon: Juan Alberto Davis MD;  Location: [facility];  Service: Gastroenterology;  Laterality: N/A;  post: transverse colon polyp   • INGUINAL HERNIA REPAIR     • LUMBAR LAMINECTOMY Left 2/22/2023    Procedure: LUMBAR 4-SACRAL 1 LAMINOTOMY, MEDIAL FACETECTOMY, FORAMINOTOMY, AND MICRODISCECTOMY;  Surgeon:  Vahe Pace MD;  Location: [facility];  Service: Neurosurgery;  Laterality: Left;   • NOSE SURGERY     • TYMPANOSTOMY TUBE PLACEMENT          Current Outpatient Medications on File Prior to Visit   Medication Sig Dispense Refill   • acetaminophen-codeine (TYLENOL with CODEINE #4) 300-60 MG per tablet Take 1 tablet by mouth Every 6 (Six) Hours As Needed for Moderate Pain. (Patient taking differently: Take 1 tablet by mouth Every 6 (Six) Hours As Needed for Moderate Pain. Alternates with hydrocodone  not currently taking) 120 tablet 5   • Alcohol Swabs (Alcohol Wipes) 70 % pads Apply 1 each topically 4 (Four) Times a Day Before Meals & at Bedtime. 200 each 2   • amLODIPine (NORVASC) 10 MG tablet Take 1 tablet by mouth Daily. (Patient taking differently: Take 1 tablet by mouth Every Night.) 90 tablet 3   • azelastine (ASTELIN) 0.1 % nasal spray 2 sprays into the nostril(s) as directed by provider 2 (Two) Times a Day. Use in each nostril as directed (Patient taking differently: 2 sprays into the nostril(s) as directed by provider As Needed. Use in each nostril as directed  not currently using) 3 each 3   • chlorthalidone (HYGROTEN) 50 MG tablet Take 1 tablet by mouth Daily. (Patient taking differently: Take 1 tablet by mouth Every Night.) 90 tablet 3   • clindamycin (CLEOCIN T) 1 % external solution Apply 1 application topically to the appropriate area as directed 2 (Two) Times a Day. Not currently using     • colchicine-probenecid (COL-BENEMID) 0.5-500 MG per tablet Take 1 tablet by mouth Every Night.     • Continuous Blood Gluc  (Dexcom G6 ) device 1 each Daily. 1 each 0   • Continuous Blood Gluc Sensor (Dexcom G6 Sensor) Every 10 (Ten) Days. 1 each 6   • Continuous Blood Gluc Transmit (Dexcom G6 Transmitter) misc 1 each Take As Directed for 30 days. 6 each 3   • Cyanocobalamin ER 1000 MCG tablet controlled-release Take 1 tablet by mouth Daily. (Patient taking differently: Take 1 tablet by  mouth Daily. Last dose 2/17) 90 each 3   • Diclofenac Epolamine (FLECTOR) 1.3 % patch patch Apply 1 patch topically to the appropriate area as directed 2 (Two) Times a Day As Needed (back pain). (Patient taking differently: Apply 1 patch topically to the appropriate area as directed As Needed (back pain). Ld 2/15) 60 patch 2   • erythromycin (ROMYCIN) 5 MG/GM ophthalmic ointment Administer 1 application to both eyes As Needed. Not currently using     • FLUoxetine (PROzac) 40 MG capsule Take 1 capsule by mouth 2 (Two) Times a Day. (Patient taking differently: Take 1 capsule by mouth Every Night.) 180 capsule 3   • gabapentin (NEURONTIN) 300 MG capsule Take 1 capsule by mouth 3 (Three) Times a Day. 270 capsule 3   • glucose blood (FREESTYLE LITE) test strip 1 each by Other route 3 (Three) Times a Day Before Meals. 200 each 3   • HYDROcodone-acetaminophen (Norco) 7.5-325 MG per tablet Take 1 tablet by mouth Every 4 (Four) Hours As Needed for Moderate Pain. 180 tablet 0   • HYDROcodone-acetaminophen (Norco) 7.5-325 MG per tablet Take 1 tablet by mouth Every 4 (Four) Hours As Needed for Moderate Pain. 180 tablet 0   • HYDROcodone-acetaminophen (Norco) 7.5-325 MG per tablet Take 1 tablet by mouth Every 4 (Four) Hours As Needed for Moderate Pain. 180 tablet 0   • hydrocortisone 1 % cream Apply  topically to the appropriate area as directed 2 (Two) Times a Day. (Patient taking differently: Apply 1 application topically to the appropriate area as directed As Needed.) 60 g 3   • hydrOXYzine (ATARAX) 25 MG tablet Take 1 tablet by mouth 4 (Four) Times a Day As Needed for Itching. (Patient taking differently: Take 1 tablet by mouth 4 (Four) Times a Day As Needed for Itching. Use preop if needed) 360 tablet 3   • Insulin Lispro (ADMELOG SOLOSTAR) 100 UNIT/ML injection pen Inject 20 Units under the skin into the appropriate area as directed 3 (Three) Times a Day With Meals for 100 days. (Patient taking differently: Inject 20 Units  under the skin into the appropriate area as directed 3 (Three) Times a Day With Meals. While off janumet   none am of surgery) 60 mL 3   • Insulin Lispro (humaLOG) 100 UNIT/ML injection Inject  under the skin into the appropriate area as directed 3 (Three) Times a Day Before Meals.     • Insulin Pen Needle (Pen Needles) 32G X 4 MM misc use 1 each 4 (Four) Times a Day Before Meals & at Bedtime. 200 each 2   • loratadine (EQ Loratadine) 10 MG tablet Take 1 tablet by mouth Daily. (Patient taking differently: Take 1 tablet by mouth Every Night.) 90 tablet 3   • methocarbamol (ROBAXIN) 750 MG tablet Take 1 tablet by mouth 4 (Four) Times a Day As Needed for Muscle Spasms. 180 tablet 3   • montelukast (SINGULAIR) 10 MG tablet Take 1 tablet by mouth Every Night. 90 tablet 3   • oxybutynin XL (DITROPAN XL) 15 MG 24 hr tablet Take 1 tablet by mouth Daily. (Patient taking differently: Take 1 tablet by mouth As Needed.) 30 tablet 12   • pantoprazole (Protonix) 40 MG EC tablet Take 1 tablet by mouth Daily. (Patient taking differently: Take 1 tablet by mouth As Needed. May take preop) 90 tablet 3   • polycarbophil 625 MG tablet tablet Take  by mouth Every Night.     • promethazine (PHENERGAN) 25 MG tablet Take 1 tablet by mouth Every 8 (Eight) Hours As Needed for Nausea or Vomiting. (Patient taking differently: Take 1 tablet by mouth As Needed for Nausea or Vomiting. Preop if needed) 90 tablet 5   • ReliOn Ultra Thin Lancets 30G misc 1 each 4 (Four) Times a Day. 360 each 3   • sildenafil (REVATIO) 20 MG tablet Take 1 tablet by mouth Daily As Needed (ed). 30 tablet 3   • sitaGLIPtin-metFORMIN (Janumet)  MG per tablet Take 1 tablet by mouth 2 (Two) Times a Day With Meals. (Patient taking differently: Take 1 tablet by mouth 2 (Two) Times a Day With Meals. Last dose 2/20  0730  covering with admelog) 180 tablet 3   • sodium chloride (Ocean Nasal Spray) 0.65 % nasal spray 1 spray each nostril tid (Patient taking differently: 1  "spray into the nostril(s) as directed by provider As Needed. Use preop) 216 mL 3   • fluticasone (FLONASE) 50 MCG/ACT nasal spray 1 spray into the nostril(s) as directed by provider Daily for 90 days. 16 g 3   • Insulin Glargine (BASAGLAR KWIKPEN) 100 UNIT/ML injection pen Inject 38 Units under the skin into the appropriate area as directed Every Night for 30 days. 15 mL 8     No current facility-administered medications on file prior to visit.        Allergies   Allergen Reactions   • Penicillin G Anaphylaxis   • Penicillins Anaphylaxis   • Sulfa Antibiotics Hives   • Allopurinol Rash        Social History     Socioeconomic History   • Marital status: Single   Tobacco Use   • Smoking status: Never   • Smokeless tobacco: Never   Vaping Use   • Vaping Use: Never used   Substance and Sexual Activity   • Alcohol use: Not Currently     Comment: socially   • Drug use: Not Currently   • Sexual activity: Defer          Review of Systems   Constitutional: Positive for activity change.   HENT: Negative.    Eyes: Negative.    Respiratory: Negative.    Cardiovascular: Negative.    Endocrine: Negative.    Genitourinary: Negative.    Musculoskeletal: Positive for arthralgias, back pain and myalgias.   Skin: Negative.    Allergic/Immunologic: Negative.    Neurological: Positive for numbness (tingling).        Left buttock   Hematological: Negative.    Psychiatric/Behavioral: Positive for sleep disturbance.        Physical Examination:     Vitals:    03/09/23 1104   BP: 124/82   Pulse: 71   SpO2: 98%   Weight: 126 kg (278 lb)   Height: 198.1 cm (78\")   PainSc:   6        Physical Exam     Neurological Exam   Neurological examination is stable compared to my in-hospital evaluation without any new red flag signs.  Incision is well-healing without any signs of breakdown or infection.    Result Review  The following data was reviewed by: Vahe Pace MD on 03/09/2023:    Data reviewed: Radiologic studies No new imaging today. "     Assessment/plan:  This a 43-year-old gentleman with bilateral radiculopathy status post L4-S1 laminotomies and microdiscectomy's.  He is doing well postoperatively.  He has had noticeable improvement in his radicular pain.  His incision is well-healing.  He will begin physical therapy.  He will return to see me in 4 weeks.  I encouraged him to call with any questions or concerns.  I have discussed with him limitations at this juncture and he expresses understanding.    Diagnoses and all orders for this visit:    1. Lumbar radiculopathy (Primary)  -     Ambulatory Referral to Physical Therapy POST OP         Return in about 4 weeks (around 4/6/2023).            Vahe Pace MD

## 2023-03-09 ENCOUNTER — OFFICE VISIT (OUTPATIENT)
Dept: NEUROSURGERY | Facility: CLINIC | Age: 43
End: 2023-03-09
Payer: MEDICAID

## 2023-03-09 VITALS
DIASTOLIC BLOOD PRESSURE: 82 MMHG | OXYGEN SATURATION: 98 % | SYSTOLIC BLOOD PRESSURE: 124 MMHG | BODY MASS INDEX: 32.17 KG/M2 | WEIGHT: 278 LBS | HEART RATE: 71 BPM | HEIGHT: 78 IN

## 2023-03-09 DIAGNOSIS — M54.16 LUMBAR RADICULOPATHY: Primary | ICD-10-CM

## 2023-03-09 PROCEDURE — 1160F RVW MEDS BY RX/DR IN RCRD: CPT | Performed by: NEUROLOGICAL SURGERY

## 2023-03-09 PROCEDURE — 3074F SYST BP LT 130 MM HG: CPT | Performed by: NEUROLOGICAL SURGERY

## 2023-03-09 PROCEDURE — 1159F MED LIST DOCD IN RCRD: CPT | Performed by: NEUROLOGICAL SURGERY

## 2023-03-09 PROCEDURE — 3079F DIAST BP 80-89 MM HG: CPT | Performed by: NEUROLOGICAL SURGERY

## 2023-03-09 PROCEDURE — 99024 POSTOP FOLLOW-UP VISIT: CPT | Performed by: NEUROLOGICAL SURGERY

## 2023-03-09 RX ORDER — INSULIN LISPRO 100 [IU]/ML
INJECTION, SOLUTION INTRAVENOUS; SUBCUTANEOUS
COMMUNITY

## 2023-03-10 ENCOUNTER — TELEPHONE (OUTPATIENT)
Dept: NEUROSURGERY | Facility: CLINIC | Age: 43
End: 2023-03-10
Payer: MEDICAID

## 2023-03-10 DIAGNOSIS — L30.8 OTHER ECZEMA: ICD-10-CM

## 2023-03-10 NOTE — TELEPHONE ENCOUNTER
Caller: Barrett Laguerre    Relationship: Self    Best call back number: [phone]    What is the medical concern/diagnosis: POST-OP    What specialty or service is being requested: PHYSICAL THERAPY    What is the provider, practice or medical service name: [facility]    Any additional details: PATIENT STATES [facility] HAS NOT RECEIVED PT ORDER. OUTGOING PT REFERRAL WAS CREATED YESTERDAY; PLEASE MAKE SURE ORDER IS FAXED [phone] WITH ATTN TO KAZ, AS PATIENT HAS WORKED WITH HER PREVIOUSLY, AND PLEASE CALL PATIENT TO ADVISE ONCE FAXED.

## 2023-03-13 RX ORDER — DICLOFENAC EPOLAMINE 0.01 G/1
1 SYSTEM TOPICAL 2 TIMES DAILY PRN
Qty: 60 PATCH | Refills: 2 | Status: SHIPPED | OUTPATIENT
Start: 2023-03-13 | End: 2023-03-23 | Stop reason: SDUPTHER

## 2023-03-13 RX ORDER — DIAPER,BRIEF,INFANT-TODD,DISP
EACH MISCELLANEOUS 2 TIMES DAILY
Qty: 60 G | Refills: 3 | Status: SHIPPED | OUTPATIENT
Start: 2023-03-13 | End: 2023-03-27 | Stop reason: SDUPTHER

## 2023-03-13 NOTE — TELEPHONE ENCOUNTER
Patient is aware referral will be sent again. Verified what location he was going to and it said the one on [facility].

## 2023-03-13 NOTE — TELEPHONE ENCOUNTER
PT CALLED AGAIN, UPSET WE HAVE NOT SENT PHY THERAPY REFERRAL TO CORRECT FAX NUMBER BELOW. PLEASE SEND AND CALL PT TO ADVISE     THANK YOU,

## 2023-03-21 ENCOUNTER — OFFICE VISIT (OUTPATIENT)
Dept: PODIATRY | Facility: CLINIC | Age: 43
End: 2023-03-21
Payer: MEDICAID

## 2023-03-21 VITALS — HEIGHT: 78 IN | OXYGEN SATURATION: 96 % | WEIGHT: 278 LBS | BODY MASS INDEX: 32.17 KG/M2

## 2023-03-21 DIAGNOSIS — M21.41 ACQUIRED BILATERAL FLAT FEET: ICD-10-CM

## 2023-03-21 DIAGNOSIS — E11.42 DIABETIC PERIPHERAL NEUROPATHY ASSOCIATED WITH TYPE 2 DIABETES MELLITUS: ICD-10-CM

## 2023-03-21 DIAGNOSIS — E11.42 DM TYPE 2 WITH DIABETIC PERIPHERAL NEUROPATHY: Primary | ICD-10-CM

## 2023-03-21 DIAGNOSIS — E11.65 TYPE 2 DIABETES MELLITUS WITH HYPERGLYCEMIA, WITHOUT LONG-TERM CURRENT USE OF INSULIN: ICD-10-CM

## 2023-03-21 DIAGNOSIS — L60.3 ONYCHODYSTROPHY: ICD-10-CM

## 2023-03-21 DIAGNOSIS — M21.42 ACQUIRED BILATERAL FLAT FEET: ICD-10-CM

## 2023-03-21 PROCEDURE — 11721 DEBRIDE NAIL 6 OR MORE: CPT

## 2023-03-21 PROCEDURE — 1159F MED LIST DOCD IN RCRD: CPT

## 2023-03-21 PROCEDURE — 1160F RVW MEDS BY RX/DR IN RCRD: CPT

## 2023-03-21 PROCEDURE — 99212 OFFICE O/P EST SF 10 MIN: CPT

## 2023-03-21 NOTE — PROGRESS NOTES
03/21/2023  Foot and Ankle Surgery - Established Patient/Follow-up  Provider: ANASTASIA Chan   Location: [facility]    Subjective:  Barrett Laguerre is a 43 y.o. male.     Chief Complaint   Patient presents with   • Left Foot - Diabetes, Nail Problem   • Right Foot - Diabetes, Nail Problem   • Follow-up     ANA Rosenberg MD 01/05/2023       The patient presents to the clinic today for routine diabetic foot evaluation.     He  states he has a difficult time reaching his toenails for routine trimming secondary to his height and his back pain. He denies ambulating barefoot, especially outside. The patient believes he should be able to obtain a new pair of diabetic shoes in 06/2023, if he still has the same insurance.    The patient notes his primary care physician, Dr. Rosenberg, manages his diabetes mellitus type 2. He reports he is on insulin and adds that it is not uncommon for his blood glucose to peak at 170 mg/dL. He reports in 02/2023, prior to his back surgery, his A1c had decreased to 6.5 percent. The patient states he occasionally wakes up with a blood glucose reading of 80 mg/dL, but other times, he will wake up to a blood glucose reading of 150 mg/dL. He notes he had to discontinue using his Janumet prior to surgery, and he realized his blood glucose is more well controlled on the combination of his medications.     Allergies   Allergen Reactions   • Penicillin G Anaphylaxis   • Penicillins Anaphylaxis   • Sulfa Antibiotics Hives   • Allopurinol Rash       Current Outpatient Medications on File Prior to Visit   Medication Sig Dispense Refill   • acetaminophen-codeine (TYLENOL with CODEINE #4) 300-60 MG per tablet Take 1 tablet by mouth Every 6 (Six) Hours As Needed for Moderate Pain. (Patient taking differently: Take 1 tablet by mouth Every 6 (Six) Hours As Needed for Moderate Pain. Alternates with hydrocodone  not currently taking) 120 tablet 5   • Alcohol Swabs (Alcohol Wipes) 70 % pads Apply 1  each topically 4 (Four) Times a Day Before Meals & at Bedtime. 200 each 2   • amLODIPine (NORVASC) 10 MG tablet Take 1 tablet by mouth Daily. (Patient taking differently: Take 1 tablet by mouth Every Night.) 90 tablet 3   • azelastine (ASTELIN) 0.1 % nasal spray 2 sprays into the nostril(s) as directed by provider 2 (Two) Times a Day. Use in each nostril as directed (Patient taking differently: 2 sprays into the nostril(s) as directed by provider As Needed. Use in each nostril as directed  not currently using) 3 each 3   • chlorthalidone (HYGROTEN) 50 MG tablet Take 1 tablet by mouth Daily. (Patient taking differently: Take 1 tablet by mouth Every Night.) 90 tablet 3   • clindamycin (CLEOCIN T) 1 % external solution Apply 1 application topically to the appropriate area as directed 2 (Two) Times a Day. Not currently using     • colchicine-probenecid (COL-BENEMID) 0.5-500 MG per tablet Take 1 tablet by mouth Every Night.     • Continuous Blood Gluc  (Dexcom G6 ) device 1 each Daily. 1 each 0   • Continuous Blood Gluc Sensor (Dexcom G6 Sensor) Every 10 (Ten) Days. 1 each 6   • Cyanocobalamin ER 1000 MCG tablet controlled-release Take 1 tablet by mouth Daily. (Patient taking differently: Take 1 tablet by mouth Daily. Last dose 2/17) 90 each 3   • Diclofenac Epolamine (FLECTOR) 1.3 % patch patch Apply 1 patch topically to the appropriate area as directed 2 (Two) Times a Day As Needed (back pain). 60 patch 2   • erythromycin (ROMYCIN) 5 MG/GM ophthalmic ointment Administer 1 application to both eyes As Needed. Not currently using     • FLUoxetine (PROzac) 40 MG capsule Take 1 capsule by mouth 2 (Two) Times a Day. (Patient taking differently: Take 1 capsule by mouth Every Night.) 180 capsule 3   • gabapentin (NEURONTIN) 300 MG capsule Take 1 capsule by mouth 3 (Three) Times a Day. 270 capsule 3   • glucose blood (FREESTYLE LITE) test strip 1 each by Other route 3 (Three) Times a Day Before Meals. 200 each 3    • HYDROcodone-acetaminophen (Norco) 7.5-325 MG per tablet Take 1 tablet by mouth Every 4 (Four) Hours As Needed for Moderate Pain. 180 tablet 0   • hydrocortisone 1 % cream Apply  topically to the appropriate area as directed 2 (Two) Times a Day. 60 g 3   • hydrOXYzine (ATARAX) 25 MG tablet Take 1 tablet by mouth 4 (Four) Times a Day As Needed for Itching. (Patient taking differently: Take 1 tablet by mouth 4 (Four) Times a Day As Needed for Itching. Use preop if needed) 360 tablet 3   • Insulin Lispro (ADMELOG SOLOSTAR) 100 UNIT/ML injection pen Inject 20 Units under the skin into the appropriate area as directed 3 (Three) Times a Day With Meals for 100 days. (Patient taking differently: Inject 20 Units under the skin into the appropriate area as directed 3 (Three) Times a Day With Meals. While off janumet   none am of surgery) 60 mL 3   • Insulin Lispro (humaLOG) 100 UNIT/ML injection Inject  under the skin into the appropriate area as directed 3 (Three) Times a Day Before Meals.     • Insulin Pen Needle (Pen Needles) 32G X 4 MM misc use 1 each 4 (Four) Times a Day Before Meals & at Bedtime. 200 each 2   • loratadine (EQ Loratadine) 10 MG tablet Take 1 tablet by mouth Daily. (Patient taking differently: Take 1 tablet by mouth Every Night.) 90 tablet 3   • methocarbamol (ROBAXIN) 750 MG tablet Take 1 tablet by mouth 4 (Four) Times a Day As Needed for Muscle Spasms. 180 tablet 3   • montelukast (SINGULAIR) 10 MG tablet Take 1 tablet by mouth Every Night. 90 tablet 3   • oxybutynin XL (DITROPAN XL) 15 MG 24 hr tablet Take 1 tablet by mouth Daily. (Patient taking differently: Take 1 tablet by mouth As Needed.) 30 tablet 12   • pantoprazole (Protonix) 40 MG EC tablet Take 1 tablet by mouth Daily. (Patient taking differently: Take 1 tablet by mouth As Needed. May take preop) 90 tablet 3   • polycarbophil 625 MG tablet tablet Take  by mouth Every Night.     • promethazine (PHENERGAN) 25 MG tablet Take 1 tablet by mouth  "Every 8 (Eight) Hours As Needed for Nausea or Vomiting. (Patient taking differently: Take 1 tablet by mouth As Needed for Nausea or Vomiting. Preop if needed) 90 tablet 5   • ReliOn Ultra Thin Lancets 30G misc 1 each 4 (Four) Times a Day. 360 each 3   • sildenafil (REVATIO) 20 MG tablet Take 1 tablet by mouth Daily As Needed (ed). 30 tablet 3   • sitaGLIPtin-metFORMIN (Janumet)  MG per tablet Take 1 tablet by mouth 2 (Two) Times a Day With Meals. (Patient taking differently: Take 1 tablet by mouth 2 (Two) Times a Day With Meals. Last dose 2/20  0730  covering with admelog) 180 tablet 3   • sodium chloride (Ocean Nasal Spray) 0.65 % nasal spray 1 spray each nostril tid (Patient taking differently: 1 spray into the nostril(s) as directed by provider As Needed. Use preop) 216 mL 3   • fluticasone (FLONASE) 50 MCG/ACT nasal spray 1 spray into the nostril(s) as directed by provider Daily for 90 days. 16 g 3   • Insulin Glargine (BASAGLAR KWIKPEN) 100 UNIT/ML injection pen Inject 38 Units under the skin into the appropriate area as directed Every Night for 30 days. 15 mL 8     No current facility-administered medications on file prior to visit.       Objective   Ht 198.1 cm (78\")   Wt 126 kg (278 lb)   SpO2 96%   BMI 32.13 kg/m²     Foot/Ankle Exam:     VASCULAR      Right Foot Vascularity   Normal vascular exam    Dorsalis pedis:  2+  Posterior tibial:  2+  Skin Temperature: warm    Edema Grading:  None  CFT:  < 3 seconds  Pedal Hair Growth:  Present  Varicosities: none       Left Foot Vascularity   Normal vascular exam    Dorsalis pedis:  2+  Posterior tibial:  2+  Skin Temperature: warm    Edema Grading:  None  CFT:  < 3 seconds  Pedal Hair Growth:  Present  Varicosities: none        NEUROLOGIC     Right Foot Neurologic   Protective Sensation using [place] Monofilament:  8     Left Foot Neurologic   Protective Sensation using [place] Monofilament:  9     MUSCULOSKELETAL      Right Foot " Musculoskeletal   Ecchymosis:  None  Tenderness: none       Left Foot Musculoskeletal   Ecchymosis:  None  Tenderness: none       DERMATOLOGIC     Right Foot Dermatologic   Skin: skin intact    Nails: dystrophic nails       Left Foot Dermatologic   Skin: skin intact    Nails: dystrophic nails        Assessment & Plan   Diagnoses and all orders for this visit:    1. DM type 2 with diabetic peripheral neuropathy (HCC) (Primary)    2. Onychodystrophy    3. Type 2 diabetes mellitus with hyperglycemia, without long-term current use of insulin (HCC)    4. Acquired bilateral flat feet    5. Diabetic peripheral neuropathy associated with type 2 diabetes mellitus (HCC)        1. Routine diabetic foot evaluation  - Patient presents to the clinic today for routine diabetic foot evaluation. Do feel patient is at moderate risk for pedal complications related to diabetes. He is advised to continue follow-ups with his primary care physician. He will be due for new diabetic shoes in 06/2023. Explained importance of diabetic foot care, daily foot checks, and glycemic control. Patient should check both feet on a daily basis, monitor and control blood sugars, make sure that both feet and in between toes are towel dried after baths or showers. Avoid barefoot walking at all times. Check shoes before putting them on. Patient was given information on proper foot care. Call the office at the first signs of a wound or with signs of infection. The patient will follow-up for routine diabetic foot evaluation in 2 months.     Patient is requesting nail debridement today and reports he is unable to get to his nails to cut them safely and they are too thick for him to cut independently.    Nail debridement: Both feet x10    Consent and time out was performed before proceeding with the procedure. Nails were debrided with a nail nipper without complication.  No anesthesia was required.  Indications for procedure were thickened, dystrophic, and  fungal appearing nails which are difficult to trim.  Proper self-care and technique was discussed with patient.  Patient was stable after procedure.    No orders of the defined types were placed in this encounter.       Transcribed from ambient dictation for ANASTASIA Alvares by Cindy Timmons.  03/21/23   14:59 EDT    Patient or patient representative verbalized consent to the visit recording.  I have personally performed the services described in this document as transcribed by the above individual, and it is both accurate and complete.  ANASTASIA Alvares  3/21/2023  17:13 EDT

## 2023-03-23 ENCOUNTER — OFFICE VISIT (OUTPATIENT)
Dept: FAMILY MEDICINE CLINIC | Facility: CLINIC | Age: 43
End: 2023-03-23
Payer: MEDICAID

## 2023-03-23 VITALS
SYSTOLIC BLOOD PRESSURE: 144 MMHG | DIASTOLIC BLOOD PRESSURE: 72 MMHG | WEIGHT: 282.4 LBS | TEMPERATURE: 97.5 F | HEART RATE: 83 BPM | HEIGHT: 78 IN | OXYGEN SATURATION: 97 % | RESPIRATION RATE: 18 BRPM | BODY MASS INDEX: 32.67 KG/M2

## 2023-03-23 DIAGNOSIS — R11.0 NAUSEA: ICD-10-CM

## 2023-03-23 DIAGNOSIS — R35.0 URINARY FREQUENCY: ICD-10-CM

## 2023-03-23 DIAGNOSIS — K21.9 GERD (GASTROESOPHAGEAL REFLUX DISEASE): ICD-10-CM

## 2023-03-23 DIAGNOSIS — M48.07 LUMBOSACRAL SPINAL STENOSIS: ICD-10-CM

## 2023-03-23 DIAGNOSIS — J06.9 UPPER RESPIRATORY TRACT INFECTION, UNSPECIFIED TYPE: Primary | ICD-10-CM

## 2023-03-23 PROBLEM — E66.811 CLASS 1 OBESITY DUE TO EXCESS CALORIES WITH SERIOUS COMORBIDITY AND BODY MASS INDEX (BMI) OF 34.0 TO 34.9 IN ADULT: Status: RESOLVED | Noted: 2022-02-28 | Resolved: 2023-03-23

## 2023-03-23 PROBLEM — E66.09 CLASS 1 OBESITY DUE TO EXCESS CALORIES WITH SERIOUS COMORBIDITY AND BODY MASS INDEX (BMI) OF 34.0 TO 34.9 IN ADULT: Status: RESOLVED | Noted: 2022-02-28 | Resolved: 2023-03-23

## 2023-03-23 RX ORDER — OXYBUTYNIN CHLORIDE 15 MG/1
15 TABLET, EXTENDED RELEASE ORAL DAILY
Qty: 90 TABLET | Refills: 3 | Status: SHIPPED | OUTPATIENT
Start: 2023-03-23

## 2023-03-23 RX ORDER — DICLOFENAC EPOLAMINE 0.01 G/1
1 SYSTEM TOPICAL 2 TIMES DAILY PRN
Qty: 60 PATCH | Refills: 2 | Status: SHIPPED | OUTPATIENT
Start: 2023-03-23

## 2023-03-23 RX ORDER — PANTOPRAZOLE SODIUM 40 MG/1
40 TABLET, DELAYED RELEASE ORAL DAILY
Qty: 90 TABLET | Refills: 3 | Status: SHIPPED | OUTPATIENT
Start: 2023-03-23

## 2023-03-23 RX ORDER — AZITHROMYCIN 250 MG/1
TABLET, FILM COATED ORAL
Qty: 6 TABLET | Refills: 0 | Status: SHIPPED | OUTPATIENT
Start: 2023-03-23 | End: 2023-03-31

## 2023-03-23 RX ORDER — PROMETHAZINE HYDROCHLORIDE 25 MG/1
25 TABLET ORAL EVERY 8 HOURS PRN
Qty: 90 TABLET | Refills: 5 | Status: SHIPPED | OUTPATIENT
Start: 2023-03-23

## 2023-03-23 NOTE — PROGRESS NOTES
Subjective   Barrett Laguerre is a 43 y.o. male.   No chief complaint on file.      History of Present Illness  Sx seem to be getting better. Also needs a refill on a few medications.   No fever   URI   This is a new problem. The current episode started in the past 7 days. There has been no fever. Associated symptoms include congestion, coughing, headaches, rhinorrhea, sinus pain, sneezing and a sore throat. Treatments tried: flonase. The treatment provided no relief.        The following portions of the patient's history were reviewed and updated as appropriate: allergies, current medications, past family history, past medical history, past social history, past surgical history and problem list.    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)   • Other seasonal allergic rhinitis   • Mixed obsessional thoughts and acts   • Hypertension, benign   • Disorder of lipid metabolism   • CIDP (chronic inflammatory demyelinating polyneuropathy) ([facility])   • Lumbar radiculopathy   • Onychomycosis   • Annual physical exam   • Class 1 obesity due to excess calories with serious comorbidity and body mass index (BMI) of 32.0 to 32.9 in adult   • Acute midline low back pain with left-sided sciatica       Current Outpatient Medications on File Prior to Visit   Medication Sig Dispense Refill   • acetaminophen-codeine (TYLENOL with CODEINE #4) 300-60 MG per tablet Take 1 tablet by mouth Every 6 (Six) Hours As Needed for Moderate Pain. (Patient taking differently: Take 1 tablet by mouth Every 6 (Six) Hours As Needed for Moderate Pain. Alternates with hydrocodone  not currently taking) 120 tablet 5   • Alcohol Swabs (Alcohol Wipes) 70 % pads Apply 1  each topically 4 (Four) Times a Day Before Meals & at Bedtime. 200 each 2   • amLODIPine (NORVASC) 10 MG tablet Take 1 tablet by mouth Daily. (Patient taking differently: Take 1 tablet by mouth Every Night.) 90 tablet 3   • azelastine (ASTELIN) 0.1 % nasal spray 2 sprays into the nostril(s) as directed by provider 2 (Two) Times a Day. Use in each nostril as directed (Patient taking differently: 2 sprays into the nostril(s) as directed by provider As Needed. Use in each nostril as directed  not currently using) 3 each 3   • chlorthalidone (HYGROTEN) 50 MG tablet Take 1 tablet by mouth Daily. (Patient taking differently: Take 1 tablet by mouth Every Night.) 90 tablet 3   • clindamycin (CLEOCIN T) 1 % external solution Apply 1 application topically to the appropriate area as directed 2 (Two) Times a Day. Not currently using     • colchicine-probenecid (COL-BENEMID) 0.5-500 MG per tablet Take 1 tablet by mouth Every Night.     • Continuous Blood Gluc  (Dexcom G6 ) device 1 each Daily. 1 each 0   • Continuous Blood Gluc Sensor (Dexcom G6 Sensor) Every 10 (Ten) Days. 1 each 6   • Cyanocobalamin ER 1000 MCG tablet controlled-release Take 1 tablet by mouth Daily. (Patient taking differently: Take 1 tablet by mouth Daily. Last dose 2/17) 90 each 3   • erythromycin (ROMYCIN) 5 MG/GM ophthalmic ointment Administer 1 application to both eyes As Needed. Not currently using     • FLUoxetine (PROzac) 40 MG capsule Take 1 capsule by mouth 2 (Two) Times a Day. (Patient taking differently: Take 1 capsule by mouth Every Night.) 180 capsule 3   • gabapentin (NEURONTIN) 300 MG capsule Take 1 capsule by mouth 3 (Three) Times a Day. 270 capsule 3   • glucose blood (FREESTYLE LITE) test strip 1 each by Other route 3 (Three) Times a Day Before Meals. 200 each 3   • HYDROcodone-acetaminophen (Norco) 7.5-325 MG per tablet Take 1 tablet by mouth Every 4 (Four) Hours As Needed for Moderate Pain. 180 tablet 0   • hydrocortisone 1  % cream Apply  topically to the appropriate area as directed 2 (Two) Times a Day. 60 g 3   • hydrOXYzine (ATARAX) 25 MG tablet Take 1 tablet by mouth 4 (Four) Times a Day As Needed for Itching. (Patient taking differently: Take 1 tablet by mouth 4 (Four) Times a Day As Needed for Itching. Use preop if needed) 360 tablet 3   • Insulin Lispro (ADMELOG SOLOSTAR) 100 UNIT/ML injection pen Inject 20 Units under the skin into the appropriate area as directed 3 (Three) Times a Day With Meals for 100 days. (Patient taking differently: Inject 20 Units under the skin into the appropriate area as directed 3 (Three) Times a Day With Meals. While off janumet   none am of surgery) 60 mL 3   • Insulin Lispro (humaLOG) 100 UNIT/ML injection Inject  under the skin into the appropriate area as directed 3 (Three) Times a Day Before Meals.     • Insulin Pen Needle (Pen Needles) 32G X 4 MM misc use 1 each 4 (Four) Times a Day Before Meals & at Bedtime. 200 each 2   • loratadine (EQ Loratadine) 10 MG tablet Take 1 tablet by mouth Daily. (Patient taking differently: Take 1 tablet by mouth Every Night.) 90 tablet 3   • methocarbamol (ROBAXIN) 750 MG tablet Take 1 tablet by mouth 4 (Four) Times a Day As Needed for Muscle Spasms. 180 tablet 3   • montelukast (SINGULAIR) 10 MG tablet Take 1 tablet by mouth Every Night. 90 tablet 3   • oxybutynin XL (DITROPAN XL) 15 MG 24 hr tablet Take 1 tablet by mouth Daily. (Patient taking differently: Take 1 tablet by mouth As Needed.) 30 tablet 12   • polycarbophil 625 MG tablet tablet Take  by mouth Every Night.     • ReliOn Ultra Thin Lancets 30G misc 1 each 4 (Four) Times a Day. 360 each 3   • sildenafil (REVATIO) 20 MG tablet Take 1 tablet by mouth Daily As Needed (ed). 30 tablet 3   • sitaGLIPtin-metFORMIN (Janumet)  MG per tablet Take 1 tablet by mouth 2 (Two) Times a Day With Meals. (Patient taking differently: Take 1 tablet by mouth 2 (Two) Times a Day With Meals. Last dose 2/20 0730   "covering with admelog) 180 tablet 3   • sodium chloride (Ocean Nasal Spray) 0.65 % nasal spray 1 spray each nostril tid (Patient taking differently: 1 spray into the nostril(s) as directed by provider As Needed. Use preop) 216 mL 3   • [DISCONTINUED] Diclofenac Epolamine (FLECTOR) 1.3 % patch patch Apply 1 patch topically to the appropriate area as directed 2 (Two) Times a Day As Needed (back pain). 60 patch 2   • [DISCONTINUED] pantoprazole (Protonix) 40 MG EC tablet Take 1 tablet by mouth Daily. (Patient taking differently: Take 1 tablet by mouth As Needed. May take preop) 90 tablet 3   • [DISCONTINUED] promethazine (PHENERGAN) 25 MG tablet Take 1 tablet by mouth Every 8 (Eight) Hours As Needed for Nausea or Vomiting. (Patient taking differently: Take 1 tablet by mouth As Needed for Nausea or Vomiting. Preop if needed) 90 tablet 5   • fluticasone (FLONASE) 50 MCG/ACT nasal spray 1 spray into the nostril(s) as directed by provider Daily for 90 days. 16 g 3   • Insulin Glargine (BASAGLAR KWIKPEN) 100 UNIT/ML injection pen Inject 38 Units under the skin into the appropriate area as directed Every Night for 30 days. 15 mL 8     No current facility-administered medications on file prior to visit.     Current outpatient and discharge medications have been reconciled for the patient.  Reviewed by: Eric Rosenberg MD      Allergies   Allergen Reactions   • Penicillin G Anaphylaxis   • Penicillins Anaphylaxis   • Sulfa Antibiotics Hives   • Allopurinol Rash       Review of Systems   HENT: Positive for congestion, rhinorrhea, sneezing and sore throat.    Respiratory: Positive for cough.    All other systems reviewed and are negative.    I have reviewed and confirmed the accuracy of the ROS as documented by the MA/LPN/RN Eric Rosenberg MD    Objective   Visit Vitals  /72 (BP Location: Right arm, Patient Position: Sitting, Cuff Size: Adult)   Pulse 83   Temp 97.5 °F (36.4 °C)   Resp 18   Ht 198.1 cm (78\") "   Wt 128 kg (282 lb 6.4 oz)   SpO2 97%   BMI 32.63 kg/m²      **  Physical Exam  Constitutional:       Appearance: He is well-developed.   HENT:      Head: Normocephalic and atraumatic.      Right Ear: External ear normal.      Left Ear: External ear normal.      Nose: Rhinorrhea present. Rhinorrhea is clear.   Eyes:      Pupils: Pupils are equal, round, and reactive to light.   Cardiovascular:      Rate and Rhythm: Normal rate and regular rhythm.      Heart sounds: Normal heart sounds.   Pulmonary:      Effort: Pulmonary effort is normal.      Breath sounds: Normal breath sounds.   Abdominal:      General: Bowel sounds are normal.      Palpations: Abdomen is soft.   Musculoskeletal:         General: Normal range of motion.      Cervical back: Normal range of motion and neck supple.   Skin:     General: Skin is warm and dry.   Neurological:      Mental Status: He is alert and oriented to person, place, and time.   Psychiatric:         Behavior: Behavior normal.         Thought Content: Thought content normal.         Judgment: Judgment normal.       Derm Physical Exam    Diagnoses and all orders for this visit:    1. Upper respiratory tract infection, unspecified type (Primary)  -     azithromycin (ZITHROMAX) 250 MG tablet; Take 2 tablets the first day, then 1 tablet daily for 4 days.  Dispense: 6 tablet; Refill: 0    2. Nausea  -     promethazine (PHENERGAN) 25 MG tablet; Take 1 tablet by mouth Every 8 (Eight) Hours As Needed for Nausea or Vomiting.  Dispense: 90 tablet; Refill: 5    3. GERD (gastroesophageal reflux disease)  -     pantoprazole (Protonix) 40 MG EC tablet; Take 1 tablet by mouth Daily.  Dispense: 90 tablet; Refill: 3    4. Urinary frequency  -     oxybutynin XL (DITROPAN XL) 15 MG 24 hr tablet; Take 1 tablet by mouth Daily.  Dispense: 90 tablet; Refill: 3    5. Lumbosacral spinal stenosis  -     Diclofenac Epolamine (FLECTOR) 1.3 % patch patch; Apply 1 patch topically to the appropriate area as  directed 2 (Two) Times a Day As Needed (back pain).  Dispense: 60 patch; Refill: 2     Findings discussed. All questions answered.  Differential diagnosis discussed.   Consider starting antibiotics if symptoms persist or worsen over the next few days.  Follow-up for routine health maintenance as indicated.     Expected course, medications, and adverse effects discussed as appropriate.  Call or return if worsening or persistent symptoms.  I wore protective equipment throughout this patient encounter to include mask and eye protection. Hand hygiene was performed before donning protective equipment and after removal when leaving the room.       This document is intended for medical professional use only.

## 2023-03-24 ENCOUNTER — TELEPHONE (OUTPATIENT)
Dept: FAMILY MEDICINE CLINIC | Facility: CLINIC | Age: 43
End: 2023-03-24

## 2023-03-24 DIAGNOSIS — R53.82 CHRONIC FATIGUE: ICD-10-CM

## 2023-03-24 NOTE — TELEPHONE ENCOUNTER
Caller: Barrett Laguerre    Relationship: Self    Best call back number:    [phone] (Mobile)         What was the call regarding:     THE PRIOR AUTHORIZATION FOR:   Diclofenac Epolamine (FLECTOR) 1.3 % patch patch  HAS  AND NEEDING RENEWED     CAN YOU WORK ON THIS AND CALL  PATIENT WHEN SUBMITTED     Do you require a callback: YES PLEASE  CAN LEAVE MESSAGE       [facility] [address] [place] [address] - [phone] [place] [phone] [phone]

## 2023-03-27 ENCOUNTER — TELEPHONE (OUTPATIENT)
Dept: FAMILY MEDICINE CLINIC | Facility: CLINIC | Age: 43
End: 2023-03-27
Payer: MEDICAID

## 2023-03-27 DIAGNOSIS — L30.8 OTHER ECZEMA: ICD-10-CM

## 2023-03-27 DIAGNOSIS — Z79.4 TYPE 2 DIABETES MELLITUS WITH OTHER NEUROLOGIC COMPLICATION, WITH LONG-TERM CURRENT USE OF INSULIN: ICD-10-CM

## 2023-03-27 DIAGNOSIS — E11.49 TYPE 2 DIABETES MELLITUS WITH OTHER NEUROLOGIC COMPLICATION, WITH LONG-TERM CURRENT USE OF INSULIN: ICD-10-CM

## 2023-03-27 RX ORDER — CYANOCOBALAMIN (VITAMIN B-12) 1000 MCG
TABLET, EXTENDED RELEASE ORAL
Qty: 30 EACH | Refills: 5 | Status: SHIPPED | OUTPATIENT
Start: 2023-03-27

## 2023-03-27 RX ORDER — LANCETS 30 GAUGE
1 EACH MISCELLANEOUS 4 TIMES DAILY
Qty: 360 EACH | Refills: 3 | Status: SHIPPED | OUTPATIENT
Start: 2023-03-27

## 2023-03-27 RX ORDER — DIAPER,BRIEF,INFANT-TODD,DISP
EACH MISCELLANEOUS 2 TIMES DAILY
Qty: 60 G | Refills: 3 | Status: SHIPPED | OUTPATIENT
Start: 2023-03-27

## 2023-03-27 NOTE — TELEPHONE ENCOUNTER
"PT CALLED BACK AND STATED THAT THIS NEEDS TO BE SENT OVER TO THE PHARMACY WITHOUT STATING RELI ON BECAUSE THE PHARMACY IS OUT OF STOCK. PT NORMALLY GETS THE [facility] BRANDED 30G. HE STATED IT WAS BEST IF THE PRESCRIPTION COULD STATE \"EITHER OR\" SO THAT HE CAN GET BRAND NAME OR GENERIC DEPENDING ON WHAT HIS PHARMACY HAS IN STOCK.   "

## 2023-03-27 NOTE — TELEPHONE ENCOUNTER
Caller: Barrett Laguerre    Relationship: Self    Best call back number: [phone]    Requested Prescriptions:   Requested Prescriptions     Pending Prescriptions Disp Refills   • ReliOn Ultra Thin Lancets 30G misc 360 each 3     Si each 4 (Four) Times a Day.        Pharmacy where request should be sent: [facility] #[id number] [place], [place] - [address] [place] [address] [phone] [facility] [phone]      Last office visit with prescribing clinician: 3/23/2023   Last telemedicine visit with prescribing clinician: Visit date not found   Next office visit with prescribing clinician: Visit date not found     Additional details provided by patient: PATIENT NEEDS THESE ASAP, HE IS COMPLETELY OUT     Does the patient have less than a 3 day supply:  [x] Yes  [] No    Would you like a call back once the refill request has been completed: [x] Yes [] No    If the office needs to give you a call back, can they leave a voicemail: [x] Yes [] No    Radha Garcia Rep   23 12:37 EDT

## 2023-03-27 NOTE — TELEPHONE ENCOUNTER
Caller: Barrett Laguerre    Relationship: Self    Best call back number: [phone]  Requested Prescriptions:   Requested Prescriptions     Pending Prescriptions Disp Refills   • hydrocortisone 1 % cream 60 g 3     Sig: Apply  topically to the appropriate area as directed 2 (Two) Times a Day.        Pharmacy where request should be sent: [facility] [address] [place] [address] [phone] [facility] [phone]      Last office visit with prescribing clinician: 3/23/2023   Last telemedicine visit with prescribing clinician: Visit date not found   Next office visit with prescribing clinician: Visit date not found     Additional details provided by patient: PATIENT NEEDS ASAP     Does the patient have less than a 3 day supply:  [x] Yes  [] No    Would you like a call back once the refill request has been completed: [x] Yes [] No    If the office needs to give you a call back, can they leave a voicemail: [x] Yes [] No    Radha Garcia Rep   03/27/23 12:40 EDT

## 2023-03-27 NOTE — TELEPHONE ENCOUNTER
PATIENT CALLED TO CHECK ON THIS, WAS SENT VIA [employer] ON 3/24/23 AND PAPERWORK WAS FAXED TODAY AS WELL. HE IS COMPLETELY OUT SO NEEDS THIS TAKEN CARE OF.    PLEASE ADVISE    CALLBACK: [phone]

## 2023-03-30 ENCOUNTER — TELEPHONE (OUTPATIENT)
Dept: FAMILY MEDICINE CLINIC | Facility: CLINIC | Age: 43
End: 2023-03-30

## 2023-03-30 DIAGNOSIS — J06.9 UPPER RESPIRATORY TRACT INFECTION, UNSPECIFIED TYPE: ICD-10-CM

## 2023-03-30 NOTE — TELEPHONE ENCOUNTER
PATIENT STATES DR. RIVERA PRESCRIBED HIM ANTIBIOTIC    azithromycin (ZITHROMAX) 250 MG tablet     FOR SINUS INFECTION THAT HE WAS DIAGNOSED WITH AT HIS LAST APT ON   AND IT WAS BEGINNING TO HELP, SOME OF THE INFECTION DRAINED DOWN THE BACK OF HIS THROAT. PATIENT STATES BECAUSE IT WAS ONLY A 5 DAY SUPPLY  IT DID NOT FULLY CLEAR IT UP AND IT HAS SINCE STARTED TO COME BACK . PATIENT REQUEST THAT  REFILL THIS FOR A SECOND GO AROUND OR A DIFFERENT ANTIBIOTIC THAT IS TAKEN FOR A LONGER PERIOD OF TIME.      ALSO,PATIENTS INSURANCE IS REQUIRING A PA FROM DR. RIVERA FOR MED>   Diclofenac Epolamine (FLECTOR) 1.3 % patch patch     THE OLD PA FOR THIS MED HAS  AS THEY ARE REQUIRED TO BE COMPLETED ANNUALLY BY PCP. PLEASE GET THIS DONE ASAP. THE PA CAN BE DONE 3 OF THE FOLLOWING WAYS...    PHONE> [phone]  FAX#> [phone]  WEBSITE> [employer]    PATIENT> [phone]

## 2023-03-31 ENCOUNTER — OFFICE VISIT (OUTPATIENT)
Dept: FAMILY MEDICINE CLINIC | Facility: CLINIC | Age: 43
End: 2023-03-31
Payer: MEDICAID

## 2023-03-31 ENCOUNTER — TELEPHONE (OUTPATIENT)
Dept: FAMILY MEDICINE CLINIC | Facility: CLINIC | Age: 43
End: 2023-03-31
Payer: MEDICAID

## 2023-03-31 VITALS
WEIGHT: 282 LBS | DIASTOLIC BLOOD PRESSURE: 87 MMHG | HEART RATE: 74 BPM | HEIGHT: 78 IN | RESPIRATION RATE: 16 BRPM | OXYGEN SATURATION: 98 % | SYSTOLIC BLOOD PRESSURE: 148 MMHG | TEMPERATURE: 98 F | BODY MASS INDEX: 32.63 KG/M2

## 2023-03-31 DIAGNOSIS — J01.41 ACUTE RECURRENT PANSINUSITIS: Primary | ICD-10-CM

## 2023-03-31 LAB
EXPIRATION DATE: NORMAL
FLUAV AG UPPER RESP QL IA.RAPID: NOT DETECTED
FLUBV AG UPPER RESP QL IA.RAPID: NOT DETECTED
INTERNAL CONTROL: NORMAL
Lab: NORMAL
SARS-COV-2 AG UPPER RESP QL IA.RAPID: NOT DETECTED

## 2023-03-31 RX ORDER — TRIAMCINOLONE ACETONIDE 55 UG/1
2 SPRAY, METERED NASAL DAILY
Qty: 16.5 G | Refills: 11 | Status: SHIPPED | OUTPATIENT
Start: 2023-03-31 | End: 2024-03-30

## 2023-03-31 RX ORDER — DOXYCYCLINE HYCLATE 100 MG/1
100 CAPSULE ORAL 2 TIMES DAILY
Qty: 14 CAPSULE | Refills: 0 | Status: SHIPPED | OUTPATIENT
Start: 2023-03-31

## 2023-03-31 RX ORDER — CETIRIZINE HYDROCHLORIDE 10 MG/1
10 TABLET ORAL DAILY
Qty: 30 TABLET | Refills: 11 | Status: SHIPPED | OUTPATIENT
Start: 2023-03-31

## 2023-03-31 RX ORDER — BENZONATATE 100 MG/1
100 CAPSULE ORAL 3 TIMES DAILY PRN
Qty: 30 CAPSULE | Refills: 0 | Status: SHIPPED | OUTPATIENT
Start: 2023-03-31

## 2023-03-31 NOTE — TELEPHONE ENCOUNTER
Attempted to call back patient needs to be advised he can just get the medication over the counter there is no alternative.

## 2023-03-31 NOTE — TELEPHONE ENCOUNTER
Pharmacy Name: [facility] [place], [place] - [address]  - [phone]  - [phone] FX     What medication are you calling in regards to: cetirizine (zyrTEC) 10 MG tablet    What question does the pharmacy have: INSURANCE DOES NOT COVER. CAN AN ALTERNATIVE BE CALLED IN

## 2023-03-31 NOTE — PROGRESS NOTES
Chief Complaint  Nasal Congestion    Subjective          Barrett Laguerre presents to [facility] for   Patient is here for an acute visit complaint of sinus congestion.    The patient is managed by Dr. Rosenberg  Medical history and current medications have been reviewed.    Review of systems and  focused physical exam has been completed.    Patient agrees with plan of care and will follow up with primary care provider as needed.     History of Present Illness  Patient coming in due to not getting better after seeing his PCP 03/23/23. Patient states he is still having cough, nasal congestion, and sneezing and is requesting another antibiotic.  Sinusitis  This is a recurrent problem. The current episode started more than 1 month ago. The problem is unchanged. There has been no fever. His pain is at a severity of 4/10. The pain is mild. Associated symptoms include congestion, coughing and sinus pressure. Pertinent negatives include no chills, diaphoresis, ear pain, headaches, hoarse voice, neck pain, shortness of breath, sneezing, sore throat or swollen glands. Past treatments include antibiotics and spray decongestants. The treatment provided no relief.     Yellow nasal discharge, PND,     Barrett Laguerre  has a past medical history of Acquired pes planus of both feet, Acute non-recurrent maxillary sinusitis, ADD (attention deficit disorder), Allergies, Annual physical exam, Asthma, Asymptomatic microscopic hematuria, Bulging lumbar disc, Chronic gout, unspecified, without tophus (tophi), CIDP (chronic inflammatory demyelinating polyneuropathy) ([facility]), Colon polyps, DDD (degenerative disc disease), lumbar, Diabetes mellitus ([facility]), Dyslipidemia, GERD (gastroesophageal reflux disease), Gout, Guillain-[place] ([facility]), Hypertension, benign, Idiopathic chronic gout without tophus, Intervertebral disc stenosis of neural canal of lumbar region, Low back pain, Lumbar disc disorder with  myelopathy, Mixed obsessional thoughts and acts, Obstructive sleep apnea, OCD (obsessive compulsive disorder), Other seasonal allergic rhinitis, Overactive bladder, PONV (postoperative nausea and vomiting), Pruritus, Screening for depression, Screening for hyperlipidemia, SI (sacroiliac) joint dysfunction, and Tobacco use disorder.      Review of Systems   Constitutional: Negative for chills and diaphoresis.   HENT: Positive for congestion and sinus pressure. Negative for ear pain, hoarse voice, sneezing, sore throat, swollen glands, tinnitus, trouble swallowing and voice change.    Respiratory: Positive for cough. Negative for shortness of breath and wheezing.    Cardiovascular: Negative.    Gastrointestinal: Negative for constipation, diarrhea, nausea, vomiting and GERD.   Musculoskeletal: Negative for neck pain.   Allergic/Immunologic: Positive for environmental allergies.   Neurological: Negative for dizziness, weakness, headache and confusion.        Family History   Problem Relation Age of Onset   • Diabetes Mother    • Heart disease Maternal Grandmother    • Diabetes Maternal Grandmother    • Heart disease Maternal Grandfather    • Diabetes Maternal Grandfather    • Cancer Paternal Grandfather    • Heart disease Other         Past Surgical History:   Procedure Laterality Date   • COLONOSCOPY N/A 12/22/2020    Procedure: COLONOSCOPY with polypectomy x;  Surgeon: Juan Alberto Davis MD;  Location: [facility] ENDOSCOPY;  Service: Gastroenterology;  Laterality: N/A;  post: transverse colon polyp   • INGUINAL HERNIA REPAIR     • LUMBAR LAMINECTOMY Left 2/22/2023    Procedure: LUMBAR 4-SACRAL 1 LAMINOTOMY, MEDIAL FACETECTOMY, FORAMINOTOMY, AND MICRODISCECTOMY;  Surgeon: Vahe Pace MD;  Location: [facility] MAIN OR;  Service: Neurosurgery;  Laterality: Left;   • NOSE SURGERY     • TYMPANOSTOMY TUBE PLACEMENT          Objective   Vitals:    03/31/23 0822   BP: 148/87   BP Location: Left arm   Patient Position:  "Sitting   Cuff Size: Large Adult   Pulse: 74   Resp: 16   Temp: 98 °F (36.7 °C)   TempSrc: Infrared   SpO2: 98%   Weight: 128 kg (282 lb)   Height: 198.1 cm (78\")     Physical Exam  Vitals and nursing note reviewed.   Constitutional:       General: He is not in acute distress.     Appearance: Normal appearance. He is well-groomed and overweight. He is not ill-appearing.   HENT:      Head: Normocephalic and atraumatic.      Right Ear: Ear canal and external ear normal. A middle ear effusion is present. There is no impacted cerumen.      Left Ear: Ear canal and external ear normal. A middle ear effusion is present. There is no impacted cerumen.      Nose: Congestion present. No rhinorrhea.      Right Sinus: Maxillary sinus tenderness and frontal sinus tenderness present.      Left Sinus: Maxillary sinus tenderness and frontal sinus tenderness present.      Mouth/Throat:      Lips: Pink.      Mouth: Mucous membranes are moist.      Pharynx: Uvula midline. No posterior oropharyngeal erythema or uvula swelling.      Tonsils: No tonsillar abscesses.   Neck:      Vascular: No carotid bruit.   Cardiovascular:      Rate and Rhythm: Normal rate and regular rhythm.      Pulses: Normal pulses.      Heart sounds: Normal heart sounds. No murmur heard.  Pulmonary:      Effort: Pulmonary effort is normal.      Breath sounds: Normal breath sounds and air entry.   Musculoskeletal:      Cervical back: Normal range of motion.      Right lower leg: No edema.      Left lower leg: No edema.   Skin:     General: Skin is warm and dry.      Capillary Refill: Capillary refill takes less than 2 seconds.   Neurological:      Mental Status: He is alert and oriented to person, place, and time. Mental status is at baseline.   Psychiatric:         Attention and Perception: Attention normal.         Mood and Affect: Mood normal.         Speech: Speech normal.         Behavior: Behavior normal. Behavior is cooperative.            Assessment and Plan  "   Diagnoses and all orders for this visit:    1. Acute recurrent pansinusitis (Primary)  -     POCT SARS-CoV-2 Antigen ARMANI  -     doxycycline (VIBRAMYCIN) 100 MG capsule; Take 1 capsule by mouth 2 (Two) Times a Day.  Dispense: 14 capsule; Refill: 0  -     cetirizine (zyrTEC) 10 MG tablet; Take 1 tablet by mouth Daily.  Dispense: 30 tablet; Refill: 11  -     benzonatate (Tessalon Perles) 100 MG capsule; Take 1 capsule by mouth 3 (Three) Times a Day As Needed for Cough.  Dispense: 30 capsule; Refill: 0  -     Triamcinolone Acetonide (NASACORT) 55 MCG/ACT nasal inhaler; 2 sprays into the nostril(s) as directed by provider Daily.  Dispense: 16.5 g; Refill: 11    Other orders  -     Cancel: Uric Acid             Follow Up   Return for Follow up with primary care provider as needed..  Patient was given instructions and counseling regarding his condition or for health maintenance advice. Please see specific information pulled into the AVS if appropriate.    Patient Instructions   Continue current plan of care as discussed.   Take medication as ordered (if applicable).    Change toothbrush after 24 hour antibiotic use.  Gargle with warm salt water for symptomatic relief of sore throat.   Stay hydrated.     Purchase over the counter decongestant for high blood pressure like Coricidan HBP.    Zyrtec instead of Claritin.  Nasocort instead of Flonase.    Practice good sleep hygiene.  Eat a well balanced diet with fresh fruit and vegetables.    Drink at least 8 bottles of water or equivalent per day.     Limit sweetened beverages, sodas, juices.    Bake, boil, broil or grill your food, avoid eating fried foods.   Exercise at least 150 minutes per week.          This document is intended for medical expert use only. Reading of this document by patients and/or patient's family without participating medical staff guidance may result in misinterpretation and unintended morbidity. Any interpretation of such data is the responsibility  of the patient and/or family member responsible for the patient in concert with their primary or specialist providers, not to be left for sources of online searches such as [employer], [employer] or similar queries. Relying on these approaches to knowledge may result in misinterpretation, misguided goals of care and even death should patients or family members try recommendations outside of the realm of professional medical care.

## 2023-03-31 NOTE — PATIENT INSTRUCTIONS
Continue current plan of care as discussed.   Take medication as ordered (if applicable).    Change toothbrush after 24 hour antibiotic use.  Gargle with warm salt water for symptomatic relief of sore throat.   Stay hydrated.     Purchase over the counter decongestant for high blood pressure like Coricidan HBP.    Zyrtec instead of Claritin.  Nasocort instead of Flonase.    Practice good sleep hygiene.  Eat a well balanced diet with fresh fruit and vegetables.    Drink at least 8 bottles of water or equivalent per day.     Limit sweetened beverages, sodas, juices.    Bake, boil, broil or grill your food, avoid eating fried foods.   Exercise at least 150 minutes per week.

## 2023-04-03 NOTE — PROGRESS NOTES
Neurosurgical Consultation      Barrett Laguerre is a 43 y.o. male is here today for follow-up from surgery on 2/22/23. Today patient reports some increased pain levels in the last 48 hours.    Chief Complaint   Patient presents with   • Post-op     Follow up         Previous treatment: Bilateral L4-L5-S1 laminotomy, medial facetectomy, foraminotomies and microdiscectomy on 2/22/23.  Physical Therapy at [facility]    HPI: This is a 43-year-old gentleman who underwent a bilateral L4-S1 laminotomy and discectomy.  He had bilateral L5 and S1 radiculopathies.  He returns today for a 6-week evaluation.  He has continued to do quite well.  He does note that he was recently diagnosed with a sinus infection and has had some increased body aches at this juncture.  No new indication of radiculopathy.  He is on antibiotics for his infection.  His incision is well-healing without any signs of breakdown or infection.  He has completed 3 sessions of physical therapy.    Past Medical History:   Diagnosis Date   • Acquired pes planus of both feet     Impression: needs referral for orthotics.   • Acute non-recurrent maxillary sinusitis     Impression: His symptoms were not improving.   • ADD (attention deficit disorder)    • Allergies    • Annual physical exam     Impression: Discussed anticipatory guidance, diet, exercise, and weight loss. Discussed safety, seatbelts, and routine screening examinations. Discussed self-examinations.   • Asthma     Impression: Stable   • Asymptomatic microscopic hematuria     Impression: recheck neg. Will follow   • Bulging lumbar disc    • Chronic gout, unspecified, without tophus (tophi)     Impression: Recommend rechecking uric acid.   • CIDP (chronic inflammatory demyelinating polyneuropathy)     Story: s/p IVIG Guillain-[place] per [facility] diagnosed 2015 gabapentin 600 tid. Impression: Symptoms include: numbness and tingling in hands and feet, difficulty concentrating, drops items,  chest pain. dizziness. Pt needs routine follow up with neurology. Previously seen by Dr Seipel   • Colon polyps    • DDD (degenerative disc disease), lumbar    • Diabetes mellitus    • Dyslipidemia    • GERD (gastroesophageal reflux disease)    • Gout    • Guillain-[place]    • Hypertension, benign     Impression: Followed by Cardiology Dr Tong who placed pt on different BP med. . Proteinuria/creatinine noted   • Idiopathic chronic gout without tophus     Unspecified site. Impression: stable. Story: UA 8.3 2/2018   • Intervertebral disc stenosis of neural canal of lumbar region     Story: MRI 11/15 showed lumbar disk disease. 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 Pt would like referra to Dr Watkins, has appt Dec 15   • Low back pain    • Lumbar disc disorder with myelopathy     Impression: failed therapy. Pt would like refill on compounded topical med. Rx faxed   • Mixed obsessional thoughts and acts    • Obstructive sleep apnea     bipap bring dos   • OCD (obsessive compulsive disorder)    • Other seasonal allergic rhinitis     Impression: Over-the-counter medication indications, dosage, and precautions discussed.   • Overactive bladder    • PONV (postoperative nausea and vomiting)    • Pruritus    • Screening for depression    • Screening for hyperlipidemia    • SI (sacroiliac) joint dysfunction     Impression: Findings discussed. All questions answered. Medication and medication adverse effects discussed. Drug education given and explained to patient. Patient verbalized understanding. Follow-up in 4-6 weeks if not better. Follow-up sooner for worsening symptoms or for any concerns. Consider chiropractor   • Tobacco use disorder         Past Surgical History:   Procedure Laterality Date   • COLONOSCOPY N/A 12/22/2020    Procedure: COLONOSCOPY with polypectomy x;  Surgeon: Juan Alberto Davis MD;  Location: [facility] ENDOSCOPY;  Service: Gastroenterology;  Laterality: N/A;  post: transverse colon polyp   • INGUINAL  HERNIA REPAIR     • LUMBAR LAMINECTOMY Left 2/22/2023    Procedure: LUMBAR 4-SACRAL 1 LAMINOTOMY, MEDIAL FACETECTOMY, FORAMINOTOMY, AND MICRODISCECTOMY;  Surgeon: Vahe Pace MD;  Location: [facility] MAIN OR;  Service: Neurosurgery;  Laterality: Left;   • NOSE SURGERY     • TYMPANOSTOMY TUBE PLACEMENT          Current Outpatient Medications on File Prior to Visit   Medication Sig Dispense Refill   • acetaminophen-codeine (TYLENOL with CODEINE #4) 300-60 MG per tablet Take 1 tablet by mouth Every 6 (Six) Hours As Needed for Moderate Pain. (Patient taking differently: Take 1 tablet by mouth Every 6 (Six) Hours As Needed for Moderate Pain. Alternates with hydrocodone  not currently taking) 120 tablet 5   • Alcohol Swabs (Alcohol Wipes) 70 % pads Apply 1 each topically 4 (Four) Times a Day Before Meals & at Bedtime. 200 each 2   • amLODIPine (NORVASC) 10 MG tablet Take 1 tablet by mouth Daily. (Patient taking differently: Take 1 tablet by mouth Every Night.) 90 tablet 3   • azelastine (ASTELIN) 0.1 % nasal spray 2 sprays into the nostril(s) as directed by provider 2 (Two) Times a Day. Use in each nostril as directed (Patient taking differently: 2 sprays into the nostril(s) as directed by provider As Needed. Use in each nostril as directed  not currently using) 3 each 3   • benzonatate (Tessalon Perles) 100 MG capsule Take 1 capsule by mouth 3 (Three) Times a Day As Needed for Cough. 30 capsule 0   • cetirizine (zyrTEC) 10 MG tablet Take 1 tablet by mouth Daily. 30 tablet 11   • chlorthalidone (HYGROTEN) 50 MG tablet Take 1 tablet by mouth Daily. (Patient taking differently: Take 1 tablet by mouth Every Night.) 90 tablet 3   • clindamycin (CLEOCIN T) 1 % external solution Apply 1 application topically to the appropriate area as directed 2 (Two) Times a Day. Not currently using     • colchicine-probenecid (COL-BENEMID) 0.5-500 MG per tablet Take 1 tablet by mouth Every Night.     • Continuous Blood Gluc  (Dexcom  G6 ) device 1 each Daily. 1 each 0   • Continuous Blood Gluc Sensor (Dexcom G6 Sensor) Every 10 (Ten) Days. 1 each 6   • Cyanocobalamin (Vitamin B-12 ER) 1000 MCG tablet controlled-release TAKE 1 TABLET DAILY 30 each 5   • Diclofenac Epolamine (FLECTOR) 1.3 % patch patch Apply 1 patch topically to the appropriate area as directed 2 (Two) Times a Day As Needed (back pain). 60 patch 2   • doxycycline (VIBRAMYCIN) 100 MG capsule Take 1 capsule by mouth 2 (Two) Times a Day. 14 capsule 0   • erythromycin (ROMYCIN) 5 MG/GM ophthalmic ointment Administer 1 application to both eyes As Needed. Not currently using     • FLUoxetine (PROzac) 40 MG capsule Take 1 capsule by mouth 2 (Two) Times a Day. (Patient taking differently: Take 1 capsule by mouth Every Night.) 180 capsule 3   • gabapentin (NEURONTIN) 300 MG capsule Take 1 capsule by mouth 3 (Three) Times a Day. 270 capsule 3   • glucose blood (FREESTYLE LITE) test strip 1 each by Other route 3 (Three) Times a Day Before Meals. 200 each 3   • HYDROcodone-acetaminophen (Norco) 7.5-325 MG per tablet Take 1 tablet by mouth Every 4 (Four) Hours As Needed for Moderate Pain. 180 tablet 0   • hydrocortisone 1 % cream Apply  topically to the appropriate area as directed 2 (Two) Times a Day. 60 g 3   • hydrOXYzine (ATARAX) 25 MG tablet Take 1 tablet by mouth 4 (Four) Times a Day As Needed for Itching. (Patient taking differently: Take 1 tablet by mouth 4 (Four) Times a Day As Needed for Itching. Use preop if needed) 360 tablet 3   • Insulin Lispro (ADMELOG SOLOSTAR) 100 UNIT/ML injection pen Inject 20 Units under the skin into the appropriate area as directed 3 (Three) Times a Day With Meals for 100 days. (Patient taking differently: Inject 20 Units under the skin into the appropriate area as directed 3 (Three) Times a Day With Meals. While off janumet   none am of surgery) 60 mL 3   • Insulin Lispro (humaLOG) 100 UNIT/ML injection Inject  under the skin into the appropriate  area as directed 3 (Three) Times a Day Before Meals.     • Insulin Pen Needle (Pen Needles) 32G X 4 MM misc use 1 each 4 (Four) Times a Day Before Meals & at Bedtime. 200 each 2   • loratadine (EQ Loratadine) 10 MG tablet Take 1 tablet by mouth Daily. (Patient taking differently: Take 1 tablet by mouth Every Night.) 90 tablet 3   • methocarbamol (ROBAXIN) 750 MG tablet Take 1 tablet by mouth 4 (Four) Times a Day As Needed for Muscle Spasms. 180 tablet 3   • montelukast (SINGULAIR) 10 MG tablet Take 1 tablet by mouth Every Night. 90 tablet 3   • oxybutynin XL (DITROPAN XL) 15 MG 24 hr tablet Take 1 tablet by mouth Daily. (Patient taking differently: Take 1 tablet by mouth As Needed.) 30 tablet 12   • oxybutynin XL (DITROPAN XL) 15 MG 24 hr tablet Take 1 tablet by mouth Daily. 90 tablet 3   • pantoprazole (Protonix) 40 MG EC tablet Take 1 tablet by mouth Daily. 90 tablet 3   • polycarbophil 625 MG tablet tablet Take  by mouth Every Night.     • promethazine (PHENERGAN) 25 MG tablet Take 1 tablet by mouth Every 8 (Eight) Hours As Needed for Nausea or Vomiting. 90 tablet 5   • ReliOn Ultra Thin Lancets 30G misc 1 each 4 (Four) Times a Day. 360 each 3   • sildenafil (REVATIO) 20 MG tablet Take 1 tablet by mouth Daily As Needed (ed). 30 tablet 3   • sitaGLIPtin-metFORMIN (Janumet)  MG per tablet Take 1 tablet by mouth 2 (Two) Times a Day With Meals. (Patient taking differently: Take 1 tablet by mouth 2 (Two) Times a Day With Meals. Last dose 2/20  0730  covering with admelog) 180 tablet 3   • sodium chloride (Ocean Nasal Spray) 0.65 % nasal spray 1 spray each nostril tid (Patient taking differently: 1 spray into the nostril(s) as directed by provider As Needed. Use preop) 216 mL 3   • Triamcinolone Acetonide (NASACORT) 55 MCG/ACT nasal inhaler 2 sprays into the nostril(s) as directed by provider Daily. 16.5 g 11   • fluticasone (FLONASE) 50 MCG/ACT nasal spray 1 spray into the nostril(s) as directed by provider  "Daily for 90 days. 16 g 3   • Insulin Glargine (BASAGLAR KWIKPEN) 100 UNIT/ML injection pen Inject 38 Units under the skin into the appropriate area as directed Every Night for 30 days. 15 mL 8     No current facility-administered medications on file prior to visit.        Allergies   Allergen Reactions   • Penicillin G Anaphylaxis   • Penicillins Anaphylaxis   • Sulfa Antibiotics Hives   • Allopurinol Rash        Social History     Socioeconomic History   • Marital status: Single   Tobacco Use   • Smoking status: Never   • Smokeless tobacco: Never   Vaping Use   • Vaping Use: Never used   Substance and Sexual Activity   • Alcohol use: Not Currently     Comment: socially   • Drug use: Not Currently   • Sexual activity: Defer          Review of Systems   Constitutional: Positive for activity change.   HENT: Negative.    Eyes: Negative.    Respiratory: Negative.    Cardiovascular: Negative.    Gastrointestinal: Negative.    Endocrine: Negative.    Genitourinary: Negative.    Musculoskeletal: Positive for arthralgias, back pain and myalgias.   Skin: Negative.    Allergic/Immunologic: Negative.    Neurological: Positive for numbness (left buttock area).   Hematological: Negative.    Psychiatric/Behavioral: Positive for sleep disturbance.        Physical Examination:     Vitals:    04/06/23 1212   BP: 143/88   Pulse: 81   SpO2: 98%   Weight: 129 kg (285 lb)   Height: 198.1 cm (78\")   PainSc:   6        Physical Exam     Neurological Exam   Neurological examination is stable compared to my last evaluation without any new red flag signs.  Incision is well-healed without any signs of breakdown or infection.    Result Review  The following data was reviewed by: Vahe Pace MD on 04/06/2023:    Data reviewed: Radiologic studies No new imaging reviewed today.     Assessment/plan:  This a 43-year-old gentleman status post L4-S1 bilateral discectomy for bilateral radiculopathy that was not amenable to nonsurgical means.  His " radiculopathy has significantly improved since the surgery.  I encouraged him to continue working with physical therapy.  He should complete his antibiotics for his sinus infection.  He should follow-up with me in 6 weeks with flexion-extension x-rays of the lumbar spine.  I have encouraged him to call with any questions or concerns.    Diagnoses and all orders for this visit:    1. Lumbar radiculopathy (Primary)  -     XR Spine Lumbar Complete With Flex & Ext; Future         Return in about 6 weeks (around 5/18/2023).            Vahe Pace MD

## 2023-04-05 ENCOUNTER — TELEPHONE (OUTPATIENT)
Dept: FAMILY MEDICINE CLINIC | Facility: CLINIC | Age: 43
End: 2023-04-05
Payer: MEDICAID

## 2023-04-05 NOTE — TELEPHONE ENCOUNTER
Pharmacy Name: [facility] - [place], [place] - [address] [place] [address] - [phone]  - [phone] FX     What medication are you calling in regards to: Diclofenac Epolamine (FLECTOR) 1.3 % patch patch    What question does the pharmacy have: NEEDING A PRIOR AUTH.    [facility] WOULD LIKE OFFICE TO CHECK INTO THIS. [phone] FOR AUTH #

## 2023-04-06 ENCOUNTER — OFFICE VISIT (OUTPATIENT)
Dept: NEUROSURGERY | Facility: CLINIC | Age: 43
End: 2023-04-06
Payer: MEDICAID

## 2023-04-06 VITALS
HEIGHT: 78 IN | HEART RATE: 81 BPM | OXYGEN SATURATION: 98 % | BODY MASS INDEX: 32.97 KG/M2 | WEIGHT: 285 LBS | SYSTOLIC BLOOD PRESSURE: 143 MMHG | DIASTOLIC BLOOD PRESSURE: 88 MMHG

## 2023-04-06 DIAGNOSIS — M54.16 LUMBAR RADICULOPATHY: Primary | ICD-10-CM

## 2023-04-06 PROCEDURE — 3077F SYST BP >= 140 MM HG: CPT | Performed by: NEUROLOGICAL SURGERY

## 2023-04-06 PROCEDURE — 99024 POSTOP FOLLOW-UP VISIT: CPT | Performed by: NEUROLOGICAL SURGERY

## 2023-04-06 PROCEDURE — 1159F MED LIST DOCD IN RCRD: CPT | Performed by: NEUROLOGICAL SURGERY

## 2023-04-06 PROCEDURE — 3079F DIAST BP 80-89 MM HG: CPT | Performed by: NEUROLOGICAL SURGERY

## 2023-04-06 PROCEDURE — 1160F RVW MEDS BY RX/DR IN RCRD: CPT | Performed by: NEUROLOGICAL SURGERY

## 2023-04-06 NOTE — TELEPHONE ENCOUNTER
Received response back-Med approved. PA Case: [id number], Status: Approved, Coverage Starts on: 4/6/2023 12:00:00 AM, Coverage Ends on: 4/5/2024 12:00:00 AM. Notified pharmacy.

## 2023-04-12 ENCOUNTER — TELEPHONE (OUTPATIENT)
Dept: NEUROSURGERY | Facility: CLINIC | Age: 43
End: 2023-04-12
Payer: MEDICAID

## 2023-04-12 NOTE — TELEPHONE ENCOUNTER
Patient called the office to see if he is far enough out for aqua therapy? He said that Dr. Pace told him last visit he is ok to start using lidocaine patch's on his incision area. I told him if that is the case aqua therapy should be ok as long as his incision is fully healed up. He will let us know if they need an updated order. He also requested I fax his recent EKG and med list over to his cardiologist. He see's them tomorrow.    Records faxed over as patient requested.

## 2023-04-17 DIAGNOSIS — E11.49 TYPE 2 DIABETES MELLITUS WITH OTHER NEUROLOGIC COMPLICATION, WITH LONG-TERM CURRENT USE OF INSULIN: ICD-10-CM

## 2023-04-17 DIAGNOSIS — Z79.4 TYPE 2 DIABETES MELLITUS WITH OTHER NEUROLOGIC COMPLICATION, WITH LONG-TERM CURRENT USE OF INSULIN: ICD-10-CM

## 2023-04-17 RX ORDER — LANCING DEVICE
1 EACH MISCELLANEOUS DAILY
Qty: 1 EACH | Refills: 0 | Status: SHIPPED | OUTPATIENT
Start: 2023-04-17

## 2023-04-17 RX ORDER — GLUCOSAM/CHON-MSM1/C/MANG/BOSW 500-416.6
1 TABLET ORAL 4 TIMES DAILY
Qty: 100 EACH | Refills: 12 | Status: SHIPPED | OUTPATIENT
Start: 2023-04-17

## 2023-04-20 DIAGNOSIS — Z79.4 TYPE 2 DIABETES MELLITUS WITH OTHER NEUROLOGIC COMPLICATION, WITH LONG-TERM CURRENT USE OF INSULIN: ICD-10-CM

## 2023-04-20 DIAGNOSIS — E11.49 TYPE 2 DIABETES MELLITUS WITH OTHER NEUROLOGIC COMPLICATION, WITH LONG-TERM CURRENT USE OF INSULIN: ICD-10-CM

## 2023-04-20 NOTE — TELEPHONE ENCOUNTER
Caller: Barrett Laguerre    Relationship: Self    Best call back number:   Barrett Laguerre (Self) [phone] (Mobile)       What was the call regarding:  PATIENT IS NEEDING THE METER AND STRIPS FOR THE TRUE BRAND   (TRUE PLUS)       Do you require a callback: YES PLEASE

## 2023-04-27 DIAGNOSIS — R11.0 NAUSEA: ICD-10-CM

## 2023-04-28 RX ORDER — PROMETHAZINE HYDROCHLORIDE 25 MG/1
TABLET ORAL
Qty: 90 TABLET | Refills: 0 | Status: SHIPPED | OUTPATIENT
Start: 2023-04-28

## 2023-05-12 ENCOUNTER — TELEPHONE (OUTPATIENT)
Dept: PAIN MEDICINE | Facility: CLINIC | Age: 43
End: 2023-05-12

## 2023-05-12 NOTE — TELEPHONE ENCOUNTER
Patient needs a prescription for Tylenol#3  He states rx needs to have reasons why he takes it: PRN to help him get through PT

## 2023-05-12 NOTE — TELEPHONE ENCOUNTER
Caller: COBY BARTH    Relationship: SELF    Best call back number: [phone]    Requested Prescriptions:   TYLENOL 3     Pharmacy where request should be sent:  [facility]    Last office visit with prescribing clinician: 2/28/2023   Last telemedicine visit with prescribing clinician: 2/28/2023   Next office visit with prescribing clinician: 6/6/2023     Additional details provided by patient: PATIENT STATED IT HAS TO BE SENT A SPECIFIC WAY.  PATIENT WILL NEED A CALL BACK TO DISCUSS HOW THE PRESCRIPTION IS BEING SENT.  PATIENT HAS TO SEND AN EXPLANATION TO THE PHARMACIST WHY THE PRESCRIPTION IS BEING FILLED IN ADDITION TO THE PRESCRIPTION ITSELF.      Does the patient have less than a 3 day supply:  [x] Yes  [] No    Would you like a call back once the refill request has been completed: [x] Yes [] No    If the office needs to give you a call back, can they leave a voicemail: [x] Yes [] No    Radha Blair   05/12/23 16:00 EDT

## 2023-05-15 ENCOUNTER — TELEPHONE (OUTPATIENT)
Dept: NEUROSURGERY | Facility: CLINIC | Age: 43
End: 2023-05-15

## 2023-05-15 NOTE — TELEPHONE ENCOUNTER
Caller: Barrett Laguerre    Relationship to patient: Self    Best call back number: [phone]    Patient is needing:     PATIENT IS GOING TO HAVE XRAY DONE ON 5/16 OR 5/17 BUT IS ASKING IF THE XRAY THAT IS ORDERED WOULD SHOW THE AREA HE IS CONCERNED ABOUT.  PATIENT HAS BEEN EXPERIENCING PAIN IN BUTTOCKS AND DOWN INTO THE KNEE AREA.    PLEASE CALL TO ADVISE

## 2023-05-16 ENCOUNTER — TELEPHONE (OUTPATIENT)
Dept: NEUROSURGERY | Facility: CLINIC | Age: 43
End: 2023-05-16
Payer: MEDICAID

## 2023-05-16 NOTE — TELEPHONE ENCOUNTER
Patient called the office today asking if his lumbar xray is going to show why he is having pain in his gluteal area and I informed him that lumbar back pain can cause radiating pain down to the area and even lower and that Dr. Pace will go over those results with him on his appt date on 5/18.

## 2023-05-17 ENCOUNTER — HOSPITAL ENCOUNTER (OUTPATIENT)
Dept: GENERAL RADIOLOGY | Facility: HOSPITAL | Age: 43
Discharge: HOME | End: 2023-05-17
Admitting: NEUROLOGICAL SURGERY
Payer: MEDICAID

## 2023-05-17 DIAGNOSIS — M54.16 LUMBAR RADICULOPATHY: ICD-10-CM

## 2023-05-17 PROCEDURE — 72114 X-RAY EXAM L-S SPINE BENDING: CPT

## 2023-05-17 NOTE — PROGRESS NOTES
Neurosurgical Consultation    Chief Complaint   Patient presents with   • Post-op        HPI: This is a 43-year-old gentleman who underwent a bilateral for this microdiscectomy for bilateral 5 and S1 radiculopathies.  He returns today for 3-month evaluation.  He is doing quite well.  His radiculopathy has resolved.  He does have some persistent midline spinal back pain which he describes as a spasm-like sensation.  He has been working with physical therapy including engaging with the home exercise program.  His incision is well-healing without any signs of breakdown or infection.    Previous treatment:    Past Medical History:   Diagnosis Date   • Acquired pes planus of both feet     Impression: needs referral for orthotics.   • Acute non-recurrent maxillary sinusitis     Impression: His symptoms were not improving.   • ADD (attention deficit disorder)    • Allergies    • Annual physical exam     Impression: Discussed anticipatory guidance, diet, exercise, and weight loss. Discussed safety, seatbelts, and routine screening examinations. Discussed self-examinations.   • Asthma     Impression: Stable   • Asymptomatic microscopic hematuria     Impression: recheck neg. Will follow   • Bulging lumbar disc    • Chronic gout, unspecified, without tophus (tophi)     Impression: Recommend rechecking uric acid.   • CIDP (chronic inflammatory demyelinating polyneuropathy)     Story: s/p IVIG Guillain-Decorah per [facility] diagnosed 2015 gabapentin 600 tid. Impression: Symptoms include: numbness and tingling in hands and feet, difficulty concentrating, drops items, chest pain. dizziness. Pt needs routine follow up with neurology. Previously seen by Dr Seipel   • Colon polyps    • DDD (degenerative disc disease), lumbar    • Diabetes mellitus    • Dyslipidemia    • GERD (gastroesophageal reflux disease)    • Gout    • Guillain-Decorah    • Hypertension, benign     Impression: Followed by Cardiology Dr Tong who placed pt on different  BP med. . Proteinuria/creatinine noted   • Idiopathic chronic gout without tophus     Unspecified site. Impression: stable. Story: UA 8.3 2/2018   • Intervertebral disc stenosis of neural canal of lumbar region     Story: MRI 11/15 showed lumbar disk disease. 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 Pt would like referra to Dr Watkins has appt Dec 15   • Low back pain    • Lumbar disc disorder with myelopathy     Impression: failed therapy. Pt would like refill on compounded topical med. Rx faxed   • Mixed obsessional thoughts and acts    • Obstructive sleep apnea     bipap bring dos   • OCD (obsessive compulsive disorder)    • Other seasonal allergic rhinitis     Impression: Over-the-counter medication indications, dosage, and precautions discussed.   • Overactive bladder    • PONV (postoperative nausea and vomiting)    • Pruritus    • Screening for depression    • Screening for hyperlipidemia    • SI (sacroiliac) joint dysfunction     Impression: Findings discussed. All questions answered. Medication and medication adverse effects discussed. Drug education given and explained to patient. Patient verbalized understanding. Follow-up in 4-6 weeks if not better. Follow-up sooner for worsening symptoms or for any concerns. Consider chiropractor   • Tobacco use disorder         Past Surgical History:   Procedure Laterality Date   • COLONOSCOPY N/A 12/22/2020    Procedure: COLONOSCOPY with polypectomy x;  Surgeon: Juan Alberto Davis MD;  Location: [facility] ENDOSCOPY;  Service: Gastroenterology;  Laterality: N/A;  post: transverse colon polyp   • INGUINAL HERNIA REPAIR     • LUMBAR LAMINECTOMY Left 2/22/2023    Procedure: LUMBAR 4-SACRAL 1 LAMINOTOMY, MEDIAL FACETECTOMY, FORAMINOTOMY, AND MICRODISCECTOMY;  Surgeon: Vahe Pace MD;  Location: [facility] MAIN OR;  Service: Neurosurgery;  Laterality: Left;   • NOSE SURGERY     • TYMPANOSTOMY TUBE PLACEMENT          Current Outpatient Medications on File Prior to Visit    Medication Sig Dispense Refill   • acetaminophen-codeine (TYLENOL with CODEINE #4) 300-60 MG per tablet Take 1 tablet by mouth Every 6 (Six) Hours As Needed for Moderate Pain. (Patient taking differently: Take 1 tablet by mouth Every 6 (Six) Hours As Needed for Moderate Pain. Alternates with hydrocodone  not currently taking) 120 tablet 5   • Alcohol Swabs (Alcohol Wipes) 70 % pads Apply 1 each topically 4 (Four) Times a Day Before Meals & at Bedtime. 200 each 2   • amLODIPine (NORVASC) 10 MG tablet Take 1 tablet by mouth Daily. (Patient taking differently: Take 1 tablet by mouth Every Night.) 90 tablet 3   • azelastine (ASTELIN) 0.1 % nasal spray 2 sprays into the nostril(s) as directed by provider 2 (Two) Times a Day. Use in each nostril as directed (Patient taking differently: 2 sprays into the nostril(s) as directed by provider As Needed. Use in each nostril as directed  not currently using) 3 each 3   • benzonatate (Tessalon Perles) 100 MG capsule Take 1 capsule by mouth 3 (Three) Times a Day As Needed for Cough. 30 capsule 0   • Blood Glucose Monitoring Suppl (True Metrix Meter) w/Device kit 1 each 4 (Four) Times a Day. Dx:E11.49 patient checks sugar qid 1 kit 0   • cetirizine (zyrTEC) 10 MG tablet Take 1 tablet by mouth Daily. 30 tablet 11   • chlorthalidone (HYGROTEN) 50 MG tablet Take 1 tablet by mouth Daily. (Patient taking differently: Take 1 tablet by mouth Every Night.) 90 tablet 3   • clindamycin (CLEOCIN T) 1 % external solution Apply 1 application topically to the appropriate area as directed 2 (Two) Times a Day. Not currently using     • colchicine-probenecid (COL-BENEMID) 0.5-500 MG per tablet Take 1 tablet by mouth Every Night.     • Continuous Blood Gluc  (Dexcom G6 ) device 1 each Daily. 1 each 0   • Continuous Blood Gluc Sensor (Dexcom G6 Sensor) Every 10 (Ten) Days. 1 each 6   • Cyanocobalamin (Vitamin B-12 ER) 1000 MCG tablet controlled-release TAKE 1 TABLET DAILY 30 each 5    • Diclofenac Epolamine (FLECTOR) 1.3 % patch patch Apply 1 patch topically to the appropriate area as directed 2 (Two) Times a Day As Needed (back pain). 60 patch 2   • doxycycline (VIBRAMYCIN) 100 MG capsule Take 1 capsule by mouth 2 (Two) Times a Day. 14 capsule 0   • erythromycin (ROMYCIN) 5 MG/GM ophthalmic ointment Administer 1 application to both eyes As Needed. Not currently using     • FLUoxetine (PROzac) 40 MG capsule Take 1 capsule by mouth 2 (Two) Times a Day. (Patient taking differently: Take 1 capsule by mouth Every Night.) 180 capsule 3   • gabapentin (NEURONTIN) 300 MG capsule Take 1 capsule by mouth 3 (Three) Times a Day. 270 capsule 3   • glucose blood (True Metrix Blood Glucose Test) test strip 1 each by Other route 4 (Four) Times a Day. Dx:E11.49 patient checks sugar qid 200 each 12   • HYDROcodone-acetaminophen (Norco) 7.5-325 MG per tablet Take 1 tablet by mouth Every 4 (Four) Hours As Needed for Moderate Pain. 180 tablet 0   • hydrocortisone 1 % cream Apply  topically to the appropriate area as directed 2 (Two) Times a Day. 60 g 3   • hydrOXYzine (ATARAX) 25 MG tablet Take 1 tablet by mouth 4 (Four) Times a Day As Needed for Itching. (Patient taking differently: Take 1 tablet by mouth 4 (Four) Times a Day As Needed for Itching. Use preop if needed) 360 tablet 3   • Insulin Lispro (ADMELOG SOLOSTAR) 100 UNIT/ML injection pen Inject 20 Units under the skin into the appropriate area as directed 3 (Three) Times a Day With Meals for 100 days. (Patient taking differently: Inject 20 Units under the skin into the appropriate area as directed 3 (Three) Times a Day With Meals. While off janumet   none am of surgery) 60 mL 3   • Insulin Lispro (humaLOG) 100 UNIT/ML injection Inject  under the skin into the appropriate area as directed 3 (Three) Times a Day Before Meals.     • Insulin Pen Needle (Pen Needles) 32G X 4 MM misc use 1 each 4 (Four) Times a Day Before Meals & at Bedtime. 200 each 2   • Lancet  Devices (TRUEdraw Lancing Device) misc 1 each Daily. Dx:E11.49 patient checks sugar qid 1 each 0   • loratadine (EQ Loratadine) 10 MG tablet Take 1 tablet by mouth Daily. (Patient taking differently: Take 1 tablet by mouth Every Night.) 90 tablet 3   • methocarbamol (ROBAXIN) 750 MG tablet Take 1 tablet by mouth 4 (Four) Times a Day As Needed for Muscle Spasms. 180 tablet 3   • montelukast (SINGULAIR) 10 MG tablet Take 1 tablet by mouth Every Night. 90 tablet 3   • oxybutynin XL (DITROPAN XL) 15 MG 24 hr tablet Take 1 tablet by mouth Daily. (Patient taking differently: Take 1 tablet by mouth As Needed.) 30 tablet 12   • oxybutynin XL (DITROPAN XL) 15 MG 24 hr tablet Take 1 tablet by mouth Daily. 90 tablet 3   • pantoprazole (Protonix) 40 MG EC tablet Take 1 tablet by mouth Daily. 90 tablet 3   • polycarbophil 625 MG tablet tablet Take  by mouth Every Night.     • promethazine (PHENERGAN) 25 MG tablet TAKE 1 TABLET BY MOUTH EVERY 8 HOURS AS NEEDED FOR NAUSEA FOR VOMITING 90 tablet 0   • sildenafil (REVATIO) 20 MG tablet Take 1 tablet by mouth Daily As Needed (ed). 30 tablet 3   • sitaGLIPtin-metFORMIN (Janumet)  MG per tablet Take 1 tablet by mouth 2 (Two) Times a Day With Meals. (Patient taking differently: Take 1 tablet by mouth 2 (Two) Times a Day With Meals. Last dose 2/20  0730  covering with admelog) 180 tablet 3   • sodium chloride (Ocean Nasal Spray) 0.65 % nasal spray 1 spray each nostril tid (Patient taking differently: 1 spray into the nostril(s) as directed by provider As Needed. Use preop) 216 mL 3   • Triamcinolone Acetonide (NASACORT) 55 MCG/ACT nasal inhaler 2 sprays into the nostril(s) as directed by provider Daily. 16.5 g 11   • TRUEplus Lancets 30G misc 1 each 4 (Four) Times a Day. Dx:E11.49 patient checks sugar qid 100 each 12   • fluticasone (FLONASE) 50 MCG/ACT nasal spray 1 spray into the nostril(s) as directed by provider Daily for 90 days. 16 g 3   • Insulin Glargine (BASAGLAR KWIKPEN)  "100 UNIT/ML injection pen Inject 38 Units under the skin into the appropriate area as directed Every Night for 30 days. 15 mL 8     No current facility-administered medications on file prior to visit.        Allergies   Allergen Reactions   • Penicillin G Anaphylaxis   • Penicillins Anaphylaxis   • Sulfa Antibiotics Hives   • Allopurinol Rash        Social History     Socioeconomic History   • Marital status: Single   Tobacco Use   • Smoking status: Never   • Smokeless tobacco: Never   Vaping Use   • Vaping Use: Never used   Substance and Sexual Activity   • Alcohol use: Not Currently     Comment: socially   • Drug use: Not Currently   • Sexual activity: Defer           Review of Systems     Physical Examination:     Vitals:    05/18/23 1516   BP: 151/91   BP Location: Left arm   Patient Position: Sitting   Cuff Size: Large Adult   Pulse: 85   SpO2: 98%   Weight: 132 kg (291 lb)   Height: 195.6 cm (77\")   PainSc:   5   PainLoc: Back      Vitals:    05/18/23 1516   PainSc:   5   PainLoc: Back           Physical Exam     Neurological Exam   Neurological examination is stable compared to my last evaluation without any new red flag signs.    Result Review  The following data was reviewed by: Vahe Pace MD on 05/18/2023:    Data reviewed: Radiologic studies Flexion-extension x-rays of the lumbar spine do not suggest any dynamic instability in particular across the operative levels.     Assessment/plan:  This is a 43-year-old gentleman with bilateral L5 and S1 radiculopathy status post decompression approximately 3 months ago.  He is doing well with almost complete resolution of his radiculopathy.  He does have some persistent back pain which is much more tolerable.  I recommended continuing to engage with physical therapy and the home exercise program.  He should return to see me in 3 months with flexion-extension x-rays at that time.  I encouraged him to call with any questions or concerns.    Diagnoses and all " orders for this visit:    1. Lumbar radiculopathy (Primary)  -     XR Spine Lumbar Complete With Flex & Ext; Future         Return in about 3 months (around 8/18/2023).                  Vahe Pace MD

## 2023-05-18 ENCOUNTER — OFFICE VISIT (OUTPATIENT)
Dept: NEUROSURGERY | Facility: CLINIC | Age: 43
End: 2023-05-18
Payer: MEDICAID

## 2023-05-18 VITALS
DIASTOLIC BLOOD PRESSURE: 91 MMHG | SYSTOLIC BLOOD PRESSURE: 151 MMHG | BODY MASS INDEX: 34.36 KG/M2 | OXYGEN SATURATION: 98 % | HEART RATE: 85 BPM | WEIGHT: 291 LBS | HEIGHT: 77 IN

## 2023-05-18 DIAGNOSIS — M54.16 LUMBAR RADICULOPATHY: Primary | ICD-10-CM

## 2023-05-21 DIAGNOSIS — Z79.4 TYPE 2 DIABETES MELLITUS WITH OTHER NEUROLOGIC COMPLICATION, WITH LONG-TERM CURRENT USE OF INSULIN: ICD-10-CM

## 2023-05-21 DIAGNOSIS — E11.49 TYPE 2 DIABETES MELLITUS WITH OTHER NEUROLOGIC COMPLICATION, WITH LONG-TERM CURRENT USE OF INSULIN: ICD-10-CM

## 2023-05-22 RX ORDER — PROCHLORPERAZINE 25 MG/1
SUPPOSITORY RECTAL
Qty: 1 EACH | Refills: 0 | Status: SHIPPED | OUTPATIENT
Start: 2023-05-22

## 2023-05-23 ENCOUNTER — HOSPITAL ENCOUNTER (OUTPATIENT)
Dept: GENERAL RADIOLOGY | Facility: HOSPITAL | Age: 43
Discharge: HOME | End: 2023-05-23
Admitting: FAMILY MEDICINE
Payer: MEDICAID

## 2023-05-23 ENCOUNTER — OFFICE VISIT (OUTPATIENT)
Dept: PODIATRY | Facility: CLINIC | Age: 43
End: 2023-05-23
Payer: MEDICAID

## 2023-05-23 ENCOUNTER — OFFICE VISIT (OUTPATIENT)
Dept: FAMILY MEDICINE CLINIC | Facility: CLINIC | Age: 43
End: 2023-05-23
Payer: MEDICAID

## 2023-05-23 VITALS
BODY MASS INDEX: 34.45 KG/M2 | HEIGHT: 77 IN | HEART RATE: 95 BPM | RESPIRATION RATE: 18 BRPM | DIASTOLIC BLOOD PRESSURE: 80 MMHG | WEIGHT: 291.8 LBS | SYSTOLIC BLOOD PRESSURE: 126 MMHG | OXYGEN SATURATION: 97 % | TEMPERATURE: 97.3 F

## 2023-05-23 VITALS — BODY MASS INDEX: 34.36 KG/M2 | RESPIRATION RATE: 20 BRPM | HEIGHT: 77 IN | WEIGHT: 291 LBS

## 2023-05-23 DIAGNOSIS — M79.641 RIGHT HAND PAIN: Primary | ICD-10-CM

## 2023-05-23 DIAGNOSIS — L60.3 ONYCHODYSTROPHY: ICD-10-CM

## 2023-05-23 DIAGNOSIS — E11.42 DM TYPE 2 WITH DIABETIC PERIPHERAL NEUROPATHY: Primary | ICD-10-CM

## 2023-05-23 DIAGNOSIS — Z11.4 ENCOUNTER FOR SCREENING FOR HIV: ICD-10-CM

## 2023-05-23 DIAGNOSIS — E11.65 TYPE 2 DIABETES MELLITUS WITH HYPERGLYCEMIA, WITHOUT LONG-TERM CURRENT USE OF INSULIN: ICD-10-CM

## 2023-05-23 DIAGNOSIS — E66.09 CLASS 1 OBESITY DUE TO EXCESS CALORIES WITH SERIOUS COMORBIDITY AND BODY MASS INDEX (BMI) OF 34.0 TO 34.9 IN ADULT: ICD-10-CM

## 2023-05-23 PROBLEM — E66.811 CLASS 1 OBESITY DUE TO EXCESS CALORIES WITH SERIOUS COMORBIDITY AND BODY MASS INDEX (BMI) OF 32.0 TO 32.9 IN ADULT: Status: RESOLVED | Noted: 2022-02-28 | Resolved: 2023-05-23

## 2023-05-23 PROCEDURE — 73130 X-RAY EXAM OF HAND: CPT

## 2023-05-23 NOTE — PROGRESS NOTES
Subjective   Barrett Laguerre is a 43 y.o. male.   Chief Complaint   Patient presents with   • Hand Pain       History of Present Illness  Pain right hand in webbing between thumb and index finger.  No trauma  Pt would also like HIV test   Hand Pain   The incident occurred more than 1 week ago. The incident occurred at home. The injury mechanism is unknown. The pain is present in the right fingers. The quality of the pain is described as aching. The pain radiates to the right hand. The pain has been fluctuating since the incident. Pertinent negatives include no chest pain or numbness. The symptoms are aggravated by movement and lifting.        The following portions of the patient's history were reviewed and updated as appropriate: allergies, current medications, past family history, past medical history, past social history, past surgical history and problem list.    Patient Active Problem List   Diagnosis   • Gout   • Lumbosacral spinal stenosis   • Memory impairment   • Acquired flexible flat foot   • Chronic midline low back pain with bilateral sciatica   • Other specified dorsopathies, site unspecified   • Guillain-Palo   • Sleep apnea   • Attention deficit disorder   • Diabetes   • Other seasonal allergic rhinitis   • Mixed obsessional thoughts and acts   • Hypertension, benign   • Disorder of lipid metabolism   • CIDP (chronic inflammatory demyelinating polyneuropathy)   • Lumbar radiculopathy   • Onychomycosis   • Annual physical exam   • Class 1 obesity due to excess calories with serious comorbidity and body mass index (BMI) of 34.0 to 34.9 in adult   • Acute midline low back pain with left-sided sciatica       Current Outpatient Medications on File Prior to Visit   Medication Sig Dispense Refill   • acetaminophen-codeine (TYLENOL with CODEINE #4) 300-60 MG per tablet Take 1 tablet by mouth Every 6 (Six) Hours As Needed for Moderate Pain. (Patient taking differently: Take 1 tablet by mouth Every 6 (Six)  Hours As Needed for Moderate Pain. Alternates with hydrocodone  not currently taking) 120 tablet 5   • Alcohol Swabs (Alcohol Wipes) 70 % pads Apply 1 each topically 4 (Four) Times a Day Before Meals & at Bedtime. 200 each 2   • amLODIPine (NORVASC) 10 MG tablet Take 1 tablet by mouth Daily. (Patient taking differently: Take 1 tablet by mouth Every Night.) 90 tablet 3   • azelastine (ASTELIN) 0.1 % nasal spray 2 sprays into the nostril(s) as directed by provider 2 (Two) Times a Day. Use in each nostril as directed (Patient taking differently: 2 sprays into the nostril(s) as directed by provider As Needed. Use in each nostril as directed  not currently using) 3 each 3   • Blood Glucose Monitoring Suppl (True Metrix Meter) w/Device kit 1 each 4 (Four) Times a Day. Dx:E11.49 patient checks sugar qid 1 kit 0   • cetirizine (zyrTEC) 10 MG tablet Take 1 tablet by mouth Daily. 30 tablet 11   • chlorthalidone (HYGROTEN) 50 MG tablet Take 1 tablet by mouth Daily. (Patient taking differently: Take 1 tablet by mouth Every Night.) 90 tablet 3   • clindamycin (CLEOCIN T) 1 % external solution Apply 1 application topically to the appropriate area as directed 2 (Two) Times a Day. Not currently using     • colchicine-probenecid (COL-BENEMID) 0.5-500 MG per tablet Take 1 tablet by mouth Every Night.     • Continuous Blood Gluc  (Dexcom G6 ) device 1  ONCE DAILY 1 each 0   • Continuous Blood Gluc Sensor (Dexcom G6 Sensor) Every 10 (Ten) Days. 1 each 6   • Cyanocobalamin (Vitamin B-12 ER) 1000 MCG tablet controlled-release TAKE 1 TABLET DAILY 30 each 5   • Diclofenac Epolamine (FLECTOR) 1.3 % patch patch Apply 1 patch topically to the appropriate area as directed 2 (Two) Times a Day As Needed (back pain). 60 patch 2   • doxycycline (VIBRAMYCIN) 100 MG capsule Take 1 capsule by mouth 2 (Two) Times a Day. 14 capsule 0   • erythromycin (ROMYCIN) 5 MG/GM ophthalmic ointment Administer 1 application to both eyes As Needed.  Not currently using     • FLUoxetine (PROzac) 40 MG capsule Take 1 capsule by mouth 2 (Two) Times a Day. (Patient taking differently: Take 1 capsule by mouth Every Night.) 180 capsule 3   • gabapentin (NEURONTIN) 300 MG capsule Take 1 capsule by mouth 3 (Three) Times a Day. 270 capsule 3   • glucose blood (True Metrix Blood Glucose Test) test strip 1 each by Other route 4 (Four) Times a Day. Dx:E11.49 patient checks sugar qid 200 each 12   • HYDROcodone-acetaminophen (Norco) 7.5-325 MG per tablet Take 1 tablet by mouth Every 4 (Four) Hours As Needed for Moderate Pain. 180 tablet 0   • hydrocortisone 1 % cream Apply  topically to the appropriate area as directed 2 (Two) Times a Day. 60 g 3   • hydrOXYzine (ATARAX) 25 MG tablet Take 1 tablet by mouth 4 (Four) Times a Day As Needed for Itching. (Patient taking differently: Take 1 tablet by mouth 4 (Four) Times a Day As Needed for Itching. Use preop if needed) 360 tablet 3   • Insulin Lispro (ADMELOG SOLOSTAR) 100 UNIT/ML injection pen Inject 20 Units under the skin into the appropriate area as directed 3 (Three) Times a Day With Meals for 100 days. (Patient taking differently: Inject 20 Units under the skin into the appropriate area as directed 3 (Three) Times a Day With Meals. While off janumet   none am of surgery) 60 mL 3   • Insulin Lispro (humaLOG) 100 UNIT/ML injection Inject  under the skin into the appropriate area as directed 3 (Three) Times a Day Before Meals.     • Insulin Pen Needle (Pen Needles) 32G X 4 MM misc use 1 each 4 (Four) Times a Day Before Meals & at Bedtime. 200 each 2   • Lancet Devices (TRUEdraw Lancing Device) misc 1 each Daily. Dx:E11.49 patient checks sugar qid 1 each 0   • loratadine (EQ Loratadine) 10 MG tablet Take 1 tablet by mouth Daily. (Patient taking differently: Take 1 tablet by mouth Every Night.) 90 tablet 3   • methocarbamol (ROBAXIN) 750 MG tablet Take 1 tablet by mouth 4 (Four) Times a Day As Needed for Muscle Spasms. 180  tablet 3   • montelukast (SINGULAIR) 10 MG tablet Take 1 tablet by mouth Every Night. 90 tablet 3   • oxybutynin XL (DITROPAN XL) 15 MG 24 hr tablet Take 1 tablet by mouth Daily. (Patient taking differently: Take 1 tablet by mouth As Needed.) 30 tablet 12   • oxybutynin XL (DITROPAN XL) 15 MG 24 hr tablet Take 1 tablet by mouth Daily. 90 tablet 3   • pantoprazole (Protonix) 40 MG EC tablet Take 1 tablet by mouth Daily. 90 tablet 3   • polycarbophil 625 MG tablet tablet Take  by mouth Every Night.     • promethazine (PHENERGAN) 25 MG tablet TAKE 1 TABLET BY MOUTH EVERY 8 HOURS AS NEEDED FOR NAUSEA FOR VOMITING 90 tablet 0   • sildenafil (REVATIO) 20 MG tablet Take 1 tablet by mouth Daily As Needed (ed). 30 tablet 3   • sitaGLIPtin-metFORMIN (Janumet)  MG per tablet Take 1 tablet by mouth 2 (Two) Times a Day With Meals. (Patient taking differently: Take 1 tablet by mouth 2 (Two) Times a Day With Meals. Last dose 2/20 0730  covering with admelog) 180 tablet 3   • sodium chloride (Ocean Nasal Spray) 0.65 % nasal spray 1 spray each nostril tid (Patient taking differently: 1 spray into the nostril(s) as directed by provider As Needed. Use preop) 216 mL 3   • Triamcinolone Acetonide (NASACORT) 55 MCG/ACT nasal inhaler 2 sprays into the nostril(s) as directed by provider Daily. 16.5 g 11   • TRUEplus Lancets 30G misc 1 each 4 (Four) Times a Day. Dx:E11.49 patient checks sugar qid 100 each 12   • benzonatate (Tessalon Perles) 100 MG capsule Take 1 capsule by mouth 3 (Three) Times a Day As Needed for Cough. (Patient not taking: Reported on 5/23/2023) 30 capsule 0   • fluticasone (FLONASE) 50 MCG/ACT nasal spray 1 spray into the nostril(s) as directed by provider Daily for 90 days. 16 g 3   • Insulin Glargine (BASAGLAR KWIKPEN) 100 UNIT/ML injection pen Inject 38 Units under the skin into the appropriate area as directed Every Night for 30 days. 15 mL 8     No current facility-administered medications on file prior to  "visit.     Current outpatient and discharge medications have been reconciled for the patient.  Reviewed by: Eric Rosenberg MD      Allergies   Allergen Reactions   • Penicillin G Anaphylaxis   • Penicillins Anaphylaxis   • Sulfa Antibiotics Hives   • Allopurinol Rash       Review of Systems   Constitutional: Negative for activity change, appetite change, fatigue and fever.   HENT: Negative for ear pain, swollen glands and voice change.    Eyes: Negative for visual disturbance.   Respiratory: Negative for shortness of breath and wheezing.    Cardiovascular: Negative for chest pain and leg swelling.   Gastrointestinal: Negative for abdominal pain, blood in stool, constipation, diarrhea, nausea and vomiting.   Endocrine: Negative for polydipsia and polyuria.   Genitourinary: Negative for dysuria, frequency and hematuria.   Musculoskeletal: Negative for joint swelling, neck pain and neck stiffness.   Skin: Negative for rash and wound.   Neurological: Negative for weakness, numbness and headache.   Psychiatric/Behavioral: Negative for suicidal ideas and depressed mood.     I have reviewed and confirmed the accuracy of the ROS as documented by the MA/LPN/RN Eric Rosenberg MD    Objective   Visit Vitals  /80 (BP Location: Right arm, Patient Position: Sitting, Cuff Size: Adult)   Pulse 95   Temp 97.3 °F (36.3 °C)   Resp 18   Ht 195.6 cm (77\")   Wt 132 kg (291 lb 12.8 oz)   SpO2 97%   BMI 34.60 kg/m²      **  Physical Exam  Constitutional:       Appearance: He is well-developed.   HENT:      Head: Normocephalic and atraumatic.      Right Ear: External ear normal.      Left Ear: External ear normal.      Nose: Nose normal.   Eyes:      Pupils: Pupils are equal, round, and reactive to light.   Cardiovascular:      Rate and Rhythm: Normal rate and regular rhythm.      Heart sounds: Normal heart sounds.   Pulmonary:      Effort: Pulmonary effort is normal.      Breath sounds: Normal breath sounds. "   Abdominal:      General: Bowel sounds are normal.      Palpations: Abdomen is soft.   Musculoskeletal:         General: Normal range of motion.        Hands:       Cervical back: Normal range of motion and neck supple.      Comments: Area of perceived pain in red. Feels electric shock   Mild tenderness joint in area marked in blue   Skin:     General: Skin is warm and dry.   Neurological:      Mental Status: He is alert and oriented to person, place, and time.   Psychiatric:         Behavior: Behavior normal.         Thought Content: Thought content normal.         Judgment: Judgment normal.       Derm Physical Exam    Diagnoses and all orders for this visit:    1. Right hand pain (Primary)  -     XR Hand 3+ View Right    2. Class 1 obesity due to excess calories with serious comorbidity and body mass index (BMI) of 34.0 to 34.9 in adult    3. Encounter for screening for HIV  -     HIV-1 / O / 2 Ag / Antibody 4th Generation     Will get baseline xray to eval for bony abnormality. Suspect interosseous muscle spasm/inflammation.  Topical care such as aspercreme or biofreeze discussed.  Follow-up in 4-6 weeks if not better.  Follow-up sooner for worsening symptoms or for any concerns.       BMI is >= 30 and <35. (Class 1 Obesity). The following options were offered after discussion;: weight loss educational material (shared in after visit summary)    Expected course, medications, and adverse effects discussed as appropriate.  Call or return if worsening or persistent symptoms.     This document is intended for medical professional use only.

## 2023-05-23 NOTE — PROGRESS NOTES
05/23/2023  Foot and Ankle Surgery - Established Patient/Follow-up  Provider: ANASTASIA Cahn   Location: [facility]    Subjective:  Barrett Laguerre is a 43 y.o. male.     Chief Complaint   Patient presents with   • Right Foot - Diabetes, Nail Problem     Dm foot care   • Left Foot - Diabetes, Nail Problem     [facility]m foot care   • Follow-up     CYRUS Rosenberg md 5/23/2023       HPI: The patient presents today for a diabetic foot check.     The patient is doing well today. The patient had back surgery since last visit and is improving.    The patient reports injuring his hand. He is going to see his primary care doctor today.     His blood sugars are doing well, ranging between 90 and 160.     Allergies   Allergen Reactions   • Penicillin G Anaphylaxis   • Penicillins Anaphylaxis   • Sulfa Antibiotics Hives   • Allopurinol Rash       Current Outpatient Medications on File Prior to Visit   Medication Sig Dispense Refill   • acetaminophen-codeine (TYLENOL with CODEINE #4) 300-60 MG per tablet Take 1 tablet by mouth Every 6 (Six) Hours As Needed for Moderate Pain. (Patient taking differently: Take 1 tablet by mouth Every 6 (Six) Hours As Needed for Moderate Pain. Alternates with hydrocodone  not currently taking) 120 tablet 5   • Alcohol Swabs (Alcohol Wipes) 70 % pads Apply 1 each topically 4 (Four) Times a Day Before Meals & at Bedtime. 200 each 2   • amLODIPine (NORVASC) 10 MG tablet Take 1 tablet by mouth Daily. (Patient taking differently: Take 1 tablet by mouth Every Night.) 90 tablet 3   • azelastine (ASTELIN) 0.1 % nasal spray 2 sprays into the nostril(s) as directed by provider 2 (Two) Times a Day. Use in each nostril as directed (Patient taking differently: 2 sprays into the nostril(s) as directed by provider As Needed. Use in each nostril as directed  not currently using) 3 each 3   • benzonatate (Tessalon Perles) 100 MG capsule Take 1 capsule by mouth 3 (Three) Times a Day As Needed for Cough.  (Patient not taking: Reported on 5/23/2023) 30 capsule 0   • Blood Glucose Monitoring Suppl (True Metrix Meter) w/Device kit 1 each 4 (Four) Times a Day. Dx:E11.49 patient checks sugar qid 1 kit 0   • cetirizine (zyrTEC) 10 MG tablet Take 1 tablet by mouth Daily. 30 tablet 11   • chlorthalidone (HYGROTEN) 50 MG tablet Take 1 tablet by mouth Daily. (Patient taking differently: Take 1 tablet by mouth Every Night.) 90 tablet 3   • clindamycin (CLEOCIN T) 1 % external solution Apply 1 application topically to the appropriate area as directed 2 (Two) Times a Day. Not currently using     • colchicine-probenecid (COL-BENEMID) 0.5-500 MG per tablet Take 1 tablet by mouth Every Night.     • Continuous Blood Gluc  (Dexcom G6 ) device 1  ONCE DAILY 1 each 0   • Continuous Blood Gluc Sensor (Dexcom G6 Sensor) Every 10 (Ten) Days. 1 each 6   • Cyanocobalamin (Vitamin B-12 ER) 1000 MCG tablet controlled-release TAKE 1 TABLET DAILY 30 each 5   • Diclofenac Epolamine (FLECTOR) 1.3 % patch patch Apply 1 patch topically to the appropriate area as directed 2 (Two) Times a Day As Needed (back pain). 60 patch 2   • doxycycline (VIBRAMYCIN) 100 MG capsule Take 1 capsule by mouth 2 (Two) Times a Day. 14 capsule 0   • erythromycin (ROMYCIN) 5 MG/GM ophthalmic ointment Administer 1 application to both eyes As Needed. Not currently using     • FLUoxetine (PROzac) 40 MG capsule Take 1 capsule by mouth 2 (Two) Times a Day. (Patient taking differently: Take 1 capsule by mouth Every Night.) 180 capsule 3   • gabapentin (NEURONTIN) 300 MG capsule Take 1 capsule by mouth 3 (Three) Times a Day. 270 capsule 3   • glucose blood (True Metrix Blood Glucose Test) test strip 1 each by Other route 4 (Four) Times a Day. Dx:E11.49 patient checks sugar qid 200 each 12   • HYDROcodone-acetaminophen (Norco) 7.5-325 MG per tablet Take 1 tablet by mouth Every 4 (Four) Hours As Needed for Moderate Pain. 180 tablet 0   • hydrocortisone 1 % cream  Apply  topically to the appropriate area as directed 2 (Two) Times a Day. 60 g 3   • hydrOXYzine (ATARAX) 25 MG tablet Take 1 tablet by mouth 4 (Four) Times a Day As Needed for Itching. (Patient taking differently: Take 1 tablet by mouth 4 (Four) Times a Day As Needed for Itching. Use preop if needed) 360 tablet 3   • Insulin Lispro (ADMELOG SOLOSTAR) 100 UNIT/ML injection pen Inject 20 Units under the skin into the appropriate area as directed 3 (Three) Times a Day With Meals for 100 days. (Patient taking differently: Inject 20 Units under the skin into the appropriate area as directed 3 (Three) Times a Day With Meals. While off janumet   none am of surgery) 60 mL 3   • Insulin Lispro (humaLOG) 100 UNIT/ML injection Inject  under the skin into the appropriate area as directed 3 (Three) Times a Day Before Meals.     • Insulin Pen Needle (Pen Needles) 32G X 4 MM misc use 1 each 4 (Four) Times a Day Before Meals & at Bedtime. 200 each 2   • Lancet Devices (TRUEdraw Lancing Device) misc 1 each Daily. Dx:E11.49 patient checks sugar qid 1 each 0   • loratadine (EQ Loratadine) 10 MG tablet Take 1 tablet by mouth Daily. (Patient taking differently: Take 1 tablet by mouth Every Night.) 90 tablet 3   • methocarbamol (ROBAXIN) 750 MG tablet Take 1 tablet by mouth 4 (Four) Times a Day As Needed for Muscle Spasms. 180 tablet 3   • montelukast (SINGULAIR) 10 MG tablet Take 1 tablet by mouth Every Night. 90 tablet 3   • oxybutynin XL (DITROPAN XL) 15 MG 24 hr tablet Take 1 tablet by mouth Daily. (Patient taking differently: Take 1 tablet by mouth As Needed.) 30 tablet 12   • oxybutynin XL (DITROPAN XL) 15 MG 24 hr tablet Take 1 tablet by mouth Daily. 90 tablet 3   • pantoprazole (Protonix) 40 MG EC tablet Take 1 tablet by mouth Daily. 90 tablet 3   • polycarbophil 625 MG tablet tablet Take  by mouth Every Night.     • promethazine (PHENERGAN) 25 MG tablet TAKE 1 TABLET BY MOUTH EVERY 8 HOURS AS NEEDED FOR NAUSEA FOR VOMITING 90  "tablet 0   • sildenafil (REVATIO) 20 MG tablet Take 1 tablet by mouth Daily As Needed (ed). 30 tablet 3   • sitaGLIPtin-metFORMIN (Janumet)  MG per tablet Take 1 tablet by mouth 2 (Two) Times a Day With Meals. (Patient taking differently: Take 1 tablet by mouth 2 (Two) Times a Day With Meals. Last dose 2/20  0730  covering with admelog) 180 tablet 3   • sodium chloride (Ocean Nasal Spray) 0.65 % nasal spray 1 spray each nostril tid (Patient taking differently: 1 spray into the nostril(s) as directed by provider As Needed. Use preop) 216 mL 3   • Triamcinolone Acetonide (NASACORT) 55 MCG/ACT nasal inhaler 2 sprays into the nostril(s) as directed by provider Daily. 16.5 g 11   • TRUEplus Lancets 30G misc 1 each 4 (Four) Times a Day. Dx:E11.49 patient checks sugar qid 100 each 12   • fluticasone (FLONASE) 50 MCG/ACT nasal spray 1 spray into the nostril(s) as directed by provider Daily for 90 days. 16 g 3   • Insulin Glargine (BASAGLAR KWIKPEN) 100 UNIT/ML injection pen Inject 38 Units under the skin into the appropriate area as directed Every Night for 30 days. 15 mL 8     No current facility-administered medications on file prior to visit.       Objective   Resp 20   Ht 195.6 cm (77\")   Wt 132 kg (291 lb)   BMI 34.51 kg/m²     Foot/Ankle Exam     VASCULAR       Right Foot Vascularity   Normal vascular exam    Dorsalis pedis:  2+  Posterior tibial:  2+  Skin Temperature: warm    Edema Grading:  None  CFT:  < 3 seconds  Pedal Hair Growth:  Present  Varicosities: none        Left Foot Vascularity   Normal vascular exam    Dorsalis pedis:  2+  Posterior tibial:  2+  Skin Temperature: warm    Edema Grading:  None  CFT:  < 3 seconds  Pedal Hair Growth:  Present  Varicosities: none        NEUROLOGIC      Right Foot Neurologic   Protective Sensation using Nancy-Ernesto Monofilament:  8      Left Foot Neurologic   Protective Sensation using Nancy-Ernesto Monofilament:  9      MUSCULOSKELETAL       Right Foot " Musculoskeletal   Ecchymosis:  None  Tenderness: none        Left Foot Musculoskeletal   Ecchymosis:  None  Tenderness: none        DERMATOLOGIC      Right Foot Dermatologic   Skin: skin intact    Nails: dystrophic nails        Left Foot Dermatologic   Skin: skin intact    Nails: dystrophic nails      Assessment & Plan   Diagnoses and all orders for this visit:    1. DM type 2 with diabetic peripheral neuropathy (Primary)    2. Type 2 diabetes mellitus with hyperglycemia, without long-term current use of insulin    3. Onychodystrophy      Explained importance of diabetic foot care, daily foot checks, and glycemic control. Patient should check both feet on a daily basis, monitor and control blood sugars, make sure that both feet and in between toes are towel dried after baths or showers. Avoid barefoot walking at all times. Check shoes before putting them on. Patient was given information on proper foot care. The patient will return in 2 months. Call the office at the first signs of a wound or with signs of infection.    Nail debridement: Both feet x10    Consent and time out was performed before proceeding with the procedure. Nails were debrided with a nail nipper without complication.  No anesthesia was required.  Indications for procedure were thickened, dystrophic, and fungal appearing nails which are difficult to trim.  Proper self-care and technique was discussed with patient.  Patient was stable after procedure.    No orders of the defined types were placed in this encounter.         Note is dictated utilizing voice recognition software. Unfortunately this leads to occasional typographical errors. I apologize in advance if the situation occurs. If questions occur please do not hesitate to call our office.      Transcribed from ambient dictation for ANASTASIA Alvares by Magaly Fisher.  05/23/23   17:38 EDT    Patient or patient representative verbalized consent to the visit recording.  I have personally  performed the services described in this document as transcribed by the above individual, and it is both accurate and complete.  Gilda Campbell, APRN  5/23/2023  17:40 EDT

## 2023-05-24 LAB — HIV 1+2 AB+HIV1 P24 AG SERPL QL IA: NON REACTIVE

## 2023-06-04 DIAGNOSIS — E11.49 TYPE 2 DIABETES MELLITUS WITH OTHER NEUROLOGIC COMPLICATION, WITH LONG-TERM CURRENT USE OF INSULIN: ICD-10-CM

## 2023-06-04 DIAGNOSIS — Z79.4 TYPE 2 DIABETES MELLITUS WITH OTHER NEUROLOGIC COMPLICATION, WITH LONG-TERM CURRENT USE OF INSULIN: ICD-10-CM

## 2023-06-06 ENCOUNTER — OFFICE VISIT (OUTPATIENT)
Dept: PAIN MEDICINE | Facility: CLINIC | Age: 43
End: 2023-06-06
Payer: MEDICAID

## 2023-06-06 VITALS
DIASTOLIC BLOOD PRESSURE: 87 MMHG | OXYGEN SATURATION: 97 % | SYSTOLIC BLOOD PRESSURE: 150 MMHG | HEART RATE: 98 BPM | RESPIRATION RATE: 16 BRPM

## 2023-06-06 DIAGNOSIS — G89.29 CHRONIC MIDLINE LOW BACK PAIN WITH BILATERAL SCIATICA: Primary | ICD-10-CM

## 2023-06-06 DIAGNOSIS — J30.2 OTHER SEASONAL ALLERGIC RHINITIS: ICD-10-CM

## 2023-06-06 DIAGNOSIS — M54.16 LUMBAR RADICULOPATHY: ICD-10-CM

## 2023-06-06 DIAGNOSIS — M54.41 CHRONIC MIDLINE LOW BACK PAIN WITH BILATERAL SCIATICA: Primary | ICD-10-CM

## 2023-06-06 DIAGNOSIS — M54.42 CHRONIC MIDLINE LOW BACK PAIN WITH BILATERAL SCIATICA: Primary | ICD-10-CM

## 2023-06-06 DIAGNOSIS — M48.07 LUMBOSACRAL SPINAL STENOSIS: ICD-10-CM

## 2023-06-06 RX ORDER — HYDROCODONE BITARTRATE AND ACETAMINOPHEN 7.5; 325 MG/1; MG/1
1 TABLET ORAL
Qty: 150 TABLET | Refills: 0 | Status: SHIPPED | OUTPATIENT
Start: 2023-06-06

## 2023-06-06 RX ORDER — FLUTICASONE PROPIONATE 50 MCG
SPRAY, SUSPENSION (ML) NASAL
Qty: 16 ML | Refills: 0 | Status: SHIPPED | OUTPATIENT
Start: 2023-06-06

## 2023-06-06 RX ORDER — ACETAMINOPHEN AND CODEINE PHOSPHATE 300; 30 MG/1; MG/1
1 TABLET ORAL 3 TIMES DAILY PRN
Qty: 21 TABLET | Refills: 5 | Status: SHIPPED | OUTPATIENT
Start: 2023-06-06

## 2023-06-06 RX ORDER — DICLOFENAC EPOLAMINE 0.01 G/1
1 SYSTEM TOPICAL 2 TIMES DAILY PRN
Qty: 60 PATCH | Refills: 11 | Status: SHIPPED | OUTPATIENT
Start: 2023-06-06

## 2023-06-06 RX ORDER — METHOCARBAMOL 750 MG/1
750 TABLET, FILM COATED ORAL 4 TIMES DAILY PRN
Qty: 180 TABLET | Refills: 3 | Status: SHIPPED | OUTPATIENT
Start: 2023-06-06
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
## 2023-07-25 ENCOUNTER — OFFICE VISIT (OUTPATIENT)
Dept: PODIATRY | Facility: CLINIC | Age: 43
End: 2023-07-25
Payer: MEDICAID

## 2023-07-25 VITALS — RESPIRATION RATE: 20 BRPM | WEIGHT: 291 LBS | BODY MASS INDEX: 34.36 KG/M2 | HEIGHT: 77 IN | OXYGEN SATURATION: 97 %

## 2023-07-25 DIAGNOSIS — E11.65 TYPE 2 DIABETES MELLITUS WITH HYPERGLYCEMIA, WITHOUT LONG-TERM CURRENT USE OF INSULIN: ICD-10-CM

## 2023-07-25 DIAGNOSIS — L84 CALLUS OF TOE: ICD-10-CM

## 2023-07-25 DIAGNOSIS — L60.3 ONYCHODYSTROPHY: ICD-10-CM

## 2023-07-25 DIAGNOSIS — E11.42 DIABETIC PERIPHERAL NEUROPATHY ASSOCIATED WITH TYPE 2 DIABETES MELLITUS: Primary | ICD-10-CM

## 2023-07-25 RX ORDER — PROCHLORPERAZINE 25 MG/1
SUPPOSITORY RECTAL
COMMUNITY
Start: 2023-07-19

## 2023-07-25 RX ORDER — POTASSIUM CHLORIDE 750 MG/1
1 CAPSULE, EXTENDED RELEASE ORAL DAILY
COMMUNITY
Start: 2023-07-15

## 2023-07-25 NOTE — PROGRESS NOTES
07/25/2023  Foot and Ankle Surgery - Established Patient/Follow-up  Provider: ANASTASIA Chan   Location: [facility]    Subjective:  Barrett Laguerre is a 43 y.o. male.     Chief Complaint   Patient presents with    Left Foot - Pain     Dm foot check     Right Foot - Pain     dm foot check     Follow-up     CYRUS Rosenberg 5/23/23     HPI: The patient is here today for routine diabetic foot check.    The patient reports he has had instances of occasional soreness. He states he is hoping to start the process to obtain new diabetic shoes and inserts. He notes his blood glucose levels have been well. He states the pain he was experiencing was near his nails.    Additionally, he states the pain he has been experiencing is not in his feet, but more in his right side back and glutes. He reports he had surgery done. He notes his pain has subsided and has relocated to the right side of his back.  Allergies   Allergen Reactions    Penicillin G Anaphylaxis    Penicillins Anaphylaxis    Sulfa Antibiotics Hives    Allopurinol Rash       Current Outpatient Medications on File Prior to Visit   Medication Sig Dispense Refill    acetaminophen-codeine (TYLENOL/CODEINE #3) 300-30 MG per tablet Take 1 tablet by mouth 3 (Three) Times a Day As Needed for Mild Pain. 21 tablet 5    Alcohol Swabs (Alcohol Wipes) 70 % pads Apply 1 each topically 4 (Four) Times a Day Before Meals & at Bedtime. 200 each 2    amLODIPine (NORVASC) 10 MG tablet Take 1 tablet by mouth Daily. (Patient taking differently: Take 1 tablet by mouth Every Night.) 90 tablet 3    azelastine (ASTELIN) 0.1 % nasal spray 2 sprays into the nostril(s) as directed by provider 2 (Two) Times a Day. Use in each nostril as directed (Patient taking differently: 2 sprays into the nostril(s) as directed by provider As Needed. Use in each nostril as directed  not currently using) 3 each 3    benzonatate (Tessalon Perles) 100 MG capsule Take 1 capsule by mouth 3 (Three)  Times a Day As Needed for Cough. 30 capsule 0    Blood Glucose Monitoring Suppl (True Metrix Meter) w/Device kit USE 1  TO CHECK GLUCOSE 4 TIMES DAILY 1 kit 0    cetirizine (zyrTEC) 10 MG tablet Take 1 tablet by mouth Daily. 30 tablet 11    chlorthalidone (HYGROTEN) 50 MG tablet Take 1 tablet by mouth Daily. (Patient taking differently: Take 1 tablet by mouth Every Night.) 90 tablet 3    clindamycin (CLEOCIN T) 1 % external solution Apply 1 application topically to the appropriate area as directed 2 (Two) Times a Day. Not currently using      colchicine-probenecid (COL-BENEMID) 0.5-500 MG per tablet Take 1 tablet by mouth once daily 30 tablet 0    Continuous Blood Gluc  (Dexcom G6 ) device 1  ONCE DAILY 1 each 0    Continuous Blood Gluc Sensor (Dexcom G6 Sensor) Every 10 (Ten) Days. 1 each 6    Continuous Blood Gluc Transmit (Dexcom G6 Transmitter) misc USE 1 EACH AS DIRECTED FOR 90 DAYS      Cyanocobalamin (Vitamin B-12 ER) 1000 MCG tablet controlled-release TAKE 1 TABLET DAILY 30 each 5    Diclofenac Epolamine (FLECTOR) 1.3 % patch patch Apply 1 patch topically to the appropriate area as directed 2 (Two) Times a Day As Needed (back pain). 60 patch 11    doxycycline (VIBRAMYCIN) 100 MG capsule Take 1 capsule by mouth 2 (Two) Times a Day. 14 capsule 0    erythromycin (ROMYCIN) 5 MG/GM ophthalmic ointment Administer 1 application  to both eyes As Needed. Not currently using      FLUoxetine (PROzac) 40 MG capsule Take 1 capsule by mouth 2 (Two) Times a Day. (Patient taking differently: Take 1 capsule by mouth Every Night.) 180 capsule 3    fluticasone (FLONASE) 50 MCG/ACT nasal spray USE 1 SPRAY(S) IN EACH NOSTRIL TWICE DAILY AS DIRECTED 16 mL 0    gabapentin (NEURONTIN) 300 MG capsule Take 1 capsule by mouth 3 (Three) Times a Day. 270 capsule 3    glucose blood (True Metrix Blood Glucose Test) test strip 1 each by Other route 4 (Four) Times a Day. Dx:E11.49 patient checks sugar qid 200 each 12     HYDROcodone-acetaminophen (Norco) 7.5-325 MG per tablet Take 1 tablet by mouth 5 (Five) Times a Day As Needed for Severe Pain. 150 tablet 0    HYDROcodone-acetaminophen (Norco) 7.5-325 MG per tablet Take 1 tablet by mouth 5 (Five) Times a Day As Needed for Severe Pain. 150 tablet 0    HYDROcodone-acetaminophen (Norco) 7.5-325 MG per tablet Take 1 tablet by mouth 5 (Five) Times a Day As Needed for Severe Pain. 150 tablet 0    hydrocortisone 1 % cream Apply  topically to the appropriate area as directed 2 (Two) Times a Day. 60 g 3    hydrOXYzine (ATARAX) 25 MG tablet Take 1 tablet by mouth 4 (Four) Times a Day As Needed for Itching. (Patient taking differently: Take 1 tablet by mouth 4 (Four) Times a Day As Needed for Itching. Use preop if needed) 360 tablet 3    Insulin Lispro (humaLOG) 100 UNIT/ML injection Inject  under the skin into the appropriate area as directed 3 (Three) Times a Day Before Meals.      Insulin Pen Needle (Pen Needles) 32G X 4 MM misc use 1 each 4 (Four) Times a Day Before Meals & at Bedtime. 200 each 2    Lancet Devices (TRUEdraw Lancing Device) misc 1 each Daily. Dx:E11.49 patient checks sugar qid 1 each 0    loratadine (EQ Loratadine) 10 MG tablet Take 1 tablet by mouth Daily. (Patient taking differently: Take 1 tablet by mouth Every Night.) 90 tablet 3    methocarbamol (ROBAXIN) 750 MG tablet Take 1 tablet by mouth 4 (Four) Times a Day As Needed for Muscle Spasms. 180 tablet 3    montelukast (SINGULAIR) 10 MG tablet Take 1 tablet by mouth Every Night. 90 tablet 3    oxybutynin XL (DITROPAN XL) 15 MG 24 hr tablet Take 1 tablet by mouth Daily. (Patient taking differently: Take 1 tablet by mouth As Needed.) 30 tablet 12    oxybutynin XL (DITROPAN XL) 15 MG 24 hr tablet Take 1 tablet by mouth Daily. 90 tablet 3    pantoprazole (Protonix) 40 MG EC tablet Take 1 tablet by mouth Daily. 90 tablet 3    polycarbophil 625 MG tablet tablet Take  by mouth Every Night.      potassium chloride (MICRO-K) 10  "MEQ CR capsule Take 1 capsule by mouth Daily.      promethazine (PHENERGAN) 25 MG tablet TAKE 1 TABLET BY MOUTH EVERY 8 HOURS AS NEEDED FOR NAUSEA FOR VOMITING 90 tablet 0    sildenafil (REVATIO) 20 MG tablet Take 1 tablet by mouth Daily As Needed (ed). 30 tablet 3    sitaGLIPtin-metFORMIN (Janumet)  MG per tablet Take 1 tablet by mouth 2 (Two) Times a Day With Meals. (Patient taking differently: Take 1 tablet by mouth 2 (Two) Times a Day With Meals. Last dose 2/20  0730  covering with admelog) 180 tablet 3    sodium chloride (Ocean Nasal Spray) 0.65 % nasal spray 1 spray each nostril tid (Patient taking differently: 1 spray into the nostril(s) as directed by provider As Needed. Use preop) 216 mL 3    Triamcinolone Acetonide (NASACORT) 55 MCG/ACT nasal inhaler 2 sprays into the nostril(s) as directed by provider Daily. 16.5 g 11    TRUEplus Lancets 30G misc 1 each 4 (Four) Times a Day. Dx:E11.49 patient checks sugar qid 100 each 12    Insulin Glargine (BASAGLAR KWIKPEN) 100 UNIT/ML injection pen Inject 38 Units under the skin into the appropriate area as directed Every Night for 30 days. 15 mL 8    Insulin Lispro (ADMELOG SOLOSTAR) 100 UNIT/ML injection pen Inject 20 Units under the skin into the appropriate area as directed 3 (Three) Times a Day With Meals for 100 days. (Patient taking differently: Inject 20 Units under the skin into the appropriate area as directed 3 (Three) Times a Day With Meals. While off janumet   none am of surgery) 60 mL 3     No current facility-administered medications on file prior to visit.       Objective   Resp 20   Ht 195.6 cm (77\")   Wt 132 kg (291 lb)   SpO2 97%   BMI 34.51 kg/m²     Foot/Ankle Exam    VASCULAR     Right Foot Vascularity   Normal vascular exam    Dorsalis pedis:  2+  Posterior tibial:  2+  Skin temperature:  warm  Edema grading:  None  CFT:  < 3 seconds  Pedal hair growth:  Present  Varicosities:  none     Left Foot Vascularity   Normal vascular exam  "   Dorsalis pedis:  2+  Posterior tibial:  2+  Skin temperature:  warm  Edema grading:  None  CFT:  < 3 seconds  Pedal hair growth:  Present  Varicosities:  none     NEUROLOGIC     Right Foot Neurologic   Protective Sensation using Kinnear-Ernesto Monofilament:   Sites tested: 9     Left Foot Neurologic   Protective Sensation using Kinnear-Ernesto Monofilament:   Sites tested: 10    MUSCULOSKELETAL     Right Foot Musculoskeletal   Tenderness:  none    [place]:  First toe     Left Foot Musculoskeletal   Tenderness:  none  Hammertoe:  First toe    DERMATOLOGIC      Right Foot Dermatologic   Skin  Right foot skin is intact.   Nails  1.  Positive for elongated, abnormal thickness and dystrophic nail. (Mild callus)  2.  Positive for elongated, abnormal thickness and dystrophic nail.  3.  Positive for elongated, abnormal thickness and dystrophic nail.  4.  Positive for elongated, abnormal thickness and dystrophic nail.  5.  Positive for elongated, abnormal thickness and dystrophic nail.     Left Foot Dermatologic   Skin  Left foot skin is intact.   Nails  1.  Positive for elongated, abnormal thickness and dystrophic nail.  2.  Positive for elongated, abnormal thickness and dystrophic nail.  3.  Positive for elongated, abnormal thickness and dystrophic nail.  4.  Positive for elongated, abnormally thick and dystrophic nail.  5.  Positive for elongated, abnormally thick and dystrophic nail.      Assessment & Plan   Diagnoses and all orders for this visit:    1. Diabetic peripheral neuropathy associated with type 2 diabetes mellitus (Primary)    2. Onychodystrophy    3. Type 2 diabetes mellitus with hyperglycemia, without long-term current use of insulin    4. Callus of toe    Do feel patient is at mild to moderate risk of pedal complications related to diabetes. Will order diabetic shoes and insoles for patient. Advised the patient when he gets new shoes to gradually break them in. Follow-up in 2 months for routine  diabetic foot check.     Explained importance of diabetic foot care, daily foot checks, and glycemic control. Patient should check both feet on a daily basis, monitor and control blood sugars, make sure that both feet and in between toes are towel dried after baths or showers. Avoid barefoot walking at all times. Check shoes before putting them on.   Patient was given information on proper foot care. Call the office at the first signs of a wound or with signs of infection.     Nail debridement: Both feet x10    Consent and time out was performed before proceeding with the procedure. Nails were debrided with a nail nipper without complication.  No anesthesia was required.  Indications for procedure were thickened, dystrophic, and fungal appearing nails which are difficult to trim.  Proper self-care and technique was discussed with patient.  Patient was stable after procedure.    No orders of the defined types were placed in this encounter.      Transcribed from ambient dictation for ANASTASIA Alvares by Claudio Mcduffie.  07/25/23   17:48 EDT    Patient or patient representative verbalized consent to the visit recording.  I have personally performed the services described in this document as transcribed by the above individual, and it is both accurate and complete.  ANASTASIA Alvares  7/26/2023  12:16 EDT

## 2023-07-28 RX ORDER — PROBENECID AND COLCHICINE 500; .5 MG/1; MG/1
TABLET ORAL
Qty: 30 TABLET | Refills: 0 | Status: SHIPPED | OUTPATIENT
Start: 2023-07-28

## 2023-08-07 ENCOUNTER — TELEPHONE (OUTPATIENT)
Dept: PODIATRY | Facility: CLINIC | Age: 43
End: 2023-08-07

## 2023-08-07 NOTE — TELEPHONE ENCOUNTER
Caller: Barrett Laguerre    Relationship to patient: Self    Best call back number: [phone]    Patient is needing: PATIENT WOULD LIKE TO KNOW THE STATUS OF GETTING DIABETIC SHOE INSERTS.

## 2023-08-08 ENCOUNTER — OFFICE VISIT (OUTPATIENT)
Dept: PAIN MEDICINE | Facility: CLINIC | Age: 43
End: 2023-08-08
Payer: MEDICAID

## 2023-08-08 VITALS
OXYGEN SATURATION: 97 % | SYSTOLIC BLOOD PRESSURE: 123 MMHG | DIASTOLIC BLOOD PRESSURE: 68 MMHG | HEART RATE: 78 BPM | RESPIRATION RATE: 16 BRPM

## 2023-08-08 DIAGNOSIS — G89.29 CHRONIC MIDLINE LOW BACK PAIN WITH BILATERAL SCIATICA: Primary | ICD-10-CM

## 2023-08-08 DIAGNOSIS — M54.42 CHRONIC MIDLINE LOW BACK PAIN WITH BILATERAL SCIATICA: Primary | ICD-10-CM

## 2023-08-08 DIAGNOSIS — M48.07 LUMBOSACRAL SPINAL STENOSIS: ICD-10-CM

## 2023-08-08 DIAGNOSIS — M54.41 CHRONIC MIDLINE LOW BACK PAIN WITH BILATERAL SCIATICA: Primary | ICD-10-CM

## 2023-08-08 DIAGNOSIS — M54.16 LUMBAR RADICULOPATHY: ICD-10-CM

## 2023-08-08 RX ORDER — LOSARTAN POTASSIUM 50 MG/1
1 TABLET ORAL DAILY
COMMUNITY
Start: 2023-08-02

## 2023-08-08 RX ORDER — HYDROCODONE BITARTRATE AND ACETAMINOPHEN 7.5; 325 MG/1; MG/1
1 TABLET ORAL
Qty: 150 TABLET | Refills: 0 | Status: SHIPPED | OUTPATIENT
Start: 2023-08-08
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
## 2023-08-14 ENCOUNTER — HOSPITAL ENCOUNTER (OUTPATIENT)
Dept: GENERAL RADIOLOGY | Facility: HOSPITAL | Age: 43
Discharge: HOME | End: 2023-08-14
Admitting: NEUROLOGICAL SURGERY
Payer: MEDICAID

## 2023-08-14 DIAGNOSIS — M54.16 LUMBAR RADICULOPATHY: ICD-10-CM

## 2023-08-14 PROCEDURE — 72114 X-RAY EXAM L-S SPINE BENDING: CPT

## 2023-08-17 ENCOUNTER — TELEPHONE (OUTPATIENT)
Dept: PAIN MEDICINE | Facility: CLINIC | Age: 43
End: 2023-08-17
Payer: MEDICAID

## 2023-08-17 NOTE — PROGRESS NOTES
Neurosurgical Consultation      Barrett Laguerre is a 43 y.o. male is here today for follow-up for lumbar radiculopathy. In the office today patient reports of back pain that radiates down to the gluteal area. No numbness, and occasional weakness due to being tense.     Chief Complaint   Patient presents with    Back Pain     Follow up        Previous treatment: Lumbar laminectomy 2/22/23, Physical therapy [facility] 10 visits, NORCO 7.5-325, Robaxin 750mg    HPI: This is a 43-year-old gentleman with originally poorly controlled diabetes and obesity with a BMI of 33 at this juncture.  He underwent an L4-S1 bilateral microdiscectomy for bilateral L4 and L5 radiculopathy.  His radiculopathy has resolved.  He continues to have up to 7 out of 10 midline spinal pain.  He has been engaging with physical therapy.  Most of his relief comes with massage and heat at this juncture.  His incision is well-healing.  He continues to work with pain management.    Past Medical History:   Diagnosis Date    Acquired pes planus of both feet     Impression: needs referral for orthotics.    Acute non-recurrent maxillary sinusitis     Impression: His symptoms were not improving.    ADD (attention deficit disorder)     Allergies     Annual physical exam     Impression: Discussed anticipatory guidance, diet, exercise, and weight loss. Discussed safety, seatbelts, and routine screening examinations. Discussed self-examinations.    Asthma     Impression: Stable    Asymptomatic microscopic hematuria     Impression: recheck neg. Will follow    Bulging lumbar disc     Chronic gout, unspecified, without tophus (tophi)     Impression: Recommend rechecking uric acid.    CIDP (chronic inflammatory demyelinating polyneuropathy)     Story: s/p IVIG Guillain-[place] per [facility] diagnosed 2015 gabapentin 600 tid. Impression: Symptoms include: numbness and tingling in hands and feet, difficulty concentrating, drops items, chest pain. dizziness. Pt needs routine  follow up with neurology. Previously seen by Dr Seipel    Colon polyps     DDD (degenerative disc disease), lumbar     Diabetes mellitus     Dyslipidemia     GERD (gastroesophageal reflux disease)     Gout     Guillain-[place]     Hypertension, benign     Impression: Followed by Cardiology Dr Tong who placed pt on different BP med. . Proteinuria/creatinine noted    Idiopathic chronic gout without tophus     Unspecified site. Impression: stable. Story: UA 8.3 2/2018    Intervertebral disc stenosis of neural canal of lumbar region     Story: MRI 11/15 showed lumbar disk disease. 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 Pt would like referra to Dr Watkins, has appt Dec 15    Low back pain     Lumbar disc disorder with myelopathy     Impression: failed therapy. Pt would like refill on compounded topical med. Rx faxed    Mixed obsessional thoughts and acts     Obstructive sleep apnea     bipap bring dos    OCD (obsessive compulsive disorder)     Other seasonal allergic rhinitis     Impression: Over-the-counter medication indications, dosage, and precautions discussed.    Overactive bladder     PONV (postoperative nausea and vomiting)     Pruritus     Screening for depression     Screening for hyperlipidemia     SI (sacroiliac) joint dysfunction     Impression: Findings discussed. All questions answered. Medication and medication adverse effects discussed. Drug education given and explained to patient. Patient verbalized understanding. Follow-up in 4-6 weeks if not better. Follow-up sooner for worsening symptoms or for any concerns. Consider chiropractor    Tobacco use disorder         Past Surgical History:   Procedure Laterality Date    COLONOSCOPY N/A 12/22/2020    Procedure: COLONOSCOPY with polypectomy x;  Surgeon: Juan Alberto Davis MD;  Location: [facility] ENDOSCOPY;  Service: Gastroenterology;  Laterality: N/A;  post: transverse colon polyp    INGUINAL HERNIA REPAIR      LUMBAR LAMINECTOMY Left 2/22/2023     Procedure: LUMBAR 4-SACRAL 1 LAMINOTOMY, MEDIAL FACETECTOMY, FORAMINOTOMY, AND MICRODISCECTOMY;  Surgeon: Vahe Pace MD;  Location: [facility] MAIN OR;  Service: Neurosurgery;  Laterality: Left;    NOSE SURGERY      TYMPANOSTOMY TUBE PLACEMENT          Current Outpatient Medications on File Prior to Visit   Medication Sig Dispense Refill    acetaminophen-codeine (TYLENOL/CODEINE #3) 300-30 MG per tablet Take 1 tablet by mouth 3 (Three) Times a Day As Needed for Mild Pain. 21 tablet 5    Alcohol Swabs (Alcohol Wipes) 70 % pads Apply 1 each topically 4 (Four) Times a Day Before Meals & at Bedtime. 200 each 2    amLODIPine (NORVASC) 10 MG tablet Take 1 tablet by mouth Daily. (Patient taking differently: Take 1 tablet by mouth Every Night.) 90 tablet 3    azelastine (ASTELIN) 0.1 % nasal spray 2 sprays into the nostril(s) as directed by provider 2 (Two) Times a Day. Use in each nostril as directed (Patient taking differently: 2 sprays into the nostril(s) as directed by provider As Needed. Use in each nostril as directed  not currently using) 3 each 3    benzonatate (Tessalon Perles) 100 MG capsule Take 1 capsule by mouth 3 (Three) Times a Day As Needed for Cough. 30 capsule 0    Blood Glucose Monitoring Suppl (True Metrix Meter) w/Device kit USE 1  TO CHECK GLUCOSE 4 TIMES DAILY 1 kit 0    cetirizine (zyrTEC) 10 MG tablet Take 1 tablet by mouth Daily. 30 tablet 11    chlorthalidone (HYGROTEN) 50 MG tablet Take 1 tablet by mouth Daily. (Patient taking differently: Take 1 tablet by mouth Every Night.) 90 tablet 3    clindamycin (CLEOCIN T) 1 % external solution Apply 1 application topically to the appropriate area as directed 2 (Two) Times a Day. Not currently using      colchicine-probenecid (COL-BENEMID) 0.5-500 MG per tablet Take 1 tablet by mouth once daily 30 tablet 0    Continuous Blood Gluc  (Dexcom G6 ) device 1  ONCE DAILY 1 each 0    Continuous Blood Gluc Sensor (Dexcom G6 Sensor) Every 10  (Ten) Days. 1 each 6    Continuous Blood Gluc Transmit (Dexcom G6 Transmitter) misc USE 1 EACH AS DIRECTED FOR 90 DAYS      Cyanocobalamin (Vitamin B-12 ER) 1000 MCG tablet controlled-release TAKE 1 TABLET DAILY 30 each 5    Diclofenac Epolamine (FLECTOR) 1.3 % patch patch Apply 1 patch topically to the appropriate area as directed 2 (Two) Times a Day As Needed (back pain). 60 patch 11    doxycycline (VIBRAMYCIN) 100 MG capsule Take 1 capsule by mouth 2 (Two) Times a Day. 14 capsule 0    erythromycin (ROMYCIN) 5 MG/GM ophthalmic ointment Administer 1 application  to both eyes As Needed. Not currently using      FLUoxetine (PROzac) 40 MG capsule Take 1 capsule by mouth 2 (Two) Times a Day. (Patient taking differently: Take 1 capsule by mouth Every Night.) 180 capsule 3    fluticasone (FLONASE) 50 MCG/ACT nasal spray USE 1 SPRAY(S) IN EACH NOSTRIL TWICE DAILY AS DIRECTED 16 mL 0    gabapentin (NEURONTIN) 300 MG capsule Take 1 capsule by mouth 3 (Three) Times a Day. 270 capsule 3    glucose blood (True Metrix Blood Glucose Test) test strip 1 each by Other route 4 (Four) Times a Day. Dx:E11.49 patient checks sugar qid 200 each 12    HYDROcodone-acetaminophen (Norco) 7.5-325 MG per tablet Take 1 tablet by mouth 5 (Five) Times a Day As Needed for Severe Pain. 150 tablet 0    HYDROcodone-acetaminophen (Norco) 7.5-325 MG per tablet Take 1 tablet by mouth 5 (Five) Times a Day As Needed for Severe Pain. 150 tablet 0    HYDROcodone-acetaminophen (Norco) 7.5-325 MG per tablet Take 1 tablet by mouth 5 (Five) Times a Day As Needed for Severe Pain. 150 tablet 0    hydrocortisone 1 % cream Apply  topically to the appropriate area as directed 2 (Two) Times a Day. 60 g 3    hydrOXYzine (ATARAX) 25 MG tablet Take 1 tablet by mouth 4 (Four) Times a Day As Needed for Itching. (Patient taking differently: Take 1 tablet by mouth 4 (Four) Times a Day As Needed for Itching. Use preop if needed) 360 tablet 3    Insulin Lispro (humaLOG) 100  UNIT/ML injection Inject  under the skin into the appropriate area as directed 3 (Three) Times a Day Before Meals.      Insulin Pen Needle (Pen Needles) 32G X 4 MM misc use 1 each 4 (Four) Times a Day Before Meals & at Bedtime. 200 each 2    Lancet Devices (TRUEdraw Lancing Device) misc 1 each Daily. Dx:E11.49 patient checks sugar qid 1 each 0    loratadine (EQ Loratadine) 10 MG tablet Take 1 tablet by mouth Daily. (Patient taking differently: Take 1 tablet by mouth Every Night.) 90 tablet 3    losartan (COZAAR) 50 MG tablet Take 1 tablet by mouth Daily.      methocarbamol (ROBAXIN) 750 MG tablet Take 1 tablet by mouth 4 (Four) Times a Day As Needed for Muscle Spasms. 180 tablet 3    montelukast (SINGULAIR) 10 MG tablet Take 1 tablet by mouth Every Night. 90 tablet 3    oxybutynin XL (DITROPAN XL) 15 MG 24 hr tablet Take 1 tablet by mouth Daily. (Patient taking differently: Take 1 tablet by mouth As Needed.) 30 tablet 12    oxybutynin XL (DITROPAN XL) 15 MG 24 hr tablet Take 1 tablet by mouth Daily. 90 tablet 3    pantoprazole (Protonix) 40 MG EC tablet Take 1 tablet by mouth Daily. 90 tablet 3    polycarbophil 625 MG tablet tablet Take  by mouth Every Night.      potassium chloride (MICRO-K) 10 MEQ CR capsule Take 1 capsule by mouth Daily.      promethazine (PHENERGAN) 25 MG tablet TAKE 1 TABLET BY MOUTH EVERY 8 HOURS AS NEEDED FOR NAUSEA FOR VOMITING 90 tablet 0    sildenafil (REVATIO) 20 MG tablet Take 1 tablet by mouth Daily As Needed (ed). 30 tablet 3    sitaGLIPtin-metFORMIN (Janumet)  MG per tablet Take 1 tablet by mouth 2 (Two) Times a Day With Meals. (Patient taking differently: Take 1 tablet by mouth 2 (Two) Times a Day With Meals. Last dose 2/20 0730  covering with admelog) 180 tablet 3    sodium chloride (Ocean Nasal Spray) 0.65 % nasal spray 1 spray each nostril tid (Patient taking differently: 1 spray into the nostril(s) as directed by provider As Needed. Use preop) 216 mL 3    Triamcinolone  Acetonide (NASACORT) 55 MCG/ACT nasal inhaler 2 sprays into the nostril(s) as directed by provider Daily. 16.5 g 11    TRUEplus Lancets 30G misc 1 each 4 (Four) Times a Day. Dx:E11.49 patient checks sugar qid 100 each 12    Insulin Glargine (BASAGLAR KWIKPEN) 100 UNIT/ML injection pen Inject 38 Units under the skin into the appropriate area as directed Every Night for 30 days. 15 mL 8    Insulin Lispro (ADMELOG SOLOSTAR) 100 UNIT/ML injection pen Inject 20 Units under the skin into the appropriate area as directed 3 (Three) Times a Day With Meals for 100 days. (Patient taking differently: Inject 20 Units under the skin into the appropriate area as directed 3 (Three) Times a Day With Meals. While off janumet   none am of surgery) 60 mL 3     No current facility-administered medications on file prior to visit.        Allergies   Allergen Reactions    Penicillin G Anaphylaxis    Penicillins Anaphylaxis    Sulfa Antibiotics Hives    Allopurinol Rash        Social History     Socioeconomic History    Marital status: Single   Tobacco Use    Smoking status: Never    Smokeless tobacco: Never   Vaping Use    Vaping Use: Never used   Substance and Sexual Activity    Alcohol use: Not Currently     Comment: socially    Drug use: Not Currently    Sexual activity: Defer          Review of Systems   Constitutional:  Positive for activity change.   HENT: Negative.     Eyes: Negative.    Respiratory: Negative.     Cardiovascular: Negative.    Gastrointestinal: Negative.    Endocrine: Negative.    Genitourinary: Negative.    Musculoskeletal:  Positive for arthralgias, back pain and myalgias.   Skin: Negative.    Allergic/Immunologic: Negative.    Neurological:  Positive for weakness. Negative for numbness.   Hematological: Negative.    Psychiatric/Behavioral:  Positive for sleep disturbance.       Physical Examination:     Vitals:    08/18/23 1520   BP: 144/85   BP Location: Left arm   Patient Position: Sitting   Cuff Size: Adult  "  Pulse: 97   Resp: 18   Weight: 127 kg (280 lb 6.4 oz)   Height: 195.6 cm (77\")   PainSc:   5   PainLoc: Back        Physical Exam        Vitals:    08/18/23 1520   PainSc:   5   PainLoc: Back            Neurological Exam   Neurological examination is stable compared to my last evaluation without any new red flag signs.      Result Review  The following data was reviewed by: Vahe Pace MD on 08/18/2023:    Data reviewed : Radiologic studies flexion-extension x-rays of the lumbar spine do not suggest any new dynamic spondylolisthesis.      Assessment/plan:  This is a 43-year-old gentleman with originally poorly controlled diabetes who had bilateral L5 and S1 radiculopathy and underwent an L4-L5 and L5-S1 bilateral microdiscectomy.  He has had significant improvement in his radiculopathy.  He does have persistent midline spinal pain that is worse with standing and walking.  His flexion-extension x-rays are not indicative of new dynamic spondylolisthesis.  I continue to recommend remaining active with regular physical therapy or home exercise program engagement.  He should continue to work with pain management and I have no hesitancy about any specific interventions.  He can return to see me in 1 year for clinical reevaluation.  I have encouraged him to call with any questions or concerns.    Diagnoses and all orders for this visit:    1. Lumbar radiculopathy (Primary)         Return in about 1 year (around 8/18/2024).            Vahe Pace MD  "

## 2023-08-17 NOTE — TELEPHONE ENCOUNTER
Provider: DR PATTERSON   Caller: COBY BARTH   Phone Number: [phone] (home)     Reason for Call: PATIENT HAS AN INJECTION SCHEDULED ON 8/24/23 AND NEEDS VERIFICATION THAT IT HAS BEEN APPROVED BY HIS INSURANCE.   PLEASE ADVISE PATIENT, AND IF THE PATIENT DOES NOT ANSWER PLEASE LEAVE A DETAILED VOICEMAIL.   THANK YOU!

## 2023-08-18 ENCOUNTER — OFFICE VISIT (OUTPATIENT)
Dept: NEUROSURGERY | Facility: CLINIC | Age: 43
End: 2023-08-18
Payer: MEDICAID

## 2023-08-18 VITALS
HEART RATE: 97 BPM | SYSTOLIC BLOOD PRESSURE: 144 MMHG | WEIGHT: 280.4 LBS | HEIGHT: 77 IN | RESPIRATION RATE: 18 BRPM | BODY MASS INDEX: 33.11 KG/M2 | DIASTOLIC BLOOD PRESSURE: 85 MMHG

## 2023-08-18 DIAGNOSIS — Z79.4 TYPE 2 DIABETES MELLITUS WITH OTHER NEUROLOGIC COMPLICATION, WITH LONG-TERM CURRENT USE OF INSULIN: Primary | ICD-10-CM

## 2023-08-18 DIAGNOSIS — E11.49 TYPE 2 DIABETES MELLITUS WITH OTHER NEUROLOGIC COMPLICATION, WITH LONG-TERM CURRENT USE OF INSULIN: Primary | ICD-10-CM

## 2023-08-18 DIAGNOSIS — M54.16 LUMBAR RADICULOPATHY: Primary | ICD-10-CM

## 2023-08-18 RX ORDER — PEN NEEDLE, DIABETIC 32GX 5/32"
NEEDLE, DISPOSABLE MISCELLANEOUS
Qty: 200 EACH | Refills: 0 | Status: SHIPPED | OUTPATIENT
Start: 2023-08-18

## 2023-08-21 ENCOUNTER — TELEPHONE (OUTPATIENT)
Dept: FAMILY MEDICINE CLINIC | Facility: CLINIC | Age: 43
End: 2023-08-21
Payer: MEDICAID

## 2023-08-21 NOTE — TELEPHONE ENCOUNTER
Caller: Barrett Laguerre    Relationship to patient: Self    Best call back number: [phone]    Patient is needing: PATIENT IS CALLING TO INFORM THE PROVIDER THAT THE INSURANCE IS TAKING A LOT OF MEDICATION OFF THE APPROVED LIST AND HE DOESN'T KNOW WHAT TO DO     PATIENT IS ASKING FOR A CALL BACK AS SOON AS POSSIBLE BECAUSE HIS INSULIN IS ONE OF THE MEDICATION THAT ARE OFF THE APPROVED LIST

## 2023-08-23 DIAGNOSIS — J30.2 OTHER SEASONAL ALLERGIC RHINITIS: ICD-10-CM

## 2023-08-23 DIAGNOSIS — F39 MOOD DISORDER: ICD-10-CM

## 2023-08-23 NOTE — TELEPHONE ENCOUNTER
Caller: Barrett Laguerre    Relationship: Self    Best call back number: [phone]    Requested Prescriptions:   Requested Prescriptions     Pending Prescriptions Disp Refills    FLUoxetine (PROzac) 40 MG capsule 180 capsule 3     Sig: Take 1 capsule by mouth 2 (Two) Times a Day.    fluticasone (FLONASE) 50 MCG/ACT nasal spray 16 mL 0    Alcohol Swabs (B-D SINGLE USE SWABS REGULAR) pads       Sig: Apply 1 each topically 4 (Four) Times a Day Before Meals & at Bedtime.      Pharmacy where request should be sent: [address] [phone] [facility] [phone] [facility]     Last office visit with prescribing clinician: 5/23/2023   Last telemedicine visit with prescribing clinician: Visit date not found   Next office visit with prescribing clinician: 8/29/2023     Additional details provided by patient: PT OUT OF MEDICATION.     Does the patient have less than a 3 day supply:  [x] Yes  [] No    Would you like a call back once the refill request has been completed: [] Yes [x] No    If the office needs to give you a call back, can they leave a voicemail: [] Yes [] No    Radha Amato Rep   08/23/23 13:49 EDT

## 2023-08-24 ENCOUNTER — HOSPITAL ENCOUNTER (OUTPATIENT)
Dept: PAIN MEDICINE | Facility: HOSPITAL | Age: 43
Discharge: HOME | End: 2023-08-24
Payer: MEDICAID

## 2023-08-24 VITALS
RESPIRATION RATE: 16 BRPM | SYSTOLIC BLOOD PRESSURE: 137 MMHG | HEART RATE: 78 BPM | TEMPERATURE: 97.1 F | WEIGHT: 280 LBS | BODY MASS INDEX: 33.06 KG/M2 | DIASTOLIC BLOOD PRESSURE: 72 MMHG | OXYGEN SATURATION: 96 % | HEIGHT: 77 IN

## 2023-08-24 DIAGNOSIS — M54.16 LUMBAR RADICULOPATHY: ICD-10-CM

## 2023-08-24 DIAGNOSIS — R52 PAIN: ICD-10-CM

## 2023-08-24 DIAGNOSIS — M54.42 CHRONIC MIDLINE LOW BACK PAIN WITH BILATERAL SCIATICA: Primary | ICD-10-CM

## 2023-08-24 DIAGNOSIS — G89.29 CHRONIC MIDLINE LOW BACK PAIN WITH BILATERAL SCIATICA: Primary | ICD-10-CM

## 2023-08-24 DIAGNOSIS — M54.41 CHRONIC MIDLINE LOW BACK PAIN WITH BILATERAL SCIATICA: Primary | ICD-10-CM

## 2023-08-24 PROCEDURE — 25510000001 IOPAMIDOL 41 % SOLUTION: Performed by: PHYSICAL MEDICINE & REHABILITATION

## 2023-08-24 PROCEDURE — 77003 FLUOROGUIDE FOR SPINE INJECT: CPT

## 2023-08-24 PROCEDURE — 25010000002 DEXAMETHASONE SODIUM PHOSPHATE 10 MG/ML SOLUTION: Performed by: PHYSICAL MEDICINE & REHABILITATION

## 2023-08-24 RX ORDER — DEXAMETHASONE SODIUM PHOSPHATE 10 MG/ML
10 INJECTION, SOLUTION INTRAMUSCULAR; INTRAVENOUS ONCE
Status: COMPLETED | OUTPATIENT
Start: 2023-08-24 | End: 2023-08-24

## 2023-08-24 RX ORDER — LIDOCAINE HYDROCHLORIDE 10 MG/ML
5 INJECTION, SOLUTION EPIDURAL; INFILTRATION; INTRACAUDAL; PERINEURAL ONCE
Status: COMPLETED | OUTPATIENT
Start: 2023-08-24 | End: 2023-08-24

## 2023-08-24 RX ORDER — FLUOXETINE HYDROCHLORIDE 40 MG/1
40 CAPSULE ORAL NIGHTLY
Qty: 90 CAPSULE | Refills: 1 | Status: SHIPPED | OUTPATIENT
Start: 2023-08-24

## 2023-08-24 RX ORDER — ISOPROPYL ALCOHOL 0.75 G/1
1 SWAB TOPICAL
Qty: 100 EACH | Refills: 1 | Status: SHIPPED | OUTPATIENT
Start: 2023-08-24

## 2023-08-24 RX ORDER — FLUTICASONE PROPIONATE 50 MCG
1 SPRAY, SUSPENSION (ML) NASAL 2 TIMES DAILY
Qty: 16 ML | Refills: 3 | Status: SHIPPED | OUTPATIENT
Start: 2023-08-24

## 2023-08-24 RX ADMIN — IOPAMIDOL 1 ML: 408 INJECTION, SOLUTION INTRATHECAL at 15:06

## 2023-08-24 RX ADMIN — DEXAMETHASONE SODIUM PHOSPHATE 10 MG: 10 INJECTION, SOLUTION INTRAMUSCULAR; INTRAVENOUS at 15:06

## 2023-08-24 RX ADMIN — LIDOCAINE HYDROCHLORIDE 5 ML: 10 INJECTION, SOLUTION EPIDURAL; INFILTRATION; INTRACAUDAL; PERINEURAL at 15:06
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
## 2023-08-24 NOTE — DISCHARGE INSTRUCTIONS

## 2023-08-25 ENCOUNTER — TELEPHONE (OUTPATIENT)
Dept: PAIN MEDICINE | Facility: HOSPITAL | Age: 43
End: 2023-08-25

## 2023-08-28 ENCOUNTER — OFFICE VISIT (OUTPATIENT)
Dept: FAMILY MEDICINE CLINIC | Facility: CLINIC | Age: 43
End: 2023-08-28
Payer: MEDICAID

## 2023-08-28 VITALS
TEMPERATURE: 97 F | WEIGHT: 279.4 LBS | HEIGHT: 77 IN | BODY MASS INDEX: 32.99 KG/M2 | OXYGEN SATURATION: 95 % | DIASTOLIC BLOOD PRESSURE: 70 MMHG | SYSTOLIC BLOOD PRESSURE: 104 MMHG | HEART RATE: 98 BPM | RESPIRATION RATE: 20 BRPM

## 2023-08-28 DIAGNOSIS — M10.9 GOUT, UNSPECIFIED CAUSE, UNSPECIFIED CHRONICITY, UNSPECIFIED SITE: ICD-10-CM

## 2023-08-28 DIAGNOSIS — Z00.00 ANNUAL PHYSICAL EXAM: ICD-10-CM

## 2023-08-28 DIAGNOSIS — R35.0 URINARY FREQUENCY: ICD-10-CM

## 2023-08-28 DIAGNOSIS — E11.49 TYPE 2 DIABETES MELLITUS WITH OTHER NEUROLOGIC COMPLICATION, WITH LONG-TERM CURRENT USE OF INSULIN: Primary | ICD-10-CM

## 2023-08-28 DIAGNOSIS — Z79.4 TYPE 2 DIABETES MELLITUS WITH OTHER NEUROLOGIC COMPLICATION, WITH LONG-TERM CURRENT USE OF INSULIN: Primary | ICD-10-CM

## 2023-08-28 DIAGNOSIS — I10 HYPERTENSION, BENIGN: ICD-10-CM

## 2023-08-28 DIAGNOSIS — E78.9 DISORDER OF LIPID METABOLISM: ICD-10-CM

## 2023-08-28 LAB
BILIRUB BLD-MCNC: NEGATIVE MG/DL
CLARITY, POC: CLEAR
COLOR UR: YELLOW
EXPIRATION DATE: NORMAL
EXPIRATION DATE: NORMAL
GLUCOSE UR STRIP-MCNC: NEGATIVE MG/DL
HBA1C MFR BLD: 7.4 %
KETONES UR QL: NEGATIVE
LEUKOCYTE EST, POC: NEGATIVE
Lab: NORMAL
Lab: NORMAL
NITRITE UR-MCNC: NEGATIVE MG/ML
PH UR: 5 [PH] (ref 5–8)
POC MICROALBUMIN URINE: 100
PROT UR STRIP-MCNC: ABNORMAL MG/DL
RBC # UR STRIP: NEGATIVE /UL
SP GR UR: 1.01 (ref 1–1.03)
UROBILINOGEN UR QL: NORMAL

## 2023-08-28 RX ORDER — HUMAN INSULIN 100 [IU]/ML
20 INJECTION, SOLUTION SUBCUTANEOUS
Qty: 54 ML | Refills: 3 | Status: SHIPPED | OUTPATIENT
Start: 2023-08-28 | End: 2024-08-22

## 2023-08-28 RX ORDER — HUMAN INSULIN 100 [IU]/ML
38 INJECTION, SUSPENSION SUBCUTANEOUS NIGHTLY
Qty: 34.2 ML | Refills: 3 | Status: SHIPPED | OUTPATIENT
Start: 2023-08-28 | End: 2024-08-22

## 2023-08-28 NOTE — PROGRESS NOTES
Subjective   Barrett Laguerre is a 43 y.o. male.   No chief complaint on file.      History of Present Illness  The patient is here: for coordination of medical care.  Patient has: minimal activity with work/home activities, good appetite, feels well with minor complaints, and is sleeping poorly    Hepatitis B(1 of 3 - 3-dose series) Never done  Pneumococcal Vaccine 0-64(1 - PCV) Never done  COVID-19 Vaccine(3 - Pfizer series) due on 06/22/2021  URINE MICROALBUMIN due on 10/02/2021  DIABETIC FOOT EXAM due on 05/06/2023  HEMOGLOBIN A1C due on 08/06/2023  INFLUENZA VACCINE due on 10/01/2023  DIABETIC EYE EXAM due on 12/31/2023  ANNUAL PHYSICAL due on 02/14/2024  TDAP/TD VACCINES(2 - Td or Tdap) due on 07/27/2030  HEPATITIS C SCREENING Completed  Current pain scale 0/10.       Nasal drainage and congestion, taking allergy meds  Sugars up some after getting shot from pain management  Cannot get urine today    Does not tolerate allopurinol due to rash     Seeing counselor     Some irritable bowels due to emotional state     Needs diabetic shoes. Current A1C 7.4      Diabetes  He presents for his follow-up diabetic visit. He has type 2 diabetes mellitus. The initial diagnosis of diabetes was made 4 years ago. Pertinent negatives for diabetes include no chest pain, no fatigue, no polydipsia, no polyuria and no weakness. There are no hypoglycemic complications. There are no diabetic complications. Risk factors for coronary artery disease include diabetes mellitus, male sex, sedentary lifestyle and hypertension. Current diabetic treatment includes insulin injections and oral agent (dual therapy).      The following portions of the patient's history were reviewed and updated as appropriate: allergies, current medications, past family history, past medical history, past social history, past surgical history, and problem list.    Patient Active Problem List   Diagnosis    Gout    Lumbosacral spinal stenosis    Memory  impairment    Acquired flexible flat foot    Chronic midline low back pain with bilateral sciatica    Other specified dorsopathies, site unspecified    Guillain-[place]    Sleep apnea    Attention deficit disorder    Diabetes    Other seasonal allergic rhinitis    Mixed obsessional thoughts and acts    Hypertension, benign    Disorder of lipid metabolism    CIDP (chronic inflammatory demyelinating polyneuropathy)    Lumbar radiculopathy    Onychomycosis    Annual physical exam    Class 1 obesity due to excess calories with serious comorbidity and body mass index (BMI) of 34.0 to 34.9 in adult    Acute midline low back pain with left-sided sciatica       Current Outpatient Medications on File Prior to Visit   Medication Sig Dispense Refill    acetaminophen-codeine (TYLENOL/CODEINE #3) 300-30 MG per tablet Take 1 tablet by mouth 3 (Three) Times a Day As Needed for Mild Pain. 21 tablet 5    Alcohol Swabs (B-D SINGLE USE SWABS REGULAR) pads Apply 1 each topically 4 (Four) Times a Day Before Meals & at Bedtime. 100 each 1    amLODIPine (NORVASC) 10 MG tablet Take 1 tablet by mouth Daily. (Patient taking differently: Take 1 tablet by mouth Every Night.) 90 tablet 3    azelastine (ASTELIN) 0.1 % nasal spray 2 sprays into the nostril(s) as directed by provider 2 (Two) Times a Day. Use in each nostril as directed (Patient taking differently: 2 sprays into the nostril(s) as directed by provider As Needed. Use in each nostril as directed  not currently using) 3 each 3    BD Pen Needle Kyra 2nd Gen 32G X 4 MM misc USE 1 UNIT 4 TIMES DAILY BEFORE MEAL(S) AND AT BEDTIME 200 each 0    Blood Glucose Monitoring Suppl (True Metrix Meter) w/Device kit USE 1  TO CHECK GLUCOSE 4 TIMES DAILY 1 kit 0    cetirizine (zyrTEC) 10 MG tablet Take 1 tablet by mouth Daily. 30 tablet 11    chlorthalidone (HYGROTEN) 50 MG tablet Take 1 tablet by mouth Daily. (Patient taking differently: Take 1 tablet by mouth Every Night.) 90 tablet 3    Continuous  Blood Gluc  (Dexcom G6 ) device 1  ONCE DAILY 1 each 0    Continuous Blood Gluc Sensor (Dexcom G6 Sensor) Every 10 (Ten) Days. 1 each 6    Continuous Blood Gluc Transmit (Dexcom G6 Transmitter) misc USE 1 EACH AS DIRECTED FOR 90 DAYS      Cyanocobalamin (Vitamin B-12 ER) 1000 MCG tablet controlled-release TAKE 1 TABLET DAILY 30 each 5    Diclofenac Epolamine (FLECTOR) 1.3 % patch patch Apply 1 patch topically to the appropriate area as directed 2 (Two) Times a Day As Needed (back pain). 60 patch 11    FLUoxetine (PROzac) 40 MG capsule Take 1 capsule by mouth Every Night. 90 capsule 1    fluticasone (FLONASE) 50 MCG/ACT nasal spray 1 spray into the nostril(s) as directed by provider 2 (Two) Times a Day. 16 mL 3    gabapentin (NEURONTIN) 300 MG capsule Take 1 capsule by mouth 3 (Three) Times a Day. 270 capsule 3    glucose blood (True Metrix Blood Glucose Test) test strip 1 each by Other route 4 (Four) Times a Day. Dx:E11.49 patient checks sugar qid 200 each 12    HYDROcodone-acetaminophen (Norco) 7.5-325 MG per tablet Take 1 tablet by mouth 5 (Five) Times a Day As Needed for Severe Pain. 150 tablet 0    HYDROcodone-acetaminophen (Norco) 7.5-325 MG per tablet Take 1 tablet by mouth 5 (Five) Times a Day As Needed for Severe Pain. 150 tablet 0    HYDROcodone-acetaminophen (Norco) 7.5-325 MG per tablet Take 1 tablet by mouth 5 (Five) Times a Day As Needed for Severe Pain. 150 tablet 0    hydrocortisone 1 % cream Apply  topically to the appropriate area as directed 2 (Two) Times a Day. 60 g 3    hydrOXYzine (ATARAX) 25 MG tablet Take 1 tablet by mouth 4 (Four) Times a Day As Needed for Itching. 360 tablet 3    Lancet Devices (TRUEdraw Lancing Device) misc 1 each Daily. Dx:E11.49 patient checks sugar qid 1 each 0    loratadine (EQ Loratadine) 10 MG tablet Take 1 tablet by mouth Daily. (Patient taking differently: Take 1 tablet by mouth Every Night.) 90 tablet 3    losartan (COZAAR) 50 MG tablet Take 1  tablet by mouth Daily.      methocarbamol (ROBAXIN) 750 MG tablet Take 1 tablet by mouth 4 (Four) Times a Day As Needed for Muscle Spasms. 180 tablet 3    montelukast (SINGULAIR) 10 MG tablet Take 1 tablet by mouth Every Night. 90 tablet 3    pantoprazole (Protonix) 40 MG EC tablet Take 1 tablet by mouth Daily. 90 tablet 3    polycarbophil 625 MG tablet tablet Take  by mouth Every Night.      potassium chloride (MICRO-K) 10 MEQ CR capsule Take 1 capsule by mouth Daily.      promethazine (PHENERGAN) 25 MG tablet TAKE 1 TABLET BY MOUTH EVERY 8 HOURS AS NEEDED FOR NAUSEA FOR VOMITING 90 tablet 0    sildenafil (REVATIO) 20 MG tablet Take 1 tablet by mouth Daily As Needed (ed). 30 tablet 3    sitaGLIPtin-metFORMIN (Janumet)  MG per tablet Take 1 tablet by mouth 2 (Two) Times a Day With Meals. (Patient taking differently: Take 1 tablet by mouth 2 (Two) Times a Day With Meals. Last dose 2/20 0730  covering with admelog) 180 tablet 3    sodium chloride ([place]cean Nasal Spray) 0.65 % nasal spray 1 spray each nostril tid (Patient taking differently: 1 spray into the nostril(s) as directed by provider As Needed. Use preop) 216 mL 3    Triamcinolone Acetonide (NASACORT) 55 MCG/ACT nasal inhaler 2 sprays into the nostril(s) as directed by provider Daily. 16.5 g 11    TRUEplus Lancets 30G misc 1 each 4 (Four) Times a Day. Dx:E11.49 patient checks sugar qid 100 each 12    [DISCONTINUED] Insulin Lispro (humaLOG) 100 UNIT/ML injection Inject  under the skin into the appropriate area as directed 3 (Three) Times a Day Before Meals.      [DISCONTINUED] oxybutynin XL (DITROPAN XL) 15 MG 24 hr tablet Take 1 tablet by mouth Daily. (Patient taking differently: Take 1 tablet by mouth As Needed.) 30 tablet 12    benzonatate (Tessalon Perles) 100 MG capsule Take 1 capsule by mouth 3 (Three) Times a Day As Needed for Cough. (Patient not taking: Reported on 8/28/2023) 30 capsule 0    clindamycin (CLEOCIN T) 1 % external solution Apply 1  application topically to the appropriate area as directed 2 (Two) Times a Day. Not currently using (Patient not taking: Reported on 8/28/2023)      colchicine-probenecid (COL-BENEMID) 0.5-500 MG per tablet Take 1 tablet by mouth once daily (Patient not taking: Reported on 8/28/2023) 30 tablet 0    doxycycline (VIBRAMYCIN) 100 MG capsule Take 1 capsule by mouth 2 (Two) Times a Day. (Patient not taking: Reported on 8/28/2023) 14 capsule 0    oxybutynin XL (DITROPAN XL) 15 MG 24 hr tablet Take 1 tablet by mouth Daily. (Patient not taking: Reported on 8/28/2023) 90 tablet 3    [DISCONTINUED] erythromycin (ROMYCIN) 5 MG/GM ophthalmic ointment Administer 1 application  to both eyes As Needed. Not currently using (Patient not taking: Reported on 8/28/2023)      [DISCONTINUED] Insulin Glargine (BASAGLAR KWIKPEN) 100 UNIT/ML injection pen Inject 38 Units under the skin into the appropriate area as directed Every Night for 30 days. 15 mL 8    [DISCONTINUED] Insulin Lispro (ADMELOG SOLOSTAR) 100 UNIT/ML injection pen Inject 20 Units under the skin into the appropriate area as directed 3 (Three) Times a Day With Meals for 100 days. (Patient taking differently: Inject 20 Units under the skin into the appropriate area as directed 3 (Three) Times a Day With Meals. While off janumet   none am of surgery) 60 mL 3     No current facility-administered medications on file prior to visit.     Current outpatient and discharge medications have been reconciled for the patient.  Reviewed by: Eric Rosenberg MD      Allergies   Allergen Reactions    Penicillin G Anaphylaxis    Penicillins Anaphylaxis    Sulfa Antibiotics Hives    Allopurinol Rash       Review of Systems   Constitutional:  Negative for activity change, appetite change, fatigue and fever.   HENT:  Negative for ear pain, swollen glands and voice change.    Eyes:  Negative for visual disturbance.   Respiratory:  Negative for shortness of breath and wheezing.   "  Cardiovascular:  Negative for chest pain and leg swelling.   Gastrointestinal:  Negative for abdominal pain, blood in stool, constipation, diarrhea, nausea and vomiting.   Endocrine: Negative for polydipsia and polyuria.   Genitourinary:  Negative for dysuria, frequency and hematuria.   Musculoskeletal:  Negative for joint swelling, neck pain and neck stiffness.   Skin:  Negative for rash and wound.   Neurological:  Negative for weakness, numbness and headache.   Psychiatric/Behavioral:  Negative for suicidal ideas and depressed mood.    I have reviewed and confirmed the accuracy of the ROS as documented by the MA/LPN/RN Eric Rosenberg MD    Objective   Visit Vitals  /70 (BP Location: Right arm, Patient Position: Sitting, Cuff Size: Large Adult)   Pulse 98   Temp 97 øF (36.1 øC) (Temporal)   Resp 20   Ht 195.6 cm (77.01\")   Wt 127 kg (279 lb 6.4 oz)   SpO2 95%   BMI 33.13 kg/mý      **  Physical Exam  Constitutional:       Appearance: He is well-developed.   HENT:      Head: Normocephalic and atraumatic.      Right Ear: External ear normal.      Left Ear: External ear normal.      Nose: Nose normal.   Eyes:      Pupils: Pupils are equal, round, and reactive to light.   Cardiovascular:      Rate and Rhythm: Normal rate and regular rhythm.      Heart sounds: Normal heart sounds.   Pulmonary:      Effort: Pulmonary effort is normal.      Breath sounds: Normal breath sounds.   Abdominal:      General: Bowel sounds are normal.      Palpations: Abdomen is soft.   Musculoskeletal:         General: Normal range of motion.      Cervical back: Normal range of motion and neck supple.   Feet:      Comments: Diabetic Foot Exam Performed. Callous noted      Skin:     General: Skin is warm and dry.   Neurological:      Mental Status: He is alert and oriented to person, place, and time.   Psychiatric:         Behavior: Behavior normal.         Thought Content: Thought content normal.         Judgment: Judgment " normal.     Derm Physical Exam    Diagnoses and all orders for this visit:    1. Type 2 diabetes mellitus with other neurologic complication, with long-term current use of insulin (Primary)  Overview:  Basalglar 30 units daily and Lispro 10 u tid with meals     Orders:  -     POC Glycosylated Hemoglobin (Hb A1C)  -     Cancel: POCT urinalysis dipstick, manual  -     Cancel: POCT microalbumin  -     Insulin NPH, Human,, Isophane, (NovoLIN N FlexPen) 100 UNIT/ML injection; Inject 38 Units under the skin into the appropriate area as directed Every Night for 360 days.  Dispense: 34.2 mL; Refill: 3  -     Insulin Regular Human (NovoLIN R FlexPen ReliOn) 100 UNIT/ML injection; Inject 20 Units under the skin into the appropriate area as directed 3 (Three) Times a Day Before Meals for 360 days.  Dispense: 54 mL; Refill: 3  -     POC Microalbumin  -     POCT urinalysis dipstick, manual    2. Hypertension, benign  Overview:  Followed by Cardiology Dr Tong who placed pt on different BP med. .    Proteinuria/creatinine noted    Orders:  -     Comprehensive Metabolic Panel  -     Lipid Panel With / Chol / HDL Ratio  -     CBC & Differential  -     TSH    3. Disorder of lipid metabolism  -     Comprehensive Metabolic Panel  -     Lipid Panel With / Chol / HDL Ratio  -     TSH    4. Gout, unspecified cause, unspecified chronicity, unspecified site  Overview:  Ok to restart colchicine    Orders:  -     Uric Acid    5. Annual physical exam  -     Comprehensive Metabolic Panel  -     Lipid Panel With / Chol / HDL Ratio  -     CBC & Differential  -     TSH  -     Uric Acid    6. Urinary frequency       Discussed anticipatory guidance, diet, exercise, and weight loss.  Discussed safety and routine screening examinations.  Discussed self-examinations.  Barrett Laguerre  reports that he has never smoked. He has never been exposed to tobacco smoke. He has never used smokeless tobacco..   Follow-up for routine health maintenance as  indicated.      Expected course, medications, and adverse effects discussed as appropriate.  Call or return if worsening or persistent symptoms.     This document is intended for medical professional use only.

## 2023-08-29 ENCOUNTER — PATIENT MESSAGE (OUTPATIENT)
Dept: FAMILY MEDICINE CLINIC | Facility: CLINIC | Age: 43
End: 2023-08-29
Payer: MEDICAID

## 2023-08-29 LAB
ALBUMIN SERPL-MCNC: 4.7 G/DL (ref 4.1–5.1)
ALBUMIN/GLOB SERPL: 1.7 {RATIO} (ref 1.2–2.2)
ALP SERPL-CCNC: 65 IU/L (ref 44–121)
ALT SERPL-CCNC: 53 IU/L (ref 0–44)
AST SERPL-CCNC: 33 IU/L (ref 0–40)
BASOPHILS # BLD AUTO: 0.1 X10E3/UL (ref 0–0.2)
BASOPHILS NFR BLD AUTO: 1 %
BILIRUB SERPL-MCNC: 0.6 MG/DL (ref 0–1.2)
BUN SERPL-MCNC: 13 MG/DL (ref 6–24)
BUN/CREAT SERPL: 12 (ref 9–20)
CALCIUM SERPL-MCNC: 10 MG/DL (ref 8.7–10.2)
CHLORIDE SERPL-SCNC: 96 MMOL/L (ref 96–106)
CHOLEST SERPL-MCNC: 175 MG/DL (ref 100–199)
CHOLEST/HDLC SERPL: 4.6 RATIO (ref 0–5)
CO2 SERPL-SCNC: 25 MMOL/L (ref 20–29)
CREAT SERPL-MCNC: 1.11 MG/DL (ref 0.76–1.27)
EGFRCR SERPLBLD CKD-EPI 2021: 85 ML/MIN/1.73
EOSINOPHIL # BLD AUTO: 0.2 X10E3/UL (ref 0–0.4)
EOSINOPHIL NFR BLD AUTO: 1 %
ERYTHROCYTE [DISTWIDTH] IN BLOOD BY AUTOMATED COUNT: 14.4 % (ref 11.6–15.4)
GLOBULIN SER CALC-MCNC: 2.8 G/DL (ref 1.5–4.5)
GLUCOSE SERPL-MCNC: 192 MG/DL (ref 70–99)
HCT VFR BLD AUTO: 45.2 % (ref 37.5–51)
HDLC SERPL-MCNC: 38 MG/DL
HGB BLD-MCNC: 14.8 G/DL (ref 13–17.7)
IMM GRANULOCYTES # BLD AUTO: 0.1 X10E3/UL (ref 0–0.1)
IMM GRANULOCYTES NFR BLD AUTO: 1 %
LDLC SERPL CALC-MCNC: 113 MG/DL (ref 0–99)
LYMPHOCYTES # BLD AUTO: 2.3 X10E3/UL (ref 0.7–3.1)
LYMPHOCYTES NFR BLD AUTO: 19 %
MCH RBC QN AUTO: 27.4 PG (ref 26.6–33)
MCHC RBC AUTO-ENTMCNC: 32.7 G/DL (ref 31.5–35.7)
MCV RBC AUTO: 84 FL (ref 79–97)
MONOCYTES # BLD AUTO: 1 X10E3/UL (ref 0.1–0.9)
MONOCYTES NFR BLD AUTO: 9 %
NEUTROPHILS # BLD AUTO: 8.6 X10E3/UL (ref 1.4–7)
NEUTROPHILS NFR BLD AUTO: 69 %
PLATELET # BLD AUTO: 318 X10E3/UL (ref 150–450)
POTASSIUM SERPL-SCNC: 4.2 MMOL/L (ref 3.5–5.2)
PROT SERPL-MCNC: 7.5 G/DL (ref 6–8.5)
RBC # BLD AUTO: 5.4 X10E6/UL (ref 4.14–5.8)
SODIUM SERPL-SCNC: 140 MMOL/L (ref 134–144)
TRIGL SERPL-MCNC: 135 MG/DL (ref 0–149)
TSH SERPL DL<=0.005 MIU/L-ACNC: 1.81 UIU/ML (ref 0.45–4.5)
URATE SERPL-MCNC: 9.4 MG/DL (ref 3.8–8.4)
VLDLC SERPL CALC-MCNC: 24 MG/DL (ref 5–40)
WBC # BLD AUTO: 12.3 X10E3/UL (ref 3.4–10.8)

## 2023-09-05 ENCOUNTER — TELEPHONE (OUTPATIENT)
Dept: FAMILY MEDICINE CLINIC | Facility: CLINIC | Age: 43
End: 2023-09-05
Payer: MEDICAID

## 2023-09-05 NOTE — TELEPHONE ENCOUNTER
Hub staff attempted to follow warm transfer process and was unsuccessful     Caller: Barrett Laguerre    Relationship to patient: Self    Best call back number: [phone]     Patient is needing: PATIENT IS CALLING TO CHECK THE STATUS OF PAPERWORK FOR DIABETIC SHOES AND INSERTS, THAT WAS GOING TO [facility].    PLEASE ADVISE.

## 2023-09-06 NOTE — TELEPHONE ENCOUNTER
PATIENT CALLED IN REGARDS TO PRESCRIPTION FOR DIABETIC SHOES AND/OR INSERTS.  PATIENT STATES [facility] HAS NOT RECEIVED PAPERWORK FOR HIS SHOES, WHICH ARE FALLING APART. HE HAS BEEN TRYING TO GET HIS SHOES FOR A MONTH.    PLEASE CALL AND ADVISE [phone]    [facility] HAS SENT THE PAPERWORK TO DR. RIVERA AND DR. RIVERA DID HAVE PAPERWORK IN HAND AT HIS LAST VISIT.

## 2023-09-08 NOTE — PROGRESS NOTES
Chief Complaint  Peripheral Neuropathy    Subjective          Barrett Laguerre presents to [facility]UROLOG[facility] for Neuropathy  History of Present Illness    He states that the neuropathy has stayed about the same.  He does takes Gabapentin and it takes the edge off.    **pt has been seeing dr hidalgo for lumbar issues**    Has  had issues with memory, improved with taking a lower dose of gabapentin    Continues on BiPAP via dr raroyo.     ====PREV. OV 9/23/21  Patient complains of neuropathy and pain in lower lumbar to left leg. It shoots down partially down his leg.  This started a few weeks ago, getting worse.   Once he moves into a certain position he gets relief.   He has used pain cream, tylenol 3 and gabapentin. He does see pain management.  He is currently taking 400 mg a day of Gabapentin, when he is in extreme pain he takes extra gabapentin.      Epidural blocks have helped but had last one just one month ago.      Mri back showed disc protrusion at L4-5 and 3-4 with extruded fragment at L3-4     =========2019=======================     Numbness, Guillain-Collins syndrome, Memory impairment     4 month f/u for numbness and tingling sensation of skin. Patent is currently taking gabapentin helps with discomfort, but it effects his thinking. Some worse than last time seen other wise unchanged.      Sequelae of Guillain-Collins syndrome 2015. treated with ivig,   f/u from EMG. emg showed cts, no sig neuropathy.   Patient still having nerve issues with tempeture change in hands and feet.   He has diabetes. Recent BS was 300 and hg a1c 9     Memory impairment, short term pt. becoming more forgetfull and losing things and not always the same to the next some days the symptoms are worse.   Patient had a neuro psych done at a vocational rehab patient will obtain a report.      Patient having more issues with fatigue, patient has a CPAP machine and averages 8-9 hours of sleep,  see's Dr. Arroyo for this  issue. Labs was done from PCP.          Current Outpatient Medications:     acetaminophen-codeine (TYLENOL/CODEINE #3) 300-30 MG per tablet, Take 1 tablet by mouth 3 (Three) Times a Day As Needed for Mild Pain., Disp: 21 tablet, Rfl: 5    Alcohol Swabs (B-D SINGLE USE SWABS REGULAR) pads, Apply 1 each topically 4 (Four) Times a Day Before Meals & at Bedtime., Disp: 100 each, Rfl: 1    amLODIPine (NORVASC) 10 MG tablet, Take 1 tablet by mouth Daily. (Patient taking differently: Take 1 tablet by mouth Every Night.), Disp: 90 tablet, Rfl: 3    azelastine (ASTELIN) 0.1 % nasal spray, 2 sprays into the nostril(s) as directed by provider 2 (Two) Times a Day. Use in each nostril as directed (Patient taking differently: 2 sprays into the nostril(s) as directed by provider As Needed. Use in each nostril as directed  not currently using), Disp: 3 each, Rfl: 3    BD Pen Needle Kyra 2nd Gen 32G X 4 MM misc, USE 1 UNIT 4 TIMES DAILY BEFORE MEAL(S) AND AT BEDTIME, Disp: 200 each, Rfl: 0    benzonatate (Tessalon Perles) 100 MG capsule, Take 1 capsule by mouth 3 (Three) Times a Day As Needed for Cough., Disp: 30 capsule, Rfl: 0    Blood Glucose Monitoring Suppl (True Metrix Meter) w/Device kit, USE 1  TO CHECK GLUCOSE 4 TIMES DAILY, Disp: 1 kit, Rfl: 0    cetirizine (zyrTEC) 10 MG tablet, Take 1 tablet by mouth Daily., Disp: 30 tablet, Rfl: 11    chlorthalidone (HYGROTEN) 50 MG tablet, Take 1 tablet by mouth Daily. (Patient taking differently: Take 1 tablet by mouth Every Night.), Disp: 90 tablet, Rfl: 3    clindamycin (CLEOCIN T) 1 % external solution, Apply 1 application  topically to the appropriate area as directed 2 (Two) Times a Day. Not currently using, Disp: , Rfl:     colchicine-probenecid (COL-BENEMID) 0.5-500 MG per tablet, Take 1 tablet by mouth once daily, Disp: 30 tablet, Rfl: 0    Continuous Blood Gluc  (Dexcom G6 ) device, 1  ONCE DAILY, Disp: 1 each, Rfl: 0    Continuous Blood Gluc Sensor (Dexcom G6  Sensor), Every 10 (Ten) Days., Disp: 1 each, Rfl: 6    Continuous Blood Gluc Transmit (Dexcom G6 Transmitter) misc, USE 1 EACH AS DIRECTED FOR 90 DAYS, Disp: , Rfl:     Cyanocobalamin (Vitamin B-12 ER) 1000 MCG tablet controlled-release, TAKE 1 TABLET DAILY, Disp: 30 each, Rfl: 5    Diclofenac Epolamine (FLECTOR) 1.3 % patch patch, Apply 1 patch topically to the appropriate area as directed 2 (Two) Times a Day As Needed (back pain)., Disp: 60 patch, Rfl: 11    doxycycline (VIBRAMYCIN) 100 MG capsule, Take 1 capsule by mouth 2 (Two) Times a Day., Disp: 14 capsule, Rfl: 0    FLUoxetine (PROzac) 40 MG capsule, Take 1 capsule by mouth Every Night., Disp: 90 capsule, Rfl: 1    fluticasone (FLONASE) 50 MCG/ACT nasal spray, 1 spray into the nostril(s) as directed by provider 2 (Two) Times a Day., Disp: 16 mL, Rfl: 3    gabapentin (NEURONTIN) 300 MG capsule, Take 1 capsule by mouth 3 (Three) Times a Day., Disp: 270 capsule, Rfl: 3    glucose blood (True Metrix Blood Glucose Test) test strip, 1 each by Other route 4 (Four) Times a Day. Dx:E11.49 patient checks sugar qid, Disp: 200 each, Rfl: 12    HYDROcodone-acetaminophen (Norco) 7.5-325 MG per tablet, Take 1 tablet by mouth 5 (Five) Times a Day As Needed for Severe Pain., Disp: 150 tablet, Rfl: 0    HYDROcodone-acetaminophen (Norco) 7.5-325 MG per tablet, Take 1 tablet by mouth 5 (Five) Times a Day As Needed for Severe Pain., Disp: 150 tablet, Rfl: 0    HYDROcodone-acetaminophen (Norco) 7.5-325 MG per tablet, Take 1 tablet by mouth 5 (Five) Times a Day As Needed for Severe Pain., Disp: 150 tablet, Rfl: 0    hydrocortisone 1 % cream, Apply  topically to the appropriate area as directed 2 (Two) Times a Day., Disp: 60 g, Rfl: 3    hydrOXYzine (ATARAX) 25 MG tablet, Take 1 tablet by mouth 4 (Four) Times a Day As Needed for Itching., Disp: 360 tablet, Rfl: 3    Insulin NPH, Human,, Isophane, (NovoLIN N FlexPen) 100 UNIT/ML injection, Inject 38 Units under the skin into the  appropriate area as directed Every Night for 360 days., Disp: 34.2 mL, Rfl: 3    Insulin Regular Human (NovoLIN R FlexPen ReliOn) 100 UNIT/ML injection, Inject 20 Units under the skin into the appropriate area as directed 3 (Three) Times a Day Before Meals for 360 days., Disp: 54 mL, Rfl: 3    Lancet Devices (TRUEdraw Lancing Device) misc, 1 each Daily. Dx:E11.49 patient checks sugar qid, Disp: 1 each, Rfl: 0    loratadine (EQ Loratadine) 10 MG tablet, Take 1 tablet by mouth Daily. (Patient taking differently: Take 1 tablet by mouth Every Night.), Disp: 90 tablet, Rfl: 3    losartan (COZAAR) 50 MG tablet, Take 1 tablet by mouth Daily., Disp: , Rfl:     methocarbamol (ROBAXIN) 750 MG tablet, Take 1 tablet by mouth 4 (Four) Times a Day As Needed for Muscle Spasms., Disp: 180 tablet, Rfl: 3    montelukast (SINGULAIR) 10 MG tablet, Take 1 tablet by mouth Every Night., Disp: 90 tablet, Rfl: 3    oxybutynin XL (DITROPAN XL) 15 MG 24 hr tablet, Take 1 tablet by mouth Daily., Disp: 90 tablet, Rfl: 3    pantoprazole (Protonix) 40 MG EC tablet, Take 1 tablet by mouth Daily., Disp: 90 tablet, Rfl: 3    polycarbophil 625 MG tablet tablet, Take  by mouth Every Night., Disp: , Rfl:     potassium chloride (MICRO-K) 10 MEQ CR capsule, Take 1 capsule by mouth Daily., Disp: , Rfl:     promethazine (PHENERGAN) 25 MG tablet, TAKE 1 TABLET BY MOUTH EVERY 8 HOURS AS NEEDED FOR NAUSEA FOR VOMITING, Disp: 90 tablet, Rfl: 0    sildenafil (REVATIO) 20 MG tablet, Take 1 tablet by mouth Daily As Needed (ed)., Disp: 30 tablet, Rfl: 3    sitaGLIPtin-metFORMIN (Janumet)  MG per tablet, Take 1 tablet by mouth 2 (Two) Times a Day With Meals. (Patient taking differently: Take 1 tablet by mouth 2 (Two) Times a Day With Meals. Last dose 2/20  0730  covering with admelog), Disp: 180 tablet, Rfl: 3    sodium chloride (Ocean Nasal Spray) 0.65 % nasal spray, 1 spray each nostril tid (Patient taking differently: 1 spray into the nostril(s) as  "directed by provider As Needed. Use preop), Disp: 216 mL, Rfl: 3    Triamcinolone Acetonide (NASACORT) 55 MCG/ACT nasal inhaler, 2 sprays into the nostril(s) as directed by provider Daily., Disp: 16.5 g, Rfl: 11    TRUEplus Lancets 30G misc, 1 each 4 (Four) Times a Day. Dx:E11.49 patient checks sugar qid, Disp: 100 each, Rfl: 12    Review of Systems   Constitutional:  Positive for fatigue.   HENT:  Positive for sneezing.    Eyes: Negative.    Respiratory: Negative.     Gastrointestinal:  Positive for diarrhea.   Endocrine: Negative.    Genitourinary: Negative.    Musculoskeletal:  Positive for back pain.   Allergic/Immunologic: Negative.    Neurological: Negative.    Hematological: Negative.    Psychiatric/Behavioral: Negative.          Objective:    Vital Signs:   /88   Pulse 69   Ht 195.6 cm (77.01\")   Wt 127 kg (279 lb)   BMI 33.08 kg/m²     Physical Exam  Vitals reviewed.   Cardiovascular:      Rate and Rhythm: Normal rate.      Pulses: Normal pulses.   Pulmonary:      Effort: Pulmonary effort is normal.   Neurological:      General: No focal deficit present.      Mental Status: He is oriented to person, place, and time.      Deep Tendon Reflexes:      Reflex Scores:       Patellar reflexes are 2+ on the right side and 2+ on the left side.       Achilles reflexes are 1+ on the right side and 1+ on the left side.  Psychiatric:         Mood and Affect: Mood normal.         Speech: Speech normal.      Result Review :                Neurologic Exam     Mental Status   Oriented to person, place, and time.   Attention: normal.   Speech: speech is normal   Level of consciousness: alert    Sensory Exam   Light touch normal.     Gait, Coordination, and Reflexes     Reflexes   Right patellar: 2+  Left patellar: 2+  Right achilles: 1+  Left achilles: 1+      Assessment and Plan    Diagnoses and all orders for this visit:    1. History of Guillain-Davison syndrome (Primary)       Continue the gabapentin for the " neuropathy discomfort   General risk of recurrent  gbs is minimal     Consider trial with duloxitine (as alternative or addition to prozac and gabapentin) for the neuropathy pain         Follow Up   Return if symptoms worsen or fail to improve.  Patient was given instructions and counseling regarding his condition or for health maintenance advice. Please see specific information pulled into the AVS if appropriate.     This document has been electronically signed by Joseph Seipel, MD on September 11, 2023 10:04 EDT

## 2023-09-11 ENCOUNTER — OFFICE VISIT (OUTPATIENT)
Dept: NEUROLOGY | Facility: CLINIC | Age: 43
End: 2023-09-11
Payer: MEDICAID

## 2023-09-11 VITALS
BODY MASS INDEX: 32.94 KG/M2 | WEIGHT: 279 LBS | HEIGHT: 77 IN | HEART RATE: 69 BPM | DIASTOLIC BLOOD PRESSURE: 88 MMHG | SYSTOLIC BLOOD PRESSURE: 144 MMHG

## 2023-09-11 DIAGNOSIS — Z86.69 HISTORY OF GUILLAIN-BARRE SYNDROME: Primary | ICD-10-CM

## 2023-09-11 PROCEDURE — 3079F DIAST BP 80-89 MM HG: CPT | Performed by: PSYCHIATRY & NEUROLOGY

## 2023-09-11 PROCEDURE — 99213 OFFICE O/P EST LOW 20 MIN: CPT | Performed by: PSYCHIATRY & NEUROLOGY

## 2023-09-11 PROCEDURE — 3077F SYST BP >= 140 MM HG: CPT | Performed by: PSYCHIATRY & NEUROLOGY

## 2023-09-11 PROCEDURE — 1160F RVW MEDS BY RX/DR IN RCRD: CPT | Performed by: PSYCHIATRY & NEUROLOGY

## 2023-09-11 PROCEDURE — 1159F MED LIST DOCD IN RCRD: CPT | Performed by: PSYCHIATRY & NEUROLOGY

## 2023-09-18 ENCOUNTER — TELEPHONE (OUTPATIENT)
Dept: FAMILY MEDICINE CLINIC | Facility: CLINIC | Age: 43
End: 2023-09-18
Payer: MEDICAID

## 2023-09-18 RX ORDER — EMTRICITABINE AND TENOFOVIR ALAFENAMIDE 200; 25 MG/1; MG/1
1 TABLET ORAL DAILY
COMMUNITY

## 2023-09-18 NOTE — TELEPHONE ENCOUNTER
Patient would like Hanny GUILLORY MA, to call him to discuss a new medication he is taking, descovy 200mg/25mg one time daily. He also is wanting to verify he is giving permission for you to speak with Mike Rivas MA with [facility]. He really wants to speak to you verbally.

## 2023-09-18 NOTE — TELEPHONE ENCOUNTER
Spoke with pt. Pt states he is getting Descovy medication from [facility] and is wanting to know if for some reason they cannot fill it there after this dose can Dr. Rosenberg fill it. Notified pt per Dr. Rosenberg he does not prescribe this medication -it is beyond his scope of practice so he will not be able to fill it for him.

## 2023-09-25 DIAGNOSIS — E11.49 TYPE 2 DIABETES MELLITUS WITH OTHER NEUROLOGIC COMPLICATION, WITH LONG-TERM CURRENT USE OF INSULIN: ICD-10-CM

## 2023-09-25 DIAGNOSIS — Z79.4 TYPE 2 DIABETES MELLITUS WITH OTHER NEUROLOGIC COMPLICATION, WITH LONG-TERM CURRENT USE OF INSULIN: ICD-10-CM

## 2023-09-25 RX ORDER — PROCHLORPERAZINE 25 MG/1
SUPPOSITORY RECTAL
Qty: 1 EACH | Refills: 0 | Status: SHIPPED | OUTPATIENT
Start: 2023-09-25

## 2023-09-25 RX ORDER — PEN NEEDLE, DIABETIC 32GX 5/32"
NEEDLE, DISPOSABLE MISCELLANEOUS
Qty: 200 EACH | Refills: 0 | Status: SHIPPED | OUTPATIENT
Start: 2023-09-25

## 2023-09-26 ENCOUNTER — TELEPHONE (OUTPATIENT)
Dept: FAMILY MEDICINE CLINIC | Facility: CLINIC | Age: 43
End: 2023-09-26
Payer: MEDICAID

## 2023-09-26 ENCOUNTER — OFFICE VISIT (OUTPATIENT)
Dept: PODIATRY | Facility: CLINIC | Age: 43
End: 2023-09-26
Payer: MEDICAID

## 2023-09-26 VITALS — WEIGHT: 279 LBS | HEART RATE: 89 BPM | BODY MASS INDEX: 32.94 KG/M2 | OXYGEN SATURATION: 97 % | HEIGHT: 77 IN

## 2023-09-26 DIAGNOSIS — E11.49 TYPE 2 DIABETES MELLITUS WITH OTHER NEUROLOGIC COMPLICATION, WITH LONG-TERM CURRENT USE OF INSULIN: ICD-10-CM

## 2023-09-26 DIAGNOSIS — E11.42 DM TYPE 2 WITH DIABETIC PERIPHERAL NEUROPATHY: ICD-10-CM

## 2023-09-26 DIAGNOSIS — Z79.4 TYPE 2 DIABETES MELLITUS WITH OTHER NEUROLOGIC COMPLICATION, WITH LONG-TERM CURRENT USE OF INSULIN: ICD-10-CM

## 2023-09-26 DIAGNOSIS — L60.3 ONYCHODYSTROPHY: Primary | ICD-10-CM

## 2023-09-26 DIAGNOSIS — E11.65 TYPE 2 DIABETES MELLITUS WITH HYPERGLYCEMIA, WITHOUT LONG-TERM CURRENT USE OF INSULIN: ICD-10-CM

## 2023-09-26 RX ORDER — PROCHLORPERAZINE 25 MG/1
SUPPOSITORY RECTAL
Qty: 3 EACH | Refills: 3 | Status: SHIPPED | OUTPATIENT
Start: 2023-09-26

## 2023-09-26 NOTE — TELEPHONE ENCOUNTER
Reveived fax from [facility] in [place] requesting PA on Pantoprazole 40mg tabs. Pa submitted online thru Covermymeds. Med approved. PA Case: [id number], Status: Approved, Coverage Starts on: 9/26/2023 12:00:00 AM, Coverage Ends on: 9/25/2024 12:00:00 AM. Notified pharmacy.

## 2023-09-26 NOTE — PROGRESS NOTES
09/26/2023  Foot and Ankle Surgery - Established Patient/Follow-up  Provider: ANASTASIA Chan   Location: [facility]    Subjective:  Barrett Laguerre is a 43 y.o. male.     Chief Complaint   Patient presents with    Left Foot - Diabetes     Foot check, nail trim     Right Foot - Diabetes     Foot check, nail trim     Follow-up     ANA Rosenberg MD 08/2023     HPI: Patient presents today for a routine diabetic foot check.    The patient states he has difficulty cutting his toenails secondary to back issues. He reports his blood glucose levels have been fairly well.     Allergies   Allergen Reactions    Penicillin G Anaphylaxis    Penicillins Anaphylaxis    Sulfa Antibiotics Hives    Allopurinol Rash       Current Outpatient Medications on File Prior to Visit   Medication Sig Dispense Refill    acetaminophen-codeine (TYLENOL/CODEINE #3) 300-30 MG per tablet Take 1 tablet by mouth 3 (Three) Times a Day As Needed for Mild Pain. 21 tablet 5    Alcohol Swabs (B-D SINGLE USE SWABS REGULAR) pads Apply 1 each topically 4 (Four) Times a Day Before Meals & at Bedtime. 100 each 1    amLODIPine (NORVASC) 10 MG tablet Take 1 tablet by mouth Daily. (Patient taking differently: Take 1 tablet by mouth Every Night.) 90 tablet 3    azelastine (ASTELIN) 0.1 % nasal spray 2 sprays into the nostril(s) as directed by provider 2 (Two) Times a Day. Use in each nostril as directed (Patient taking differently: 2 sprays into the nostril(s) as directed by provider As Needed. Use in each nostril as directed  not currently using) 3 each 3    BD Pen Needle Kyra 2nd Gen 32G X 4 MM misc USE 1 UNIT 4 TIMES DAILY BEFORE MEAL(S) AND AT BEDTIME 200 each 0    benzonatate (Tessalon Perles) 100 MG capsule Take 1 capsule by mouth 3 (Three) Times a Day As Needed for Cough. 30 capsule 0    Blood Glucose Monitoring Suppl (True Metrix Meter) w/Device kit USE 1  TO CHECK GLUCOSE 4 TIMES DAILY 1 kit 0    cetirizine (zyrTEC) 10 MG tablet Take 1 tablet  by mouth Daily. 30 tablet 11    chlorthalidone (HYGROTEN) 50 MG tablet Take 1 tablet by mouth Daily. (Patient taking differently: Take 1 tablet by mouth Every Night.) 90 tablet 3    clindamycin (CLEOCIN T) 1 % external solution Apply 1 application  topically to the appropriate area as directed 2 (Two) Times a Day. Not currently using      colchicine-probenecid (COL-BENEMID) 0.5-500 MG per tablet Take 1 tablet by mouth once daily 30 tablet 0    Continuous Blood Gluc  (Dexcom G6 ) device 1  ONCE DAILY 1 each 0    Continuous Blood Gluc Transmit (Dexcom G6 Transmitter) misc USE 1 EACH AS DIRECTED FOR 90 DAYS      Cyanocobalamin (Vitamin B-12 ER) 1000 MCG tablet controlled-release TAKE 1 TABLET DAILY 30 each 5    Diclofenac Epolamine (FLECTOR) 1.3 % patch patch Apply 1 patch topically to the appropriate area as directed 2 (Two) Times a Day As Needed (back pain). 60 patch 11    doxycycline (VIBRAMYCIN) 100 MG capsule Take 1 capsule by mouth 2 (Two) Times a Day. 14 capsule 0    Emtricitabine-Tenofovir AF (Descovy) 200-25 MG per tablet Take 1 tablet by mouth Daily.      FLUoxetine (PROzac) 40 MG capsule Take 1 capsule by mouth Every Night. 90 capsule 1    fluticasone (FLONASE) 50 MCG/ACT nasal spray 1 spray into the nostril(s) as directed by provider 2 (Two) Times a Day. 16 mL 3    gabapentin (NEURONTIN) 300 MG capsule Take 1 capsule by mouth 3 (Three) Times a Day. 270 capsule 3    glucose blood (True Metrix Blood Glucose Test) test strip 1 each by Other route 4 (Four) Times a Day. Dx:E11.49 patient checks sugar qid 200 each 12    HYDROcodone-acetaminophen (Norco) 7.5-325 MG per tablet Take 1 tablet by mouth 5 (Five) Times a Day As Needed for Severe Pain. 150 tablet 0    HYDROcodone-acetaminophen (Norco) 7.5-325 MG per tablet Take 1 tablet by mouth 5 (Five) Times a Day As Needed for Severe Pain. 150 tablet 0    HYDROcodone-acetaminophen (Norco) 7.5-325 MG per tablet Take 1 tablet by mouth 5 (Five) Times a  Day As Needed for Severe Pain. 150 tablet 0    hydrocortisone 1 % cream Apply  topically to the appropriate area as directed 2 (Two) Times a Day. 60 g 3    hydrOXYzine (ATARAX) 25 MG tablet Take 1 tablet by mouth 4 (Four) Times a Day As Needed for Itching. 360 tablet 3    Insulin NPH, Human,, Isophane, (NovoLIN N FlexPen) 100 UNIT/ML injection Inject 38 Units under the skin into the appropriate area as directed Every Night for 360 days. 34.2 mL 3    Insulin Regular Human (NovoLIN R FlexPen ReliOn) 100 UNIT/ML injection Inject 20 Units under the skin into the appropriate area as directed 3 (Three) Times a Day Before Meals for 360 days. 54 mL 3    Lancet Devices (TRUEdraw Lancing Device) misc 1 each Daily. Dx:E11.49 patient checks sugar qid 1 each 0    loratadine (EQ Loratadine) 10 MG tablet Take 1 tablet by mouth Daily. (Patient taking differently: Take 1 tablet by mouth Every Night.) 90 tablet 3    losartan (COZAAR) 50 MG tablet Take 1 tablet by mouth Daily.      methocarbamol (ROBAXIN) 750 MG tablet Take 1 tablet by mouth 4 (Four) Times a Day As Needed for Muscle Spasms. 180 tablet 3    montelukast (SINGULAIR) 10 MG tablet Take 1 tablet by mouth Every Night. 90 tablet 3    oxybutynin XL (DITROPAN XL) 15 MG 24 hr tablet Take 1 tablet by mouth Daily. 90 tablet 3    pantoprazole (Protonix) 40 MG EC tablet Take 1 tablet by mouth Daily. 90 tablet 3    polycarbophil 625 MG tablet tablet Take  by mouth Every Night.      potassium chloride (MICRO-K) 10 MEQ CR capsule Take 1 capsule by mouth Daily.      promethazine (PHENERGAN) 25 MG tablet TAKE 1 TABLET BY MOUTH EVERY 8 HOURS AS NEEDED FOR NAUSEA FOR VOMITING 90 tablet 0    sildenafil (REVATIO) 20 MG tablet Take 1 tablet by mouth Daily As Needed (ed). 30 tablet 3    sitaGLIPtin-metFORMIN (Janumet)  MG per tablet Take 1 tablet by mouth 2 (Two) Times a Day With Meals. (Patient taking differently: Take 1 tablet by mouth 2 (Two) Times a Day With Meals. Last dose 2/20   "0730  covering with admelog) 180 tablet 3    sodium chloride (Ocean Nasal Spray) 0.65 % nasal spray 1 spray each nostril tid (Patient taking differently: 1 spray into the nostril(s) as directed by provider As Needed. Use preop) 216 mL 3    Triamcinolone Acetonide (NASACORT) 55 MCG/ACT nasal inhaler 2 sprays into the nostril(s) as directed by provider Daily. 16.5 g 11    TRUEplus Lancets 30G misc 1 each 4 (Four) Times a Day. Dx:E11.49 patient checks sugar qid 100 each 12     No current facility-administered medications on file prior to visit.       Objective   Pulse 89   Ht 195.6 cm (77\")   Wt 127 kg (279 lb)   SpO2 97%   BMI 33.08 kg/m²     Foot/Ankle Exam    VASCULAR     Right Foot Vascularity   Normal vascular exam    Dorsalis pedis:  2+  Posterior tibial:  2+  Skin temperature:  warm  Edema grading:  None  CFT:  < 3 seconds  Pedal hair growth:  Present  Varicosities:  none     Left Foot Vascularity   Normal vascular exam    Dorsalis pedis:  2+  Posterior tibial:  2+  Skin temperature:  warm  Edema grading:  None  CFT:  < 3 seconds  Pedal hair growth:  Present  Varicosities:  none     NEUROLOGIC     Right Foot Neurologic   Protective Sensation using [place]ouez-Ernesto Monofilament:   Sites tested: 9     Left Foot Neurologic   Protective Sensation using [place]ouez-Ernesto Monofilament:   Sites tested: 10    MUSCULOSKELETAL     Right Foot Musculoskeletal   Tenderness:  none    Hammertoe:  First toe     Left Foot Musculoskeletal   Tenderness:  none  Hammertoe:  First toe    DERMATOLOGIC      Right Foot Dermatologic   Skin  Right foot skin is intact.   Nails  1.  Positive for elongated, abnormal thickness and dystrophic nail. (Mild callus)  2.  Positive for elongated, abnormal thickness and dystrophic nail.  3.  Positive for elongated, abnormal thickness and dystrophic nail.  4.  Positive for elongated, abnormal thickness and dystrophic nail.  5.  Positive for elongated, abnormal thickness and dystrophic nail.     " Left Foot Dermatologic   Skin  Left foot skin is intact.   Nails  1.  Positive for elongated, abnormal thickness and dystrophic nail.  2.  Positive for elongated, abnormal thickness and dystrophic nail.  3.  Positive for elongated, abnormal thickness and dystrophic nail.  4.  Positive for elongated, abnormally thick and dystrophic nail.  5.  Positive for elongated, abnormally thick and dystrophic nail.      Assessment & Plan   Diagnoses and all orders for this visit:    1. Onychodystrophy (Primary)    2. Type 2 diabetes mellitus with hyperglycemia, without long-term current use of insulin    3. DM type 2 with diabetic peripheral neuropathy    No issues or concerns today. The patient will follow up in 2 weeks for routine diabetic foot check.     Explained importance of diabetic foot care, daily foot checks, and glycemic control. Patient should check both feet on a daily basis, monitor and control blood sugars, make sure that both feet and in between toes are towel dried after baths or showers. Avoid barefoot walking at all times. Check shoes before putting them on.   Patient was given information on proper foot care. Call the office at the first signs of a wound or with signs of infection.    Nail debridement: Both feet x10    Consent and time out was performed before proceeding with the procedure. Nails were debrided with a nail nipper without complication.  No anesthesia was required.  Indications for procedure were thickened, dystrophic, and fungal appearing nails which are difficult to trim.  Proper self-care and technique was discussed with patient.  Patient was stable after procedure.      No orders of the defined types were placed in this encounter.       Transcribed from ambient dictation for ANASTASIA Alvares by Claudio Mcduffie.  09/26/23   15:25 EDT    Patient or patient representative verbalized consent to the visit recording.  I have personally performed the services described in this document  as transcribed by the above individual, and it is both accurate and complete.  Gilda Campbell, APRN  9/27/2023  08:16 EDT

## 2023-10-05 RX ORDER — ISOPROPYL ALCOHOL 0.75 G/1
SWAB TOPICAL
Qty: 100 EACH | Refills: 12 | Status: SHIPPED | OUTPATIENT
Start: 2023-10-05

## 2023-10-23 DIAGNOSIS — J30.2 OTHER SEASONAL ALLERGIC RHINITIS: ICD-10-CM

## 2023-10-23 RX ORDER — AZELASTINE HYDROCHLORIDE 137 UG/1
SPRAY, METERED NASAL
Qty: 30 ML | Refills: 0 | Status: SHIPPED | OUTPATIENT
Start: 2023-10-23

## 2023-10-27 DIAGNOSIS — J30.2 OTHER SEASONAL ALLERGIC RHINITIS: ICD-10-CM

## 2023-10-29 RX ORDER — ECHINACEA PURPUREA EXTRACT 125 MG
TABLET ORAL
Qty: 72 ML | Refills: 0 | Status: SHIPPED | OUTPATIENT
Start: 2023-10-29

## 2023-10-29 RX ORDER — LOSARTAN POTASSIUM 50 MG/1
50 TABLET ORAL DAILY
Qty: 90 TABLET | Refills: 0 | Status: SHIPPED | OUTPATIENT
Start: 2023-10-29

## 2023-10-31 ENCOUNTER — OFFICE VISIT (OUTPATIENT)
Dept: PAIN MEDICINE | Facility: CLINIC | Age: 43
End: 2023-10-31
Payer: MEDICAID

## 2023-10-31 VITALS
HEART RATE: 90 BPM | DIASTOLIC BLOOD PRESSURE: 96 MMHG | RESPIRATION RATE: 16 BRPM | SYSTOLIC BLOOD PRESSURE: 144 MMHG | OXYGEN SATURATION: 96 %

## 2023-10-31 DIAGNOSIS — M48.07 LUMBOSACRAL SPINAL STENOSIS: ICD-10-CM

## 2023-10-31 DIAGNOSIS — M54.16 LUMBAR RADICULOPATHY: ICD-10-CM

## 2023-10-31 DIAGNOSIS — Z79.899 HIGH RISK MEDICATION USE: Primary | ICD-10-CM

## 2023-10-31 DIAGNOSIS — M54.41 CHRONIC MIDLINE LOW BACK PAIN WITH BILATERAL SCIATICA: ICD-10-CM

## 2023-10-31 DIAGNOSIS — M54.42 CHRONIC MIDLINE LOW BACK PAIN WITH BILATERAL SCIATICA: ICD-10-CM

## 2023-10-31 DIAGNOSIS — G89.29 CHRONIC MIDLINE LOW BACK PAIN WITH BILATERAL SCIATICA: ICD-10-CM

## 2023-10-31 RX ORDER — HYDROCODONE BITARTRATE AND ACETAMINOPHEN 7.5; 325 MG/1; MG/1
1 TABLET ORAL
Qty: 150 TABLET | Refills: 0 | Status: SHIPPED | OUTPATIENT
Start: 2023-10-31

## 2023-10-31 RX ORDER — ACETAMINOPHEN AND CODEINE PHOSPHATE 300; 30 MG/1; MG/1
1 TABLET ORAL 3 TIMES DAILY PRN
Qty: 21 TABLET | Refills: 5 | Status: SHIPPED | OUTPATIENT
Start: 2023-10-31

## 2023-10-31 RX ORDER — METHOCARBAMOL 750 MG/1
750 TABLET, FILM COATED ORAL 4 TIMES DAILY PRN
Qty: 360 TABLET | Refills: 3 | Status: SHIPPED | OUTPATIENT
Start: 2023-10-31
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
## 2023-11-20 RX ORDER — PROBENECID AND COLCHICINE 500; .5 MG/1; MG/1
TABLET ORAL
Qty: 30 TABLET | Refills: 0 | Status: SHIPPED | OUTPATIENT
Start: 2023-11-20

## 2023-11-28 ENCOUNTER — OFFICE VISIT (OUTPATIENT)
Dept: PODIATRY | Facility: CLINIC | Age: 43
End: 2023-11-28
Payer: MEDICAID

## 2023-11-28 VITALS — RESPIRATION RATE: 18 BRPM | BODY MASS INDEX: 36.45 KG/M2 | HEIGHT: 74 IN | WEIGHT: 284 LBS

## 2023-11-28 DIAGNOSIS — E11.65 TYPE 2 DIABETES MELLITUS WITH HYPERGLYCEMIA, WITHOUT LONG-TERM CURRENT USE OF INSULIN: ICD-10-CM

## 2023-11-28 DIAGNOSIS — M79.674 PAIN IN TOES OF BOTH FEET: ICD-10-CM

## 2023-11-28 DIAGNOSIS — R26.81 UNSTEADINESS ON FEET: ICD-10-CM

## 2023-11-28 DIAGNOSIS — L60.3 ONYCHODYSTROPHY: Primary | ICD-10-CM

## 2023-11-28 DIAGNOSIS — M79.675 PAIN IN TOES OF BOTH FEET: ICD-10-CM

## 2023-11-28 DIAGNOSIS — E11.42 DIABETIC PERIPHERAL NEUROPATHY ASSOCIATED WITH TYPE 2 DIABETES MELLITUS: ICD-10-CM

## 2023-11-28 NOTE — PROGRESS NOTES
11/28/2023  Foot and Ankle Surgery - Established Patient/Follow-up  Provider: ANASTASIA Chan   Location: [facility]    Subjective:  Barrett Laguerre is a 43 y.o. male.     Chief Complaint   Patient presents with    Left Foot - Diabetes     Foot check, nail trim     Right Foot - Diabetes     Foot check, nail trim     Follow-up     ANA Rosenberg MD 8/28/2023       The patient is here today for diabetic foot check.    The patient has not been experiencing any complications with his feet. He needs his nails trimmed. Blood glucose has been within normal limits when checked. Sometimes the results are slightly elevated. He does not know what his last A1c was. The patient is on insulin.    Thursday, 11/30/2023 he is supposed to receive another epidural with Dr. Lora for his back. His primary issue currently is back flare ups. One moment he will not experience any pain then he could experience excruciating pain the same day. He would have to lay down and take hydrocodone.    Allergies   Allergen Reactions    Penicillin G Anaphylaxis    Penicillins Anaphylaxis    Sulfa Antibiotics Hives    Allopurinol Rash       Current Outpatient Medications on File Prior to Visit   Medication Sig Dispense Refill    acetaminophen-codeine (TYLENOL/CODEINE #3) 300-30 MG per tablet Take 1 tablet by mouth 3 (Three) Times a Day As Needed for Mild Pain. 21 tablet 5    albuterol sulfate  (90 Base) MCG/ACT inhaler Inhale 2 puffs Every 4 (Four) Hours As Needed for Wheezing. 8 g 0    Alcohol Swabs (B-D SINGLE USE SWABS REGULAR) pads APPLY 1 SWAB EXTERNALLY 4 TIMES DAILY BEFORE MEAL(S) AND AT BEDTIME 100 each 12    amLODIPine (NORVASC) 10 MG tablet Take 1 tablet by mouth Daily. (Patient taking differently: Take 1 tablet by mouth Every Night.) 90 tablet 3    Azelastine HCl 137 MCG/SPRAY solution USE 2 SPRAY(S) IN EACH NOSTRIL TWICE DAILY AS DIRECTED 30 mL 0    BD Pen Needle Kyra 2nd Gen 32G X 4 MM misc USE 1 UNIT 4 TIMES DAILY  BEFORE MEAL(S) AND AT BEDTIME 200 each 0    Blood Glucose Monitoring Suppl (True Metrix Meter) w/Device kit USE 1  TO CHECK GLUCOSE 4 TIMES DAILY 1 kit 0    cetirizine (zyrTEC) 10 MG tablet Take 1 tablet by mouth Daily. 30 tablet 11    chlorthalidone (HYGROTEN) 50 MG tablet Take 1 tablet by mouth Daily. (Patient taking differently: Take 1 tablet by mouth Every Night.) 90 tablet 3    clindamycin (CLEOCIN T) 1 % external solution Apply 1 application  topically to the appropriate area as directed 2 (Two) Times a Day. Not currently using      colchicine-probenecid (COL-BENEMID) 0.5-500 MG per tablet Take 1 tablet by mouth once daily 30 tablet 0    Continuous Blood Gluc  (Dexcom G6 ) device 1  ONCE DAILY 1 each 0    Continuous Blood Gluc Sensor (Dexcom G6 Sensor) USE ONE SENSOR EVERY 10 DAYS 3 each 3    Continuous Blood Gluc Transmit (Dexcom G6 Transmitter) misc USE 1 EACH AS DIRECTED FOR 90 DAYS      Cyanocobalamin (Vitamin B-12 ER) 1000 MCG tablet controlled-release TAKE 1 TABLET DAILY 30 each 5    dexAMETHasone (DECADRON) 4 MG tablet Take 1 tablet by mouth 2 (Two) Times a Day With Meals. 16 tablet 0    Diclofenac Epolamine (FLECTOR) 1.3 % patch patch Apply 1 patch topically to the appropriate area as directed 2 (Two) Times a Day As Needed (back pain). 60 patch 11    Emtricitabine-Tenofovir AF (Descovy) 200-25 MG per tablet Take 1 tablet by mouth Daily.      FLUoxetine (PROzac) 40 MG capsule Take 1 capsule by mouth Every Night. 90 capsule 1    fluticasone (FLONASE) 50 MCG/ACT nasal spray 1 spray into the nostril(s) as directed by provider 2 (Two) Times a Day. 16 mL 3    gabapentin (NEURONTIN) 300 MG capsule Take 1 capsule by mouth 3 (Three) Times a Day. 270 capsule 3    glucose blood (True Metrix Blood Glucose Test) test strip 1 each by Other route 4 (Four) Times a Day. Dx:E11.49 patient checks sugar qid 200 each 12    HYDROcodone-acetaminophen (Norco) 7.5-325 MG per tablet Take 1 tablet by mouth 5  (Five) Times a Day As Needed for Severe Pain. 150 tablet 0    HYDROcodone-acetaminophen (Norco) 7.5-325 MG per tablet Take 1 tablet by mouth 5 (Five) Times a Day As Needed for Severe Pain. 150 tablet 0    hydrocortisone 1 % cream Apply  topically to the appropriate area as directed 2 (Two) Times a Day. 60 g 3    hydrOXYzine (ATARAX) 25 MG tablet Take 1 tablet by mouth 4 (Four) Times a Day As Needed for Itching. 360 tablet 3    Insulin NPH, Human,, Isophane, (NovoLIN N FlexPen) 100 UNIT/ML injection Inject 38 Units under the skin into the appropriate area as directed Every Night for 360 days. 34.2 mL 3    Insulin Regular Human (NovoLIN R FlexPen ReliOn) 100 UNIT/ML injection Inject 20 Units under the skin into the appropriate area as directed 3 (Three) Times a Day Before Meals for 360 days. 54 mL 3    Lancet Devices (TRUEdraw Lancing Device) misc 1 each Daily. Dx:E11.49 patient checks sugar qid 1 each 0    loratadine (EQ Loratadine) 10 MG tablet Take 1 tablet by mouth Daily. (Patient taking differently: Take 1 tablet by mouth Every Night.) 90 tablet 3    losartan (COZAAR) 50 MG tablet Take 1 tablet by mouth once daily 90 tablet 0    methocarbamol (ROBAXIN) 750 MG tablet Take 1 tablet by mouth 4 (Four) Times a Day As Needed for Muscle Spasms. 360 tablet 3    montelukast (SINGULAIR) 10 MG tablet Take 1 tablet by mouth Every Night. 90 tablet 3    oxybutynin XL (DITROPAN XL) 15 MG 24 hr tablet Take 1 tablet by mouth Daily. 90 tablet 3    pantoprazole (Protonix) 40 MG EC tablet Take 1 tablet by mouth Daily. 90 tablet 3    polycarbophil 625 MG tablet tablet Take  by mouth Every Night.      potassium chloride (MICRO-K) 10 MEQ CR capsule Take 1 capsule by mouth Daily.      promethazine (PHENERGAN) 25 MG tablet TAKE 1 TABLET BY MOUTH EVERY 8 HOURS AS NEEDED FOR NAUSEA FOR VOMITING 90 tablet 0    promethazine-dextromethorphan (PROMETHAZINE-DM) 6.25-15 MG/5ML syrup Take 5 mL by mouth 4 (Four) Times a Day As Needed for Cough. 90  "mL 0    sildenafil (REVATIO) 20 MG tablet Take 1 tablet by mouth Daily As Needed (ed). 30 tablet 3    sitaGLIPtin-metFORMIN (Janumet)  MG per tablet Take 1 tablet by mouth 2 (Two) Times a Day With Meals. (Patient taking differently: Take 1 tablet by mouth 2 (Two) Times a Day With Meals. Last dose 2/20  0730  covering with admelog) 180 tablet 3    sodium chloride (Ayr) 0.65 % nasal spray USE 1 DOSE IN EACH NOSTRIL THREE TIMES DAILY 72 mL 0    Triamcinolone Acetonide (NASACORT) 55 MCG/ACT nasal inhaler 2 sprays into the nostril(s) as directed by provider Daily. 16.5 g 11    TRUEplus Lancets 30G misc 1 each 4 (Four) Times a Day. Dx:E11.49 patient checks sugar qid 100 each 12     No current facility-administered medications on file prior to visit.       Objective   Resp 18   Ht 188 cm (74\")   Wt 129 kg (284 lb)   BMI 36.46 kg/m²     Foot/Ankle Exam    VASCULAR     Right Foot Vascularity   Normal vascular exam    Dorsalis pedis:  2+  Posterior tibial:  2+  Skin temperature:  warm  Edema grading:  None  CFT:  < 3 seconds  Pedal hair growth:  Present  Varicosities:  none     Left Foot Vascularity   Normal vascular exam    Dorsalis pedis:  2+  Posterior tibial:  2+  Skin temperature:  warm  Edema grading:  None  CFT:  < 3 seconds  Pedal hair growth:  Present  Varicosities:  none     NEUROLOGIC     Right Foot Neurologic   Protective Sensation using Boyd-Ernesto Monofilament:   Sites tested: 9     Left Foot Neurologic   Protective Sensation using Boyd-Ernesto Monofilament:   Sites tested: 10    MUSCULOSKELETAL     Right Foot Musculoskeletal   Tenderness:  none    Hammertoe:  First toe     Left Foot Musculoskeletal   Tenderness:  none  Hammertoe:  First toe    DERMATOLOGIC      Right Foot Dermatologic   Skin  Right foot skin is intact.   Nails  1.  Positive for elongated, abnormal thickness and dystrophic nail. (Mild callus)  2.  Positive for elongated, abnormal thickness and dystrophic nail.  3.  Positive " for elongated, abnormal thickness and dystrophic nail.  4.  Positive for elongated, abnormal thickness and dystrophic nail.  5.  Positive for elongated, abnormal thickness and dystrophic nail.     Left Foot Dermatologic   Skin  Left foot skin is intact.   Nails  1.  Positive for elongated, abnormal thickness and dystrophic nail.  2.  Positive for elongated, abnormal thickness and dystrophic nail.  3.  Positive for elongated, abnormal thickness and dystrophic nail.  4.  Positive for elongated, abnormally thick and dystrophic nail.  5.  Positive for elongated, abnormally thick and dystrophic nail.    [facility]    Assessment & Plan   Diagnoses and all orders for this visit:    1. Onychodystrophy (Primary)    2. Type 2 diabetes mellitus with hyperglycemia, without long-term current use of insulin    3. Diabetic peripheral neuropathy associated with type 2 diabetes mellitus    4. Pain in toes of both feet    5. Unsteadiness on feet        1. Diabetic foot check  The patient is at mild to moderate risk for pedal complications related to diabetes. He will follow up in 2 months.  Explained importance of diabetic foot care, daily foot checks, and glycemic control. Patient should check both feet on a daily basis, monitor and control blood sugars, make sure that both feet and in between toes are towel dried after baths or showers. Avoid barefoot walking at all times. Check shoes before putting them on. Patient was given information on proper foot care. Call the office at the first signs of a wound or with signs of infection.    Nail debridement: Both feet x10    Consent and time out was performed before proceeding with the procedure. Nails were debrided with a nail nipper without complication.  No anesthesia was required.  Indications for procedure were thickened, dystrophic, and fungal appearing nails which are difficult to trim.  Proper self-care and technique was discussed with patient.  Patient was stable after procedure.      No orders of the defined types were placed in this encounter.          Transcribed from ambient dictation for ANASTASIA Alvares by Harsh Hamilton.  11/28/23   16:43 EST    Patient or patient representative verbalized consent to the visit recording.  I have personally performed the services described in this document as transcribed by the above individual, and it is both accurate and complete.

## 2023-11-30 ENCOUNTER — HOSPITAL ENCOUNTER (OUTPATIENT)
Dept: PAIN MEDICINE | Facility: HOSPITAL | Age: 43
Discharge: HOME | End: 2023-11-30
Payer: MEDICAID

## 2023-11-30 VITALS
BODY MASS INDEX: 36.45 KG/M2 | RESPIRATION RATE: 16 BRPM | TEMPERATURE: 97.3 F | HEIGHT: 74 IN | OXYGEN SATURATION: 96 % | WEIGHT: 284 LBS | HEART RATE: 93 BPM | DIASTOLIC BLOOD PRESSURE: 88 MMHG | SYSTOLIC BLOOD PRESSURE: 128 MMHG

## 2023-11-30 DIAGNOSIS — R52 PAIN: ICD-10-CM

## 2023-11-30 DIAGNOSIS — M54.42 CHRONIC MIDLINE LOW BACK PAIN WITH BILATERAL SCIATICA: Primary | ICD-10-CM

## 2023-11-30 DIAGNOSIS — M54.41 CHRONIC MIDLINE LOW BACK PAIN WITH BILATERAL SCIATICA: Primary | ICD-10-CM

## 2023-11-30 DIAGNOSIS — G89.29 CHRONIC MIDLINE LOW BACK PAIN WITH BILATERAL SCIATICA: Primary | ICD-10-CM

## 2023-11-30 DIAGNOSIS — M54.16 LUMBAR RADICULOPATHY: ICD-10-CM

## 2023-11-30 PROCEDURE — 25010000002 DEXAMETHASONE SODIUM PHOSPHATE 10 MG/ML SOLUTION: Performed by: PHYSICAL MEDICINE & REHABILITATION

## 2023-11-30 PROCEDURE — 25510000001 IOPAMIDOL 41 % SOLUTION: Performed by: PHYSICAL MEDICINE & REHABILITATION

## 2023-11-30 PROCEDURE — 77003 FLUOROGUIDE FOR SPINE INJECT: CPT

## 2023-11-30 RX ORDER — LIDOCAINE HYDROCHLORIDE 10 MG/ML
5 INJECTION, SOLUTION EPIDURAL; INFILTRATION; INTRACAUDAL; PERINEURAL ONCE
Status: COMPLETED | OUTPATIENT
Start: 2023-11-30 | End: 2023-11-30

## 2023-11-30 RX ORDER — DEXAMETHASONE SODIUM PHOSPHATE 10 MG/ML
10 INJECTION, SOLUTION INTRAMUSCULAR; INTRAVENOUS ONCE
Status: COMPLETED | OUTPATIENT
Start: 2023-11-30 | End: 2023-11-30

## 2023-11-30 RX ORDER — IOPAMIDOL 408 MG/ML
10 INJECTION, SOLUTION INTRATHECAL
Status: COMPLETED | OUTPATIENT
Start: 2023-11-30 | End: 2023-11-30

## 2023-11-30 RX ADMIN — IOPAMIDOL 10 ML: 408 INJECTION, SOLUTION INTRATHECAL at 14:23

## 2023-11-30 RX ADMIN — DEXAMETHASONE SODIUM PHOSPHATE 10 MG: 10 INJECTION, SOLUTION INTRAMUSCULAR; INTRAVENOUS at 14:25

## 2023-11-30 RX ADMIN — LIDOCAINE HYDROCHLORIDE 5 ML: 10 INJECTION, SOLUTION EPIDURAL; INFILTRATION; INTRACAUDAL; PERINEURAL at 14:25

## 2023-11-30 NOTE — DISCHARGE INSTRUCTIONS
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
## 2023-12-01 ENCOUNTER — TELEPHONE (OUTPATIENT)
Dept: PAIN MEDICINE | Facility: HOSPITAL | Age: 43
End: 2023-12-01
Payer: MEDICAID

## 2023-12-03 DIAGNOSIS — Z79.4 TYPE 2 DIABETES MELLITUS WITH OTHER NEUROLOGIC COMPLICATION, WITH LONG-TERM CURRENT USE OF INSULIN: ICD-10-CM

## 2023-12-03 DIAGNOSIS — E11.49 TYPE 2 DIABETES MELLITUS WITH OTHER NEUROLOGIC COMPLICATION, WITH LONG-TERM CURRENT USE OF INSULIN: ICD-10-CM

## 2023-12-04 RX ORDER — PEN NEEDLE, DIABETIC 32GX 5/32"
NEEDLE, DISPOSABLE MISCELLANEOUS
Qty: 200 EACH | Refills: 0 | Status: SHIPPED | OUTPATIENT
Start: 2023-12-04

## 2023-12-18 ENCOUNTER — PATIENT MESSAGE (OUTPATIENT)
Dept: FAMILY MEDICINE CLINIC | Facility: CLINIC | Age: 43
End: 2023-12-18

## 2023-12-18 ENCOUNTER — OFFICE VISIT (OUTPATIENT)
Dept: FAMILY MEDICINE CLINIC | Facility: CLINIC | Age: 43
End: 2023-12-18
Payer: MEDICAID

## 2023-12-18 VITALS
HEART RATE: 76 BPM | DIASTOLIC BLOOD PRESSURE: 80 MMHG | RESPIRATION RATE: 20 BRPM | WEIGHT: 287.6 LBS | HEIGHT: 74 IN | OXYGEN SATURATION: 97 % | BODY MASS INDEX: 36.91 KG/M2 | SYSTOLIC BLOOD PRESSURE: 132 MMHG

## 2023-12-18 DIAGNOSIS — R35.0 URINARY FREQUENCY: Primary | ICD-10-CM

## 2023-12-18 DIAGNOSIS — N41.0 ACUTE PROSTATITIS: ICD-10-CM

## 2023-12-18 LAB
BILIRUB BLD-MCNC: NEGATIVE MG/DL
CLARITY, POC: CLEAR
COLOR UR: YELLOW
GLUCOSE UR STRIP-MCNC: ABNORMAL MG/DL
KETONES UR QL: NEGATIVE
LEUKOCYTE EST, POC: NEGATIVE
NITRITE UR-MCNC: NEGATIVE MG/ML
PH UR: 7 [PH] (ref 5–8)
PROT UR STRIP-MCNC: ABNORMAL MG/DL
RBC # UR STRIP: ABNORMAL /UL
SP GR UR: 1.02 (ref 1–1.03)
UROBILINOGEN UR QL: ABNORMAL

## 2023-12-18 RX ORDER — CIPROFLOXACIN 500 MG/1
500 TABLET, FILM COATED ORAL 2 TIMES DAILY
Qty: 28 TABLET | Refills: 0 | Status: SHIPPED | OUTPATIENT
Start: 2023-12-18 | End: 2024-01-01

## 2023-12-18 NOTE — PROGRESS NOTES
Subjective   Barrett Laguerre is a 43 y.o. male.   Chief Complaint   Patient presents with    prostate issue       History of Present Illness  Pt c/o enlarged prostate, some difficulty urinating. Seems to be improving some. No fever       The following portions of the patient's history were reviewed and updated as appropriate: allergies, current medications, past family history, past medical history, past social history, past surgical history, and problem list.    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-[place]    Sleep apnea    Attention deficit disorder    Diabetes    Other seasonal allergic rhinitis    Mixed obsessional thoughts and acts    Hypertension, benign    Disorder of lipid metabolism    CIDP (chronic inflammatory demyelinating polyneuropathy)    Lumbar radiculopathy    Onychomycosis    Annual physical exam    Class 1 obesity due to excess calories with serious comorbidity and body mass index (BMI) of 34.0 to 34.9 in adult    Acute midline low back pain with left-sided sciatica       Current Outpatient Medications on File Prior to Visit   Medication Sig Dispense Refill    acetaminophen-codeine (TYLENOL/CODEINE #3) 300-30 MG per tablet Take 1 tablet by mouth 3 (Three) Times a Day As Needed for Mild Pain. 21 tablet 5    albuterol sulfate  (90 Base) MCG/ACT inhaler Inhale 2 puffs Every 4 (Four) Hours As Needed for Wheezing. 8 g 0    Alcohol Swabs (B-D SINGLE USE SWABS REGULAR) pads APPLY 1 SWAB EXTERNALLY 4 TIMES DAILY BEFORE MEAL(S) AND AT BEDTIME 100 each 12    amLODIPine (NORVASC) 10 MG tablet Take 1 tablet by mouth Daily. (Patient taking differently: Take 1 tablet by mouth Every Night.) 90 tablet 3    Azelastine HCl 137 MCG/SPRAY solution USE 2 SPRAY(S) IN EACH NOSTRIL TWICE DAILY AS DIRECTED  30 mL 0    BD Pen Needle Kyra 2nd Gen 32G X 4 MM misc USE 1 UNIT 4 TIMES DAILY BEFORE MEAL(S) AND AT BEDTIME 200 each 0    Blood Glucose Monitoring Suppl (True Metrix Meter) w/Device kit USE 1  TO CHECK GLUCOSE 4 TIMES DAILY 1 kit 0    cetirizine (zyrTEC) 10 MG tablet Take 1 tablet by mouth Daily. 30 tablet 11    chlorthalidone (HYGROTEN) 50 MG tablet Take 1 tablet by mouth Daily. (Patient taking differently: Take 1 tablet by mouth Every Night.) 90 tablet 3    colchicine-probenecid (COL-BENEMID) 0.5-500 MG per tablet Take 1 tablet by mouth once daily 30 tablet 0    Continuous Blood Gluc  (Dexcom G6 ) device 1  ONCE DAILY 1 each 0    Continuous Blood Gluc Sensor (Dexcom G6 Sensor) USE ONE SENSOR EVERY 10 DAYS 3 each 3    Continuous Blood Gluc Transmit (Dexcom G6 Transmitter) misc USE 1 EACH AS DIRECTED FOR 90 DAYS      Cyanocobalamin (Vitamin B-12 ER) 1000 MCG tablet controlled-release TAKE 1 TABLET DAILY 30 each 5    dexAMETHasone (DECADRON) 4 MG tablet Take 1 tablet by mouth 2 (Two) Times a Day With Meals. 16 tablet 0    Diclofenac Epolamine (FLECTOR) 1.3 % patch patch Apply 1 patch topically to the appropriate area as directed 2 (Two) Times a Day As Needed (back pain). 60 patch 11    Emtricitabine-Tenofovir AF (Descovy) 200-25 MG per tablet Take 1 tablet by mouth Daily.      FLUoxetine (PROzac) 40 MG capsule Take 1 capsule by mouth Every Night. 90 capsule 1    fluticasone (FLONASE) 50 MCG/ACT nasal spray 1 spray into the nostril(s) as directed by provider 2 (Two) Times a Day. 16 mL 3    gabapentin (NEURONTIN) 300 MG capsule Take 1 capsule by mouth 3 (Three) Times a Day. 270 capsule 3    glucose blood (True Metrix Blood Glucose Test) test strip 1 each by Other route 4 (Four) Times a Day. Dx:E11.49 patient checks sugar qid 200 each 12    HYDROcodone-acetaminophen (Norco) 7.5-325 MG per tablet Take 1 tablet by mouth 5 (Five) Times a Day As Needed for Severe Pain. 150 tablet 0     HYDROcodone-acetaminophen (Norco) 7.5-325 MG per tablet Take 1 tablet by mouth 5 (Five) Times a Day As Needed for Severe Pain. 150 tablet 0    hydrocortisone 1 % cream Apply  topically to the appropriate area as directed 2 (Two) Times a Day. 60 g 3    hydrOXYzine (ATARAX) 25 MG tablet Take 1 tablet by mouth 4 (Four) Times a Day As Needed for Itching. 360 tablet 3    Insulin NPH, Human,, Isophane, (NovoLIN N FlexPen) 100 UNIT/ML injection Inject 38 Units under the skin into the appropriate area as directed Every Night for 360 days. 34.2 mL 3    Insulin Regular Human (NovoLIN R FlexPen ReliOn) 100 UNIT/ML injection Inject 20 Units under the skin into the appropriate area as directed 3 (Three) Times a Day Before Meals for 360 days. 54 mL 3    Lancet Devices (TRUEdraw Lancing Device) misc 1 each Daily. Dx:E11.49 patient checks sugar qid 1 each 0    loratadine (EQ Loratadine) 10 MG tablet Take 1 tablet by mouth Daily. (Patient taking differently: Take 1 tablet by mouth Every Night.) 90 tablet 3    losartan (COZAAR) 50 MG tablet Take 1 tablet by mouth once daily 90 tablet 0    methocarbamol (ROBAXIN) 750 MG tablet Take 1 tablet by mouth 4 (Four) Times a Day As Needed for Muscle Spasms. 360 tablet 3    montelukast (SINGULAIR) 10 MG tablet Take 1 tablet by mouth Every Night. 90 tablet 3    pantoprazole (Protonix) 40 MG EC tablet Take 1 tablet by mouth Daily. 90 tablet 3    polycarbophil 625 MG tablet tablet Take  by mouth Every Night.      potassium chloride (MICRO-K) 10 MEQ CR capsule Take 1 capsule by mouth Daily.      promethazine (PHENERGAN) 25 MG tablet TAKE 1 TABLET BY MOUTH EVERY 8 HOURS AS NEEDED FOR NAUSEA FOR VOMITING 90 tablet 0    sildenafil (REVATIO) 20 MG tablet Take 1 tablet by mouth Daily As Needed (ed). 30 tablet 3    sitaGLIPtin-metFORMIN (Janumet)  MG per tablet Take 1 tablet by mouth 2 (Two) Times a Day With Meals. (Patient taking differently: Take 1 tablet by mouth 2 (Two) Times a Day With Meals.  Last dose 2/20  0730  covering with admelog) 180 tablet 3    sodium chloride (Ayr) 0.65 % nasal spray USE 1 DOSE IN EACH NOSTRIL THREE TIMES DAILY 72 mL 0    Triamcinolone Acetonide (NASACORT) 55 MCG/ACT nasal inhaler 2 sprays into the nostril(s) as directed by provider Daily. 16.5 g 11    TRUEplus Lancets 30G misc 1 each 4 (Four) Times a Day. Dx:E11.49 patient checks sugar qid 100 each 12    [DISCONTINUED] promethazine-dextromethorphan (PROMETHAZINE-DM) 6.25-15 MG/5ML syrup Take 5 mL by mouth 4 (Four) Times a Day As Needed for Cough. 90 mL 0    clindamycin (CLEOCIN T) 1 % external solution Apply 1 application  topically to the appropriate area as directed 2 (Two) Times a Day. Not currently using      oxybutynin XL (DITROPAN XL) 15 MG 24 hr tablet Take 1 tablet by mouth Daily. 90 tablet 3     No current facility-administered medications on file prior to visit.     Current outpatient and discharge medications have been reconciled for the patient.  Reviewed by: Eric Rosenberg MD      Allergies   Allergen Reactions    Penicillin G Anaphylaxis    Penicillins Anaphylaxis    Sulfa Antibiotics Hives    Allopurinol Rash       Review of Systems   Constitutional:  Negative for activity change, appetite change, fatigue and fever.   HENT:  Negative for ear pain, swollen glands and voice change.    Eyes:  Negative for visual disturbance.   Respiratory:  Negative for shortness of breath and wheezing.    Cardiovascular:  Negative for chest pain and leg swelling.   Gastrointestinal:  Negative for abdominal pain, blood in stool, constipation, diarrhea, nausea and vomiting.   Endocrine: Negative for polydipsia and polyuria.   Genitourinary:  Positive for difficulty urinating. Negative for dysuria, frequency and hematuria.   Musculoskeletal:  Negative for joint swelling, neck pain and neck stiffness.   Skin:  Negative for rash and wound.   Neurological:  Negative for weakness, numbness and headache.   Psychiatric/Behavioral:   "Negative for suicidal ideas and depressed mood.      I have reviewed and confirmed the accuracy of the ROS as documented by the MA/LPN/RN Eric Rosenberg MD    Objective   Visit Vitals  /80 (BP Location: Right arm, Patient Position: Sitting, Cuff Size: Large Adult)   Pulse 76   Resp 20   Ht 188 cm (74.02\")   Wt 130 kg (287 lb 9.6 oz)   SpO2 97%   BMI 36.91 kg/m²      **  Physical Exam  Constitutional:       Appearance: He is well-developed.   HENT:      Head: Normocephalic and atraumatic.      Right Ear: External ear normal.      Left Ear: External ear normal.      Nose: Nose normal.   Eyes:      Pupils: Pupils are equal, round, and reactive to light.   Cardiovascular:      Rate and Rhythm: Normal rate and regular rhythm.      Heart sounds: Normal heart sounds.   Pulmonary:      Effort: Pulmonary effort is normal.      Breath sounds: Normal breath sounds.   Abdominal:      General: Bowel sounds are normal.      Palpations: Abdomen is soft.   Musculoskeletal:         General: Normal range of motion.      Cervical back: Normal range of motion and neck supple.   Skin:     General: Skin is warm and dry.   Neurological:      Mental Status: He is alert and oriented to person, place, and time.   Psychiatric:         Behavior: Behavior normal.         Thought Content: Thought content normal.         Judgment: Judgment normal.       Derm Physical Exam  Ortho Exam   Neurologic Exam     Mental Status   Oriented to person, place, and time.     Cranial Nerves     CN III, IV, VI   Pupils are equal, round, and reactive to light.       Diagnoses and all orders for this visit:    1. Urinary frequency (Primary)  -     POCT urinalysis dipstick, manual  -     ciprofloxacin (Cipro) 500 MG tablet; Take 1 tablet by mouth 2 (Two) Times a Day for 14 days.  Dispense: 28 tablet; Refill: 0    2. Acute prostatitis  -     ciprofloxacin (Cipro) 500 MG tablet; Take 1 tablet by mouth 2 (Two) Times a Day for 14 days.  Dispense: 28 " tablet; Refill: 0    Findings discussed. All questions answered.  Medication and medication adverse effects discussed.  Drug education given and explained to patient. Patient verbalized understanding.  Follow-up in 2 weeks if not better.  Follow-up sooner for worsening symptoms or for any concerns.      Expected course, medications, and adverse effects discussed as appropriate.  Call or return if worsening or persistent symptoms.     This document is intended for medical professional use only.

## 2023-12-26 DIAGNOSIS — J30.2 OTHER SEASONAL ALLERGIC RHINITIS: ICD-10-CM

## 2023-12-26 RX ORDER — PROBENECID AND COLCHICINE .5; 5 MG/1; MG/1
TABLET ORAL
Qty: 30 TABLET | Refills: 0 | Status: SHIPPED | OUTPATIENT
Start: 2023-12-26

## 2023-12-26 RX ORDER — LORATADINE 10 MG/1
TABLET ORAL DAILY
Qty: 30 TABLET | Refills: 0 | Status: SHIPPED | OUTPATIENT
Start: 2023-12-26

## 2024-01-09 DIAGNOSIS — J30.89 NON-SEASONAL ALLERGIC RHINITIS DUE TO OTHER ALLERGIC TRIGGER: ICD-10-CM

## 2024-01-09 DIAGNOSIS — I10 HYPERTENSION, BENIGN: ICD-10-CM

## 2024-01-09 DIAGNOSIS — G61.0 GUILLAIN-BARRE: ICD-10-CM

## 2024-01-09 DIAGNOSIS — J30.2 OTHER SEASONAL ALLERGIC RHINITIS: ICD-10-CM

## 2024-01-09 RX ORDER — MONTELUKAST SODIUM 10 MG/1
10 TABLET ORAL NIGHTLY
Qty: 30 TABLET | Refills: 0 | Status: SHIPPED | OUTPATIENT
Start: 2024-01-09

## 2024-01-09 RX ORDER — AMLODIPINE BESYLATE 10 MG/1
10 TABLET ORAL NIGHTLY
Qty: 30 TABLET | Refills: 3 | Status: SHIPPED | OUTPATIENT
Start: 2024-01-09

## 2024-01-11 RX ORDER — GABAPENTIN 300 MG/1
300 CAPSULE ORAL 3 TIMES DAILY
Qty: 270 CAPSULE | Refills: 0 | Status: SHIPPED | OUTPATIENT
Start: 2024-01-11

## 2024-01-11 RX ORDER — PROBENECID AND COLCHICINE .5; 5 MG/1; MG/1
TABLET ORAL
Qty: 30 TABLET | Refills: 0 | Status: SHIPPED | OUTPATIENT
Start: 2024-01-11

## 2024-01-11 RX ORDER — ECHINACEA PURPUREA EXTRACT 125 MG
TABLET ORAL
Qty: 50 ML | Refills: 0 | Status: SHIPPED | OUTPATIENT
Start: 2024-01-11

## 2024-01-12 ENCOUNTER — OFFICE VISIT (OUTPATIENT)
Dept: PAIN MEDICINE | Facility: CLINIC | Age: 44
End: 2024-01-12
Payer: MEDICAID

## 2024-01-12 VITALS
OXYGEN SATURATION: 97 % | RESPIRATION RATE: 16 BRPM | HEART RATE: 77 BPM | SYSTOLIC BLOOD PRESSURE: 127 MMHG | WEIGHT: 288 LBS | BODY MASS INDEX: 36.96 KG/M2 | DIASTOLIC BLOOD PRESSURE: 84 MMHG

## 2024-01-12 DIAGNOSIS — M54.42 CHRONIC MIDLINE LOW BACK PAIN WITH BILATERAL SCIATICA: Primary | ICD-10-CM

## 2024-01-12 DIAGNOSIS — G89.29 CHRONIC MIDLINE LOW BACK PAIN WITH BILATERAL SCIATICA: Primary | ICD-10-CM

## 2024-01-12 DIAGNOSIS — M54.41 CHRONIC MIDLINE LOW BACK PAIN WITH BILATERAL SCIATICA: Primary | ICD-10-CM

## 2024-01-12 DIAGNOSIS — M48.07 LUMBOSACRAL SPINAL STENOSIS: ICD-10-CM

## 2024-01-12 DIAGNOSIS — M54.16 LUMBAR RADICULOPATHY: ICD-10-CM

## 2024-01-12 RX ORDER — HYDROCODONE BITARTRATE AND ACETAMINOPHEN 7.5; 325 MG/1; MG/1
1 TABLET ORAL
Qty: 150 TABLET | Refills: 0 | Status: SHIPPED | OUTPATIENT
Start: 2024-01-12

## 2024-01-12 RX ORDER — ACETAMINOPHEN AND CODEINE PHOSPHATE 300; 30 MG/1; MG/1
1 TABLET ORAL 3 TIMES DAILY PRN
Qty: 21 TABLET | Refills: 5 | Status: SHIPPED | OUTPATIENT
Start: 2024-01-12
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
## 2024-01-18 DIAGNOSIS — L30.8 OTHER ECZEMA: ICD-10-CM

## 2024-01-18 RX ORDER — BENZOCAINE/MENTHOL 6 MG-10 MG
LOZENGE MUCOUS MEMBRANE 2 TIMES DAILY
Qty: 60 G | Refills: 3 | Status: SHIPPED | OUTPATIENT
Start: 2024-01-18

## 2024-01-25 RX ORDER — LOSARTAN POTASSIUM 50 MG/1
50 TABLET ORAL DAILY
Qty: 90 TABLET | Refills: 0 | Status: SHIPPED | OUTPATIENT
Start: 2024-01-25

## 2024-01-28 DIAGNOSIS — N41.0 ACUTE PROSTATITIS: ICD-10-CM

## 2024-01-28 DIAGNOSIS — R35.0 URINARY FREQUENCY: ICD-10-CM

## 2024-01-28 DIAGNOSIS — J30.2 OTHER SEASONAL ALLERGIC RHINITIS: ICD-10-CM

## 2024-01-28 DIAGNOSIS — E11.9 TYPE 2 DIABETES MELLITUS WITHOUT COMPLICATION, WITHOUT LONG-TERM CURRENT USE OF INSULIN: ICD-10-CM

## 2024-01-28 DIAGNOSIS — Z79.4 TYPE 2 DIABETES MELLITUS WITH OTHER NEUROLOGIC COMPLICATION, WITH LONG-TERM CURRENT USE OF INSULIN: ICD-10-CM

## 2024-01-28 DIAGNOSIS — I10 HYPERTENSION, BENIGN: ICD-10-CM

## 2024-01-28 DIAGNOSIS — E11.49 TYPE 2 DIABETES MELLITUS WITH OTHER NEUROLOGIC COMPLICATION, WITH LONG-TERM CURRENT USE OF INSULIN: ICD-10-CM

## 2024-01-29 DIAGNOSIS — J30.2 OTHER SEASONAL ALLERGIC RHINITIS: ICD-10-CM

## 2024-01-29 DIAGNOSIS — L30.8 OTHER ECZEMA: ICD-10-CM

## 2024-01-30 RX ORDER — CHLORTHALIDONE 50 MG/1
50 TABLET ORAL DAILY
Qty: 90 TABLET | Refills: 0 | Status: SHIPPED | OUTPATIENT
Start: 2024-01-30

## 2024-01-30 RX ORDER — LORATADINE 10 MG/1
TABLET ORAL DAILY
Qty: 30 TABLET | Refills: 0 | Status: SHIPPED | OUTPATIENT
Start: 2024-01-30

## 2024-01-30 RX ORDER — AZELASTINE HYDROCHLORIDE 137 UG/1
SPRAY, METERED NASAL
Qty: 30 ML | Refills: 0 | Status: SHIPPED | OUTPATIENT
Start: 2024-01-30

## 2024-01-30 RX ORDER — PROBENECID AND COLCHICINE .5; 5 MG/1; MG/1
TABLET ORAL
Qty: 30 TABLET | Refills: 0 | Status: SHIPPED | OUTPATIENT
Start: 2024-01-30

## 2024-01-30 RX ORDER — PEN NEEDLE, DIABETIC 32GX 5/32"
NEEDLE, DISPOSABLE MISCELLANEOUS
Qty: 200 EACH | Refills: 0 | Status: SHIPPED | OUTPATIENT
Start: 2024-01-30

## 2024-01-30 RX ORDER — ECHINACEA PURPUREA EXTRACT 125 MG
TABLET ORAL
Qty: 50 ML | Refills: 0 | Status: SHIPPED | OUTPATIENT
Start: 2024-01-30

## 2024-01-30 RX ORDER — BENZOCAINE/MENTHOL 6 MG-10 MG
LOZENGE MUCOUS MEMBRANE
Qty: 60 G | Refills: 0 | OUTPATIENT
Start: 2024-01-30

## 2024-01-30 RX ORDER — CIPROFLOXACIN 500 MG/1
500 TABLET, FILM COATED ORAL 2 TIMES DAILY
Qty: 28 TABLET | Refills: 0 | OUTPATIENT
Start: 2024-01-30 | End: 2024-02-13

## 2024-01-30 RX ORDER — SITAGLIPTIN AND METFORMIN HYDROCHLORIDE 1000; 50 MG/1; MG/1
1 TABLET, FILM COATED ORAL 2 TIMES DAILY WITH MEALS
Qty: 180 TABLET | Refills: 0 | Status: SHIPPED | OUTPATIENT
Start: 2024-01-30

## 2024-01-30 RX ORDER — OXYBUTYNIN CHLORIDE 15 MG/1
15 TABLET, EXTENDED RELEASE ORAL DAILY
Qty: 90 TABLET | Refills: 0 | Status: SHIPPED | OUTPATIENT
Start: 2024-01-30

## 2024-02-05 DIAGNOSIS — J30.89 NON-SEASONAL ALLERGIC RHINITIS DUE TO OTHER ALLERGIC TRIGGER: ICD-10-CM

## 2024-02-06 ENCOUNTER — OFFICE VISIT (OUTPATIENT)
Dept: PODIATRY | Facility: CLINIC | Age: 44
End: 2024-02-06
Payer: MEDICAID

## 2024-02-06 VITALS — BODY MASS INDEX: 36.31 KG/M2 | HEIGHT: 75 IN | RESPIRATION RATE: 20 BRPM | WEIGHT: 292 LBS

## 2024-02-06 DIAGNOSIS — M79.675 PAIN IN TOES OF BOTH FEET: ICD-10-CM

## 2024-02-06 DIAGNOSIS — S90.221A SUBUNGUAL HEMATOMA OF FIFTH TOE OF RIGHT FOOT, INITIAL ENCOUNTER: ICD-10-CM

## 2024-02-06 DIAGNOSIS — E11.42 DM TYPE 2 WITH DIABETIC PERIPHERAL NEUROPATHY: ICD-10-CM

## 2024-02-06 DIAGNOSIS — E11.42 DIABETIC PERIPHERAL NEUROPATHY ASSOCIATED WITH TYPE 2 DIABETES MELLITUS: ICD-10-CM

## 2024-02-06 DIAGNOSIS — E11.65 TYPE 2 DIABETES MELLITUS WITH HYPERGLYCEMIA, WITHOUT LONG-TERM CURRENT USE OF INSULIN: ICD-10-CM

## 2024-02-06 DIAGNOSIS — L60.3 ONYCHODYSTROPHY: Primary | ICD-10-CM

## 2024-02-06 DIAGNOSIS — M79.674 PAIN IN TOES OF BOTH FEET: ICD-10-CM

## 2024-02-06 DIAGNOSIS — R26.81 UNSTEADINESS ON FEET: ICD-10-CM

## 2024-02-06 PROCEDURE — 1159F MED LIST DOCD IN RCRD: CPT

## 2024-02-06 PROCEDURE — 99213 OFFICE O/P EST LOW 20 MIN: CPT

## 2024-02-06 PROCEDURE — 11721 DEBRIDE NAIL 6 OR MORE: CPT

## 2024-02-06 PROCEDURE — 1160F RVW MEDS BY RX/DR IN RCRD: CPT

## 2024-02-06 RX ORDER — MONTELUKAST SODIUM 10 MG/1
10 TABLET ORAL NIGHTLY
Qty: 30 TABLET | Refills: 0 | Status: SHIPPED | OUTPATIENT
Start: 2024-02-06

## 2024-02-06 NOTE — PROGRESS NOTES
02/06/2024  Foot and Ankle Surgery - Established Patient/Follow-up  Provider: ANASTASIA Chan   Location: [facility]    Subjective:  Barrett Laguerre is a 43 y.o. male.     Chief Complaint   Patient presents with    Right Foot - Follow-up, Diabetes     [facility] foot care    Left Foot - Follow-up, Diabetes     [facility] foot [facility]    Follow-up     CYRUS Rosenberg md 8/28/2023       The patient is here today for routine diabetic foot check and is requesting nail trimmed.    He states he hit his right fifth toe on the bed frame approximately 2 months ago, and it has not healed 100 percent. He notes he was barefoot at this time. He adds originally, his whole nail was 80 percent black after he hit it. The patient did experience pain at first, but this subsided. The patient states his blood glucose levels are a little higher than they should be. He inquires about his toenail growing back.    Allergies   Allergen Reactions    Penicillin G Anaphylaxis    Penicillins Anaphylaxis    Sulfa Antibiotics Hives    Allopurinol Rash       Current Outpatient Medications on File Prior to Visit   Medication Sig Dispense Refill    acetaminophen-codeine (TYLENOL/CODEINE #3) 300-30 MG per tablet Take 1 tablet by mouth 3 (Three) Times a Day As Needed for Mild Pain. 21 tablet 5    albuterol sulfate  (90 Base) MCG/ACT inhaler Inhale 2 puffs Every 4 (Four) Hours As Needed for Wheezing. 6.7 g 0    Alcohol Swabs (B-D SINGLE USE SWABS REGULAR) pads APPLY 1 SWAB EXTERNALLY 4 TIMES DAILY BEFORE MEAL(S) AND AT BEDTIME 100 each 12    amLODIPine (NORVASC) 10 MG tablet Take 1 tablet by mouth Every Night. 30 tablet 3    Azelastine HCl 137 MCG/SPRAY solution USE 2 SPRAY(S) IN EACH NOSTRIL TWICE DAILY AS DIRECTED 30 mL 0    azithromycin (Zithromax Z-Oniel) 250 MG tablet Take 2 tablets the first day, then 1 tablet daily for 4 days. 6 tablet 0    BD Pen Needle Kyra 2nd Gen 32G X 4 MM misc USE 1 PEN NEEDLE 4 TIMES DAILY BEFORE MEAL(S) AND AT BEDTIME 200  each 0    Blood Glucose Monitoring Suppl (True Metrix Meter) w/Device kit USE 1  TO CHECK GLUCOSE 4 TIMES DAILY 1 kit 0    chlorthalidone (HYGROTEN) 50 MG tablet Take 1 tablet by mouth once daily 90 tablet 0    clindamycin (CLEOCIN T) 1 % external solution Apply 1 application  topically to the appropriate area as directed 2 (Two) Times a Day. Not currently using      colchicine-probenecid (COL-BENEMID) 0.5-500 MG per tablet Take 1 tablet by mouth once daily 30 tablet 0    Continuous Blood Gluc  (Dexcom G6 ) device 1  ONCE DAILY 1 each 0    Continuous Blood Gluc Sensor (Dexcom G6 Sensor) USE ONE SENSOR EVERY 10 DAYS 3 each 3    Continuous Blood Gluc Transmit (Dexcom G6 Transmitter) misc USE 1 EACH AS DIRECTED FOR 90 DAYS      Cyanocobalamin (Vitamin B-12 ER) 1000 MCG tablet controlled-release TAKE 1 TABLET DAILY 30 each 5    dexAMETHasone (DECADRON) 4 MG tablet Take 1 tablet by mouth 2 (Two) Times a Day With Meals. 16 tablet 0    Diclofenac Epolamine (FLECTOR) 1.3 % patch patch Apply 1 patch topically to the appropriate area as directed 2 (Two) Times a Day As Needed (back pain). 60 patch 11    Emtricitabine-Tenofovir AF (Descovy) 200-25 MG per tablet Take 1 tablet by mouth Daily.      FLUoxetine (PROzac) 40 MG capsule Take 1 capsule by mouth Every Night. 90 capsule 1    fluticasone (FLONASE) 50 MCG/ACT nasal spray 1 spray into the nostril(s) as directed by provider 2 (Two) Times a Day. 16 mL 3    gabapentin (NEURONTIN) 300 MG capsule TAKE 1 CAPSULE BY MOUTH THREE TIMES DAILY 270 capsule 0    glucose blood (True Metrix Blood Glucose Test) test strip 1 each by Other route 4 (Four) Times a Day. Dx:E11.49 patient checks sugar qid 200 each 12    guaifenesin (ROBITUSSIN) 100 MG/5ML liquid Take 10 mL by mouth Every 4 (Four) Hours As Needed for Cough. 180 mL 0    HYDROcodone-acetaminophen (Norco) 7.5-325 MG per tablet Take 1 tablet by mouth 5 (Five) Times a Day As Needed for Severe Pain. 150 tablet 0     HYDROcodone-acetaminophen (Norco) 7.5-325 MG per tablet Take 1 tablet by mouth 5 (Five) Times a Day As Needed for Severe Pain. 150 tablet 0    HYDROcodone-acetaminophen (Norco) 7.5-325 MG per tablet Take 1 tablet by mouth 5 (Five) Times a Day As Needed for Severe Pain. 150 tablet 0    hydrocortisone 1 % cream Apply  topically to the appropriate area as directed 2 (Two) Times a Day. 60 g 3    hydrOXYzine (ATARAX) 25 MG tablet Take 1 tablet by mouth 4 (Four) Times a Day As Needed for Itching. 360 tablet 3    Insulin NPH, Human,, Isophane, (NovoLIN N FlexPen) 100 UNIT/ML injection Inject 38 Units under the skin into the appropriate area as directed Every Night for 360 days. 34.2 mL 3    Insulin Regular Human (NovoLIN R FlexPen ReliOn) 100 UNIT/ML injection Inject 20 Units under the skin into the appropriate area as directed 3 (Three) Times a Day Before Meals for 360 days. 54 mL 3    Janumet  MG per tablet TAKE 1 TABLET BY MOUTH TWICE DAILY WITH MEALS 180 tablet 0    Lancet Devices (TRUEdraw Lancing Device) misc 1 each Daily. Dx:E11.49 patient checks sugar qid 1 each 0    loratadine (CLARITIN) 10 MG tablet Take 1 tablet by mouth once daily 30 tablet 0    losartan (COZAAR) 50 MG tablet Take 1 tablet by mouth once daily 90 tablet 0    methocarbamol (ROBAXIN) 750 MG tablet Take 1 tablet by mouth 4 (Four) Times a Day As Needed for Muscle Spasms. 360 tablet 3    montelukast (SINGULAIR) 10 MG tablet TAKE 1 TABLET BY MOUTH ONCE DAILY AT NIGHT 30 tablet 0    oxybutynin XL (DITROPAN XL) 15 MG 24 hr tablet Take 1 tablet by mouth once daily 90 tablet 0    pantoprazole (Protonix) 40 MG EC tablet Take 1 tablet by mouth Daily. 90 tablet 3    polycarbophil 625 MG tablet tablet Take  by mouth Every Night.      potassium chloride (MICRO-K) 10 MEQ CR capsule Take 1 capsule by mouth Daily.      promethazine (PHENERGAN) 25 MG tablet TAKE 1 TABLET BY MOUTH EVERY 8 HOURS AS NEEDED FOR NAUSEA FOR VOMITING 90 tablet 0    sildenafil  "(REVATIO) 20 MG tablet Take 1 tablet by mouth Daily As Needed (ed). 30 tablet 3    sodium chloride (Ayr) 0.65 % nasal spray USE 1 DOSE IN EACH NOSTRIL THREE TIMES DAILY 50 mL 0    Triamcinolone Acetonide (NASACORT) 55 MCG/ACT nasal inhaler 2 sprays into the nostril(s) as directed by provider Daily. 16.5 g 11    TRUEplus Lancets 30G misc 1 each 4 (Four) Times a Day. Dx:E11.49 patient checks sugar qid 100 each 12    [DISCONTINUED] montelukast (SINGULAIR) 10 MG tablet TAKE 1 TABLET BY MOUTH ONCE DAILY AT NIGHT 30 tablet 0     No current facility-administered medications on file prior to visit.       Objective   Resp 20   Ht 190.5 cm (75\")   Wt 132 kg (292 lb)   BMI 36.50 kg/m²     Foot/Ankle Exam    VASCULAR     Right Foot Vascularity   Normal vascular exam    Dorsalis pedis:  2+  Posterior tibial:  2+  Skin temperature:  warm  Edema grading:  None  CFT:  < 3 seconds  Pedal hair growth:  Present  Varicosities:  none     Left Foot Vascularity   Normal vascular exam    Dorsalis pedis:  2+  Posterior tibial:  2+  Skin temperature:  warm  Edema grading:  None  CFT:  < 3 seconds  Pedal hair growth:  Present  Varicosities:  none     NEUROLOGIC     Right Foot Neurologic   Protective Sensation using [place] Monofilament:   Sites tested: 9     Left Foot Neurologic   Protective Sensation using [place]esto Monofilament:   Sites tested: 10    MUSCULOSKELETAL     Right Foot Musculoskeletal   Tenderness:  none    [place]amm[place]oe:  First toe     Left Foot Musculoskeletal   Tenderness:  none  Hammertoe:  First toe    DERMATOLOGIC      Right Foot Dermatologic   Skin  Right foot skin is intact.   Nails  1.  Positive for elongated, abnormal thickness and dystrophic nail. (Mild callus)  2.  Positive for elongated, abnormal thickness and dystrophic nail.  3.  Positive for elongated, abnormal thickness and dystrophic nail.  4.  Positive for elongated, abnormal thickness and dystrophic nail.  5.  Positive for elongated, abnormal " thickness and dystrophic nail.     Left Foot Dermatologic   Skin  Left foot skin is intact.   Nails  1.  Positive for elongated, abnormal thickness and dystrophic nail.  2.  Positive for elongated, abnormal thickness and dystrophic nail.  3.  Positive for elongated, abnormal thickness and dystrophic nail.  4.  Positive for elongated, abnormally thick and dystrophic nail.  5.  Positive for elongated, abnormally thick and dystrophic nail.     Right foot additional comments: 02/06/2024: Patient has subungual hematoma and nail avulsion to right fifth toenail        Assessment & Plan   Diagnoses and all orders for this visit:    1. Onychodystrophy (Primary)    2. Unsteadiness on feet    3. DM type 2 with diabetic peripheral neuropathy    4. Type 2 diabetes mellitus with hyperglycemia, without long-term current use of insulin    5. Diabetic peripheral neuropathy associated with type 2 diabetes mellitus    6. Pain in toes of both feet    7. Subungual hematoma of fifth toe of right foot, initial encounter      Diabetic foot exam  The patient has subungual hematoma and nail avulsion to right fifth toenail, which nail was easily removed today with nail nipper.  I do feel the patient is at mild to moderate risk for pedal complications related to diabetes. Explained importance of diabetic foot care, daily foot checks, and glycemic control. Patient should check both feet on a daily basis, monitor and control blood sugars, make sure that both feet and in between toes are towel dried after baths or showers. Avoid barefoot walking at all times. Check shoes before putting them on.   Patient was given information on proper foot care. Call the office at the first signs of a wound or with signs of infection.     Nail debridement: Both feet x10    Consent and time out was performed before proceeding with the procedure. Nails were debrided with a nail nipper without complication.  No anesthesia was required.  Indications for procedure  were thickened, dystrophic, and fungal appearing nails which are difficult to trim.  Proper self-care and technique was discussed with patient.  Patient was stable after procedure.     Follow-up  The patient will follow up in 2 months for routine diabetic foot check.    No orders of the defined types were placed in this encounter.     Transcribed from ambient dictation for ANASTASIA Alvares by Deneen See.  02/06/24   14:38 EST    Patient or patient representative verbalized consent to the visit recording.  I have personally performed the services described in this document as transcribed by the above individual, and it is both accurate and complete.  ANASTASIA Alvares  2/6/2024  15:21 EST

## 2024-02-12 DIAGNOSIS — Z79.4 TYPE 2 DIABETES MELLITUS WITH OTHER NEUROLOGIC COMPLICATION, WITH LONG-TERM CURRENT USE OF INSULIN: ICD-10-CM

## 2024-02-12 DIAGNOSIS — E11.49 TYPE 2 DIABETES MELLITUS WITH OTHER NEUROLOGIC COMPLICATION, WITH LONG-TERM CURRENT USE OF INSULIN: ICD-10-CM

## 2024-02-12 RX ORDER — INSULIN LISPRO 100 U/ML
INJECTION, SOLUTION SUBCUTANEOUS
Qty: 15 ML | Refills: 0 | Status: SHIPPED | OUTPATIENT
Start: 2024-02-12 | End: 2024-02-15

## 2024-02-13 ENCOUNTER — TELEPHONE (OUTPATIENT)
Dept: FAMILY MEDICINE CLINIC | Facility: CLINIC | Age: 44
End: 2024-02-13
Payer: MEDICAID

## 2024-02-13 DIAGNOSIS — E11.49 TYPE 2 DIABETES MELLITUS WITH OTHER NEUROLOGIC COMPLICATION, WITH LONG-TERM CURRENT USE OF INSULIN: Primary | ICD-10-CM

## 2024-02-13 DIAGNOSIS — Z79.4 TYPE 2 DIABETES MELLITUS WITH OTHER NEUROLOGIC COMPLICATION, WITH LONG-TERM CURRENT USE OF INSULIN: Primary | ICD-10-CM

## 2024-02-13 RX ORDER — LANCETS 30 GAUGE
1 EACH MISCELLANEOUS 4 TIMES DAILY
Qty: 200 EACH | Refills: 12 | Status: SHIPPED | OUTPATIENT
Start: 2024-02-13

## 2024-02-13 RX ORDER — BLOOD-GLUCOSE METER
1 KIT MISCELLANEOUS DAILY
Qty: 1 EACH | Refills: 0 | Status: SHIPPED | OUTPATIENT
Start: 2024-02-13

## 2024-02-15 DIAGNOSIS — E11.49 TYPE 2 DIABETES MELLITUS WITH OTHER NEUROLOGIC COMPLICATION, WITH LONG-TERM CURRENT USE OF INSULIN: Primary | ICD-10-CM

## 2024-02-15 DIAGNOSIS — Z79.4 TYPE 2 DIABETES MELLITUS WITH OTHER NEUROLOGIC COMPLICATION, WITH LONG-TERM CURRENT USE OF INSULIN: Primary | ICD-10-CM

## 2024-02-15 RX ORDER — INSULIN LISPRO 100 [IU]/ML
15 INJECTION, SOLUTION INTRAVENOUS; SUBCUTANEOUS
Qty: 15 ML | Refills: 1 | Status: SHIPPED | OUTPATIENT
Start: 2024-02-15

## 2024-03-01 DIAGNOSIS — G61.0 GUILLAIN-BARRE: ICD-10-CM

## 2024-03-01 DIAGNOSIS — J30.2 OTHER SEASONAL ALLERGIC RHINITIS: ICD-10-CM

## 2024-03-01 RX ORDER — ECHINACEA PURPUREA EXTRACT 125 MG
TABLET ORAL
Qty: 50 ML | Refills: 0 | Status: SHIPPED | OUTPATIENT
Start: 2024-03-01

## 2024-03-01 RX ORDER — GABAPENTIN 300 MG/1
300 CAPSULE ORAL 3 TIMES DAILY
Qty: 270 CAPSULE | Refills: 0 | Status: SHIPPED | OUTPATIENT
Start: 2024-03-01

## 2024-03-06 DIAGNOSIS — J30.2 OTHER SEASONAL ALLERGIC RHINITIS: ICD-10-CM

## 2024-03-06 RX ORDER — LORATADINE 10 MG/1
TABLET ORAL DAILY
Qty: 30 TABLET | Refills: 0 | Status: SHIPPED | OUTPATIENT
Start: 2024-03-06

## 2024-03-09 DIAGNOSIS — J30.2 OTHER SEASONAL ALLERGIC RHINITIS: ICD-10-CM

## 2024-03-11 RX ORDER — ECHINACEA PURPUREA EXTRACT 125 MG
TABLET ORAL
Qty: 50 ML | Refills: 0 | Status: SHIPPED | OUTPATIENT
Start: 2024-03-11

## 2024-03-11 RX ORDER — AZELASTINE HYDROCHLORIDE 137 UG/1
SPRAY, METERED NASAL
Qty: 30 ML | Refills: 0 | Status: SHIPPED | OUTPATIENT
Start: 2024-03-11

## 2024-03-26 RX ORDER — PROBENECID AND COLCHICINE .5; 5 MG/1; MG/1
TABLET ORAL
Qty: 30 TABLET | Refills: 0 | Status: SHIPPED | OUTPATIENT
Start: 2024-03-26

## 2024-03-27 DIAGNOSIS — J30.2 OTHER SEASONAL ALLERGIC RHINITIS: ICD-10-CM

## 2024-03-27 DIAGNOSIS — F39 MOOD DISORDER: ICD-10-CM

## 2024-03-27 DIAGNOSIS — R11.0 NAUSEA: ICD-10-CM

## 2024-03-27 DIAGNOSIS — K21.9 GERD (GASTROESOPHAGEAL REFLUX DISEASE): ICD-10-CM

## 2024-03-27 DIAGNOSIS — M48.07 LUMBOSACRAL SPINAL STENOSIS: ICD-10-CM

## 2024-03-28 RX ORDER — DICLOFENAC EPOLAMINE 0.01 G/1
1 SYSTEM TOPICAL 2 TIMES DAILY PRN
Qty: 60 PATCH | Refills: 11 | Status: SHIPPED | OUTPATIENT
Start: 2024-03-28

## 2024-03-28 RX ORDER — METHOCARBAMOL 750 MG/1
750 TABLET, FILM COATED ORAL 4 TIMES DAILY PRN
Qty: 360 TABLET | Refills: 3 | Status: SHIPPED | OUTPATIENT
Start: 2024-03-28

## 2024-03-28 RX ORDER — POTASSIUM CHLORIDE 750 MG/1
10 CAPSULE, EXTENDED RELEASE ORAL DAILY
Qty: 30 CAPSULE | Refills: 3 | Status: SHIPPED | OUTPATIENT
Start: 2024-03-28

## 2024-03-28 RX ORDER — ISOPROPYL ALCOHOL 0.75 G/1
1 SWAB TOPICAL DAILY
Qty: 100 EACH | Refills: 12 | Status: SHIPPED | OUTPATIENT
Start: 2024-03-28

## 2024-03-28 RX ORDER — PROMETHAZINE HYDROCHLORIDE 25 MG/1
25 TABLET ORAL EVERY 6 HOURS PRN
Qty: 90 TABLET | Refills: 0 | Status: SHIPPED | OUTPATIENT
Start: 2024-03-28

## 2024-03-28 RX ORDER — FLUTICASONE PROPIONATE 50 MCG
1 SPRAY, SUSPENSION (ML) NASAL 2 TIMES DAILY
Qty: 16 ML | Refills: 3 | Status: SHIPPED | OUTPATIENT
Start: 2024-03-28

## 2024-03-28 RX ORDER — FLUOXETINE HYDROCHLORIDE 40 MG/1
40 CAPSULE ORAL NIGHTLY
Qty: 90 CAPSULE | Refills: 1 | Status: SHIPPED | OUTPATIENT
Start: 2024-03-28

## 2024-03-28 RX ORDER — PANTOPRAZOLE SODIUM 40 MG/1
40 TABLET, DELAYED RELEASE ORAL DAILY
Qty: 90 TABLET | Refills: 3 | Status: SHIPPED | OUTPATIENT
Start: 2024-03-28

## 2024-04-01 DIAGNOSIS — E11.9 TYPE 2 DIABETES MELLITUS WITHOUT COMPLICATION, WITHOUT LONG-TERM CURRENT USE OF INSULIN: ICD-10-CM

## 2024-04-01 DIAGNOSIS — E11.49 TYPE 2 DIABETES MELLITUS WITH OTHER NEUROLOGIC COMPLICATION, WITH LONG-TERM CURRENT USE OF INSULIN: ICD-10-CM

## 2024-04-01 DIAGNOSIS — Z79.4 TYPE 2 DIABETES MELLITUS WITH OTHER NEUROLOGIC COMPLICATION, WITH LONG-TERM CURRENT USE OF INSULIN: ICD-10-CM

## 2024-04-01 RX ORDER — INSULIN LISPRO 100 [IU]/ML
INJECTION, SOLUTION INTRAVENOUS; SUBCUTANEOUS
Qty: 15 ML | Refills: 0 | Status: SHIPPED | OUTPATIENT
Start: 2024-04-01

## 2024-04-01 RX ORDER — SITAGLIPTIN AND METFORMIN HYDROCHLORIDE 1000; 50 MG/1; MG/1
1 TABLET, FILM COATED ORAL 2 TIMES DAILY WITH MEALS
Qty: 180 TABLET | Refills: 0 | Status: SHIPPED | OUTPATIENT
Start: 2024-04-01

## 2024-04-01 RX ORDER — PEN NEEDLE, DIABETIC 32GX 5/32"
NEEDLE, DISPOSABLE MISCELLANEOUS
Qty: 200 EACH | Refills: 0 | Status: SHIPPED | OUTPATIENT
Start: 2024-04-01

## 2024-04-01 RX ORDER — PROCHLORPERAZINE 25 MG/1
SUPPOSITORY RECTAL
Qty: 3 EACH | Refills: 0 | Status: SHIPPED | OUTPATIENT
Start: 2024-04-01

## 2024-04-05 ENCOUNTER — TELEPHONE (OUTPATIENT)
Dept: FAMILY MEDICINE CLINIC | Facility: CLINIC | Age: 44
End: 2024-04-05
Payer: MEDICAID

## 2024-04-09 ENCOUNTER — OFFICE VISIT (OUTPATIENT)
Dept: PAIN MEDICINE | Facility: CLINIC | Age: 44
End: 2024-04-09
Payer: MEDICAID

## 2024-04-09 VITALS
SYSTOLIC BLOOD PRESSURE: 147 MMHG | OXYGEN SATURATION: 97 % | RESPIRATION RATE: 16 BRPM | BODY MASS INDEX: 36.75 KG/M2 | WEIGHT: 294 LBS | DIASTOLIC BLOOD PRESSURE: 87 MMHG | HEART RATE: 94 BPM

## 2024-04-09 DIAGNOSIS — M54.16 LUMBAR RADICULOPATHY: ICD-10-CM

## 2024-04-09 DIAGNOSIS — G89.29 CHRONIC MIDLINE LOW BACK PAIN WITH BILATERAL SCIATICA: Primary | ICD-10-CM

## 2024-04-09 DIAGNOSIS — M48.07 LUMBOSACRAL SPINAL STENOSIS: ICD-10-CM

## 2024-04-09 DIAGNOSIS — M54.41 CHRONIC MIDLINE LOW BACK PAIN WITH BILATERAL SCIATICA: Primary | ICD-10-CM

## 2024-04-09 DIAGNOSIS — M54.42 CHRONIC MIDLINE LOW BACK PAIN WITH BILATERAL SCIATICA: Primary | ICD-10-CM

## 2024-04-09 PROCEDURE — 3077F SYST BP >= 140 MM HG: CPT | Performed by: PHYSICAL MEDICINE & REHABILITATION

## 2024-04-09 PROCEDURE — 1125F AMNT PAIN NOTED PAIN PRSNT: CPT | Performed by: PHYSICAL MEDICINE & REHABILITATION

## 2024-04-09 PROCEDURE — 1159F MED LIST DOCD IN RCRD: CPT | Performed by: PHYSICAL MEDICINE & REHABILITATION

## 2024-04-09 PROCEDURE — 99214 OFFICE O/P EST MOD 30 MIN: CPT | Performed by: PHYSICAL MEDICINE & REHABILITATION

## 2024-04-09 PROCEDURE — 1160F RVW MEDS BY RX/DR IN RCRD: CPT | Performed by: PHYSICAL MEDICINE & REHABILITATION

## 2024-04-09 PROCEDURE — 3079F DIAST BP 80-89 MM HG: CPT | Performed by: PHYSICAL MEDICINE & REHABILITATION

## 2024-04-09 RX ORDER — ACETAMINOPHEN AND CODEINE PHOSPHATE 300; 30 MG/1; MG/1
1 TABLET ORAL 3 TIMES DAILY PRN
Qty: 21 TABLET | Refills: 2 | Status: SHIPPED | OUTPATIENT
Start: 2024-04-09

## 2024-04-09 RX ORDER — HYDROCODONE BITARTRATE AND ACETAMINOPHEN 7.5; 325 MG/1; MG/1
1 TABLET ORAL
Qty: 150 TABLET | Refills: 0 | Status: SHIPPED | OUTPATIENT
Start: 2024-04-09
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
## 2024-04-15 ENCOUNTER — TELEPHONE (OUTPATIENT)
Dept: PAIN MEDICINE | Facility: CLINIC | Age: 44
End: 2024-04-15

## 2024-04-15 NOTE — TELEPHONE ENCOUNTER
Caller: PATIENT    Relationship: SELF    Best call back number: [phone]    What is the best time to reach you: ANY    Who are you requesting to speak with (clinical staff, provider,  specific staff member): CLINICAL    What was the call regarding: SCHEDULED FOR PROCEDURE WITH DR PATTERSON 4/24/24 HE WASN'T TAKING HIS GOUT MEDS FOR 3 DAYS AND WONDERED IF IT MADE HIS PAIN WORSE. ALSO PAIN IS TRAVELING DOWN HIS LEFT BUTT CHEEK NOT JUST LOWER BACK. PLEASE ADVISE    Is it okay if the provider responds through MyChart: CALL

## 2024-04-15 NOTE — TELEPHONE ENCOUNTER
His pain is probably worse off of his gout medication. And that sounds like radicular pain, which is what we will be treating with the injection.

## 2024-04-16 DIAGNOSIS — J30.2 OTHER SEASONAL ALLERGIC RHINITIS: ICD-10-CM

## 2024-04-16 NOTE — TELEPHONE ENCOUNTER
Been having this problem for the last 3 days with twitching in muscles and butt not warm to the touch but thinks there is still inflammation going on. He missed his gout medication for a couple of days and wants to know what can alleviate the pain the pain level is about a 8. Do you need to change the site of the injection patient is feeling it most on the left side of the butt and has gone done to the leg down the buttocks.

## 2024-04-16 NOTE — TELEPHONE ENCOUNTER
That sounds like he may also be having some SI joint pain. Would he like to try and injection in that?

## 2024-04-16 NOTE — TELEPHONE ENCOUNTER
Hub staff attempted to follow warm transfer process and was unsuccessful     Caller: Barrett Laguerre    Relationship to patient: Self    Best call back number: [phone]    Patient is needing: PATIENT STATES THAT HE WOKE UP THIS MORNING IN EVEN MORE PAIN THAN HE WAS HAVING YESTERDAY. HE STATE THE PAIN IS GOING FROM HIS LOW LEFT BACK IN TO HIS BUTT, AND HE IS HAVING MUSCLE SPASMS IN THE AREA. HE WOULD LIKE TO SPEAK WITH SOMEONE ASAP PLEASE.

## 2024-04-17 NOTE — TELEPHONE ENCOUNTER
Let's keep the injection for next week, and I will take a look at him then and schedule the SIJ injections then if appropriate, thanks

## 2024-04-17 NOTE — TELEPHONE ENCOUNTER
Yes,   It won't cancel the other injection wants to keep his injection for next week?  Want's to know if you want to order a lab prior

## 2024-04-18 ENCOUNTER — OFFICE VISIT (OUTPATIENT)
Dept: PODIATRY | Facility: CLINIC | Age: 44
End: 2024-04-18
Payer: MEDICAID

## 2024-04-18 VITALS
HEART RATE: 85 BPM | RESPIRATION RATE: 20 BRPM | WEIGHT: 285 LBS | OXYGEN SATURATION: 96 % | HEIGHT: 75 IN | BODY MASS INDEX: 35.43 KG/M2

## 2024-04-18 DIAGNOSIS — E11.65 TYPE 2 DIABETES MELLITUS WITH HYPERGLYCEMIA, WITHOUT LONG-TERM CURRENT USE OF INSULIN: ICD-10-CM

## 2024-04-18 DIAGNOSIS — E11.42 DM TYPE 2 WITH DIABETIC PERIPHERAL NEUROPATHY: ICD-10-CM

## 2024-04-18 DIAGNOSIS — R26.81 UNSTEADINESS ON FEET: ICD-10-CM

## 2024-04-18 DIAGNOSIS — L60.3 ONYCHODYSTROPHY: Primary | ICD-10-CM

## 2024-04-18 RX ORDER — LORATADINE 10 MG/1
10 TABLET ORAL DAILY
Qty: 30 TABLET | Refills: 0 | Status: SHIPPED | OUTPATIENT
Start: 2024-04-18

## 2024-04-18 NOTE — PROGRESS NOTES
04/18/2024  Foot and Ankle Surgery - Established Patient/Follow-up  Provider: Dr. Anatoliy Rehman DPM  Location: [facility]    Subjective:  Barrett Laguerre is a 44 y.o. male.     Chief Complaint   Patient presents with    Left Foot - Follow-up     Dm foot check, nails    Right Foot - Follow-up     Dm foot check, nails    Follow-up     CYRUS Rosenberg pcp last visit 08/28/2023       HPI:  The patient is a 44-year-old male who presents for routine foot care.    The patient reports he seen Chelsea Campbell approximately 2 months ago for a routine foot care follow-up. He reports no new issues with his feet. However, he has been experiencing gout-related issues, which have been causing his back to flare up. He reports he has been talking with his pain specialist and is scheduled to receive an epidural injection on Tuesday. He states he does not remember his last HbA1c level, however he acknowledges that it has been consistently high. His last lab work was conducted in 02/2024, during which his glucose levels were found to be elevated.    Allergies   Allergen Reactions    Penicillin G Anaphylaxis    Penicillins Anaphylaxis    Sulfa Antibiotics Hives    Allopurinol Rash       Current Outpatient Medications on File Prior to Visit   Medication Sig Dispense Refill    acetaminophen-codeine (TYLENOL/CODEINE #3) 300-30 MG per tablet Take 1 tablet by mouth 3 (Three) Times a Day As Needed for Mild Pain. 21 tablet 2    albuterol sulfate  (90 Base) MCG/ACT inhaler Inhale 2 puffs Every 4 (Four) Hours As Needed for Wheezing. 6.7 g 0    Alcohol Swabs (B-D SINGLE USE SWABS REGULAR) pads Apply 1 each topically to the appropriate area as directed Daily. 100 each 12    amLODIPine (NORVASC) 10 MG tablet Take 1 tablet by mouth Every Night. 30 tablet 3    Azelastine HCl 137 MCG/SPRAY solution USE 2 SPRAY(S) IN EACH NOSTRIL TWICE DAILY AS DIRECTED 30 mL 0    azithromycin (Zithromax Z-Oniel) 250 MG tablet Take 2 tablets the first day,  then 1 tablet daily for 4 days. 6 tablet 0    BD Pen Needle Kyra 2nd Gen 32G X 4 MM misc USE 1 PEN NEEDLE 4 TIMES DAILY.  BEFORE MEAL(S) AND AT BEDTIME 200 each 0    chlorthalidone (HYGROTEN) 50 MG tablet Take 1 tablet by mouth once daily 90 tablet 0    clindamycin (CLEOCIN T) 1 % external solution Apply 1 application  topically to the appropriate area as directed 2 (Two) Times a Day. Not currently using      colchicine-probenecid (COL-BENEMID) 0.5-500 MG per tablet Take 1 tablet by mouth once daily 30 tablet 0    Continuous Blood Gluc  (Dexcom G6 ) device 1  ONCE DAILY 1 each 0    Continuous Blood Gluc Sensor (Dexcom G6 Sensor) USE 1 SENSOR TO CHECK BLOOD SUGAR DAILY AS DIRECTED // CHANGE EVERY 10 DAYS 3 each 0    Continuous Blood Gluc Transmit (Dexcom G6 Transmitter) misc USE 1 EACH AS DIRECTED FOR 90 DAYS      Cyanocobalamin (Vitamin B-12 ER) 1000 MCG tablet controlled-release TAKE 1 TABLET DAILY 30 each 5    dexAMETHasone (DECADRON) 4 MG tablet Take 1 tablet by mouth 2 (Two) Times a Day With Meals. 16 tablet 0    Diclofenac Epolamine (FLECTOR) 1.3 % patch patch Apply 1 patch topically to the appropriate area as directed 2 (Two) Times a Day As Needed (back pain). 60 patch 11    Emtricitabine-Tenofovir AF (Descovy) 200-25 MG per tablet Take 1 tablet by mouth Daily.      FLUoxetine (PROzac) 40 MG capsule Take 1 capsule by mouth Every Night. 90 capsule 1    fluticasone (FLONASE) 50 MCG/ACT nasal spray 1 spray into the nostril(s) as directed by provider 2 (Two) Times a Day. 16 mL 3    gabapentin (NEURONTIN) 300 MG capsule TAKE 1 CAPSULE BY MOUTH THREE TIMES DAILY 270 capsule 0    glucose blood test strip 1 each by Other route 4 (Four) Times a Day. Dx: E11.49 patient checks sugar 4 x day 200 each 12    glucose monitor monitoring kit Use 1 each Daily. Dx: E11.49 patient checks sugar 4 x day 1 each 0    guaifenesin (ROBITUSSIN) 100 MG/5ML liquid Take 10 mL by mouth Every 4 (Four) Hours As Needed for  Cough. 180 mL 0    HumaLOG KwikPen 100 UNIT/ML solution pen-injector INJECT 15 UNITS SUBCUTANEOUSLY THREE TIMES DAILY 15  MINUTES  BEFORE  MEALS  OR  IMMEDIATELY  FOLLOWING  MEALS 15 mL 0    HYDROcodone-acetaminophen (Norco) 7.5-325 MG per tablet Take 1 tablet by mouth 5 (Five) Times a Day As Needed for Severe Pain. 150 tablet 0    HYDROcodone-acetaminophen (Norco) 7.5-325 MG per tablet Take 1 tablet by mouth 5 (Five) Times a Day As Needed for Severe Pain. 150 tablet 0    HYDROcodone-acetaminophen (Norco) 7.5-325 MG per tablet Take 1 tablet by mouth 5 (Five) Times a Day As Needed for Severe Pain. 150 tablet 0    hydrocortisone 1 % cream Apply  topically to the appropriate area as directed 2 (Two) Times a Day. 60 g 3    hydrOXYzine (ATARAX) 25 MG tablet Take 1 tablet by mouth 4 (Four) Times a Day As Needed for Itching. 360 tablet 3    Insulin NPH, Human,, Isophane, (NovoLIN N FlexPen) 100 UNIT/ML injection Inject 38 Units under the skin into the appropriate area as directed Every Night for 360 days. 34.2 mL 3    Insulin Regular Human (NovoLIN R FlexPen ReliOn) 100 UNIT/ML injection Inject 20 Units under the skin into the appropriate area as directed 3 (Three) Times a Day Before Meals for 360 days. 54 mL 3    Janumet  MG per tablet TAKE 1 TABLET BY MOUTH TWICE DAILY WITH MEALS 180 tablet 0    Lancets misc Use 1 each 4 (Four) Times a Day. Dx: E11.49 patient checks sugar 4 x day 200 each 12    losartan (COZAAR) 50 MG tablet Take 1 tablet by mouth once daily (Patient taking differently: Take 1 tablet by mouth Daily. Pt now taking 100 mg) 90 tablet 0    methocarbamol (ROBAXIN) 750 MG tablet Take 1 tablet by mouth 4 (Four) Times a Day As Needed for Muscle Spasms. 360 tablet 3    montelukast (SINGULAIR) 10 MG tablet TAKE 1 TABLET BY MOUTH ONCE DAILY AT NIGHT 30 tablet 0    oxybutynin XL (DITROPAN XL) 15 MG 24 hr tablet Take 1 tablet by mouth once daily 90 tablet 0    pantoprazole (Protonix) 40 MG EC tablet Take 1  "tablet by mouth Daily. 90 tablet 3    polycarbophil 625 MG tablet tablet Take  by mouth Every Night.      potassium chloride (MICRO-K) 10 MEQ CR capsule Take 1 capsule by mouth Daily. 30 capsule 3    promethazine (PHENERGAN) 25 MG tablet Take 1 tablet by mouth Every 6 (Six) Hours As Needed for Nausea or Vomiting. 90 tablet 0    sildenafil (REVATIO) 20 MG tablet Take 1 tablet by mouth Daily As Needed (ed). 30 tablet 3    sodium chloride (Ayr) 0.65 % nasal spray USE 1 DOSE IN EACH NOSTRIL THREE TIMES DAILY 50 mL 0    EQ All Day Allergy Relief 10 MG tablet Take 1 tablet by mouth once daily 30 tablet 0     No current facility-administered medications on file prior to visit.       Objective   Pulse 85   Resp 20   Ht 190.5 cm (75\")   Wt 129 kg (285 lb)   SpO2 96%   BMI 35.62 kg/m²     Foot/Ankle Exam  VASCULAR     Right Foot Vascularity   Normal vascular exam    Dorsalis pedis:  2+  Posterior tibial:  2+  Skin temperature:  warm  Edema grading:  None  CFT:  < 3 seconds  Pedal hair growth:  Present  Varicosities:  none     Left Foot Vascularity   Normal vascular exam    Dorsalis pedis:  2+  Posterior tibial:  2+  Skin temperature:  warm  Edema grading:  None  CFT:  < 3 seconds  Pedal hair growth:  Present  Varicosities:  none     NEUROLOGIC     Right Foot Neurologic   Protective Sensation using Fontana-Ernesto Monofilament:   Sites tested: 9     Left Foot Neurologic   Protective Sensation using Fontana-Ernesto Monofilament:   Sites tested: 10    MUSCULOSKELETAL     Right Foot Musculoskeletal   Tenderness:  none    Hammertoe:  First toe     Left Foot Musculoskeletal   Tenderness:  none  Hammertoe:  First toe    DERMATOLOGIC      Right Foot Dermatologic   Skin  Right foot skin is intact.   Nails  1.  Positive for elongated, abnormal thickness and dystrophic nail. (Mild callus)  2.  Positive for elongated, abnormal thickness and dystrophic nail.  3.  Positive for elongated, abnormal thickness and dystrophic nail.  4.  " Positive for elongated, abnormal thickness and dystrophic nail.  5.  Positive for elongated, abnormal thickness and dystrophic nail.     Left Foot Dermatologic   Skin  Left foot skin is intact.   Nails  1.  Positive for elongated, abnormal thickness and dystrophic nail.  2.  Positive for elongated, abnormal thickness and dystrophic nail.  3.  Positive for elongated, abnormal thickness and dystrophic nail.  4.  Positive for elongated, abnormally thick and dystrophic nail.  5.  Positive for elongated, abnormally thick and dystrophic nail.     Right foot additional comments: 02/06/2024: Patient has subungual hematoma and nail avulsion to right fifth toenail    04/18/24: No significant changes. No open wounds or signs of infection.         Assessment & Plan   Diagnoses and all orders for this visit:    1. Onychodystrophy (Primary)    2. DM type 2 with diabetic peripheral neuropathy    3. Type 2 diabetes mellitus with hyperglycemia, without long-term current use of insulin    4. Unsteadiness on feet        The patient is a 44-year-old male who presents for routine foot care. No images were reviewed with patient today during visit. Upon examination, the patient's feet exhibit no significant changes. There are no open wounds or signs of infection. The patient is scheduled for a follow-up visit in 2 months with LUISA Tran.  Explained importance of diabetic foot care, daily foot checks, and glycemic control. Patient should check both feet on a daily basis, monitor and control blood sugars, make sure that both feet and in between toes are towel dried after baths or showers. Avoid barefoot walking at all times.  I discussed wearing white socks and checking shoes before wearing them. Patient was given information on proper foot care. Call the office at the first signs of a wound or with signs of infection.    Reviewed proper basic stretching and manual therapy exercises along with appropriate shoes and activity.  Discussed proper  use and/or avoidance of OTC anti-inflammatories.  Patient is to call with any additional issues or concerns.  Greater than 20 minutes was spent before, during, and after evaluation for patient care.    No orders of the defined types were placed in this encounter.         Note is dictated utilizing voice recognition software. Unfortunately this leads to occasional typographical errors. I apologize in advance if the situation occurs. If questions occur please do not hesitate to call our office.    Transcribed from ambient dictation for SHANE Rehman DPM by Ximena David.  04/18/24   16:50 EDT    Patient or patient representative verbalized consent to the visit recording.  I have personally performed the services described in this document as transcribed by the above individual, and it is both accurate and complete.

## 2024-04-21 DIAGNOSIS — I10 HYPERTENSION, BENIGN: Primary | ICD-10-CM

## 2024-04-22 RX ORDER — LOSARTAN POTASSIUM 50 MG/1
50 TABLET ORAL DAILY
Qty: 90 TABLET | Refills: 0 | Status: SHIPPED | OUTPATIENT
Start: 2024-04-22

## 2024-04-23 ENCOUNTER — HOSPITAL ENCOUNTER (OUTPATIENT)
Dept: PAIN MEDICINE | Facility: HOSPITAL | Age: 44
Discharge: HOME | End: 2024-04-23
Payer: MEDICAID

## 2024-04-23 ENCOUNTER — TELEPHONE (OUTPATIENT)
Dept: FAMILY MEDICINE CLINIC | Facility: CLINIC | Age: 44
End: 2024-04-23
Payer: MEDICAID

## 2024-04-23 VITALS
HEART RATE: 101 BPM | DIASTOLIC BLOOD PRESSURE: 95 MMHG | TEMPERATURE: 96.9 F | HEIGHT: 75 IN | SYSTOLIC BLOOD PRESSURE: 166 MMHG | RESPIRATION RATE: 16 BRPM | BODY MASS INDEX: 35.43 KG/M2 | OXYGEN SATURATION: 96 % | WEIGHT: 285 LBS

## 2024-04-23 DIAGNOSIS — M54.41 CHRONIC MIDLINE LOW BACK PAIN WITH BILATERAL SCIATICA: Primary | ICD-10-CM

## 2024-04-23 DIAGNOSIS — M54.42 CHRONIC MIDLINE LOW BACK PAIN WITH BILATERAL SCIATICA: Primary | ICD-10-CM

## 2024-04-23 DIAGNOSIS — I10 HYPERTENSION, BENIGN: ICD-10-CM

## 2024-04-23 DIAGNOSIS — G89.29 CHRONIC MIDLINE LOW BACK PAIN WITH BILATERAL SCIATICA: Primary | ICD-10-CM

## 2024-04-23 DIAGNOSIS — M54.16 LUMBAR RADICULOPATHY: ICD-10-CM

## 2024-04-23 DIAGNOSIS — R52 PAIN: ICD-10-CM

## 2024-04-23 RX ORDER — DEXAMETHASONE SODIUM PHOSPHATE 10 MG/ML
10 INJECTION, SOLUTION INTRAMUSCULAR; INTRAVENOUS ONCE
Status: DISCONTINUED | OUTPATIENT
Start: 2024-04-23 | End: 2024-04-23

## 2024-04-23 RX ORDER — LIDOCAINE HYDROCHLORIDE 10 MG/ML
5 INJECTION, SOLUTION EPIDURAL; INFILTRATION; INTRACAUDAL; PERINEURAL ONCE
Status: DISCONTINUED | OUTPATIENT
Start: 2024-04-23 | End: 2024-04-23

## 2024-04-23 RX ORDER — IOPAMIDOL 408 MG/ML
10 INJECTION, SOLUTION INTRATHECAL
Status: DISCONTINUED | OUTPATIENT
Start: 2024-04-23 | End: 2024-04-23

## 2024-04-23 NOTE — TELEPHONE ENCOUNTER
Caller: Barrett Laguerre    Relationship: Self    Best call back number: [phone]    What medication are you requesting: losartan (COZAAR) 100 MG tablet     If a prescription is needed, what is your preferred pharmacy and phone number: [facility] [address] [place] [address] [phone] [facility] [phone] FX     Additional notes:  CARDIOLOGIST DR ZENDEJAS WITH [facility] WAS SUPPOSE TO INCREASE MEDICATION  MG BUT WAS NEVER SENT. BARRETT PICKED UP MEDICATION BECAUSE HE DOES NOT WANT TO RUN OUT. HE HAS BEEN TRYING TO REACH DR ZENDEJAS FOR OVER A WEEK WITH NO RESPONSE

## 2024-04-24 RX ORDER — CHLORTHALIDONE 50 MG/1
50 TABLET ORAL DAILY
Qty: 90 TABLET | Refills: 1 | Status: SHIPPED | OUTPATIENT
Start: 2024-04-24

## 2024-04-25 DIAGNOSIS — I10 HYPERTENSION, BENIGN: ICD-10-CM

## 2024-04-25 RX ORDER — AMLODIPINE BESYLATE 10 MG/1
10 TABLET ORAL NIGHTLY
Qty: 30 TABLET | Refills: 0 | Status: SHIPPED | OUTPATIENT
Start: 2024-04-25

## 2024-04-30 ENCOUNTER — HOSPITAL ENCOUNTER (OUTPATIENT)
Dept: PAIN MEDICINE | Facility: HOSPITAL | Age: 44
Discharge: HOME | End: 2024-04-30
Payer: MEDICAID

## 2024-04-30 VITALS
TEMPERATURE: 96.4 F | BODY MASS INDEX: 35.43 KG/M2 | DIASTOLIC BLOOD PRESSURE: 99 MMHG | HEART RATE: 83 BPM | SYSTOLIC BLOOD PRESSURE: 159 MMHG | OXYGEN SATURATION: 96 % | WEIGHT: 285 LBS | RESPIRATION RATE: 16 BRPM | HEIGHT: 75 IN

## 2024-04-30 DIAGNOSIS — G89.29 CHRONIC MIDLINE LOW BACK PAIN WITH BILATERAL SCIATICA: Primary | ICD-10-CM

## 2024-04-30 DIAGNOSIS — M54.16 LUMBAR RADICULOPATHY: ICD-10-CM

## 2024-04-30 DIAGNOSIS — M54.41 CHRONIC MIDLINE LOW BACK PAIN WITH BILATERAL SCIATICA: Primary | ICD-10-CM

## 2024-04-30 DIAGNOSIS — R52 PAIN: ICD-10-CM

## 2024-04-30 DIAGNOSIS — M54.42 CHRONIC MIDLINE LOW BACK PAIN WITH BILATERAL SCIATICA: Primary | ICD-10-CM

## 2024-04-30 PROCEDURE — 77003 FLUOROGUIDE FOR SPINE INJECT: CPT

## 2024-04-30 PROCEDURE — 25010000002 DEXAMETHASONE SODIUM PHOSPHATE 10 MG/ML SOLUTION: Performed by: PHYSICAL MEDICINE & REHABILITATION

## 2024-04-30 PROCEDURE — 64483 NJX AA&/STRD TFRM EPI L/S 1: CPT | Performed by: PHYSICAL MEDICINE & REHABILITATION

## 2024-04-30 PROCEDURE — 25510000001 IOPAMIDOL 41 % SOLUTION: Performed by: PHYSICAL MEDICINE & REHABILITATION

## 2024-04-30 PROCEDURE — 64484 NJX AA&/STRD TFRM EPI L/S EA: CPT | Performed by: PHYSICAL MEDICINE & REHABILITATION

## 2024-04-30 RX ORDER — DEXAMETHASONE SODIUM PHOSPHATE 10 MG/ML
10 INJECTION, SOLUTION INTRAMUSCULAR; INTRAVENOUS ONCE
Status: COMPLETED | OUTPATIENT
Start: 2024-04-30 | End: 2024-04-30

## 2024-04-30 RX ORDER — LIDOCAINE HYDROCHLORIDE 10 MG/ML
5 INJECTION, SOLUTION EPIDURAL; INFILTRATION; INTRACAUDAL; PERINEURAL ONCE
Status: COMPLETED | OUTPATIENT
Start: 2024-04-30 | End: 2024-04-30

## 2024-04-30 RX ORDER — IOPAMIDOL 408 MG/ML
10 INJECTION, SOLUTION INTRATHECAL
Status: COMPLETED | OUTPATIENT
Start: 2024-04-30 | End: 2024-04-30

## 2024-04-30 RX ADMIN — IOPAMIDOL 10 ML: 408 INJECTION, SOLUTION INTRATHECAL at 11:00

## 2024-04-30 RX ADMIN — LIDOCAINE HYDROCHLORIDE 5 ML: 10 INJECTION, SOLUTION EPIDURAL; INFILTRATION; INTRACAUDAL; PERINEURAL at 11:01

## 2024-04-30 RX ADMIN — DEXAMETHASONE SODIUM PHOSPHATE 10 MG: 10 INJECTION, SOLUTION INTRAMUSCULAR; INTRAVENOUS at 11:01

## 2024-04-30 NOTE — DISCHARGE INSTRUCTIONS
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
## 2024-05-01 ENCOUNTER — TELEPHONE (OUTPATIENT)
Dept: PAIN MEDICINE | Facility: HOSPITAL | Age: 44
End: 2024-05-01
Payer: MEDICAID

## 2024-05-01 NOTE — TELEPHONE ENCOUNTER
Post procedure phone call completed.  Pt states they are doing good and denies questions or concerns.  Patient reports his pain level a #6.

## 2024-05-14 DIAGNOSIS — Z79.4 TYPE 2 DIABETES MELLITUS WITH OTHER NEUROLOGIC COMPLICATION, WITH LONG-TERM CURRENT USE OF INSULIN: ICD-10-CM

## 2024-05-14 DIAGNOSIS — E11.49 TYPE 2 DIABETES MELLITUS WITH OTHER NEUROLOGIC COMPLICATION, WITH LONG-TERM CURRENT USE OF INSULIN: ICD-10-CM

## 2024-05-15 RX ORDER — PROCHLORPERAZINE 25 MG/1
SUPPOSITORY RECTAL
Qty: 3 EACH | Refills: 0 | Status: SHIPPED | OUTPATIENT
Start: 2024-05-15

## 2024-05-15 RX ORDER — PROCHLORPERAZINE 25 MG/1
SUPPOSITORY RECTAL
Qty: 1 EACH | Refills: 0 | Status: SHIPPED | OUTPATIENT
Start: 2024-05-15

## 2024-05-16 DIAGNOSIS — J30.2 OTHER SEASONAL ALLERGIC RHINITIS: ICD-10-CM

## 2024-05-16 RX ORDER — LORATADINE 10 MG/1
10 TABLET ORAL DAILY
Qty: 30 TABLET | Refills: 0 | Status: SHIPPED | OUTPATIENT
Start: 2024-05-16

## 2024-05-18 DIAGNOSIS — Z79.4 TYPE 2 DIABETES MELLITUS WITH OTHER NEUROLOGIC COMPLICATION, WITH LONG-TERM CURRENT USE OF INSULIN: ICD-10-CM

## 2024-05-18 DIAGNOSIS — E11.49 TYPE 2 DIABETES MELLITUS WITH OTHER NEUROLOGIC COMPLICATION, WITH LONG-TERM CURRENT USE OF INSULIN: ICD-10-CM

## 2024-05-20 RX ORDER — PEN NEEDLE, DIABETIC 32GX 5/32"
NEEDLE, DISPOSABLE MISCELLANEOUS
Qty: 200 EACH | Refills: 0 | Status: SHIPPED | OUTPATIENT
Start: 2024-05-20

## 2024-05-21 ENCOUNTER — TELEPHONE (OUTPATIENT)
Dept: PAIN MEDICINE | Facility: CLINIC | Age: 44
End: 2024-05-21
Payer: MEDICAID

## 2024-05-21 DIAGNOSIS — I10 HYPERTENSION, BENIGN: ICD-10-CM

## 2024-05-21 DIAGNOSIS — E11.49 TYPE 2 DIABETES MELLITUS WITH OTHER NEUROLOGIC COMPLICATION, WITH LONG-TERM CURRENT USE OF INSULIN: ICD-10-CM

## 2024-05-21 DIAGNOSIS — Z79.4 TYPE 2 DIABETES MELLITUS WITH OTHER NEUROLOGIC COMPLICATION, WITH LONG-TERM CURRENT USE OF INSULIN: ICD-10-CM

## 2024-05-21 DIAGNOSIS — G61.0 GUILLAIN-BARRE: ICD-10-CM

## 2024-05-21 RX ORDER — LOSARTAN POTASSIUM 50 MG/1
50 TABLET ORAL DAILY
Qty: 90 TABLET | Refills: 0 | Status: SHIPPED | OUTPATIENT
Start: 2024-05-21

## 2024-05-21 RX ORDER — GABAPENTIN 300 MG/1
300 CAPSULE ORAL 3 TIMES DAILY
Qty: 270 CAPSULE | Refills: 0 | Status: SHIPPED | OUTPATIENT
Start: 2024-05-21

## 2024-05-21 NOTE — TELEPHONE ENCOUNTER
Pt called upset that his Diclofenac patches had been denied by [employer] and he wasn't made aware.  Is hoping that Dr. Lora can write something different that [employer] will cover please?  Patient would like a call back to let him know if we have an alternative that a PA will be approved.

## 2024-05-22 NOTE — TELEPHONE ENCOUNTER
Dr. Lora sent in hydrocodone; however, this was also denied.   Messaged patient asking him to call insurance to find out what they will cover, maybe we can go that direction.

## 2024-05-23 DIAGNOSIS — I10 HYPERTENSION, BENIGN: ICD-10-CM

## 2024-05-23 RX ORDER — AMLODIPINE BESYLATE 10 MG/1
10 TABLET ORAL NIGHTLY
Qty: 30 TABLET | Refills: 0 | Status: SHIPPED | OUTPATIENT
Start: 2024-05-23

## 2024-06-01 DIAGNOSIS — R35.0 URINARY FREQUENCY: ICD-10-CM

## 2024-06-03 RX ORDER — OXYBUTYNIN CHLORIDE 15 MG/1
15 TABLET, EXTENDED RELEASE ORAL DAILY
Qty: 90 TABLET | Refills: 0 | Status: SHIPPED | OUTPATIENT
Start: 2024-06-03

## 2024-06-11 DIAGNOSIS — J30.2 OTHER SEASONAL ALLERGIC RHINITIS: ICD-10-CM

## 2024-06-11 DIAGNOSIS — Z79.4 TYPE 2 DIABETES MELLITUS WITH OTHER NEUROLOGIC COMPLICATION, WITH LONG-TERM CURRENT USE OF INSULIN: ICD-10-CM

## 2024-06-11 DIAGNOSIS — E11.49 TYPE 2 DIABETES MELLITUS WITH OTHER NEUROLOGIC COMPLICATION, WITH LONG-TERM CURRENT USE OF INSULIN: ICD-10-CM

## 2024-06-13 RX ORDER — LORATADINE 10 MG/1
10 TABLET ORAL DAILY
Qty: 30 TABLET | Refills: 0 | Status: SHIPPED | OUTPATIENT
Start: 2024-06-13

## 2024-06-13 RX ORDER — PROCHLORPERAZINE 25 MG/1
SUPPOSITORY RECTAL
Qty: 3 EACH | Refills: 0 | Status: SHIPPED | OUTPATIENT
Start: 2024-06-13

## 2024-06-21 ENCOUNTER — TELEPHONE (OUTPATIENT)
Dept: NEUROSURGERY | Facility: CLINIC | Age: 44
End: 2024-06-21
Payer: MEDICAID
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
## 2024-06-25 ENCOUNTER — OFFICE VISIT (OUTPATIENT)
Dept: PODIATRY | Facility: CLINIC | Age: 44
End: 2024-06-25
Payer: MEDICAID

## 2024-06-25 VITALS
RESPIRATION RATE: 20 BRPM | HEART RATE: 89 BPM | HEIGHT: 75 IN | OXYGEN SATURATION: 97 % | BODY MASS INDEX: 35.43 KG/M2 | WEIGHT: 285 LBS

## 2024-06-25 DIAGNOSIS — R26.81 UNSTEADINESS ON FEET: ICD-10-CM

## 2024-06-25 DIAGNOSIS — M79.674 PAIN IN TOES OF BOTH FEET: ICD-10-CM

## 2024-06-25 DIAGNOSIS — Z79.4 TYPE 2 DIABETES MELLITUS WITH OTHER NEUROLOGIC COMPLICATION, WITH LONG-TERM CURRENT USE OF INSULIN: ICD-10-CM

## 2024-06-25 DIAGNOSIS — Z87.39 HISTORY OF GOUT: ICD-10-CM

## 2024-06-25 DIAGNOSIS — E11.42 DM TYPE 2 WITH DIABETIC PERIPHERAL NEUROPATHY: ICD-10-CM

## 2024-06-25 DIAGNOSIS — L60.3 ONYCHODYSTROPHY: Primary | ICD-10-CM

## 2024-06-25 DIAGNOSIS — E11.49 TYPE 2 DIABETES MELLITUS WITH OTHER NEUROLOGIC COMPLICATION, WITH LONG-TERM CURRENT USE OF INSULIN: ICD-10-CM

## 2024-06-25 DIAGNOSIS — M79.675 PAIN IN TOES OF BOTH FEET: ICD-10-CM

## 2024-06-25 PROCEDURE — 1159F MED LIST DOCD IN RCRD: CPT

## 2024-06-25 PROCEDURE — 1160F RVW MEDS BY RX/DR IN RCRD: CPT

## 2024-06-25 PROCEDURE — 11721 DEBRIDE NAIL 6 OR MORE: CPT

## 2024-06-25 NOTE — PROGRESS NOTES
06/25/2024  Foot and Ankle Surgery - Established Patient/Follow-up  Provider: ANASTASIA Chan   Location: [facility]    Subjective:  Barrett Laguerre is a 44 y.o. male.     Chief Complaint   Patient presents with    Right Foot - Follow-up, Diabetes     Dm foot care, nails    Left Foot - Follow-up, Diabetes     Dm foot care, nails    Follow-up     CYRUS Rosenberg last pcp visit 02/15/2024       History of Present Illness  The patient is a 43-year-old male who is here today for routine diabetic foot check and is requesting nail care.    The patient reports overall good health of his feet, however, he continues to experience back pain. He acknowledges suboptimal blood sugar monitoring, which he believes may be elevated. He experiences mild numbness, which he attributes to his back. The pain, which occasionally radiates down his left leg, occasionally slightly below the knee, has been occurring recently. His previous back surgeon has since left the practice, but he is scheduled to see another physician in the same office on Thursday. He expresses a need for additional surgery. His current medication regimen includes allopurinol for gout, which resulted in an allergic reaction. He is currently on a regimen of colchicine. He has attempted to reduce his intake of Coke, but admits to a high intake of caffeine.   He is allergic to ALLOPURINOL.       Allergies   Allergen Reactions    Penicillin G Anaphylaxis    Penicillins Anaphylaxis    Sulfa Antibiotics Hives    Allopurinol Rash       Current Outpatient Medications on File Prior to Visit   Medication Sig Dispense Refill    acetaminophen-codeine (TYLENOL/CODEINE #3) 300-30 MG per tablet Take 1 tablet by mouth 3 (Three) Times a Day As Needed for Mild Pain. 21 tablet 2    albuterol sulfate  (90 Base) MCG/ACT inhaler Inhale 2 puffs Every 4 (Four) Hours As Needed for Wheezing. 6.7 g 0    Alcohol Swabs (B-D SINGLE USE SWABS REGULAR) pads Apply 1 each topically  to the appropriate area as directed Daily. 100 each 12    amLODIPine (NORVASC) 10 MG tablet TAKE 1 TABLET BY MOUTH ONCE DAILY AT NIGHT 30 tablet 0    Azelastine HCl 137 MCG/SPRAY solution USE 2 SPRAY(S) IN EACH NOSTRIL TWICE DAILY AS DIRECTED 30 mL 0    azithromycin (Zithromax Z-Oniel) 250 MG tablet Take 2 tablets the first day, then 1 tablet daily for 4 days. 6 tablet 0    BD Pen Needle Kyra 2nd Gen 32G X 4 MM misc USE 1 PEN NEEDLE 4 TIMES DAILY ) BEFORE MEAL(S) AND AT BEDTIME ) 200 each 0    chlorthalidone (HYGROTEN) 50 MG tablet Take 1 tablet by mouth Daily. 90 tablet 1    clindamycin (CLEOCIN T) 1 % external solution Apply 1 application  topically to the appropriate area as directed 2 (Two) Times a Day. Not currently using      colchicine-probenecid (COL-BENEMID) 0.5-500 MG per tablet Take 1 tablet by mouth once daily 30 tablet 0    Continuous Blood Gluc  (Dexcom G6 ) device 1  ONCE DAILY 1 each 0    Continuous Glucose Sensor (Dexcom G6 Sensor) USE 1 SENSOR TO CHECK BLOOD SUGAR DAILY AS DIRECTED // CHANGE EVERY 10 DAYS 3 each 0    Continuous Glucose Transmitter (Dexcom G6 Transmitter) misc USE 1 EACH AS DIRECTED FOR 90 DAYS 1 each 0    Cyanocobalamin (Vitamin B-12 ER) 1000 MCG tablet controlled-release TAKE 1 TABLET DAILY 30 each 5    dexAMETHasone (DECADRON) 4 MG tablet Take 1 tablet by mouth 2 (Two) Times a Day With Meals. 16 tablet 0    Diclofenac Epolamine (FLECTOR) 1.3 % patch patch Apply 1 patch topically to the appropriate area as directed 2 (Two) Times a Day As Needed (back pain). 60 patch 11    Emtricitabine-Tenofovir AF (Descovy) 200-25 MG per tablet Take 1 tablet by mouth Daily.      EQ All Day Allergy Relief 10 MG tablet Take 1 tablet by mouth once daily 30 tablet 0    FLUoxetine (PROzac) 40 MG capsule Take 1 capsule by mouth Every Night. 90 capsule 1    fluticasone (FLONASE) 50 MCG/ACT nasal spray 1 spray into the nostril(s) as directed by provider 2 (Two) Times a Day. 16 mL 3     gabapentin (NEURONTIN) 300 MG capsule TAKE 1 CAPSULE BY MOUTH THREE TIMES DAILY 270 capsule 0    glucose blood test strip USE 1 STRIP TO CHECK GLUCOSE 4 TIMES DAILY 100 each 12    glucose monitor monitoring kit Use 1 each Daily. Dx: E11.49 patient checks sugar 4 x day 1 each 0    guaifenesin (ROBITUSSIN) 100 MG/5ML liquid Take 10 mL by mouth Every 4 (Four) Hours As Needed for Cough. 180 mL 0    HumaLOG KwikPen 100 UNIT/ML solution pen-injector INJECT 15 UNITS SUBCUTANEOUSLY THREE TIMES DAILY 15  MINUTES  BEFORE  MEALS  OR  IMMEDIATELY  FOLLOWING  MEALS 15 mL 0    HYDROcodone-acetaminophen (Norco) 7.5-325 MG per tablet Take 1 tablet by mouth 5 (Five) Times a Day As Needed for Severe Pain. 150 tablet 0    HYDROcodone-acetaminophen (Norco) 7.5-325 MG per tablet Take 1 tablet by mouth 5 (Five) Times a Day As Needed for Severe Pain. 150 tablet 0    HYDROcodone-acetaminophen (Norco) 7.5-325 MG per tablet Take 1 tablet by mouth 5 (Five) Times a Day As Needed for Severe Pain. 150 tablet 0    hydrocortisone 1 % cream Apply  topically to the appropriate area as directed 2 (Two) Times a Day. 60 g 3    hydrOXYzine (ATARAX) 25 MG tablet Take 1 tablet by mouth 4 (Four) Times a Day As Needed for Itching. 360 tablet 3    Insulin NPH, Human,, Isophane, (NovoLIN N FlexPen) 100 UNIT/ML injection Inject 38 Units under the skin into the appropriate area as directed Every Night for 360 days. 34.2 mL 3    Insulin Regular Human (NovoLIN R FlexPen ReliOn) 100 UNIT/ML injection Inject 20 Units under the skin into the appropriate area as directed 3 (Three) Times a Day Before Meals for 360 days. 54 mL 3    Janumet  MG per tablet TAKE 1 TABLET BY MOUTH TWICE DAILY WITH MEALS 180 tablet 0    Lancets misc Use 1 each 4 (Four) Times a Day. Dx: E11.49 patient checks sugar 4 x day 200 each 12    losartan (COZAAR) 50 MG tablet Take 1 tablet by mouth once daily 90 tablet 0    methocarbamol (ROBAXIN) 750 MG tablet Take 1 tablet by mouth 4 (Four)  "Times a Day As Needed for Muscle Spasms. 360 tablet 3    montelukast (SINGULAIR) 10 MG tablet TAKE 1 TABLET BY MOUTH ONCE DAILY AT NIGHT 30 tablet 0    oxybutynin XL (DITROPAN XL) 15 MG 24 hr tablet Take 1 tablet by mouth once daily 90 tablet 0    pantoprazole (Protonix) 40 MG EC tablet Take 1 tablet by mouth Daily. 90 tablet 3    polycarbophil 625 MG tablet tablet Take  by mouth Every Night.      potassium chloride (MICRO-K) 10 MEQ CR capsule Take 1 capsule by mouth Daily. 30 capsule 3    promethazine (PHENERGAN) 25 MG tablet Take 1 tablet by mouth Every 6 (Six) Hours As Needed for Nausea or Vomiting. 90 tablet 0    sildenafil (REVATIO) 20 MG tablet Take 1 tablet by mouth Daily As Needed (ed). 30 tablet 3    sodium chloride (Ayr) 0.65 % nasal spray USE 1 DOSE IN EACH NOSTRIL THREE TIMES DAILY 50 mL 0     No current facility-administered medications on file prior to visit.       Objective   Pulse 89   Resp 20   Ht 190.5 cm (75\")   Wt 129 kg (285 lb)   SpO2 97%   BMI 35.62 kg/m²     Foot/Ankle Exam  Physical Exam          VASCULAR      Right Foot Vascularity   Normal vascular exam    Dorsalis pedis:  2+  Posterior tibial:  2+  Skin temperature:  warm  Edema grading:  None  CFT:  < 3 seconds  Pedal hair growth:  Present  Varicosities:  none      Left Foot Vascularity   Normal vascular exam    Dorsalis pedis:  2+  Posterior tibial:  2+  Skin temperature:  warm  Edema grading:  None  CFT:  < 3 seconds  Pedal hair growth:  Present  Varicosities:  none     NEUROLOGIC      Right Foot Neurologic   Protective Sensation using Milligan-Ernesto Monofilament:   Sites tested: 9      Left Foot Neurologic   Protective Sensation using Milligan-Ernesto Monofilament:   Sites tested: 10     MUSCULOSKELETAL      Right Foot Musculoskeletal   Tenderness:  none    Hammertoe:  First toe      Left Foot Musculoskeletal   Tenderness:  none  Hammertoe:  First toe     DERMATOLOGIC       Right Foot Dermatologic   Skin  Right foot skin is " intact.   Nails  1.  Positive for elongated, abnormal thickness and dystrophic nail. (Mild callus)  2.  Positive for elongated, abnormal thickness and dystrophic nail.  3.  Positive for elongated, abnormal thickness and dystrophic nail.  4.  Positive for elongated, abnormal thickness and dystrophic nail.  5.  Positive for elongated, abnormal thickness and dystrophic nail.      Left Foot Dermatologic   Skin  Left foot skin is intact.   Nails  1.  Positive for elongated, abnormal thickness and dystrophic nail.  2.  Positive for elongated, abnormal thickness and dystrophic nail.  3.  Positive for elongated, abnormal thickness and dystrophic nail.  4.  Positive for elongated, abnormally thick and dystrophic nail.  5.  Positive for elongated, abnormally thick and dystrophic nail.    Results       Assessment & Plan   Diagnoses and all orders for this visit:    1. Onychodystrophy (Primary)    2. DM type 2 with diabetic peripheral neuropathy    3. Unsteadiness on feet    4. Pain in toes of both feet    5. History of gout      Assessment & Plan  1. Routine diabetic foot check.  The patient's bilateral feet are stable with no open wounds or signs of active acute infection or process. Nails were debrided today. Given the patient's moderate risk for pedal complications related to diabetes, he has expressed interest in medication for gout and the prevention of symptoms. Information regarding gout and potential treatment options, including dietary restrictions and fluid recommendations, was provided.    Explained importance of diabetic foot care, daily foot checks, and glycemic control. Patient should check both feet on a daily basis, monitor and control blood sugars, make sure that both feet and in between toes are towel dried after baths or showers. Avoid barefoot walking at all times.  I discussed wearing white socks and checking shoes before wearing them. Patient was given information on proper foot care. Call the office at  the first signs of a wound or with signs of infection.     Nail debridement: Both feet x10    Consent and time out was performed before proceeding with the procedure. Nails were debrided with a nail nipper without complication.  No anesthesia was required.  Indications for procedure were thickened, dystrophic, and fungal appearing nails which are difficult to trim.  Proper self-care and technique was discussed with patient.  Patient was stable after procedure.     Follow-up  A follow-up appointment is scheduled for 2 months from now for a routine diabetic foot check.       No orders of the defined types were placed in this encounter.         Patient or patient representative verbalized consent for the use of Ambient Listening during the visit with  ANASTASIA Alvares for chart documentation. 6/25/2024  16:23 EDT

## 2024-06-27 RX ORDER — INSULIN GLARGINE 100 [IU]/ML
38 INJECTION, SOLUTION SUBCUTANEOUS NIGHTLY
Qty: 15 ML | Refills: 0 | OUTPATIENT
Start: 2024-06-27 | End: 2024-07-27

## 2024-06-27 NOTE — PROGRESS NOTES
"Subjective   History of Present Illness: Barrett Laguerre is a 44 y.o. male is here today for follow-up.  Patient underwent an L4 S1 bilateral microdiscectomy for bilateral L4-L5 radiculopathy with Dr. Pace on 2/22/2023.  His radiculopathy symptoms had resolved.  On his last visit with Dr. Pace on 8/18/2023, he continued to have persistent midline spinal pain worse standing and and walking.  Flexion-extension x-rays were not indicative of any new dynamic spondylolisthesis.  He was recommended to continue with regular physical therapy and home exercise engagement.  He was recommended to follow-up in 1 year.    Patient returns with continuing/worsening midline lumbosacral pain with radiation down into his left buttock down to his calf.  It started shortly after his surgery and has worsened since then.  He says his pain is different from his presurgical pain and feels like it has \"shifted to a lower level.  Symptoms seem to be worse standing or going long distances.  He has undergone a couple of injections with Dr. Lora that are not working as good as they used to.  He describes no bowel/bladder dysfunction or saddle anesthesia.    History of Present Illness    The following portions of the patient's history were reviewed and updated as appropriate: allergies, current medications, past family history, past medical history, past social history, past surgical history, and problem list.    Review of Systems   Constitutional:  Positive for activity change.   HENT: Negative.     Eyes: Negative.    Respiratory: Negative.     Cardiovascular: Negative.    Gastrointestinal: Negative.    Endocrine: Negative.    Genitourinary: Negative.    Musculoskeletal:  Positive for arthralgias, back pain and myalgias.   Skin: Negative.    Allergic/Immunologic: Negative.    Neurological:  Positive for numbness (+tingling sometimes in butt and left leg).        Dull pain in like his lower back right at his butt crack   The pain goes " "into left leg    Hematological: Negative.    Psychiatric/Behavioral:  Positive for sleep disturbance.    All other systems reviewed and are negative.      Objective     /100   Pulse 94   Resp 18   Ht 190.5 cm (75\")   Wt 129 kg (284 lb)   SpO2 96%   BMI 35.50 kg/m²    Body mass index is 35.5 kg/m².    Vitals:    06/28/24 1602   PainSc:   7   PainLoc: Back          Physical Exam  Neurologic Exam    Lower extremity motor strength 5/5    Assessment & Plan   Independent Review of Radiographic Studies:      I personally reviewed the images from the following studies.    No imaging    Medical Decision Making:      Barrett Orellana a 44 y.o. male with medical history significant for DM type II with no recent A1c who underwent a two-level decompression with Dr. Pace in February 2023 that is following up today on persistent/worsening midline back pain and left buttock pain into his calf.  Injection therapy has become refractory.  Will check MRI for any repeat disc herniation correlating to his symptoms.  We will follow-up afterward for any further recommendations.  Patient agrees to plan of care and wishes to proceed.  Advanced imaging (i.e. MRI, CT scan, or CT myelogram) was ordered today during your office visit. Once this order is approved by insurance, our office will call you ASAP in regards to appointment details for your test. If there is a waiting time on this, it is because we are waiting on approval from your insurance.     Please schedule a follow-up appointment to discuss your test results in the office.    For the fastest turn around time for your advanced imaging to be reviewed by a provider and uploaded into our system, pick a [facility] facility.     If you choose non-[facility] facility, you will be responsible for bringing the images on a CD to your follow-up appointment. If you forget the CD at the time of your appointment, you may be asked to reschedule.         Diagnoses and all orders for this " visit:    1. DDD (degenerative disc disease), lumbar (Primary)  -     MRI Lumbar Spine With & Without Contrast; Future  -     XR Spine Lumbar Complete With Flex & Ext; Future    2. Hx of excision of lamina of lumbar vertebra for decompression of spinal cord      Return for Recheck.    This patient was examined wearing appropriate personal protective equipment.     Barrett Laguerre  reports that he has never smoked. He has never been exposed to tobacco smoke. He has never used smokeless tobacco.      Body mass index is 35.5 kg/m².  Class 2 Severe Obesity (BMI >=35 and <=39.9). Obesity-related health conditions include the following: hypertension, DM type II and ANDREE. Obesity is unchanged. BMI is above average; BMI management plan is completed.  Recommendations for portion control and increasing exercise.     Patient's blood pressure was reviewed.  Recommendations for  a low-salt diet and exercise to maintain/improve BP in addition to taking any presribed medications.               Marisa Locke DNP, APRN    06/28/24  16:37 EDT

## 2024-06-28 ENCOUNTER — OFFICE VISIT (OUTPATIENT)
Dept: NEUROSURGERY | Facility: CLINIC | Age: 44
End: 2024-06-28
Payer: MEDICAID

## 2024-06-28 VITALS
BODY MASS INDEX: 35.31 KG/M2 | HEART RATE: 94 BPM | RESPIRATION RATE: 18 BRPM | SYSTOLIC BLOOD PRESSURE: 146 MMHG | WEIGHT: 284 LBS | DIASTOLIC BLOOD PRESSURE: 100 MMHG | HEIGHT: 75 IN | OXYGEN SATURATION: 96 %

## 2024-06-28 DIAGNOSIS — Z98.890 HX OF EXCISION OF LAMINA OF LUMBAR VERTEBRA FOR DECOMPRESSION OF SPINAL CORD: ICD-10-CM

## 2024-06-28 DIAGNOSIS — M51.36 DDD (DEGENERATIVE DISC DISEASE), LUMBAR: Primary | ICD-10-CM

## 2024-06-28 PROBLEM — M51.369 DDD (DEGENERATIVE DISC DISEASE), LUMBAR: Status: ACTIVE | Noted: 2024-06-28

## 2024-06-28 PROCEDURE — 1159F MED LIST DOCD IN RCRD: CPT | Performed by: NURSE PRACTITIONER

## 2024-06-28 PROCEDURE — 3080F DIAST BP >= 90 MM HG: CPT | Performed by: NURSE PRACTITIONER

## 2024-06-28 PROCEDURE — 3077F SYST BP >= 140 MM HG: CPT | Performed by: NURSE PRACTITIONER

## 2024-06-28 PROCEDURE — 1160F RVW MEDS BY RX/DR IN RCRD: CPT | Performed by: NURSE PRACTITIONER

## 2024-06-28 PROCEDURE — 99214 OFFICE O/P EST MOD 30 MIN: CPT | Performed by: NURSE PRACTITIONER

## 2024-07-05 ENCOUNTER — OFFICE VISIT (OUTPATIENT)
Dept: FAMILY MEDICINE CLINIC | Facility: CLINIC | Age: 44
End: 2024-07-05
Payer: MEDICAID

## 2024-07-05 VITALS
HEIGHT: 75 IN | RESPIRATION RATE: 16 BRPM | OXYGEN SATURATION: 96 % | HEART RATE: 94 BPM | WEIGHT: 287 LBS | SYSTOLIC BLOOD PRESSURE: 132 MMHG | DIASTOLIC BLOOD PRESSURE: 86 MMHG | TEMPERATURE: 97.3 F | BODY MASS INDEX: 35.68 KG/M2

## 2024-07-05 DIAGNOSIS — I10 HYPERTENSION, BENIGN: ICD-10-CM

## 2024-07-05 DIAGNOSIS — E66.01 CLASS 2 SEVERE OBESITY DUE TO EXCESS CALORIES WITH SERIOUS COMORBIDITY AND BODY MASS INDEX (BMI) OF 35.0 TO 35.9 IN ADULT: ICD-10-CM

## 2024-07-05 DIAGNOSIS — J30.89 NON-SEASONAL ALLERGIC RHINITIS DUE TO OTHER ALLERGIC TRIGGER: ICD-10-CM

## 2024-07-05 DIAGNOSIS — K21.9 GERD (GASTROESOPHAGEAL REFLUX DISEASE): ICD-10-CM

## 2024-07-05 DIAGNOSIS — E78.9 DISORDER OF LIPID METABOLISM: ICD-10-CM

## 2024-07-05 DIAGNOSIS — M10.9 GOUT, UNSPECIFIED CAUSE, UNSPECIFIED CHRONICITY, UNSPECIFIED SITE: ICD-10-CM

## 2024-07-05 DIAGNOSIS — Z79.4 TYPE 2 DIABETES MELLITUS WITH OTHER NEUROLOGIC COMPLICATION, WITH LONG-TERM CURRENT USE OF INSULIN: Primary | ICD-10-CM

## 2024-07-05 DIAGNOSIS — R35.0 URINARY FREQUENCY: ICD-10-CM

## 2024-07-05 DIAGNOSIS — J30.2 OTHER SEASONAL ALLERGIC RHINITIS: ICD-10-CM

## 2024-07-05 DIAGNOSIS — R06.2 WHEEZING: ICD-10-CM

## 2024-07-05 DIAGNOSIS — G61.0 GUILLAIN-BARRE: ICD-10-CM

## 2024-07-05 DIAGNOSIS — F39 MOOD DISORDER: ICD-10-CM

## 2024-07-05 DIAGNOSIS — E11.49 TYPE 2 DIABETES MELLITUS WITH OTHER NEUROLOGIC COMPLICATION, WITH LONG-TERM CURRENT USE OF INSULIN: Primary | ICD-10-CM

## 2024-07-05 LAB
EXPIRATION DATE: ABNORMAL
HBA1C MFR BLD: 9.6 % (ref 4.5–5.7)
Lab: ABNORMAL

## 2024-07-05 PROCEDURE — 83036 HEMOGLOBIN GLYCOSYLATED A1C: CPT | Performed by: FAMILY MEDICINE

## 2024-07-05 PROCEDURE — 3046F HEMOGLOBIN A1C LEVEL >9.0%: CPT | Performed by: FAMILY MEDICINE

## 2024-07-05 PROCEDURE — 1125F AMNT PAIN NOTED PAIN PRSNT: CPT | Performed by: FAMILY MEDICINE

## 2024-07-05 PROCEDURE — 3075F SYST BP GE 130 - 139MM HG: CPT | Performed by: FAMILY MEDICINE

## 2024-07-05 PROCEDURE — 99214 OFFICE O/P EST MOD 30 MIN: CPT | Performed by: FAMILY MEDICINE

## 2024-07-05 PROCEDURE — 3079F DIAST BP 80-89 MM HG: CPT | Performed by: FAMILY MEDICINE

## 2024-07-05 RX ORDER — AZELASTINE HYDROCHLORIDE 137 UG/1
2 SPRAY, METERED NASAL 2 TIMES DAILY
Qty: 60 ML | Refills: 2 | Status: SHIPPED | OUTPATIENT
Start: 2024-07-05

## 2024-07-05 RX ORDER — LORATADINE 10 MG/1
10 TABLET ORAL DAILY
Qty: 30 TABLET | Refills: 0 | Status: SHIPPED | OUTPATIENT
Start: 2024-07-05

## 2024-07-05 RX ORDER — OXYBUTYNIN CHLORIDE 15 MG/1
15 TABLET, EXTENDED RELEASE ORAL DAILY
Qty: 90 TABLET | Refills: 3 | Status: SHIPPED | OUTPATIENT
Start: 2024-07-05

## 2024-07-05 RX ORDER — PROBENECID AND COLCHICINE .5; 5 MG/1; MG/1
1 TABLET ORAL DAILY
Qty: 30 TABLET | Refills: 3 | Status: SHIPPED | OUTPATIENT
Start: 2024-07-05

## 2024-07-05 RX ORDER — ALBUTEROL SULFATE 90 UG/1
2 AEROSOL, METERED RESPIRATORY (INHALATION) EVERY 4 HOURS PRN
Qty: 6.7 G | Refills: 0 | Status: SHIPPED | OUTPATIENT
Start: 2024-07-05

## 2024-07-05 RX ORDER — GABAPENTIN 300 MG/1
300 CAPSULE ORAL 3 TIMES DAILY
Qty: 270 CAPSULE | Refills: 1 | Status: SHIPPED | OUTPATIENT
Start: 2024-07-05

## 2024-07-05 RX ORDER — FLUOXETINE HYDROCHLORIDE 40 MG/1
40 CAPSULE ORAL NIGHTLY
Qty: 90 CAPSULE | Refills: 3 | Status: SHIPPED | OUTPATIENT
Start: 2024-07-05

## 2024-07-05 RX ORDER — POTASSIUM CHLORIDE 750 MG/1
10 CAPSULE, EXTENDED RELEASE ORAL DAILY
Qty: 90 CAPSULE | Refills: 3 | Status: SHIPPED | OUTPATIENT
Start: 2024-07-05

## 2024-07-05 RX ORDER — PANTOPRAZOLE SODIUM 40 MG/1
40 TABLET, DELAYED RELEASE ORAL DAILY
Qty: 90 TABLET | Refills: 3 | Status: SHIPPED | OUTPATIENT
Start: 2024-07-05

## 2024-07-05 RX ORDER — AMLODIPINE BESYLATE 10 MG/1
10 TABLET ORAL NIGHTLY
Qty: 90 TABLET | Refills: 3 | Status: SHIPPED | OUTPATIENT
Start: 2024-07-05

## 2024-07-05 RX ORDER — CHLORTHALIDONE 50 MG/1
50 TABLET ORAL DAILY
Qty: 90 TABLET | Refills: 3 | Status: SHIPPED | OUTPATIENT
Start: 2024-07-05

## 2024-07-05 RX ORDER — MONTELUKAST SODIUM 10 MG/1
10 TABLET ORAL NIGHTLY
Qty: 90 TABLET | Refills: 3 | Status: SHIPPED | OUTPATIENT
Start: 2024-07-05

## 2024-07-05 RX ORDER — ISOPROPYL ALCOHOL 0.75 G/1
1 SWAB TOPICAL DAILY
Qty: 100 EACH | Refills: 12 | Status: SHIPPED | OUTPATIENT
Start: 2024-07-05

## 2024-07-05 RX ORDER — INSULIN GLARGINE 100 [IU]/ML
40 INJECTION, SOLUTION SUBCUTANEOUS NIGHTLY
Qty: 4 PEN | Refills: 11 | Status: SHIPPED | OUTPATIENT
Start: 2024-07-05

## 2024-07-05 RX ORDER — SITAGLIPTIN AND METFORMIN HYDROCHLORIDE 1000; 50 MG/1; MG/1
1 TABLET, FILM COATED ORAL 2 TIMES DAILY WITH MEALS
Qty: 180 TABLET | Refills: 3 | Status: SHIPPED | OUTPATIENT
Start: 2024-07-05

## 2024-07-05 RX ORDER — FLUTICASONE PROPIONATE 50 MCG
1 SPRAY, SUSPENSION (ML) NASAL 2 TIMES DAILY
Qty: 16 ML | Refills: 3 | Status: SHIPPED | OUTPATIENT
Start: 2024-07-05

## 2024-07-05 RX ORDER — LOSARTAN POTASSIUM 100 MG/1
100 TABLET ORAL DAILY
Qty: 90 TABLET | Refills: 3 | Status: SHIPPED | OUTPATIENT
Start: 2024-07-05

## 2024-07-05 NOTE — PROGRESS NOTES
Subjective   Barrett Laguerre is a 44 y.o. male.   Chief Complaint   Patient presents with    Diabetes    Hypertension       History of Present Illness  Pt needs refill on meds. Would also like to change to basalglar from humulin N as he does not tolerate the latter   Diabetes  He presents for his follow-up diabetic visit. He has type 2 diabetes mellitus. The initial diagnosis of diabetes was made 4 years ago. Pertinent negatives for hypoglycemia include no confusion, dizziness, headaches, sleepiness or sweats. Pertinent negatives for diabetes include no blurred vision, no chest pain, no fatigue, no polydipsia, no polyuria, no visual change and no weakness. There are no hypoglycemic complications. There are no diabetic complications. Risk factors for coronary artery disease include diabetes mellitus, male sex, sedentary lifestyle and hypertension. Current diabetic treatment includes insulin injections and oral agent (dual therapy). (Does check blood sugar at home)   Hypertension  This is a chronic problem. The current episode started more than 1 year ago. Pertinent negatives include no blurred vision, chest pain, headaches or sweats. Current antihypertension treatment includes calcium channel blockers and angiotensin blockers.        The following portions of the patient's history were reviewed and updated as appropriate: allergies, current medications, past family history, past medical history, past social history, past surgical history, and problem list.    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-[place]    Sleep apnea    Attention deficit disorder    Diabetes    Other seasonal allergic rhinitis    Mixed obsessional thoughts and acts    Hypertension, benign    Disorder of lipid metabolism    CIDP (chronic  inflammatory demyelinating polyneuropathy)    Lumbar radiculopathy    Onychomycosis    Annual physical exam    Class 1 obesity due to excess calories with serious comorbidity and body mass index (BMI) of 34.0 to 34.9 in adult    Acute midline low back pain with left-sided sciatica    DDD (degenerative disc disease), lumbar    Hx of excision of lamina of lumbar vertebra for decompression of spinal cord       Current Outpatient Medications on File Prior to Visit   Medication Sig Dispense Refill    acetaminophen-codeine (TYLENOL/CODEINE #3) 300-30 MG per tablet Take 1 tablet by mouth 3 (Three) Times a Day As Needed for Mild Pain. 21 tablet 2    BD Pen Needle Kyra 2nd Gen 32G X 4 MM misc USE 1 PEN NEEDLE 4 TIMES DAILY ) BEFORE MEAL(S) AND AT BEDTIME ) 200 each 0    clindamycin (CLEOCIN T) 1 % external solution Apply 1 application  topically to the appropriate area as directed 2 (Two) Times a Day. Not currently using      Continuous Blood Gluc  (Dexcom G6 ) device 1  ONCE DAILY 1 each 0    Continuous Glucose Sensor (Dexcom G6 Sensor) USE 1 SENSOR TO CHECK BLOOD SUGAR DAILY AS DIRECTED // CHANGE EVERY 10 DAYS 3 each 0    Continuous Glucose Transmitter (Dexcom G6 Transmitter) misc USE 1 EACH AS DIRECTED FOR 90 DAYS 1 each 0    Cyanocobalamin (Vitamin B-12 ER) 1000 MCG tablet controlled-release TAKE 1 TABLET DAILY 30 each 5    dexAMETHasone (DECADRON) 4 MG tablet Take 1 tablet by mouth 2 (Two) Times a Day With Meals. 16 tablet 0    Diclofenac Epolamine (FLECTOR) 1.3 % patch patch Apply 1 patch topically to the appropriate area as directed 2 (Two) Times a Day As Needed (back pain). 60 patch 11    Emtricitabine-Tenofovir AF (Descovy) 200-25 MG per tablet Take 1 tablet by mouth Daily.      glucose blood test strip USE 1 STRIP TO CHECK GLUCOSE 4 TIMES DAILY 100 each 12    glucose monitor monitoring kit Use 1 each Daily. Dx: E11.49 patient checks sugar 4 x day 1 each 0    guaifenesin (ROBITUSSIN) 100 MG/5ML  liquid Take 10 mL by mouth Every 4 (Four) Hours As Needed for Cough. 180 mL 0    HumaLOG KwikPen 100 UNIT/ML solution pen-injector INJECT 15 UNITS SUBCUTANEOUSLY THREE TIMES DAILY 15  MINUTES  BEFORE  MEALS  OR  IMMEDIATELY  FOLLOWING  MEALS 15 mL 0    HYDROcodone-acetaminophen (Norco) 7.5-325 MG per tablet Take 1 tablet by mouth 5 (Five) Times a Day As Needed for Severe Pain. 150 tablet 0    HYDROcodone-acetaminophen (Norco) 7.5-325 MG per tablet Take 1 tablet by mouth 5 (Five) Times a Day As Needed for Severe Pain. 150 tablet 0    HYDROcodone-acetaminophen (Norco) 7.5-325 MG per tablet Take 1 tablet by mouth 5 (Five) Times a Day As Needed for Severe Pain. 150 tablet 0    hydrocortisone 1 % cream Apply  topically to the appropriate area as directed 2 (Two) Times a Day. 60 g 3    hydrOXYzine (ATARAX) 25 MG tablet Take 1 tablet by mouth 4 (Four) Times a Day As Needed for Itching. 360 tablet 3    Insulin NPH, Human,, Isophane, (NovoLIN N FlexPen) 100 UNIT/ML injection Inject 38 Units under the skin into the appropriate area as directed Every Night for 360 days. 34.2 mL 3    Insulin Regular Human (NovoLIN R FlexPen ReliOn) 100 UNIT/ML injection Inject 20 Units under the skin into the appropriate area as directed 3 (Three) Times a Day Before Meals for 360 days. 54 mL 3    Lancets misc Use 1 each 4 (Four) Times a Day. Dx: E11.49 patient checks sugar 4 x day 200 each 12    methocarbamol (ROBAXIN) 750 MG tablet Take 1 tablet by mouth 4 (Four) Times a Day As Needed for Muscle Spasms. 360 tablet 3    polycarbophil 625 MG tablet tablet Take  by mouth Every Night.      promethazine (PHENERGAN) 25 MG tablet Take 1 tablet by mouth Every 6 (Six) Hours As Needed for Nausea or Vomiting. 90 tablet 0    sildenafil (REVATIO) 20 MG tablet Take 1 tablet by mouth Daily As Needed (ed). 30 tablet 3    sodium chloride (Ayr) 0.65 % nasal spray USE 1 DOSE IN EACH NOSTRIL THREE TIMES DAILY 50 mL 0    [DISCONTINUED] albuterol sulfate   (90 Base) MCG/ACT inhaler Inhale 2 puffs Every 4 (Four) Hours As Needed for Wheezing. 6.7 g 0    [DISCONTINUED] Alcohol Swabs (B-D SINGLE USE SWABS REGULAR) pads Apply 1 each topically to the appropriate area as directed Daily. 100 each 12    [DISCONTINUED] amLODIPine (NORVASC) 10 MG tablet TAKE 1 TABLET BY MOUTH ONCE DAILY AT NIGHT 30 tablet 0    [DISCONTINUED] Azelastine HCl 137 MCG/SPRAY solution USE 2 SPRAY(S) IN EACH NOSTRIL TWICE DAILY AS DIRECTED 30 mL 0    [DISCONTINUED] chlorthalidone (HYGROTEN) 50 MG tablet Take 1 tablet by mouth Daily. 90 tablet 1    [DISCONTINUED] colchicine-probenecid (COL-BENEMID) 0.5-500 MG per tablet Take 1 tablet by mouth once daily 30 tablet 0    [DISCONTINUED] EQ All Day Allergy Relief 10 MG tablet Take 1 tablet by mouth once daily 30 tablet 0    [DISCONTINUED] FLUoxetine (PROzac) 40 MG capsule Take 1 capsule by mouth Every Night. 90 capsule 1    [DISCONTINUED] fluticasone (FLONASE) 50 MCG/ACT nasal spray 1 spray into the nostril(s) as directed by provider 2 (Two) Times a Day. 16 mL 3    [DISCONTINUED] gabapentin (NEURONTIN) 300 MG capsule TAKE 1 CAPSULE BY MOUTH THREE TIMES DAILY 270 capsule 0    [DISCONTINUED] Janumet  MG per tablet TAKE 1 TABLET BY MOUTH TWICE DAILY WITH MEALS 180 tablet 0    [DISCONTINUED] losartan (COZAAR) 50 MG tablet Take 1 tablet by mouth once daily 90 tablet 0    [DISCONTINUED] montelukast (SINGULAIR) 10 MG tablet TAKE 1 TABLET BY MOUTH ONCE DAILY AT NIGHT 30 tablet 0    [DISCONTINUED] oxybutynin XL (DITROPAN XL) 15 MG 24 hr tablet Take 1 tablet by mouth once daily 90 tablet 0    [DISCONTINUED] pantoprazole (Protonix) 40 MG EC tablet Take 1 tablet by mouth Daily. 90 tablet 3    [DISCONTINUED] potassium chloride (MICRO-K) 10 MEQ CR capsule Take 1 capsule by mouth Daily. 30 capsule 3    [DISCONTINUED] azithromycin (Zithromax Z-Oniel) 250 MG tablet Take 2 tablets the first day, then 1 tablet daily for 4 days. (Patient not taking: Reported on 7/5/2024) 6  "tablet 0     No current facility-administered medications on file prior to visit.     Current outpatient and discharge medications have been reconciled for the patient.  Reviewed by: Eric Rosenberg MD      Allergies   Allergen Reactions    Penicillin G Anaphylaxis    Penicillins Anaphylaxis    Sulfa Antibiotics Hives    Allopurinol Rash       Review of Systems   Constitutional:  Negative for fatigue.   Eyes:  Negative for blurred vision.   Cardiovascular:  Negative for chest pain.   Endocrine: Negative for polydipsia and polyuria.   Neurological:  Negative for dizziness, weakness and confusion.     I have reviewed and confirmed the accuracy of the ROS as documented by the MA/LPN/RN Eric Rosenberg MD    Objective   Visit Vitals  /86 (BP Location: Right arm, Patient Position: Sitting, Cuff Size: Large Adult)   Pulse 94   Temp 97.3 °F (36.3 °C) (Temporal)   Resp 16   Ht 190.5 cm (75\")   Wt 130 kg (287 lb)   SpO2 96%   BMI 35.87 kg/m²      **  Physical Exam  Constitutional:       Appearance: He is well-developed.   HENT:      Head: Normocephalic and atraumatic.      Right Ear: External ear normal.      Left Ear: External ear normal.      Nose: Nose normal.   Eyes:      Pupils: Pupils are equal, round, and reactive to light.   Cardiovascular:      Rate and Rhythm: Normal rate and regular rhythm.      Heart sounds: Normal heart sounds.   Pulmonary:      Effort: Pulmonary effort is normal.      Breath sounds: Normal breath sounds.   Abdominal:      General: Bowel sounds are normal.      Palpations: Abdomen is soft.   Musculoskeletal:         General: Normal range of motion.      Cervical back: Normal range of motion and neck supple.   Skin:     General: Skin is warm and dry.   Neurological:      Mental Status: He is alert and oriented to person, place, and time.   Psychiatric:         Behavior: Behavior normal.         Thought Content: Thought content normal.         Judgment: Judgment normal. "       Derm Physical Exam  Ortho Exam   Neurologic Exam     Mental Status   Oriented to person, place, and time.     Cranial Nerves     CN III, IV, VI   Pupils are equal, round, and reactive to light.         Diagnoses and all orders for this visit:    1. Type 2 diabetes mellitus with other neurologic complication, with long-term current use of insulin (Primary)  Overview:  Basalglar 30 units daily and Lispro 10 u tid with meals     Orders:  -     POC Glycosylated Hemoglobin (Hb A1C)  -     sitaGLIPtin-metFORMIN (Janumet)  MG per tablet; Take 1 tablet by mouth 2 (Two) Times a Day With Meals.  Dispense: 180 tablet; Refill: 3  -     Alcohol Swabs (B-D SINGLE USE SWABS REGULAR) pads; Apply 1 each topically to the appropriate area as directed Daily.  Dispense: 100 each; Refill: 12  -     Insulin Glargine (BASAGLAR KWIKPEN) 100 UNIT/ML injection pen; Inject 40 Units under the skin into the appropriate area as directed Every Night.  Dispense: 4 Pen; Refill: 11    2. Hypertension, benign  Overview:  Followed by Cardiology Dr Tong who placed pt on different BP med. .    Proteinuria/creatinine noted    Orders:  -     amLODIPine (NORVASC) 10 MG tablet; Take 1 tablet by mouth Every Night.  Dispense: 90 tablet; Refill: 3  -     chlorthalidone (HYGROTEN) 50 MG tablet; Take 1 tablet by mouth Daily.  Dispense: 90 tablet; Refill: 3  -     losartan (COZAAR) 100 MG tablet; Take 1 tablet by mouth Daily.  Dispense: 90 tablet; Refill: 3  -     potassium chloride (MICRO-K) 10 MEQ CR capsule; Take 1 capsule by mouth Daily.  Dispense: 90 capsule; Refill: 3  -     CBC & Differential  -     Comprehensive Metabolic Panel    3. Class 2 severe obesity due to excess calories with serious comorbidity and body mass index (BMI) of 35.0 to 35.9 in adult    4. Mood disorder  -     FLUoxetine (PROzac) 40 MG capsule; Take 1 capsule by mouth Every Night.  Dispense: 90 capsule; Refill: 3    5. Other seasonal allergic  rhinitis  Overview:  Over-the-counter medication indications, dosage, and precautions discussed.    Orders:  -     fluticasone (FLONASE) 50 MCG/ACT nasal spray; 1 spray into the nostril(s) as directed by provider 2 (Two) Times a Day.  Dispense: 16 mL; Refill: 3  -     Azelastine HCl 137 MCG/SPRAY solution; 2 sprays into the nostril(s) as directed by provider 2 (Two) Times a Day.  Dispense: 60 mL; Refill: 2  -     loratadine (EQ All Day Allergy Relief) 10 MG tablet; Take 1 tablet by mouth Daily.  Dispense: 30 tablet; Refill: 0    6. Guillain-[place]  -     gabapentin (NEURONTIN) 300 MG capsule; Take 1 capsule by mouth 3 (Three) Times a Day.  Dispense: 270 capsule; Refill: 1    7. Non-seasonal allergic rhinitis due to other allergic trigger  -     montelukast (SINGULAIR) 10 MG tablet; Take 1 tablet by mouth Every Night.  Dispense: 90 tablet; Refill: 3    8. Urinary frequency  -     oxybutynin XL (DITROPAN XL) 15 MG 24 hr tablet; Take 1 tablet by mouth Daily.  Dispense: 90 tablet; Refill: 3    9. GERD (gastroesophageal reflux disease)  -     pantoprazole (Protonix) 40 MG EC tablet; Take 1 tablet by mouth Daily.  Dispense: 90 tablet; Refill: 3    10. Wheezing  -     albuterol sulfate  (90 Base) MCG/ACT inhaler; Inhale 2 puffs Every 4 (Four) Hours As Needed for Wheezing.  Dispense: 6.7 g; Refill: 0    11. Gout, unspecified cause, unspecified chronicity, unspecified site  Overview:  Ok to restart colchicine    Orders:  -     colchicine-probenecid (COL-BENEMID) 0.5-500 MG per tablet; Take 1 tablet by mouth Daily.  Dispense: 30 tablet; Refill: 3    12. Disorder of lipid metabolism  -     Comprehensive Metabolic Panel  -     Lipid Panel With / Chol / HDL Ratio  -     TSH     Findings discussed. All questions answered.  Medication and medication adverse effects discussed.  Drug education given and explained to patient. Patient verbalized understanding.  Follow-up for routine health maintenance as indicated. Follow up in  3 months for recheck, sooner for concerns.  Sugars out of range today   Class 2 Severe Obesity (BMI >=35 and <=39.9). Obesity-related health conditions include the following: hypertension and diabetes mellitus. Obesity is unchanged. BMI is is above average; BMI management plan is completed. We discussed portion control and increasing exercise.      Expected course, medications, and adverse effects discussed as appropriate.  Call or return if worsening or persistent symptoms.     This document is intended for medical professional use only.   Electronically signed by Eric Rosenberg MD on 07/05/2024. This content may not have been proofread.

## 2024-07-06 DIAGNOSIS — E11.49 TYPE 2 DIABETES MELLITUS WITH OTHER NEUROLOGIC COMPLICATION, WITH LONG-TERM CURRENT USE OF INSULIN: ICD-10-CM

## 2024-07-06 DIAGNOSIS — Z79.4 TYPE 2 DIABETES MELLITUS WITH OTHER NEUROLOGIC COMPLICATION, WITH LONG-TERM CURRENT USE OF INSULIN: ICD-10-CM

## 2024-07-08 RX ORDER — PEN NEEDLE, DIABETIC 32GX 5/32"
NEEDLE, DISPOSABLE MISCELLANEOUS
Qty: 200 EACH | Refills: 0 | Status: SHIPPED | OUTPATIENT
Start: 2024-07-08

## 2024-07-09 ENCOUNTER — PATIENT MESSAGE (OUTPATIENT)
Dept: FAMILY MEDICINE CLINIC | Facility: CLINIC | Age: 44
End: 2024-07-09
Payer: MEDICAID

## 2024-07-09 ENCOUNTER — TELEPHONE (OUTPATIENT)
Dept: FAMILY MEDICINE CLINIC | Facility: CLINIC | Age: 44
End: 2024-07-09
Payer: MEDICAID

## 2024-07-09 LAB
ALBUMIN SERPL-MCNC: 4.4 G/DL (ref 4.1–5.1)
ALP SERPL-CCNC: 73 IU/L (ref 44–121)
ALT SERPL-CCNC: 38 IU/L (ref 0–44)
AST SERPL-CCNC: 38 IU/L (ref 0–40)
BASOPHILS # BLD AUTO: 0.1 X10E3/UL (ref 0–0.2)
BASOPHILS NFR BLD AUTO: 1 %
BILIRUB SERPL-MCNC: 0.3 MG/DL (ref 0–1.2)
BUN SERPL-MCNC: 14 MG/DL (ref 6–24)
BUN/CREAT SERPL: 13 (ref 9–20)
CALCIUM SERPL-MCNC: 9.6 MG/DL (ref 8.7–10.2)
CHLORIDE SERPL-SCNC: 98 MMOL/L (ref 96–106)
CHOLEST SERPL-MCNC: 176 MG/DL (ref 100–199)
CHOLEST/HDLC SERPL: 5.7 RATIO (ref 0–5)
CO2 SERPL-SCNC: 25 MMOL/L (ref 20–29)
CREAT SERPL-MCNC: 1.06 MG/DL (ref 0.76–1.27)
EGFRCR SERPLBLD CKD-EPI 2021: 89 ML/MIN/1.73
EOSINOPHIL # BLD AUTO: 0.4 X10E3/UL (ref 0–0.4)
EOSINOPHIL NFR BLD AUTO: 3 %
ERYTHROCYTE [DISTWIDTH] IN BLOOD BY AUTOMATED COUNT: 14.5 % (ref 11.6–15.4)
GLOBULIN SER CALC-MCNC: 3 G/DL (ref 1.5–4.5)
GLUCOSE SERPL-MCNC: 167 MG/DL (ref 70–99)
HCT VFR BLD AUTO: 42.8 % (ref 37.5–51)
HDLC SERPL-MCNC: 31 MG/DL
HGB BLD-MCNC: 14.3 G/DL (ref 13–17.7)
IMM GRANULOCYTES # BLD AUTO: 0.1 X10E3/UL (ref 0–0.1)
IMM GRANULOCYTES NFR BLD AUTO: 1 %
LDLC SERPL CALC-MCNC: 86 MG/DL (ref 0–99)
LYMPHOCYTES # BLD AUTO: 4.3 X10E3/UL (ref 0.7–3.1)
LYMPHOCYTES NFR BLD AUTO: 32 %
MCH RBC QN AUTO: 27.8 PG (ref 26.6–33)
MCHC RBC AUTO-ENTMCNC: 33.4 G/DL (ref 31.5–35.7)
MCV RBC AUTO: 83 FL (ref 79–97)
MONOCYTES # BLD AUTO: 1.2 X10E3/UL (ref 0.1–0.9)
MONOCYTES NFR BLD AUTO: 9 %
NEUTROPHILS # BLD AUTO: 7.3 X10E3/UL (ref 1.4–7)
NEUTROPHILS NFR BLD AUTO: 54 %
PLATELET # BLD AUTO: 300 X10E3/UL (ref 150–450)
POTASSIUM SERPL-SCNC: 4.2 MMOL/L (ref 3.5–5.2)
PROT SERPL-MCNC: 7.4 G/DL (ref 6–8.5)
RBC # BLD AUTO: 5.15 X10E6/UL (ref 4.14–5.8)
SODIUM SERPL-SCNC: 137 MMOL/L (ref 134–144)
TRIGL SERPL-MCNC: 362 MG/DL (ref 0–149)
TSH SERPL DL<=0.005 MIU/L-ACNC: 2.58 UIU/ML (ref 0.45–4.5)
VLDLC SERPL CALC-MCNC: 59 MG/DL (ref 5–40)
WBC # BLD AUTO: 13.4 X10E3/UL (ref 3.4–10.8)

## 2024-07-09 NOTE — TELEPHONE ENCOUNTER
Received fax from [facility] in [place] requesting PA on Basaglar KwikPen 100U/ml. PA submitted online thru Covermymeds. Med approved. PA Case: [id number], Status: Approved, Coverage Starts on: 7/9/2024 12:00:00 AM, Coverage Ends on: 7/9/2025 12:00:00 AM. Pharmacy Notified.

## 2024-07-11 DIAGNOSIS — Z79.4 TYPE 2 DIABETES MELLITUS WITH OTHER NEUROLOGIC COMPLICATION, WITH LONG-TERM CURRENT USE OF INSULIN: ICD-10-CM

## 2024-07-11 DIAGNOSIS — E11.49 TYPE 2 DIABETES MELLITUS WITH OTHER NEUROLOGIC COMPLICATION, WITH LONG-TERM CURRENT USE OF INSULIN: ICD-10-CM

## 2024-07-11 RX ORDER — PROCHLORPERAZINE 25 MG/1
SUPPOSITORY RECTAL
Qty: 3 EACH | Refills: 0 | Status: SHIPPED | OUTPATIENT
Start: 2024-07-11

## 2024-07-12 ENCOUNTER — OFFICE VISIT (OUTPATIENT)
Dept: PAIN MEDICINE | Facility: CLINIC | Age: 44
End: 2024-07-12
Payer: MEDICAID

## 2024-07-12 VITALS
SYSTOLIC BLOOD PRESSURE: 148 MMHG | BODY MASS INDEX: 35.87 KG/M2 | DIASTOLIC BLOOD PRESSURE: 97 MMHG | RESPIRATION RATE: 16 BRPM | OXYGEN SATURATION: 97 % | WEIGHT: 287 LBS | HEART RATE: 87 BPM

## 2024-07-12 DIAGNOSIS — G89.29 CHRONIC MIDLINE LOW BACK PAIN WITH BILATERAL SCIATICA: Primary | ICD-10-CM

## 2024-07-12 DIAGNOSIS — M54.16 LUMBAR RADICULOPATHY: ICD-10-CM

## 2024-07-12 DIAGNOSIS — Z79.899 HIGH RISK MEDICATION USE: Primary | ICD-10-CM

## 2024-07-12 DIAGNOSIS — M48.07 LUMBOSACRAL SPINAL STENOSIS: ICD-10-CM

## 2024-07-12 DIAGNOSIS — M54.42 CHRONIC MIDLINE LOW BACK PAIN WITH BILATERAL SCIATICA: Primary | ICD-10-CM

## 2024-07-12 DIAGNOSIS — M51.36 DDD (DEGENERATIVE DISC DISEASE), LUMBAR: ICD-10-CM

## 2024-07-12 DIAGNOSIS — G61.0 GUILLAIN-BARRE: ICD-10-CM

## 2024-07-12 DIAGNOSIS — M54.41 CHRONIC MIDLINE LOW BACK PAIN WITH BILATERAL SCIATICA: Primary | ICD-10-CM

## 2024-07-12 RX ORDER — HYDROCODONE BITARTRATE AND ACETAMINOPHEN 10; 325 MG/1; MG/1
1 TABLET ORAL EVERY 6 HOURS PRN
Qty: 120 TABLET | Refills: 0 | Status: SHIPPED | OUTPATIENT
Start: 2024-07-12

## 2024-07-12 RX ORDER — GABAPENTIN 300 MG/1
300 CAPSULE ORAL 3 TIMES DAILY
Qty: 270 CAPSULE | Refills: 1 | Status: SHIPPED | OUTPATIENT
Start: 2024-07-12
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
## 2024-07-26 ENCOUNTER — HOSPITAL ENCOUNTER (OUTPATIENT)
Dept: MRI IMAGING | Facility: HOSPITAL | Age: 44
Discharge: HOME | End: 2024-07-26
Payer: MEDICAID

## 2024-07-26 ENCOUNTER — HOSPITAL ENCOUNTER (OUTPATIENT)
Dept: GENERAL RADIOLOGY | Facility: HOSPITAL | Age: 44
Discharge: HOME | End: 2024-07-26
Payer: MEDICAID

## 2024-07-26 DIAGNOSIS — M51.36 DDD (DEGENERATIVE DISC DISEASE), LUMBAR: ICD-10-CM

## 2024-07-26 PROCEDURE — A9579 GAD-BASE MR CONTRAST NOS,1ML: HCPCS | Performed by: NURSE PRACTITIONER

## 2024-07-26 PROCEDURE — 72158 MRI LUMBAR SPINE W/O & W/DYE: CPT

## 2024-07-26 PROCEDURE — 72114 X-RAY EXAM L-S SPINE BENDING: CPT

## 2024-07-26 PROCEDURE — 25010000002 GADOTERIDOL PER 1 ML: Performed by: NURSE PRACTITIONER

## 2024-07-26 RX ADMIN — GADOTERIDOL 20 ML: 279.3 INJECTION, SOLUTION INTRAVENOUS at 18:11

## 2024-08-01 NOTE — PROGRESS NOTES
"Subjective   History of Present Illness: Barrett Laguerre is a 44 y.o. male {Specialty - why patient here?:[id number]}    History of Present Illness    {Common H&P Review Areas:10612}    Review of Systems    Objective     .There were no vitals taken for this visit.   There is no height or weight on file to calculate BMI.    There were no vitals filed for this visit.       Physical Exam  Neurologic Exam      Assessment & Plan   Independent Review of Radiographic Studies:      I personally reviewed the images from the following studies.    ***    Medical Decision Making:      Barrett Wens a 44 y.o. male           There are no diagnoses linked to this encounter.  No follow-ups on file.    This patient was examined wearing appropriate personal protective equipment.     Barrett Laguerre  reports that he has never smoked. He has never been exposed to tobacco smoke. He has never used smokeless tobacco. I have educated him on the risk of diseases from using tobacco products such as {Tobacco Cessation Diseases:40628::\"cancer\",\"COPD\",\"heart disease\"}.     I advised him to quit and he is {Willing/Not Willing to Quit Tobacco Products:34130}.    I spent {Time Spent Tobacco :58745} minutes counseling the patient.         There is no height or weight on file to calculate BMI.      Patient's blood pressure was reviewed.  Recommendations for  a low-salt diet and exercise to maintain/improve BP in addition to taking any presribed medications.    Advance Care Planning   {Advance Directive Status:78174}         Blanka Fox MA  08/01/24  13:50 EDT  "

## 2024-08-01 NOTE — PROGRESS NOTES
"Subjective   History of Present Illness: Barrett Laguerre is a 44 y.o. male is here today for follow-up on Lumbar Degenerative disc disease with a new Lumbar MRI and XR. Today patient reports low back and buttock pain with numbness and tingling.  Patient is status post left L4-S1 microdiscectomy performed about a year and a half ago with recurrent symptoms that began about 9 months ago.  He describes pain radiating down his left buttock and lower extremity.  Symptoms have become quite severe and are affecting his daily life.  He has undergone an epidural injection without improvement as well as physical therapy.    Chief Complaint   Patient presents with    Back Pain     Buttock pain          Previous treatment: Norco, Gabapentin, Robaxin,     Previous neurosurgery:  2/22/2023- LUMBAR 4-SACRAL 1 LAMINOTOMY, MEDIAL FACETECTOMY, FORAMINOTOMY, AND MICRODISCECTOMY      Previous injections:     The following portions of the patient's history were reviewed and updated as appropriate: allergies, current medications, past family history, past medical history, past social history, past surgical history, and problem list.    Review of Systems   Constitutional:  Positive for activity change.   Respiratory:  Negative for chest tightness and shortness of breath.    Cardiovascular:  Negative for chest pain.   Musculoskeletal:  Positive for back pain and myalgias.        + buttock pain, posterior thigh and calf pain   Neurological:  Positive for numbness.        + tingling       Objective      /99   Pulse 92   Resp 18   Ht 190.5 cm (75\")   Wt 130 kg (286 lb)   SpO2 97%   BMI 35.75 kg/m²    Body mass index is 35.75 kg/m².  There were no vitals filed for this visit.        Neurologic Exam     Mental Status   Oriented to person, place, and time.     Motor Exam     Strength   Strength 5/5 throughout.     Sensory Exam   Light touch normal.     Gait, Coordination, and Reflexes     Reflexes   Right [place]: absent  Left " [place]: absent      Assessment & Plan   Independent Review of Radiographic Studies:      I personally reviewed and interpreted the images from the following studies.    MRI lumbar spine: Moderate to severe disc degeneration from L3-S1.  There is right shallow paracentral disc herniation at L3-4 resulting in moderate central stenosis and lateral recess stenosis on the right.  At L4-5 there is moderate central and bilateral lateral recess stenosis as well as postoperative changes.  At L5-S1 there is a large disc reherniation paracentral to the left causing severe lateral recess stenosis and compression of the traversing nerve root.    Medical Decision Making:      Barrett Laguerre is a 44 y.o. male status post left L4-S1 microdiscectomy with large recurrent disc herniation on the left at L5-S1.  Patient has failed conservative measures would benefit from surgical intervention in the form of revision L5-S1 left-sided microdisc.  He has pretty significant degeneration and also some stenosis from L3-4 which would not improve with surgery.  He understands that if he were not to get significant relief with this microdiscectomy he could require more extensive surgery including L3-S1 or pelvic fusion.  Patient also had an elevated A1c above 9 about a month ago.  He has been working to get his glucose under control.  We will repeat the A1c prior to surgery and if we see good trend down then we could proceed.      Diagnoses and all orders for this visit:    1. Lumbar disc herniation (Primary)    2. Lumbar radiculopathy    3. Lumbar degenerative disc disease      No follow-ups on file.    This patient was examined wearing appropriate personal protective equipment.                      Dr. Kuldip Osorio IV    08/02/24  14:49 EDT

## 2024-08-02 ENCOUNTER — OFFICE VISIT (OUTPATIENT)
Dept: NEUROSURGERY | Facility: CLINIC | Age: 44
End: 2024-08-02
Payer: MEDICAID

## 2024-08-02 ENCOUNTER — PREP FOR SURGERY (OUTPATIENT)
Dept: OTHER | Facility: HOSPITAL | Age: 44
End: 2024-08-02
Payer: MEDICAID

## 2024-08-02 VITALS
HEART RATE: 92 BPM | SYSTOLIC BLOOD PRESSURE: 136 MMHG | DIASTOLIC BLOOD PRESSURE: 99 MMHG | BODY MASS INDEX: 35.56 KG/M2 | OXYGEN SATURATION: 97 % | WEIGHT: 286 LBS | RESPIRATION RATE: 18 BRPM | HEIGHT: 75 IN

## 2024-08-02 DIAGNOSIS — M54.16 LUMBAR RADICULOPATHY: ICD-10-CM

## 2024-08-02 DIAGNOSIS — M51.26 LUMBAR DISC HERNIATION: Primary | ICD-10-CM

## 2024-08-02 DIAGNOSIS — M51.36 LUMBAR DEGENERATIVE DISC DISEASE: ICD-10-CM

## 2024-08-05 ENCOUNTER — OFFICE VISIT (OUTPATIENT)
Dept: FAMILY MEDICINE CLINIC | Facility: CLINIC | Age: 44
End: 2024-08-05
Payer: MEDICAID

## 2024-08-05 ENCOUNTER — TELEPHONE (OUTPATIENT)
Dept: PAIN MEDICINE | Facility: CLINIC | Age: 44
End: 2024-08-05
Payer: MEDICAID

## 2024-08-05 ENCOUNTER — TELEPHONE (OUTPATIENT)
Dept: NEUROSURGERY | Facility: CLINIC | Age: 44
End: 2024-08-05
Payer: MEDICAID

## 2024-08-05 VITALS
SYSTOLIC BLOOD PRESSURE: 146 MMHG | OXYGEN SATURATION: 98 % | WEIGHT: 286 LBS | HEART RATE: 105 BPM | DIASTOLIC BLOOD PRESSURE: 98 MMHG | BODY MASS INDEX: 35.56 KG/M2 | RESPIRATION RATE: 20 BRPM | HEIGHT: 75 IN

## 2024-08-05 DIAGNOSIS — B88.9 INFESTATION, MITES: ICD-10-CM

## 2024-08-05 DIAGNOSIS — K02.9 CARIES: ICD-10-CM

## 2024-08-05 DIAGNOSIS — B88.9 INFESTATION, MITES: Primary | ICD-10-CM

## 2024-08-05 PROCEDURE — 99214 OFFICE O/P EST MOD 30 MIN: CPT | Performed by: FAMILY MEDICINE

## 2024-08-05 PROCEDURE — 1125F AMNT PAIN NOTED PAIN PRSNT: CPT | Performed by: FAMILY MEDICINE

## 2024-08-05 PROCEDURE — 3077F SYST BP >= 140 MM HG: CPT | Performed by: FAMILY MEDICINE

## 2024-08-05 PROCEDURE — 3046F HEMOGLOBIN A1C LEVEL >9.0%: CPT | Performed by: FAMILY MEDICINE

## 2024-08-05 PROCEDURE — 3080F DIAST BP >= 90 MM HG: CPT | Performed by: FAMILY MEDICINE

## 2024-08-05 RX ORDER — SODIUM FLUORIDE 5 MG/ML
1 PASTE, DENTIFRICE DENTAL 2 TIMES DAILY
Qty: 51 G | Refills: 5 | Status: SHIPPED | OUTPATIENT
Start: 2024-08-05 | End: 2024-08-06 | Stop reason: SDUPTHER

## 2024-08-05 RX ORDER — PERMETHRIN 50 MG/G
1 CREAM TOPICAL ONCE
Qty: 1 G | Refills: 0 | Status: SHIPPED | OUTPATIENT
Start: 2024-08-05 | End: 2024-08-05

## 2024-08-05 NOTE — PROGRESS NOTES
Subjective   Barrett Laguerre is a 44 y.o. male.   Chief Complaint   Patient presents with    Diabetes       History of Present Illness  Pt has lice/mites and would like cream/lotion for such. Located primarily in pubic area    Has labs ordered for Dr Osorio pending back surgery    Would like rx for dental paste that would be covered by insurance       The following portions of the patient's history were reviewed and updated as appropriate: allergies, current medications, past family history, past medical history, past social history, past surgical history, and problem list.    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-[place]    Sleep apnea    Attention deficit disorder    Diabetes    Other seasonal allergic rhinitis    Mixed obsessional thoughts and acts    Hypertension, benign    Disorder of lipid metabolism    CIDP (chronic inflammatory demyelinating polyneuropathy)    Lumbar radiculopathy    Onychomycosis    Annual physical exam    Class 1 obesity due to excess calories with serious comorbidity and body mass index (BMI) of 34.0 to 34.9 in adult    Acute midline low back pain with left-sided sciatica    DDD (degenerative disc disease), lumbar    Hx of excision of lamina of lumbar vertebra for decompression of spinal cord       Current Outpatient Medications on File Prior to Visit   Medication Sig Dispense Refill    acetaminophen-codeine (TYLENOL/CODEINE #3) 300-30 MG per tablet Take 1 tablet by mouth 3 (Three) Times a Day As Needed for Mild Pain. 21 tablet 2    albuterol sulfate  (90 Base) MCG/ACT inhaler Inhale 2 puffs Every 4 (Four) Hours As Needed for Wheezing. 6.7 g 0    Alcohol Swabs (B-D SINGLE USE SWABS REGULAR) pads Apply 1 each topically to the appropriate area as directed Daily. 100 each 12     amLODIPine (NORVASC) 10 MG tablet Take 1 tablet by mouth Every Night. 90 tablet 3    Azelastine HCl 137 MCG/SPRAY solution 2 sprays into the nostril(s) as directed by provider 2 (Two) Times a Day. 60 mL 2    BD Pen Needle Kyra 2nd Gen 32G X 4 MM misc USE 1 PEN NEEDLE 4 TIMES DAILY  BEFORE MEAL(S) AND AT BEDTIME 200 each 0    chlorthalidone (HYGROTEN) 50 MG tablet Take 1 tablet by mouth Daily. 90 tablet 3    clindamycin (CLEOCIN T) 1 % external solution Apply 1 application  topically to the appropriate area as directed 2 (Two) Times a Day. Not currently using      colchicine-probenecid (COL-BENEMID) 0.5-500 MG per tablet Take 1 tablet by mouth Daily. 30 tablet 3    Continuous Blood Gluc  (Dexcom G6 ) device 1  ONCE DAILY 1 each 0    Continuous Glucose Sensor (Dexcom G6 Sensor) USE 1 SENSOR TO CHECK BLOOD SUGAR DAILY AS DIRECTED // CHANGE EVERY 10 DAYS 3 each 0    Continuous Glucose Transmitter (Dexcom G6 Transmitter) misc USE 1 EACH AS DIRECTED FOR 90 DAYS 1 each 0    Cyanocobalamin (Vitamin B-12 ER) 1000 MCG tablet controlled-release TAKE 1 TABLET DAILY 30 each 5    dexAMETHasone (DECADRON) 4 MG tablet Take 1 tablet by mouth 2 (Two) Times a Day With Meals. 16 tablet 0    Diclofenac Epolamine (FLECTOR) 1.3 % patch patch Apply 1 patch topically to the appropriate area as directed 2 (Two) Times a Day As Needed (back pain). 60 patch 11    Emtricitabine-Tenofovir AF (Descovy) 200-25 MG per tablet Take 1 tablet by mouth Daily.      FLUoxetine (PROzac) 40 MG capsule Take 1 capsule by mouth Every Night. 90 capsule 3    fluticasone (FLONASE) 50 MCG/ACT nasal spray 1 spray into the nostril(s) as directed by provider 2 (Two) Times a Day. 16 mL 3    gabapentin (NEURONTIN) 300 MG capsule Take 1 capsule by mouth 3 (Three) Times a Day. 270 capsule 1    glucose blood test strip USE 1 STRIP TO CHECK GLUCOSE 4 TIMES DAILY 100 each 12    glucose monitor monitoring kit Use 1 each Daily. Dx: E11.49 patient checks sugar 4  x day 1 each 0    guaifenesin (ROBITUSSIN) 100 MG/5ML liquid Take 10 mL by mouth Every 4 (Four) Hours As Needed for Cough. 180 mL 0    HumaLOG KwikPen 100 UNIT/ML solution pen-injector INJECT 15 UNITS SUBCUTANEOUSLY THREE TIMES DAILY 15  MINUTES  BEFORE  MEALS  OR  IMMEDIATELY  FOLLOWING  MEALS 15 mL 0    HYDROcodone-acetaminophen (Norco)  MG per tablet Take 1 tablet by mouth Every 6 (Six) Hours As Needed for Moderate Pain. 120 tablet 0    HYDROcodone-acetaminophen (Norco)  MG per tablet Take 1 tablet by mouth Every 6 (Six) Hours As Needed for Moderate Pain. 120 tablet 0    HYDROcodone-acetaminophen (Norco)  MG per tablet Take 1 tablet by mouth Every 6 (Six) Hours As Needed for Moderate Pain. 120 tablet 0    hydrocortisone 1 % cream Apply  topically to the appropriate area as directed 2 (Two) Times a Day. 60 g 3    hydrOXYzine (ATARAX) 25 MG tablet Take 1 tablet by mouth 4 (Four) Times a Day As Needed for Itching. 360 tablet 3    Insulin Glargine (BASAGLAR KWIKPEN) 100 UNIT/ML injection pen Inject 40 Units under the skin into the appropriate area as directed Every Night. 4 Pen 11    Insulin NPH, Human,, Isophane, (NovoLIN N FlexPen) 100 UNIT/ML injection Inject 38 Units under the skin into the appropriate area as directed Every Night for 360 days. 34.2 mL 3    Insulin Regular Human (NovoLIN R FlexPen ReliOn) 100 UNIT/ML injection Inject 20 Units under the skin into the appropriate area as directed 3 (Three) Times a Day Before Meals for 360 days. 54 mL 3    Lancets misc Use 1 each 4 (Four) Times a Day. Dx: E11.49 patient checks sugar 4 x day 200 each 12    loratadine (EQ All Day Allergy Relief) 10 MG tablet Take 1 tablet by mouth Daily. 30 tablet 0    losartan (COZAAR) 100 MG tablet Take 1 tablet by mouth Daily. 90 tablet 3    methocarbamol (ROBAXIN) 750 MG tablet Take 1 tablet by mouth 4 (Four) Times a Day As Needed for Muscle Spasms. 360 tablet 3    montelukast (SINGULAIR) 10 MG tablet Take 1  "tablet by mouth Every Night. 90 tablet 3    oxybutynin XL (DITROPAN XL) 15 MG 24 hr tablet Take 1 tablet by mouth Daily. 90 tablet 3    pantoprazole (Protonix) 40 MG EC tablet Take 1 tablet by mouth Daily. 90 tablet 3    polycarbophil 625 MG tablet tablet Take  by mouth Every Night.      potassium chloride (MICRO-K) 10 MEQ CR capsule Take 1 capsule by mouth Daily. 90 capsule 3    promethazine (PHENERGAN) 25 MG tablet Take 1 tablet by mouth Every 6 (Six) Hours As Needed for Nausea or Vomiting. 90 tablet 0    sildenafil (REVATIO) 20 MG tablet Take 1 tablet by mouth Daily As Needed (ed). 30 tablet 3    sitaGLIPtin-metFORMIN (Janumet)  MG per tablet Take 1 tablet by mouth 2 (Two) Times a Day With Meals. 180 tablet 3    sodium chloride ([place]) 0.65 % nasal spray USE 1 DOSE IN EACH NOSTRIL THREE TIMES DAILY 50 mL 0     No current facility-administered medications on file prior to visit.     Current outpatient and discharge medications have been reconciled for the patient.  Reviewed by: Eric Rosenberg MD      Allergies   Allergen Reactions    Penicillin G Anaphylaxis    Penicillins Anaphylaxis    Sulfa Antibiotics Hives    Allopurinol Rash       Review of Systems   Constitutional:  Negative for fatigue.   Eyes:  Negative for blurred vision.   Cardiovascular:  Negative for chest pain.   Endocrine: Negative for polydipsia and polyuria.   Skin:  Positive for rash.   Neurological:  Negative for dizziness, weakness and confusion.     I have reviewed and confirmed the accuracy of the ROS as documented by the MA/LPN/RN Eric Rosenberg MD    Objective   Visit Vitals  /98 (BP Location: Right arm, Patient Position: Sitting, Cuff Size: Large Adult)   Pulse 105   Resp 20   Ht 190.5 cm (75\")   Wt 130 kg (286 lb)   SpO2 98%   BMI 35.75 kg/m²      **  Physical Exam  Vitals reviewed.   Constitutional:       Appearance: He is well-developed.   HENT:      Head: Normocephalic.      Right Ear: External ear normal.    "   Left Ear: External ear normal.      Nose: Nose normal.   Eyes:      Conjunctiva/sclera: Conjunctivae normal.   Cardiovascular:      Rate and Rhythm: Normal rate.   Pulmonary:      Effort: Pulmonary effort is normal. No respiratory distress.   Abdominal:      General: Abdomen is flat.   Musculoskeletal:         General: No signs of injury.      Cervical back: Normal range of motion.   Skin:     Findings: No rash.      Comments: Pt brings in arthropod in bag    Neurological:      Mental Status: He is alert and oriented to person, place, and time.   Psychiatric:         Behavior: Behavior normal.         Thought Content: Thought content normal.         Judgment: Judgment normal.       Derm Physical Exam  Ortho Exam   Neurologic Exam     Mental Status   Oriented to person, place, and time.          Diagnoses and all orders for this visit:    1. Infestation, mites (Primary)  -     permethrin (ELIMITE) 5 % cream; Apply 1 Application topically to the appropriate area as directed 1 (One) Time for 1 dose.  Dispense: 1 g; Refill: 0  -     permethrin (NIX) 1 % liquid; Apply  topically to the appropriate area as directed 1 (One) Time for 1 dose.  Dispense: 120 mL; Refill: 0    2. Caries  -     Sodium Fluoride (Denta 5000 Plus) 1.1 % cream; Apply 1 Application to teeth 2 (Two) Times a Day.  Dispense: 51 g; Refill: 5     Findings discussed. All questions answered.  Medication and medication adverse effects discussed.  Drug education given and explained to patient. Patient verbalized understanding.  Follow-up for routine health maintenance as indicated.  Follow-up in 2 weeks if not better.  Follow-up sooner for worsening symptoms or for any concerns.            Expected course, medications, and adverse effects discussed as appropriate.  Call or return if worsening or persistent symptoms.     This document is intended for medical professional use only.   Electronically signed by Eric Rosenberg MD on 08/05/2024. This  content may not have been proofread.

## 2024-08-05 NOTE — TELEPHONE ENCOUNTER
Wants to know if you can increase Hydrocodone? He is going to have surgery on his back in around 5-6 weeks and hopes that will help the pain.

## 2024-08-05 NOTE — TELEPHONE ENCOUNTER
10mg QID is already pretty high. Any higher, and it may interfere with the anesthesia when he gets surgery.

## 2024-08-05 NOTE — TELEPHONE ENCOUNTER
Caller: [facility] [address] [place] [address] - [address] [place] [address] - [phone]  - [phone]    Relationship: Pharmacy    Best call back number: [phone]    What is the best time to reach you: ANY    Who are you requesting to speak with (clinical staff, provider,  specific staff member): CLINICAL    Do you know the name of the person who called: RUTH    What was the call regarding:     permethrin (NIX) 1 % liquid   INSURANCE IS NOT LETTING THIS GO THUR    Sodium Fluoride (Denta 5000 Plus) 1.1 % cream   PATIENT WANTS 2 TUBES AND SCRIPT IS WRITTEN FOR ONE  IF PATIENT GETS 2 TUBES THE SCRIPT WILL NEED TO CLARIFY DOSAGE

## 2024-08-06 ENCOUNTER — TELEPHONE (OUTPATIENT)
Dept: FAMILY MEDICINE CLINIC | Facility: CLINIC | Age: 44
End: 2024-08-06
Payer: MEDICAID

## 2024-08-06 RX ORDER — PERMETHRIN 0.25 %
SPRAY, NON-AEROSOL (ML) MISCELLANEOUS ONCE
Qty: 120 ML | Refills: 0 | Status: SHIPPED | OUTPATIENT
Start: 2024-08-06 | End: 2024-08-06

## 2024-08-06 RX ORDER — SODIUM FLUORIDE 5 MG/ML
1 PASTE, DENTIFRICE DENTAL 2 TIMES DAILY
Qty: 102 G | Refills: 5 | Status: SHIPPED | OUTPATIENT
Start: 2024-08-06

## 2024-08-06 NOTE — TELEPHONE ENCOUNTER
Received fax from [facility] requesting PA on Nix Cream Rinse Liquid 1%. PA submitted online thru Covermymeds. Received response back: Medication not covered by plan. Notified pharmacy he will need to get it over the counter.

## 2024-08-07 ENCOUNTER — TELEPHONE (OUTPATIENT)
Dept: NEUROSURGERY | Facility: CLINIC | Age: 44
End: 2024-08-07
Payer: MEDICAID

## 2024-08-07 NOTE — TELEPHONE ENCOUNTER
PATIENT RETURNED CALL TO SCHEDULE.  INFORMED MURALI WAS GONE FOR THE DAY.      PATIENT STATES HE WILL CALL BACK AROUND NOON 8/8/2024    THANK YOU

## 2024-08-08 ENCOUNTER — TELEPHONE (OUTPATIENT)
Dept: NEUROSURGERY | Facility: CLINIC | Age: 44
End: 2024-08-08
Payer: MEDICAID

## 2024-08-08 NOTE — TELEPHONE ENCOUNTER
PATIENT CALLED BACK TO SPEAK TO MURALI.  HE STATES HE NEEDS TO BE OUT OF THE HOSPITAL AND FINISHED BY 4:00 ON THE 16TH, D/T A CONFLICT WITH A RIDE OR HIS SURGERY NEEDS TO BE ON A DIFFERENT DAY THAT WEEK.    PLEASE CALL PATIENT WITH ANY QUESTIONS. [phone] HE SAYS YOU CAN REACH HIM AT NOON.

## 2024-08-08 NOTE — TELEPHONE ENCOUNTER
Patient called about getting scheduled for surgery. I let him know Rosa is still in her meeting and she said she will call as soon as she's able to. He stated he understood.

## 2024-08-09 PROBLEM — M51.26 LUMBAR DISC HERNIATION: Status: ACTIVE | Noted: 2024-08-02

## 2024-08-09 NOTE — TELEPHONE ENCOUNTER
RETURNED CALL TO PATIENT LM LETTING HIM KNOW HE IS SCHEDULED AS FIRST CASE AND SHOULD BE DONE AND DISCHARGED BY 4 PM

## 2024-08-12 DIAGNOSIS — J30.89 NON-SEASONAL ALLERGIC RHINITIS DUE TO OTHER ALLERGIC TRIGGER: ICD-10-CM

## 2024-08-13 RX ORDER — MONTELUKAST SODIUM 10 MG/1
10 TABLET ORAL NIGHTLY
Qty: 30 TABLET | Refills: 0 | Status: SHIPPED | OUTPATIENT
Start: 2024-08-13

## 2024-08-14 DIAGNOSIS — E11.49 TYPE 2 DIABETES MELLITUS WITH OTHER NEUROLOGIC COMPLICATION, WITH LONG-TERM CURRENT USE OF INSULIN: Primary | ICD-10-CM

## 2024-08-14 DIAGNOSIS — Z79.4 TYPE 2 DIABETES MELLITUS WITH OTHER NEUROLOGIC COMPLICATION, WITH LONG-TERM CURRENT USE OF INSULIN: Primary | ICD-10-CM

## 2024-08-14 DIAGNOSIS — B88.9 INFESTATION, MITES: ICD-10-CM

## 2024-08-14 NOTE — TELEPHONE ENCOUNTER
Vahe called and needs a refill on his Elimite cream, he is almost out.  His insurance didn't cover the liquid so he has only been using the cream.  He also needs his Dexcom sensor and transmitter.  Please send to [facility] on [address] and contact him at .  Thanks Portia

## 2024-08-15 DIAGNOSIS — J30.2 OTHER SEASONAL ALLERGIC RHINITIS: ICD-10-CM

## 2024-08-15 RX ORDER — LORATADINE 10 MG/1
10 TABLET ORAL DAILY
Qty: 30 TABLET | Refills: 0 | Status: SHIPPED | OUTPATIENT
Start: 2024-08-15

## 2024-08-15 RX ORDER — PROCHLORPERAZINE 25 MG/1
1 SUPPOSITORY RECTAL
Qty: 1 EACH | Refills: 3 | Status: SHIPPED | OUTPATIENT
Start: 2024-08-15

## 2024-08-15 RX ORDER — PROCHLORPERAZINE 25 MG/1
SUPPOSITORY RECTAL
Qty: 3 EACH | Refills: 12 | Status: SHIPPED | OUTPATIENT
Start: 2024-08-15

## 2024-08-15 RX ORDER — PERMETHRIN 50 MG/G
1 CREAM TOPICAL ONCE
Qty: 1 G | Refills: 0 | Status: SHIPPED | OUTPATIENT
Start: 2024-08-15 | End: 2024-08-15

## 2024-08-16 DIAGNOSIS — M10.9 GOUT, UNSPECIFIED CAUSE, UNSPECIFIED CHRONICITY, UNSPECIFIED SITE: ICD-10-CM

## 2024-08-16 RX ORDER — PROBENECID AND COLCHICINE .5; 5 MG/1; MG/1
1 TABLET ORAL DAILY
Qty: 30 TABLET | Refills: 3 | Status: SHIPPED | OUTPATIENT
Start: 2024-08-16

## 2024-08-27 ENCOUNTER — OFFICE VISIT (OUTPATIENT)
Dept: PODIATRY | Facility: CLINIC | Age: 44
End: 2024-08-27
Payer: MEDICAID

## 2024-08-27 VITALS
HEIGHT: 75 IN | HEART RATE: 93 BPM | RESPIRATION RATE: 20 BRPM | BODY MASS INDEX: 35.56 KG/M2 | OXYGEN SATURATION: 98 % | WEIGHT: 286 LBS

## 2024-08-27 DIAGNOSIS — R26.81 UNSTEADINESS ON FEET: ICD-10-CM

## 2024-08-27 DIAGNOSIS — M79.674 PAIN IN TOES OF BOTH FEET: ICD-10-CM

## 2024-08-27 DIAGNOSIS — T25.229S: ICD-10-CM

## 2024-08-27 DIAGNOSIS — M79.675 PAIN IN TOES OF BOTH FEET: ICD-10-CM

## 2024-08-27 DIAGNOSIS — L60.3 ONYCHODYSTROPHY: Primary | ICD-10-CM

## 2024-08-27 DIAGNOSIS — Z79.4 TYPE 2 DIABETES MELLITUS WITH OTHER NEUROLOGIC COMPLICATION, WITH LONG-TERM CURRENT USE OF INSULIN: ICD-10-CM

## 2024-08-27 DIAGNOSIS — E11.42 DM TYPE 2 WITH DIABETIC PERIPHERAL NEUROPATHY: ICD-10-CM

## 2024-08-27 DIAGNOSIS — E11.49 TYPE 2 DIABETES MELLITUS WITH OTHER NEUROLOGIC COMPLICATION, WITH LONG-TERM CURRENT USE OF INSULIN: ICD-10-CM

## 2024-08-27 DIAGNOSIS — E11.65 TYPE 2 DIABETES MELLITUS WITH HYPERGLYCEMIA, WITHOUT LONG-TERM CURRENT USE OF INSULIN: ICD-10-CM

## 2024-08-27 PROCEDURE — 1159F MED LIST DOCD IN RCRD: CPT

## 2024-08-27 PROCEDURE — 11721 DEBRIDE NAIL 6 OR MORE: CPT

## 2024-08-27 PROCEDURE — 99213 OFFICE O/P EST LOW 20 MIN: CPT

## 2024-08-27 PROCEDURE — 1160F RVW MEDS BY RX/DR IN RCRD: CPT

## 2024-08-27 RX ORDER — PERMETHRIN 50 MG/G
CREAM TOPICAL
COMMUNITY
Start: 2024-08-15

## 2024-08-27 NOTE — PROGRESS NOTES
08/27/2024  Foot and Ankle Surgery - Established Patient/Follow-up  Provider: ANASTASIA Chan   Location: [facility]    Subjective:  Barrett Laguerre is a 44 y.o. male.     Chief Complaint   Patient presents with    Right Foot - Follow-up, Diabetes     Dm foot care, nails    Left Foot - Follow-up, Diabetes     Dm foot care, nails    Follow-up     CYRUS Rosenberg last pcp visit 08/05/2024       HPI: Patient returns today for reevaluation of bilateral feet.  He reports blood sugars are doing better and he is attempting to get his A1c better controlled for anticipating another back surgery.  Patient reports that last week he went to [employer] and sustained blisters to his feet he reports that he cleaned to the best blisters and agrees that they have improved.  Patient is requesting nail debridement today.    Allergies   Allergen Reactions    Penicillin G Anaphylaxis    Penicillins Anaphylaxis    Sulfa Antibiotics Hives    Allopurinol Rash       Current Outpatient Medications on File Prior to Visit   Medication Sig Dispense Refill    acetaminophen-codeine (TYLENOL/CODEINE #3) 300-30 MG per tablet Take 1 tablet by mouth 3 (Three) Times a Day As Needed for Mild Pain. 21 tablet 2    albuterol sulfate  (90 Base) MCG/ACT inhaler Inhale 2 puffs Every 4 (Four) Hours As Needed for Wheezing. 6.7 g 0    Alcohol Swabs (B-D SINGLE USE SWABS REGULAR) pads Apply 1 each topically to the appropriate area as directed Daily. 100 each 12    amLODIPine (NORVASC) 10 MG tablet Take 1 tablet by mouth Every Night. 90 tablet 3    Azelastine HCl 137 MCG/SPRAY solution 2 sprays into the nostril(s) as directed by provider 2 (Two) Times a Day. 60 mL 2    BD Pen Needle Kyra 2nd Gen 32G X 4 MM misc USE 1 PEN NEEDLE 4 TIMES DAILY  BEFORE MEAL(S) AND AT BEDTIME 200 each 0    chlorthalidone (HYGROTEN) 50 MG tablet Take 1 tablet by mouth Daily. 90 tablet 3    clindamycin (CLEOCIN T) 1 % external solution Apply 1 application   topically to the appropriate area as directed 2 (Two) Times a Day. Not currently using      colchicine-probenecid (COL-BENEMID) 0.5-500 MG per tablet Take 1 tablet by mouth Daily. 30 tablet 3    Continuous Blood Gluc  (Dexcom G6 ) device 1  ONCE DAILY 1 each 0    Continuous Glucose Sensor (Dexcom G6 Sensor) Use Every 10 (Ten) Days. 3 each 12    Continuous Glucose Transmitter (Dexcom G6 Transmitter) misc Use 1 each Every 3 (Three) Months. 1 each 3    Cyanocobalamin (Vitamin B-12 ER) 1000 MCG tablet controlled-release TAKE 1 TABLET DAILY 30 each 5    dexAMETHasone (DECADRON) 4 MG tablet Take 1 tablet by mouth 2 (Two) Times a Day With Meals. 16 tablet 0    Diclofenac Epolamine (FLECTOR) 1.3 % patch patch Apply 1 patch topically to the appropriate area as directed 2 (Two) Times a Day As Needed (back pain). 60 patch 11    Emtricitabine-Tenofovir AF (Descovy) 200-25 MG per tablet Take 1 tablet by mouth Daily.      EQ All Day Allergy Relief 10 MG tablet Take 1 tablet by mouth once daily 30 tablet 0    FLUoxetine (PROzac) 40 MG capsule Take 1 capsule by mouth Every Night. 90 capsule 3    fluticasone (FLONASE) 50 MCG/ACT nasal spray 1 spray into the nostril(s) as directed by provider 2 (Two) Times a Day. 16 mL 3    gabapentin (NEURONTIN) 300 MG capsule Take 1 capsule by mouth 3 (Three) Times a Day. 270 capsule 1    glucose blood test strip USE 1 STRIP TO CHECK GLUCOSE 4 TIMES DAILY 100 each 12    glucose monitor monitoring kit Use 1 each Daily. Dx: E11.49 patient checks sugar 4 x day 1 each 0    guaifenesin (ROBITUSSIN) 100 MG/5ML liquid Take 10 mL by mouth Every 4 (Four) Hours As Needed for Cough. 180 mL 0    HumaLOG KwikPen 100 UNIT/ML solution pen-injector INJECT 15 UNITS SUBCUTANEOUSLY THREE TIMES DAILY 15  MINUTES  BEFORE  MEALS  OR  IMMEDIATELY  FOLLOWING  MEALS 15 mL 0    HYDROcodone-acetaminophen (Norco)  MG per tablet Take 1 tablet by mouth Every 6 (Six) Hours As Needed for Moderate Pain. 120  tablet 0    HYDROcodone-acetaminophen (Norco)  MG per tablet Take 1 tablet by mouth Every 6 (Six) Hours As Needed for Moderate Pain. 120 tablet 0    HYDROcodone-acetaminophen (Norco)  MG per tablet Take 1 tablet by mouth Every 6 (Six) Hours As Needed for Moderate Pain. 120 tablet 0    hydrocortisone 1 % cream Apply  topically to the appropriate area as directed 2 (Two) Times a Day. 60 g 3    hydrOXYzine (ATARAX) 25 MG tablet Take 1 tablet by mouth 4 (Four) Times a Day As Needed for Itching. 360 tablet 3    Insulin Glargine (BASAGLAR KWIKPEN) 100 UNIT/ML injection pen Inject 40 Units under the skin into the appropriate area as directed Every Night. 4 Pen 11    Lancets misc Use 1 each 4 (Four) Times a Day. Dx: E11.49 patient checks sugar 4 x day 200 each 12    losartan (COZAAR) 100 MG tablet Take 1 tablet by mouth Daily. 90 tablet 3    methocarbamol (ROBAXIN) 750 MG tablet Take 1 tablet by mouth 4 (Four) Times a Day As Needed for Muscle Spasms. 360 tablet 3    montelukast (SINGULAIR) 10 MG tablet TAKE 1 TABLET BY MOUTH ONCE DAILY AT NIGHT 30 tablet 0    oxybutynin XL (DITROPAN XL) 15 MG 24 hr tablet Take 1 tablet by mouth Daily. 90 tablet 3    pantoprazole (Protonix) 40 MG EC tablet Take 1 tablet by mouth Daily. 90 tablet 3    permethrin (ELIMITE) 5 % cream       polycarbophil 625 MG tablet tablet Take  by mouth Every Night.      potassium chloride (MICRO-K) 10 MEQ CR capsule Take 1 capsule by mouth Daily. 90 capsule 3    promethazine (PHENERGAN) 25 MG tablet Take 1 tablet by mouth Every 6 (Six) Hours As Needed for Nausea or Vomiting. 90 tablet 0    sildenafil (REVATIO) 20 MG tablet Take 1 tablet by mouth Daily As Needed (ed). 30 tablet 3    sitaGLIPtin-metFORMIN (Janumet)  MG per tablet Take 1 tablet by mouth 2 (Two) Times a Day With Meals. 180 tablet 3    sodium chloride (Ayr) 0.65 % nasal spray USE 1 DOSE IN EACH NOSTRIL THREE TIMES DAILY 50 mL 0    Sodium Fluoride (Denta 5000 Plus) 1.1 % cream  "Apply 1 Application to teeth 2 (Two) Times a Day. 102 g 5    Insulin NPH, Human,, Isophane, (NovoLIN N FlexPen) 100 UNIT/ML injection Inject 38 Units under the skin into the appropriate area as directed Every Night for 360 days. 34.2 mL 3    Insulin Regular Human (NovoLIN R FlexPen ReliOn) 100 UNIT/ML injection Inject 20 Units under the skin into the appropriate area as directed 3 (Three) Times a Day Before Meals for 360 days. 54 mL 3     No current facility-administered medications on file prior to visit.       Objective   Pulse 93   Resp 20   Ht 190.5 cm (75\")   Wt 130 kg (286 lb)   SpO2 98%   BMI 35.75 kg/m²     Foot/Ankle Exam    Right Foot Vascularity   Normal vascular exam    Dorsalis pedis:  2+  Posterior tibial:  2+  Skin temperature:  warm  Edema grading:  None  CFT:  < 3 seconds  Pedal hair growth:  Present  Varicosities:  none      Left Foot Vascularity   Normal vascular exam    Dorsalis pedis:  2+  Posterior tibial:  2+  Skin temperature:  warm  Edema grading:  None  CFT:  < 3 seconds  Pedal hair growth:  Present  Varicosities:  none     NEUROLOGIC      Right Foot Neurologic   Protective Sensation using Ong-Ernesto Monofilament:   Sites tested: 9      Left Foot Neurologic   Protective Sensation using Ong-Ernesto Monofilament:   Sites tested: 10     MUSCULOSKELETAL      Right Foot Musculoskeletal   Tenderness:  none    Hammertoe:  First toe      Left Foot Musculoskeletal   Tenderness:  none  Hammertoe:  First toe     DERMATOLOGIC       Right Foot Dermatologic   Skin  Right foot skin is intact.   Nails  1.  Positive for elongated, abnormal thickness and dystrophic nail. (Mild callus)  2.  Positive for elongated, abnormal thickness and dystrophic nail.  3.  Positive for elongated, abnormal thickness and dystrophic nail.  4.  Positive for elongated, abnormal thickness and dystrophic nail.  5.  Positive for elongated, abnormal thickness and dystrophic nail.      Left Foot Dermatologic "   Skin  Left foot skin is intact.   Nails  1.  Positive for elongated, abnormal thickness and dystrophic nail.  2.  Positive for elongated, abnormal thickness and dystrophic nail.  3.  Positive for elongated, abnormal thickness and dystrophic nail.  4.  Positive for elongated, abnormally thick and dystrophic nail.  5.  Positive for elongated, abnormally thick and dystrophic nail.    Assessment & Plan   Diagnoses and all orders for this visit:    1. Onychodystrophy (Primary)    2. DM type 2 with diabetic peripheral neuropathy    3. Unsteadiness on feet    4. Pain in toes of both feet    5. Type 2 diabetes mellitus with hyperglycemia, without long-term current use of insulin    6. Partial thickness burn of foot, unspecified laterality, sequela      On exam patient has reabsorbing healed blisters to left forefoot and right heel.  Right heel dry skin was easily trimmed and revealed completely healed.  Left foot blisters have reabsorbed and also appear healed no signs of infection noted to either foot.  Recommend patient proceed with moisturizing foot daily and avoid going barefoot especially on any hot pavement to prevent reoccurrence.  Patient verbalized understanding.    I do feel patient continues to be at mild to moderate risk for pedal complications related to diabetes he is encouraged to strive for appropriate glucose control and to continue to monitor his feet daily.    Explained importance of diabetic foot care, daily foot checks, and glycemic control. Patient should check both feet on a daily basis, monitor and control blood sugars, make sure that both feet and in between toes are towel dried after baths or showers. Avoid barefoot walking at all times.  I discussed wearing white socks and checking shoes before wearing them. Patient was given information on proper foot care. Call the office at the first signs of a wound or with signs of infection.     Nail debridement: Both feet x10    Consent and time out was  performed before proceeding with the procedure. Nails were debrided with a nail nipper without complication.  No anesthesia was required.  Indications for procedure were thickened, dystrophic, and fungal appearing nails which are difficult to trim.  Proper self-care and technique was discussed with patient.  Patient was stable after procedure.     No orders of the defined types were placed in this encounter.         Note is dictated utilizing voice recognition software. Unfortunately this leads to occasional typographical errors. I apologize in advance if the situation occurs. If questions occur please do not hesitate to call our office.

## 2024-08-28 RX ORDER — PEN NEEDLE, DIABETIC 32GX 5/32"
NEEDLE, DISPOSABLE MISCELLANEOUS
Qty: 200 EACH | Refills: 12 | Status: SHIPPED | OUTPATIENT
Start: 2024-08-28

## 2024-09-06 ENCOUNTER — HOSPITAL ENCOUNTER (OUTPATIENT)
Dept: CARDIOLOGY | Facility: HOSPITAL | Age: 44
Discharge: HOME | End: 2024-09-06
Payer: MEDICAID

## 2024-09-06 ENCOUNTER — LAB (OUTPATIENT)
Dept: LAB | Facility: HOSPITAL | Age: 44
End: 2024-09-06
Payer: MEDICAID

## 2024-09-06 DIAGNOSIS — M51.26 LUMBAR DISC HERNIATION: ICD-10-CM

## 2024-09-06 LAB
ABO GROUP BLD: NORMAL
ANION GAP SERPL CALCULATED.3IONS-SCNC: 12.6 MMOL/L (ref 5–15)
BASOPHILS # BLD AUTO: 0.05 10*3/MM3 (ref 0–0.2)
BASOPHILS NFR BLD AUTO: 0.5 % (ref 0–1.5)
BILIRUB UR QL STRIP: NEGATIVE
BLD GP AB SCN SERPL QL: NEGATIVE
BUN SERPL-MCNC: 11 MG/DL (ref 6–20)
BUN/CREAT SERPL: 11.7 (ref 7–25)
CALCIUM SPEC-SCNC: 10 MG/DL (ref 8.6–10.5)
CHLORIDE SERPL-SCNC: 100 MMOL/L (ref 98–107)
CLARITY UR: CLEAR
CO2 SERPL-SCNC: 26.4 MMOL/L (ref 22–29)
COLOR UR: YELLOW
CREAT SERPL-MCNC: 0.94 MG/DL (ref 0.76–1.27)
DEPRECATED RDW RBC AUTO: 41.8 FL (ref 37–54)
EGFRCR SERPLBLD CKD-EPI 2021: 103.2 ML/MIN/1.73
EOSINOPHIL # BLD AUTO: 0.29 10*3/MM3 (ref 0–0.4)
EOSINOPHIL NFR BLD AUTO: 2.9 % (ref 0.3–6.2)
ERYTHROCYTE [DISTWIDTH] IN BLOOD BY AUTOMATED COUNT: 13.6 % (ref 12.3–15.4)
GLUCOSE SERPL-MCNC: 149 MG/DL (ref 65–99)
GLUCOSE UR STRIP-MCNC: NEGATIVE MG/DL
HBA1C MFR BLD: 7.76 % (ref 4.8–5.6)
HCT VFR BLD AUTO: 43.9 % (ref 37.5–51)
HGB BLD-MCNC: 14.4 G/DL (ref 13–17.7)
HGB UR QL STRIP.AUTO: NEGATIVE
IMM GRANULOCYTES # BLD AUTO: 0.03 10*3/MM3 (ref 0–0.05)
IMM GRANULOCYTES NFR BLD AUTO: 0.3 % (ref 0–0.5)
INR PPP: 0.96 (ref 0.93–1.1)
KETONES UR QL STRIP: NEGATIVE
LEUKOCYTE ESTERASE UR QL STRIP.AUTO: NEGATIVE
LYMPHOCYTES # BLD AUTO: 2.74 10*3/MM3 (ref 0.7–3.1)
LYMPHOCYTES NFR BLD AUTO: 26.9 % (ref 19.6–45.3)
MCH RBC QN AUTO: 27.7 PG (ref 26.6–33)
MCHC RBC AUTO-ENTMCNC: 32.8 G/DL (ref 31.5–35.7)
MCV RBC AUTO: 84.4 FL (ref 79–97)
MONOCYTES # BLD AUTO: 0.83 10*3/MM3 (ref 0.1–0.9)
MONOCYTES NFR BLD AUTO: 8.2 % (ref 5–12)
MRSA DNA SPEC QL NAA+PROBE: NORMAL
NEUTROPHILS NFR BLD AUTO: 6.23 10*3/MM3 (ref 1.7–7)
NEUTROPHILS NFR BLD AUTO: 61.2 % (ref 42.7–76)
NITRITE UR QL STRIP: NEGATIVE
NRBC BLD AUTO-RTO: 0 /100 WBC (ref 0–0.2)
PH UR STRIP.AUTO: 6 [PH] (ref 5–8)
PLATELET # BLD AUTO: 299 10*3/MM3 (ref 140–450)
PMV BLD AUTO: 10.7 FL (ref 6–12)
POTASSIUM SERPL-SCNC: 4.3 MMOL/L (ref 3.5–5.2)
PROT UR QL STRIP: ABNORMAL
PROTHROMBIN TIME: 10.5 SECONDS (ref 9.6–11.7)
QT INTERVAL: 395 MS
QTC INTERVAL: 455 MS
RBC # BLD AUTO: 5.2 10*6/MM3 (ref 4.14–5.8)
RH BLD: POSITIVE
SODIUM SERPL-SCNC: 139 MMOL/L (ref 136–145)
SP GR UR STRIP: 1.01 (ref 1–1.03)
T&S EXPIRATION DATE: NORMAL
UROBILINOGEN UR QL STRIP: ABNORMAL
WBC NRBC COR # BLD AUTO: 10.17 10*3/MM3 (ref 3.4–10.8)

## 2024-09-06 PROCEDURE — 86901 BLOOD TYPING SEROLOGIC RH(D): CPT | Performed by: NEUROLOGICAL SURGERY

## 2024-09-06 PROCEDURE — 86850 RBC ANTIBODY SCREEN: CPT | Performed by: NEUROLOGICAL SURGERY

## 2024-09-06 PROCEDURE — 81003 URINALYSIS AUTO W/O SCOPE: CPT

## 2024-09-06 PROCEDURE — 83036 HEMOGLOBIN GLYCOSYLATED A1C: CPT

## 2024-09-06 PROCEDURE — 85610 PROTHROMBIN TIME: CPT

## 2024-09-06 PROCEDURE — 80048 BASIC METABOLIC PNL TOTAL CA: CPT

## 2024-09-06 PROCEDURE — 93005 ELECTROCARDIOGRAM TRACING: CPT | Performed by: NEUROLOGICAL SURGERY

## 2024-09-06 PROCEDURE — 86900 BLOOD TYPING SEROLOGIC ABO: CPT | Performed by: NEUROLOGICAL SURGERY

## 2024-09-06 PROCEDURE — 87641 MR-STAPH DNA AMP PROBE: CPT

## 2024-09-06 PROCEDURE — 85025 COMPLETE CBC W/AUTO DIFF WBC: CPT

## 2024-09-06 PROCEDURE — 36415 COLL VENOUS BLD VENIPUNCTURE: CPT

## 2024-09-10 DIAGNOSIS — J30.2 OTHER SEASONAL ALLERGIC RHINITIS: ICD-10-CM

## 2024-09-11 RX ORDER — LORATADINE 10 MG/1
10 TABLET ORAL DAILY
Qty: 30 TABLET | Refills: 0 | Status: SHIPPED | OUTPATIENT
Start: 2024-09-11

## 2024-09-15 ENCOUNTER — ANESTHESIA EVENT (OUTPATIENT)
Dept: PERIOP | Facility: HOSPITAL | Age: 44
End: 2024-09-15
Payer: MEDICAID

## 2024-09-16 ENCOUNTER — ANESTHESIA (OUTPATIENT)
Dept: PERIOP | Facility: HOSPITAL | Age: 44
End: 2024-09-16
Payer: MEDICAID

## 2024-09-16 ENCOUNTER — APPOINTMENT (OUTPATIENT)
Dept: GENERAL RADIOLOGY | Facility: HOSPITAL | Age: 44
End: 2024-09-16
Payer: MEDICAID

## 2024-09-16 ENCOUNTER — HOSPITAL ENCOUNTER (OUTPATIENT)
Facility: HOSPITAL | Age: 44
Setting detail: SURGERY ADMIT
Discharge: HOME | End: 2024-09-16
Attending: NEUROLOGICAL SURGERY | Admitting: NEUROLOGICAL SURGERY
Payer: MEDICAID

## 2024-09-16 VITALS
HEART RATE: 74 BPM | BODY MASS INDEX: 32.97 KG/M2 | DIASTOLIC BLOOD PRESSURE: 87 MMHG | OXYGEN SATURATION: 93 % | HEIGHT: 78 IN | SYSTOLIC BLOOD PRESSURE: 144 MMHG | TEMPERATURE: 97 F | WEIGHT: 285 LBS | RESPIRATION RATE: 15 BRPM

## 2024-09-16 DIAGNOSIS — Z98.890 STATUS POST LUMBAR SPINE OPERATIVE PROCEDURE FOR DECOMPRESSION OF SPINAL CORD: Primary | ICD-10-CM

## 2024-09-16 LAB
GLUCOSE BLDC GLUCOMTR-MCNC: 194 MG/DL (ref 70–105)
GLUCOSE BLDC GLUCOMTR-MCNC: 212 MG/DL (ref 70–105)

## 2024-09-16 PROCEDURE — 25010000002 HYDROMORPHONE 1 MG/ML SOLUTION: Performed by: NURSE ANESTHETIST, CERTIFIED REGISTERED

## 2024-09-16 PROCEDURE — 25810000003 SODIUM CHLORIDE 0.9 % SOLUTION 250 ML FLEX CONT: Performed by: NURSE ANESTHETIST, CERTIFIED REGISTERED

## 2024-09-16 PROCEDURE — 72100 X-RAY EXAM L-S SPINE 2/3 VWS: CPT

## 2024-09-16 PROCEDURE — 25010000002 ONDANSETRON PER 1 MG: Performed by: NURSE ANESTHETIST, CERTIFIED REGISTERED

## 2024-09-16 PROCEDURE — 25010000002 SUGAMMADEX 200 MG/2ML SOLUTION: Performed by: NURSE ANESTHETIST, CERTIFIED REGISTERED

## 2024-09-16 PROCEDURE — 25810000003 LACTATED RINGERS PER 1000 ML: Performed by: NURSE ANESTHETIST, CERTIFIED REGISTERED

## 2024-09-16 PROCEDURE — 25010000002 MAGNESIUM SULFATE PER 500 MG OF MAGNESIUM: Performed by: NURSE ANESTHETIST, CERTIFIED REGISTERED

## 2024-09-16 PROCEDURE — 25010000002 FENTANYL CITRATE (PF) 50 MCG/ML SOLUTION: Performed by: NURSE ANESTHETIST, CERTIFIED REGISTERED

## 2024-09-16 PROCEDURE — 63030 LAMOT DCMPRN NRV RT 1 LMBR: CPT | Performed by: NEUROLOGICAL SURGERY

## 2024-09-16 PROCEDURE — 99024 POSTOP FOLLOW-UP VISIT: CPT

## 2024-09-16 PROCEDURE — 25810000003 LACTATED RINGERS PER 1000 ML: Performed by: NEUROLOGICAL SURGERY

## 2024-09-16 PROCEDURE — 25010000002 PROPOFOL 200 MG/20ML EMULSION: Performed by: NURSE ANESTHETIST, CERTIFIED REGISTERED

## 2024-09-16 PROCEDURE — 25010000002 PHENYLEPHRINE 10 MG/ML SOLUTION 5 ML VIAL: Performed by: NURSE ANESTHETIST, CERTIFIED REGISTERED

## 2024-09-16 PROCEDURE — 82948 REAGENT STRIP/BLOOD GLUCOSE: CPT | Performed by: NURSE ANESTHETIST, CERTIFIED REGISTERED

## 2024-09-16 PROCEDURE — 76000 FLUOROSCOPY <1 HR PHYS/QHP: CPT

## 2024-09-16 PROCEDURE — 25010000002 CEFAZOLIN PER 500 MG: Performed by: NURSE ANESTHETIST, CERTIFIED REGISTERED

## 2024-09-16 PROCEDURE — 25010000002 METHYLPREDNISOLONE PER 40 MG: Performed by: NEUROLOGICAL SURGERY

## 2024-09-16 PROCEDURE — 82948 REAGENT STRIP/BLOOD GLUCOSE: CPT

## 2024-09-16 PROCEDURE — 25010000002 DEXAMETHASONE SODIUM PHOSPHATE 20 MG/5ML SOLUTION: Performed by: NURSE ANESTHETIST, CERTIFIED REGISTERED

## 2024-09-16 PROCEDURE — 25010000002 MIDAZOLAM PER 1 MG: Performed by: NURSE ANESTHETIST, CERTIFIED REGISTERED

## 2024-09-16 DEVICE — DEV CONTRL TISS STRATAFIX SPIRAL MNCRYL UD 3/0 PLS 30CM: Type: IMPLANTABLE DEVICE | Site: SPINE LUMBAR | Status: FUNCTIONAL

## 2024-09-16 RX ORDER — PHENYLEPHRINE HCL IN 0.9% NACL 1 MG/10 ML
SYRINGE (ML) INTRAVENOUS AS NEEDED
Status: DISCONTINUED | OUTPATIENT
Start: 2024-09-16 | End: 2024-09-16 | Stop reason: SURG

## 2024-09-16 RX ORDER — DROPERIDOL 2.5 MG/ML
0.62 INJECTION, SOLUTION INTRAMUSCULAR; INTRAVENOUS ONCE AS NEEDED
Status: DISCONTINUED | OUTPATIENT
Start: 2024-09-16 | End: 2024-09-16 | Stop reason: HOSPADM

## 2024-09-16 RX ORDER — MEPERIDINE HYDROCHLORIDE 25 MG/ML
12.5 INJECTION INTRAMUSCULAR; INTRAVENOUS; SUBCUTANEOUS
Status: DISCONTINUED | OUTPATIENT
Start: 2024-09-16 | End: 2024-09-16 | Stop reason: HOSPADM

## 2024-09-16 RX ORDER — ONDANSETRON 2 MG/ML
4 INJECTION INTRAMUSCULAR; INTRAVENOUS ONCE AS NEEDED
Status: DISCONTINUED | OUTPATIENT
Start: 2024-09-16 | End: 2024-09-16 | Stop reason: HOSPADM

## 2024-09-16 RX ORDER — MAGNESIUM SULFATE HEPTAHYDRATE 500 MG/ML
INJECTION, SOLUTION INTRAMUSCULAR; INTRAVENOUS AS NEEDED
Status: DISCONTINUED | OUTPATIENT
Start: 2024-09-16 | End: 2024-09-16 | Stop reason: SURG

## 2024-09-16 RX ORDER — MIDAZOLAM HYDROCHLORIDE 1 MG/ML
INJECTION INTRAMUSCULAR; INTRAVENOUS AS NEEDED
Status: DISCONTINUED | OUTPATIENT
Start: 2024-09-16 | End: 2024-09-16 | Stop reason: SURG

## 2024-09-16 RX ORDER — FENTANYL CITRATE 50 UG/ML
50 INJECTION, SOLUTION INTRAMUSCULAR; INTRAVENOUS
Status: DISCONTINUED | OUTPATIENT
Start: 2024-09-16 | End: 2024-09-16 | Stop reason: HOSPADM

## 2024-09-16 RX ORDER — SODIUM CHLORIDE 0.9 % (FLUSH) 0.9 %
10 SYRINGE (ML) INJECTION AS NEEDED
Status: DISCONTINUED | OUTPATIENT
Start: 2024-09-16 | End: 2024-09-16 | Stop reason: HOSPADM

## 2024-09-16 RX ORDER — DEXAMETHASONE SODIUM PHOSPHATE 4 MG/ML
INJECTION, SOLUTION INTRA-ARTICULAR; INTRALESIONAL; INTRAMUSCULAR; INTRAVENOUS; SOFT TISSUE AS NEEDED
Status: DISCONTINUED | OUTPATIENT
Start: 2024-09-16 | End: 2024-09-16 | Stop reason: SURG

## 2024-09-16 RX ORDER — HYDROCODONE BITARTRATE AND ACETAMINOPHEN 10; 325 MG/1; MG/1
1 TABLET ORAL EVERY 6 HOURS PRN
Qty: 30 TABLET | Refills: 0 | Status: SHIPPED | OUTPATIENT
Start: 2024-09-16

## 2024-09-16 RX ORDER — ACETAMINOPHEN 500 MG
1000 TABLET ORAL ONCE
Status: COMPLETED | OUTPATIENT
Start: 2024-09-16 | End: 2024-09-16

## 2024-09-16 RX ORDER — DIPHENHYDRAMINE HYDROCHLORIDE 50 MG/ML
12.5 INJECTION INTRAMUSCULAR; INTRAVENOUS ONCE AS NEEDED
Status: DISCONTINUED | OUTPATIENT
Start: 2024-09-16 | End: 2024-09-16 | Stop reason: HOSPADM

## 2024-09-16 RX ORDER — PROMETHAZINE HYDROCHLORIDE 25 MG/1
25 SUPPOSITORY RECTAL ONCE AS NEEDED
Status: DISCONTINUED | OUTPATIENT
Start: 2024-09-16 | End: 2024-09-16 | Stop reason: HOSPADM

## 2024-09-16 RX ORDER — METHYLPREDNISOLONE ACETATE 40 MG/ML
INJECTION, SUSPENSION INTRA-ARTICULAR; INTRALESIONAL; INTRAMUSCULAR; SOFT TISSUE AS NEEDED
Status: DISCONTINUED | OUTPATIENT
Start: 2024-09-16 | End: 2024-09-16 | Stop reason: HOSPADM

## 2024-09-16 RX ORDER — GABAPENTIN 300 MG/1
300 CAPSULE ORAL ONCE
Status: COMPLETED | OUTPATIENT
Start: 2024-09-16 | End: 2024-09-16

## 2024-09-16 RX ORDER — CEFAZOLIN SODIUM 1 G/3ML
INJECTION, POWDER, FOR SOLUTION INTRAMUSCULAR; INTRAVENOUS AS NEEDED
Status: DISCONTINUED | OUTPATIENT
Start: 2024-09-16 | End: 2024-09-16 | Stop reason: SURG

## 2024-09-16 RX ORDER — ONDANSETRON 2 MG/ML
INJECTION INTRAMUSCULAR; INTRAVENOUS AS NEEDED
Status: DISCONTINUED | OUTPATIENT
Start: 2024-09-16 | End: 2024-09-16 | Stop reason: SURG

## 2024-09-16 RX ORDER — LABETALOL HYDROCHLORIDE 5 MG/ML
5 INJECTION, SOLUTION INTRAVENOUS
Status: DISCONTINUED | OUTPATIENT
Start: 2024-09-16 | End: 2024-09-16 | Stop reason: HOSPADM

## 2024-09-16 RX ORDER — SODIUM CHLORIDE, SODIUM LACTATE, POTASSIUM CHLORIDE, CALCIUM CHLORIDE 600; 310; 30; 20 MG/100ML; MG/100ML; MG/100ML; MG/100ML
1000 INJECTION, SOLUTION INTRAVENOUS CONTINUOUS
Status: DISCONTINUED | OUTPATIENT
Start: 2024-09-16 | End: 2024-09-16 | Stop reason: SDUPTHER

## 2024-09-16 RX ORDER — ROCURONIUM BROMIDE 10 MG/ML
INJECTION, SOLUTION INTRAVENOUS AS NEEDED
Status: DISCONTINUED | OUTPATIENT
Start: 2024-09-16 | End: 2024-09-16 | Stop reason: SURG

## 2024-09-16 RX ORDER — LIDOCAINE HYDROCHLORIDE AND EPINEPHRINE 10; 10 MG/ML; UG/ML
INJECTION, SOLUTION INFILTRATION; PERINEURAL AS NEEDED
Status: DISCONTINUED | OUTPATIENT
Start: 2024-09-16 | End: 2024-09-16 | Stop reason: HOSPADM

## 2024-09-16 RX ORDER — HYDRALAZINE HYDROCHLORIDE 20 MG/ML
5 INJECTION INTRAMUSCULAR; INTRAVENOUS
Status: DISCONTINUED | OUTPATIENT
Start: 2024-09-16 | End: 2024-09-16 | Stop reason: HOSPADM

## 2024-09-16 RX ORDER — PROPOFOL 10 MG/ML
INJECTION, EMULSION INTRAVENOUS AS NEEDED
Status: DISCONTINUED | OUTPATIENT
Start: 2024-09-16 | End: 2024-09-16 | Stop reason: SURG

## 2024-09-16 RX ORDER — HYDROCODONE BITARTRATE AND ACETAMINOPHEN 5; 325 MG/1; MG/1
1 TABLET ORAL ONCE AS NEEDED
Status: DISCONTINUED | OUTPATIENT
Start: 2024-09-16 | End: 2024-09-16 | Stop reason: HOSPADM

## 2024-09-16 RX ORDER — EPHEDRINE SULFATE 5 MG/ML
INJECTION INTRAVENOUS AS NEEDED
Status: DISCONTINUED | OUTPATIENT
Start: 2024-09-16 | End: 2024-09-16 | Stop reason: SURG

## 2024-09-16 RX ORDER — NALOXONE HCL 0.4 MG/ML
0.4 VIAL (ML) INJECTION AS NEEDED
Status: DISCONTINUED | OUTPATIENT
Start: 2024-09-16 | End: 2024-09-16 | Stop reason: HOSPADM

## 2024-09-16 RX ORDER — DIPHENHYDRAMINE HYDROCHLORIDE 50 MG/ML
12.5 INJECTION INTRAMUSCULAR; INTRAVENOUS
Status: DISCONTINUED | OUTPATIENT
Start: 2024-09-16 | End: 2024-09-16 | Stop reason: HOSPADM

## 2024-09-16 RX ORDER — ACETAMINOPHEN 650 MG/1
650 SUPPOSITORY RECTAL EVERY 4 HOURS PRN
Status: DISCONTINUED | OUTPATIENT
Start: 2024-09-16 | End: 2024-09-16 | Stop reason: HOSPADM

## 2024-09-16 RX ORDER — SODIUM CHLORIDE, SODIUM LACTATE, POTASSIUM CHLORIDE, CALCIUM CHLORIDE 600; 310; 30; 20 MG/100ML; MG/100ML; MG/100ML; MG/100ML
1000 INJECTION, SOLUTION INTRAVENOUS CONTINUOUS
Status: DISCONTINUED | OUTPATIENT
Start: 2024-09-16 | End: 2024-09-16 | Stop reason: HOSPADM

## 2024-09-16 RX ORDER — PROMETHAZINE HYDROCHLORIDE 25 MG/1
25 TABLET ORAL ONCE AS NEEDED
Status: DISCONTINUED | OUTPATIENT
Start: 2024-09-16 | End: 2024-09-16 | Stop reason: HOSPADM

## 2024-09-16 RX ORDER — ALBUTEROL SULFATE 0.83 MG/ML
2.5 SOLUTION RESPIRATORY (INHALATION) ONCE AS NEEDED
Status: DISCONTINUED | OUTPATIENT
Start: 2024-09-16 | End: 2024-09-16 | Stop reason: HOSPADM

## 2024-09-16 RX ORDER — ACETAMINOPHEN 325 MG/1
650 TABLET ORAL ONCE AS NEEDED
Status: DISCONTINUED | OUTPATIENT
Start: 2024-09-16 | End: 2024-09-16 | Stop reason: HOSPADM

## 2024-09-16 RX ORDER — SODIUM CHLORIDE, SODIUM LACTATE, POTASSIUM CHLORIDE, CALCIUM CHLORIDE 600; 310; 30; 20 MG/100ML; MG/100ML; MG/100ML; MG/100ML
INJECTION, SOLUTION INTRAVENOUS CONTINUOUS PRN
Status: DISCONTINUED | OUTPATIENT
Start: 2024-09-16 | End: 2024-09-16 | Stop reason: SURG

## 2024-09-16 RX ORDER — LIDOCAINE HYDROCHLORIDE 10 MG/ML
0.5 INJECTION, SOLUTION EPIDURAL; INFILTRATION; INTRACAUDAL; PERINEURAL ONCE AS NEEDED
Status: DISCONTINUED | OUTPATIENT
Start: 2024-09-16 | End: 2024-09-16 | Stop reason: HOSPADM

## 2024-09-16 RX ORDER — HYDROCODONE BITARTRATE AND ACETAMINOPHEN 7.5; 325 MG/1; MG/1
1 TABLET ORAL EVERY 4 HOURS PRN
Status: DISCONTINUED | OUTPATIENT
Start: 2024-09-16 | End: 2024-09-16 | Stop reason: HOSPADM

## 2024-09-16 RX ADMIN — HYDROMORPHONE HYDROCHLORIDE 0.5 MG: 1 INJECTION, SOLUTION INTRAMUSCULAR; INTRAVENOUS; SUBCUTANEOUS at 08:23

## 2024-09-16 RX ADMIN — SODIUM CHLORIDE, SODIUM LACTATE, POTASSIUM CHLORIDE, AND CALCIUM CHLORIDE: .6; .31; .03; .02 INJECTION, SOLUTION INTRAVENOUS at 07:39

## 2024-09-16 RX ADMIN — HYDROCODONE BITARTRATE AND ACETAMINOPHEN 1 TABLET: 7.5; 325 TABLET ORAL at 10:00

## 2024-09-16 RX ADMIN — CEFAZOLIN 3 G: 1 INJECTION, POWDER, FOR SOLUTION INTRAMUSCULAR; INTRAVENOUS at 07:54

## 2024-09-16 RX ADMIN — SODIUM CHLORIDE, POTASSIUM CHLORIDE, SODIUM LACTATE AND CALCIUM CHLORIDE 1000 ML: 600; 310; 30; 20 INJECTION, SOLUTION INTRAVENOUS at 06:57

## 2024-09-16 RX ADMIN — PROPOFOL 150 MCG/KG/MIN: 10 INJECTION, EMULSION INTRAVENOUS at 07:51

## 2024-09-16 RX ADMIN — ACETAMINOPHEN 1000 MG: 500 TABLET, FILM COATED ORAL at 06:57

## 2024-09-16 RX ADMIN — PROPOFOL 100 MG: 10 INJECTION, EMULSION INTRAVENOUS at 07:53

## 2024-09-16 RX ADMIN — EPHEDRINE SULFATE 10 MG: 5 INJECTION INTRAVENOUS at 08:15

## 2024-09-16 RX ADMIN — LIDOCAINE HYDROCHLORIDE 100 MG: 20 INJECTION, SOLUTION EPIDURAL; INFILTRATION; INTRACAUDAL; PERINEURAL at 07:47

## 2024-09-16 RX ADMIN — ROCURONIUM BROMIDE 50 MG: 10 INJECTION, SOLUTION INTRAVENOUS at 07:47

## 2024-09-16 RX ADMIN — FENTANYL CITRATE 50 MCG: 50 INJECTION, SOLUTION INTRAMUSCULAR; INTRAVENOUS at 10:06

## 2024-09-16 RX ADMIN — PHENYLEPHRINE HYDROCHLORIDE 1 MCG/KG/MIN: 10 INJECTION INTRAVENOUS at 08:13

## 2024-09-16 RX ADMIN — Medication 30 MG: at 08:10

## 2024-09-16 RX ADMIN — PROPOFOL 200 MG: 10 INJECTION, EMULSION INTRAVENOUS at 07:47

## 2024-09-16 RX ADMIN — ONDANSETRON 4 MG: 2 INJECTION INTRAMUSCULAR; INTRAVENOUS at 08:47

## 2024-09-16 RX ADMIN — MAGNESIUM SULFATE HEPTAHYDRATE 2 G: 500 INJECTION, SOLUTION INTRAMUSCULAR; INTRAVENOUS at 08:21

## 2024-09-16 RX ADMIN — MIDAZOLAM 2 MG: 1 INJECTION INTRAMUSCULAR; INTRAVENOUS at 07:41

## 2024-09-16 RX ADMIN — GABAPENTIN 300 MG: 300 CAPSULE ORAL at 06:57

## 2024-09-16 RX ADMIN — DEXAMETHASONE SODIUM PHOSPHATE 8 MG: 4 INJECTION, SOLUTION INTRAMUSCULAR; INTRAVENOUS at 07:54

## 2024-09-16 RX ADMIN — Medication 100 MCG: at 08:05

## 2024-09-16 RX ADMIN — HYDROMORPHONE HYDROCHLORIDE 0.5 MG: 1 INJECTION, SOLUTION INTRAMUSCULAR; INTRAVENOUS; SUBCUTANEOUS at 08:45

## 2024-09-16 RX ADMIN — SUGAMMADEX 200 MG: 100 INJECTION, SOLUTION INTRAVENOUS at 07:52

## 2024-09-16 RX ADMIN — REMIFENTANIL HYDROCHLORIDE 0.1 MCG/KG/MIN: 1 INJECTION, POWDER, LYOPHILIZED, FOR SOLUTION INTRAVENOUS at 07:51

## 2024-09-30 ENCOUNTER — OFFICE VISIT (OUTPATIENT)
Dept: NEUROSURGERY | Facility: CLINIC | Age: 44
End: 2024-09-30
Payer: MEDICAID

## 2024-09-30 VITALS
HEART RATE: 88 BPM | WEIGHT: 282 LBS | BODY MASS INDEX: 32.63 KG/M2 | HEIGHT: 78 IN | OXYGEN SATURATION: 95 % | DIASTOLIC BLOOD PRESSURE: 94 MMHG | SYSTOLIC BLOOD PRESSURE: 142 MMHG | RESPIRATION RATE: 18 BRPM

## 2024-09-30 DIAGNOSIS — Z98.890 STATUS POST LUMBAR SPINE SURGERY FOR DECOMPRESSION OF SPINAL CORD: Primary | ICD-10-CM

## 2024-09-30 PROCEDURE — 3080F DIAST BP >= 90 MM HG: CPT

## 2024-09-30 PROCEDURE — 3077F SYST BP >= 140 MM HG: CPT

## 2024-09-30 PROCEDURE — 1160F RVW MEDS BY RX/DR IN RCRD: CPT

## 2024-09-30 PROCEDURE — 99024 POSTOP FOLLOW-UP VISIT: CPT

## 2024-09-30 PROCEDURE — 1159F MED LIST DOCD IN RCRD: CPT

## 2024-09-30 RX ORDER — FUROSEMIDE 20 MG
TABLET ORAL
COMMUNITY
Start: 2024-09-24

## 2024-09-30 NOTE — PROGRESS NOTES
Subjective     Chief Complaint   Patient presents with    Post-op         Previous Treatment: Left L5-S1 microdiscectomy 9/16/24     HPI: Barrett Laguerre is a 44 y.o. male who presents to clinic for his 2-week follow-up from an L5-S1 microdiscectomy.  Patient states that he still has some pain in his left buttock that radiates around towards his hip.  Patient states that the pain on the left side was not there before it is new.  Patient states that his pain can be pretty bad and states that his current pain medication is not helping.  He states he is going to talk to Dr. Pryor, his pain management provider, to increase his medication dose.  He denies any pain or numbness and tingling that radiate down his legs.  He denies any bowel or bladder incontinence or saddle anesthesia at this time.        PMH:  Past Medical History:   Diagnosis Date    Acquired pes planus of both feet     Acute non-recurrent maxillary sinusitis     ADD (attention deficit disorder)     Allergies     Annual physical exam     Asthma     Asymptomatic microscopic hematuria     Bulging lumbar disc     Chronic gout, unspecified, without tophus (tophi)     CIDP (chronic inflammatory demyelinating polyneuropathy)     Colon polyps     DDD (degenerative disc disease), lumbar     Diabetes mellitus     Dyslipidemia     GERD (gastroesophageal reflux disease)     Guillain-[place]     Hypertension, benign     Idiopathic chronic gout without tophus     Intervertebral disc stenosis of neural canal of lumbar region     Low back pain     Lumbar disc disorder with myelopathy     Mixed obsessional thoughts and acts     Obstructive sleep apnea     OCD (obsessive compulsive disorder)     Other seasonal allergic rhinitis     Overactive bladder     PONV (postoperative nausea and vomiting)     Pruritus     Screening for depression     Screening for hyperlipidemia     SI (sacroiliac) joint dysfunction          Current Outpatient Medications:     acetaminophen-codeine  (TYLENOL/CODEINE #3) 300-30 MG per tablet, Take 1 tablet by mouth 3 (Three) Times a Day As Needed for Mild Pain., Disp: 21 tablet, Rfl: 2    albuterol sulfate  (90 Base) MCG/ACT inhaler, Inhale 2 puffs Every 4 (Four) Hours As Needed for Wheezing., Disp: 6.7 g, Rfl: 0    Alcohol Swabs (B-D SINGLE USE SWABS REGULAR) pads, Apply 1 each topically to the appropriate area as directed Daily., Disp: 100 each, Rfl: 12    amLODIPine (NORVASC) 10 MG tablet, Take 1 tablet by mouth Every Night., Disp: 90 tablet, Rfl: 3    Azelastine HCl 137 MCG/SPRAY solution, 2 sprays into the nostril(s) as directed by provider 2 (Two) Times a Day., Disp: 60 mL, Rfl: 2    BD Pen Needle Kyra 2nd Gen 32G X 4 MM misc, USE 1 PEN NEEDLE 4 TIMES DAILY  BEFORE MEAL(S) AND AT BEDTIME, Disp: 200 each, Rfl: 12    chlorthalidone (HYGROTEN) 50 MG tablet, Take 1 tablet by mouth Daily., Disp: 90 tablet, Rfl: 3    colchicine-probenecid (COL-BENEMID) 0.5-500 MG per tablet, Take 1 tablet by mouth Daily., Disp: 30 tablet, Rfl: 3    Continuous Blood Gluc  (Dexcom G6 ) device, 1  ONCE DAILY, Disp: 1 each, Rfl: 0    Continuous Glucose Sensor (Dexcom G6 Sensor), Use Every 10 (Ten) Days., Disp: 3 each, Rfl: 12    Continuous Glucose Transmitter (Dexcom G6 Transmitter) misc, Use 1 each Every 3 (Three) Months., Disp: 1 each, Rfl: 3    Cyanocobalamin (Vitamin B-12 ER) 1000 MCG tablet controlled-release, TAKE 1 TABLET DAILY, Disp: 30 each, Rfl: 5    Diclofenac Epolamine (FLECTOR) 1.3 % patch patch, Apply 1 patch topically to the appropriate area as directed 2 (Two) Times a Day As Needed (back pain)., Disp: 60 patch, Rfl: 11    Emtricitabine-Tenofovir AF (Descovy) 200-25 MG per tablet, Take 1 tablet by mouth Daily., Disp: , Rfl:     EQ All Day Allergy Relief 10 MG tablet, Take 1 tablet by mouth once daily, Disp: 30 tablet, Rfl: 0    FLUoxetine (PROzac) 40 MG capsule, Take 1 capsule by mouth Every Night., Disp: 90 capsule, Rfl: 3    fluticasone  (FLONASE) 50 MCG/ACT nasal spray, 1 spray into the nostril(s) as directed by provider 2 (Two) Times a Day., Disp: 16 mL, Rfl: 3    furosemide (LASIX) 20 MG tablet, , Disp: , Rfl:     gabapentin (NEURONTIN) 300 MG capsule, Take 1 capsule by mouth 3 (Three) Times a Day., Disp: 270 capsule, Rfl: 1    glucose blood test strip, USE 1 STRIP TO CHECK GLUCOSE 4 TIMES DAILY, Disp: 100 each, Rfl: 12    glucose monitor monitoring kit, Use 1 each Daily. Dx: E11.49 patient checks sugar 4 x day, Disp: 1 each, Rfl: 0    HumaLOG KwikPen 100 UNIT/ML solution pen-injector, INJECT 15 UNITS SUBCUTANEOUSLY THREE TIMES DAILY 15  MINUTES  BEFORE  MEALS  OR  IMMEDIATELY  FOLLOWING  MEALS, Disp: 15 mL, Rfl: 0    HYDROcodone-acetaminophen (NORCO)  MG per tablet, Take 1 tablet by mouth Every 6 (Six) Hours As Needed for Moderate Pain or Severe Pain., Disp: 30 tablet, Rfl: 0    Insulin Glargine (BASAGLAR KWIKPEN SC), Inject 38 Units under the skin into the appropriate area as directed Every Night., Disp: , Rfl:     Insulin Glargine (BASAGLAR KWIKPEN) 100 UNIT/ML injection pen, Inject 40 Units under the skin into the appropriate area as directed Every Night., Disp: 4 Pen, Rfl: 11    Lancets misc, Use 1 each 4 (Four) Times a Day. Dx: E11.49 patient checks sugar 4 x day, Disp: 200 each, Rfl: 12    losartan (COZAAR) 100 MG tablet, Take 1 tablet by mouth Daily., Disp: 90 tablet, Rfl: 3    methocarbamol (ROBAXIN) 750 MG tablet, Take 1 tablet by mouth 4 (Four) Times a Day As Needed for Muscle Spasms., Disp: 360 tablet, Rfl: 3    montelukast (SINGULAIR) 10 MG tablet, TAKE 1 TABLET BY MOUTH ONCE DAILY AT NIGHT, Disp: 30 tablet, Rfl: 0    naloxone (NARCAN) 4 MG/0.1ML nasal spray, Call 911. Don't prime. Freeman in 1 nostril for overdose. Repeat in 2-3 minutes in other nostril if no or minimal breathing/responsiveness., Disp: 2 each, Rfl: 0    oxybutynin XL (DITROPAN XL) 15 MG 24 hr tablet, Take 1 tablet by mouth Daily., Disp: 90 tablet, Rfl: 3     pantoprazole (Protonix) 40 MG EC tablet, Take 1 tablet by mouth Daily., Disp: 90 tablet, Rfl: 3    potassium chloride (MICRO-K) 10 MEQ CR capsule, Take 1 capsule by mouth Daily., Disp: 90 capsule, Rfl: 3    promethazine (PHENERGAN) 25 MG tablet, Take 1 tablet by mouth Every 6 (Six) Hours As Needed for Nausea or Vomiting., Disp: 90 tablet, Rfl: 0    sildenafil (REVATIO) 20 MG tablet, Take 1 tablet by mouth Daily As Needed (ed)., Disp: 30 tablet, Rfl: 3    sitaGLIPtin-metFORMIN (Janumet)  MG per tablet, Take 1 tablet by mouth 2 (Two) Times a Day With Meals., Disp: 180 tablet, Rfl: 3    sodium chloride (Ayr) 0.65 % nasal spray, USE 1 DOSE IN EACH NOSTRIL THREE TIMES DAILY, Disp: 50 mL, Rfl: 0    Sodium Fluoride (Denta 5000 Plus) 1.1 % cream, Apply 1 Application to teeth 2 (Two) Times a Day., Disp: 102 g, Rfl: 5     Allergies   Allergen Reactions    Penicillin G Anaphylaxis    Penicillins Anaphylaxis    Sulfa Antibiotics Hives     Was told by mother possible anaphylaxis    Allopurinol Rash        Past Surgical History:   Procedure Laterality Date    COLONOSCOPY N/A 12/22/2020    Procedure: COLONOSCOPY with polypectomy x;  Surgeon: Juan Alberto Davis MD;  Location: [facility] ENDOSCOPY;  Service: Gastroenterology;  Laterality: N/A;  post: transverse colon polyp    INGUINAL HERNIA REPAIR      LUMBAR DISCECTOMY Left 9/16/2024    Procedure: Left lumbar 5 sacral 1 microdiscectomy;  Surgeon: Kuldip Osorio IV, MD;  Location: [facility] MAIN OR;  Service: Neurosurgery;  Laterality: Left;    LUMBAR LAMINECTOMY Left 02/22/2023    Procedure: LUMBAR 4-SACRAL 1 LAMINOTOMY, MEDIAL FACETECTOMY, FORAMINOTOMY, AND MICRODISCECTOMY;  Surgeon: Vahe Pace MD;  Location: [facility] MAIN OR;  Service: Neurosurgery;  Laterality: Left;    NOSE SURGERY      deviated septum    TYMPANOSTOMY TUBE PLACEMENT      x2        Family History   Problem Relation Age of Onset    Diabetes Mother     Heart disease Maternal Grandmother     Diabetes  "Maternal Grandmother     Heart disease Maternal Grandfather     Diabetes Maternal Grandfather     Cancer Paternal Grandfather     Heart disease Other          Social Hx:  Social History     Tobacco Use   Smoking Status Former    Types: Cigarettes    Passive exposure: Never   Smokeless Tobacco Never      Alcohol Use: Not At Risk (10/27/2022)    AUDIT-C     Frequency of Alcohol Consumption: Monthly or less     Average Number of Drinks: 1 or 2     Frequency of Binge Drinking: Less than monthly      Social History     Substance and Sexual Activity   Drug Use Not Currently          Review of Systems   Constitutional:  Positive for activity change.   HENT: Negative.     Eyes: Negative.    Respiratory: Negative.     Cardiovascular: Negative.    Gastrointestinal: Negative.    Endocrine: Negative.    Genitourinary: Negative.    Musculoskeletal:  Positive for arthralgias, back pain and myalgias.   Skin:         Looks great    Allergic/Immunologic: Negative.    Neurological:  Positive for numbness (+tingling in buttocks).        Sharp pain in his butt than his back      Hematological: Negative.    Psychiatric/Behavioral:  Positive for sleep disturbance.          Objective     /94   Pulse 88   Resp 18   Ht 198.1 cm (78\")   Wt 128 kg (282 lb)   SpO2 95%   BMI 32.59 kg/m²    Body mass index is 32.59 kg/m².      Physical Exam  Vitals reviewed.   Eyes:      Extraocular Movements: Extraocular movements intact.      Conjunctiva/sclera: Conjunctivae normal.      Pupils: Pupils are equal, round, and reactive to light.   Musculoskeletal:         General: Normal range of motion.      Cervical back: Normal range of motion.   Skin:     General: Skin is warm and dry.   Neurological:      General: No focal deficit present.      Mental Status: He is alert and oriented to person, place, and time.   Psychiatric:         Mood and Affect: Mood normal.         Speech: Speech normal.        Neurologic Exam     Mental Status   Oriented " to person, place, and time.   Speech: speech is normal   Level of consciousness: alert    Cranial Nerves     CN III, IV, VI   Pupils are equal, round, and reactive to light.    Motor Exam   Muscle bulk: normal  Right leg tone: normal  Left leg tone: normal    Strength   Right iliopsoas: 5/5  Left iliopsoas: 5/5  Right quadriceps: 5/5  Left quadriceps: 5/5  Right hamstrin/5  Left hamstrin/5  Right glutei: 5/5  Left glutei: 5/5  Right anterior tibial: 5/5  Left anterior tibial: 5/5  Right posterior tibial: 5/5  Left posterior tibial: 5/5    Sensory Exam   Light touch normal.     Gait, Coordination, and Reflexes Patient walks without difficulty         Results Review  I personally reviewed and interpreted the images from the following studies:          XR Spine Lumbar 2 or 3 View    Result Date: 2024  This procedure was auto-finalized with no dictation required.       Assessment & Plan     MDM: Barrett Laguerre is a 44 y.o. male presents to clinic for 2-week follow-up from his L5-S1 microdiscectomy.  Patient states that he continues to have left-sided buttock pain that radiates around to his left hip.  Patient states that it ranges in pain and can either be tolerable or severe in nature.  Patient states that his current pain regimen is not cutting it and plans of talking with Dr. Lora, his pain management provider.  I explained to the patient that it is still normal to have residual symptoms since it is only 2 weeks.  Patient is to start physical therapy, which I told him I think will be very beneficial for this pain.    Patient is now able to take a shower and get his incision wet, but is not able to submerge his wound yet.  He is able to lift up to 50 pounds but should still be cognitive of his bending, lifting and twisting.  Patient has been on pain medication for some time now and reports that he 6 hours after taking the pain medication to drive.  I said as long as he takes these precautions it is  okay to drive.    I told the patient that if he starts to experience worsening symptoms such as pain or numbness and tingling that radiate down his legs, lack of sensation in his lower extremities, bowel or bladder incontinence or saddle anesthesia please call the office.  Patient is to follow-up after he completes physical therapy for his 3-month follow-up with .  Patient is agreeable to this plan and knows to call with any questions or concerns.       Diagnosis Plan   1. Status post lumbar spine surgery for decompression of spinal cord  Ambulatory Referral to Physical Therapy for Evaluation & Treatment          Return in about 10 weeks (around 12/9/2024) for Next scheduled follow up.           This patient was examined wearing appropriate personal protective equipment.            Regina Hugo PA-C    09/30/24  14:45 EDT      Part of this note may be an electronic transcription/translation of spoken language to printed text using the Dragon Dictation System.

## 2024-10-02 ENCOUNTER — TELEPHONE (OUTPATIENT)
Dept: NEUROSURGERY | Facility: CLINIC | Age: 44
End: 2024-10-02
Payer: MEDICAID

## 2024-10-02 NOTE — TELEPHONE ENCOUNTER
Patient called and stated that he had specified he wanted to go to [facility] for his Physical Therapy and that [facility] had called and tried to schedule him with them and he stated that he was furious about this. I did explain that David has sent that order to [facility] and that he could call them today as they should have that PT order. I did explain that we do normally sent our patients to a [facility] for PT unless the patient specificly request another facility,then we will sent that order to that clinic. I told him there may have been a miscommunication regarding this and I did apologize for that. He verbally understood.

## 2024-10-03 DIAGNOSIS — J30.2 OTHER SEASONAL ALLERGIC RHINITIS: ICD-10-CM

## 2024-10-03 RX ORDER — ECHINACEA PURPUREA EXTRACT 125 MG
TABLET ORAL
Qty: 50 ML | Refills: 0 | Status: SHIPPED | OUTPATIENT
Start: 2024-10-03

## 2024-10-04 ENCOUNTER — OFFICE VISIT (OUTPATIENT)
Dept: FAMILY MEDICINE CLINIC | Facility: CLINIC | Age: 44
End: 2024-10-04
Payer: MEDICAID

## 2024-10-04 ENCOUNTER — TELEPHONE (OUTPATIENT)
Dept: FAMILY MEDICINE CLINIC | Facility: CLINIC | Age: 44
End: 2024-10-04

## 2024-10-04 VITALS
SYSTOLIC BLOOD PRESSURE: 142 MMHG | TEMPERATURE: 97.5 F | WEIGHT: 290.6 LBS | OXYGEN SATURATION: 98 % | RESPIRATION RATE: 18 BRPM | HEIGHT: 60 IN | DIASTOLIC BLOOD PRESSURE: 82 MMHG | BODY MASS INDEX: 57.05 KG/M2 | HEART RATE: 91 BPM

## 2024-10-04 DIAGNOSIS — S29.011A INTERCOSTAL MUSCLE STRAIN, INITIAL ENCOUNTER: ICD-10-CM

## 2024-10-04 DIAGNOSIS — E11.49 TYPE 2 DIABETES MELLITUS WITH OTHER NEUROLOGIC COMPLICATION, WITH LONG-TERM CURRENT USE OF INSULIN: Primary | ICD-10-CM

## 2024-10-04 DIAGNOSIS — Z79.4 TYPE 2 DIABETES MELLITUS WITH OTHER NEUROLOGIC COMPLICATION, WITH LONG-TERM CURRENT USE OF INSULIN: Primary | ICD-10-CM

## 2024-10-04 LAB
BILIRUB BLD-MCNC: NEGATIVE MG/DL
CLARITY, POC: CLEAR
COLOR UR: YELLOW
EXPIRATION DATE: NORMAL
GLUCOSE UR STRIP-MCNC: NEGATIVE MG/DL
KETONES UR QL: NEGATIVE
LEUKOCYTE EST, POC: NEGATIVE
Lab: NORMAL
NITRITE UR-MCNC: NEGATIVE MG/ML
PH UR: 5 [PH] (ref 5–8)
POC MICROALBUMIN URINE: 100
PROT UR STRIP-MCNC: ABNORMAL MG/DL
RBC # UR STRIP: NEGATIVE /UL
SP GR UR: 1.01 (ref 1–1.03)
UROBILINOGEN UR QL: NORMAL

## 2024-10-04 NOTE — PROGRESS NOTES
Subjective   Barrett Laguerre is a 44 y.o. male.   Chief Complaint   Patient presents with    Diabetes       History of Present Illness  Left chest wall discomfort, worse with movement     Diabetes  He presents for his follow-up diabetic visit. He has type 2 diabetes mellitus. The initial diagnosis of diabetes was made 4 years ago. Pertinent negatives for diabetes include no chest pain, no fatigue, no polydipsia, no polyuria and no weakness. There are no hypoglycemic complications. There are no diabetic complications. Risk factors for coronary artery disease include diabetes mellitus, male sex, sedentary lifestyle and hypertension. Current diabetic treatment includes insulin injections and oral agent (dual therapy). He has not had a previous visit with a dietitian. (Does check blood sugar at home) An ACE inhibitor/angiotensin II receptor blocker is not being taken. He does not see a podiatrist.Eye exam is not current.   Hypertension  This is a chronic problem. The current episode started more than 1 year ago. Pertinent negatives include no chest pain, neck pain or shortness of breath. Current antihypertension treatment includes calcium channel blockers and angiotensin blockers.        The following portions of the patient's history were reviewed and updated as appropriate: allergies, current medications, past family history, past medical history, past social history, past surgical history, and problem list.    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-Whitakers    Sleep apnea    Attention deficit disorder    Diabetes    Other seasonal allergic rhinitis    Mixed obsessional thoughts and acts    Hypertension, benign    Disorder of lipid metabolism    CIDP (chronic inflammatory demyelinating polyneuropathy)    Lumbar  radiculopathy    Onychomycosis    Annual physical exam    Class 1 obesity due to excess calories with serious comorbidity and body mass index (BMI) of 34.0 to 34.9 in adult    Acute midline low back pain with left-sided sciatica    DDD (degenerative disc disease), lumbar    Hx of excision of lamina of lumbar vertebra for decompression of spinal cord    Lumbar disc herniation       Current Outpatient Medications on File Prior to Visit   Medication Sig Dispense Refill    acetaminophen-codeine (TYLENOL/CODEINE #3) 300-30 MG per tablet Take 1 tablet by mouth 3 (Three) Times a Day As Needed for Mild Pain. 21 tablet 2    albuterol sulfate  (90 Base) MCG/ACT inhaler Inhale 2 puffs Every 4 (Four) Hours As Needed for Wheezing. 6.7 g 0    Alcohol Swabs (B-D SINGLE USE SWABS REGULAR) pads Apply 1 each topically to the appropriate area as directed Daily. 100 each 12    amLODIPine (NORVASC) 10 MG tablet Take 1 tablet by mouth Every Night. 90 tablet 3    Azelastine HCl 137 MCG/SPRAY solution 2 sprays into the nostril(s) as directed by provider 2 (Two) Times a Day. 60 mL 2    chlorthalidone (HYGROTEN) 50 MG tablet Take 1 tablet by mouth Daily. 90 tablet 3    colchicine-probenecid (COL-BENEMID) 0.5-500 MG per tablet Take 1 tablet by mouth Daily. 30 tablet 3    Continuous Blood Gluc  (Dexcom G6 ) device 1  ONCE DAILY 1 each 0    Continuous Glucose Sensor (Dexcom G6 Sensor) Use Every 10 (Ten) Days. 3 each 12    Continuous Glucose Transmitter (Dexcom G6 Transmitter) misc Use 1 each Every 3 (Three) Months. 1 each 3    Cyanocobalamin (Vitamin B-12 ER) 1000 MCG tablet controlled-release TAKE 1 TABLET DAILY 30 each 5    Diclofenac Epolamine (FLECTOR) 1.3 % patch patch Apply 1 patch topically to the appropriate area as directed 2 (Two) Times a Day As Needed (back pain). 60 patch 11    Emtricitabine-Tenofovir AF (Descovy) 200-25 MG per tablet Take 1 tablet by mouth Daily.      EQ All Day Allergy Relief 10 MG tablet  Take 1 tablet by mouth once daily 30 tablet 0    FLUoxetine (PROzac) 40 MG capsule Take 1 capsule by mouth Every Night. 90 capsule 3    fluticasone (FLONASE) 50 MCG/ACT nasal spray 1 spray into the nostril(s) as directed by provider 2 (Two) Times a Day. 16 mL 3    furosemide (LASIX) 20 MG tablet       gabapentin (NEURONTIN) 300 MG capsule Take 1 capsule by mouth 3 (Three) Times a Day. 270 capsule 1    glucose blood test strip USE 1 STRIP TO CHECK GLUCOSE 4 TIMES DAILY 100 each 12    glucose monitor monitoring kit Use 1 each Daily. Dx: E11.49 patient checks sugar 4 x day 1 each 0    HumaLOG KwikPen 100 UNIT/ML solution pen-injector INJECT 15 UNITS SUBCUTANEOUSLY THREE TIMES DAILY 15  MINUTES  BEFORE  MEALS  OR  IMMEDIATELY  FOLLOWING  MEALS 15 mL 0    HYDROcodone-acetaminophen (NORCO)  MG per tablet Take 1 tablet by mouth Every 6 (Six) Hours As Needed for Moderate Pain or Severe Pain. 30 tablet 0    Insulin Glargine (BASAGLAR KWIKPEN SC) Inject 38 Units under the skin into the appropriate area as directed Every Night.      Insulin Glargine (BASAGLAR KWIKPEN) 100 UNIT/ML injection pen Inject 40 Units under the skin into the appropriate area as directed Every Night. 4 Pen 11    Lancets misc Use 1 each 4 (Four) Times a Day. Dx: E11.49 patient checks sugar 4 x day 200 each 12    losartan (COZAAR) 100 MG tablet Take 1 tablet by mouth Daily. 90 tablet 3    methocarbamol (ROBAXIN) 750 MG tablet Take 1 tablet by mouth 4 (Four) Times a Day As Needed for Muscle Spasms. 360 tablet 3    montelukast (SINGULAIR) 10 MG tablet TAKE 1 TABLET BY MOUTH ONCE DAILY AT NIGHT 30 tablet 0    naloxone (NARCAN) 4 MG/0.1ML nasal spray Call 911. Don't prime. Sawyer in 1 nostril for overdose. Repeat in 2-3 minutes in other nostril if no or minimal breathing/responsiveness. 2 each 0    oxybutynin XL (DITROPAN XL) 15 MG 24 hr tablet Take 1 tablet by mouth Daily. 90 tablet 3    pantoprazole (Protonix) 40 MG EC tablet Take 1 tablet by mouth  Daily. 90 tablet 3    potassium chloride (MICRO-K) 10 MEQ CR capsule Take 1 capsule by mouth Daily. 90 capsule 3    promethazine (PHENERGAN) 25 MG tablet Take 1 tablet by mouth Every 6 (Six) Hours As Needed for Nausea or Vomiting. 90 tablet 0    sildenafil (REVATIO) 20 MG tablet Take 1 tablet by mouth Daily As Needed (ed). 30 tablet 3    sitaGLIPtin-metFORMIN (Janumet)  MG per tablet Take 1 tablet by mouth 2 (Two) Times a Day With Meals. 180 tablet 3    sodium chloride (Ayr) 0.65 % nasal spray USE 1 DOSE IN EACH NOSTRIL THREE TIMES DAILY 50 mL 0    Sodium Fluoride (Denta 5000 Plus) 1.1 % cream Apply 1 Application to teeth 2 (Two) Times a Day. 102 g 5    [DISCONTINUED] BD Pen Needle Kyra 2nd Gen 32G X 4 MM misc USE 1 PEN NEEDLE 4 TIMES DAILY  BEFORE MEAL(S) AND AT BEDTIME 200 each 12     No current facility-administered medications on file prior to visit.     Current outpatient and discharge medications have been reconciled for the patient.  Reviewed by: Eric Rosenberg MD      Allergies   Allergen Reactions    Penicillin G Anaphylaxis    Penicillins Anaphylaxis    Sulfa Antibiotics Hives     Was told by mother possible anaphylaxis    Allopurinol Rash       Review of Systems   Constitutional:  Negative for activity change, appetite change, fatigue and fever.   HENT:  Negative for ear pain, swollen glands and voice change.    Eyes:  Negative for visual disturbance.   Respiratory:  Negative for shortness of breath and wheezing.    Cardiovascular:  Negative for chest pain and leg swelling.   Gastrointestinal:  Negative for abdominal pain, blood in stool, constipation, diarrhea, nausea and vomiting.   Endocrine: Negative for polydipsia and polyuria.   Genitourinary:  Negative for dysuria, frequency and hematuria.   Musculoskeletal:  Negative for joint swelling, neck pain and neck stiffness.   Skin:  Negative for rash and wound.   Neurological:  Negative for weakness, numbness and headache.  "  Psychiatric/Behavioral:  Negative for suicidal ideas and depressed mood.      I have reviewed and confirmed the accuracy of the ROS as documented by the MA/LPN/RN Eric Rosenberg MD    Objective   Visit Vitals  /82 (BP Location: Right arm, Patient Position: Sitting, Cuff Size: Adult)   Pulse 91   Temp 97.5 °F (36.4 °C) (Temporal)   Resp 18   Ht 78 cm (30.71\")   Wt 132 kg (290 lb 9.6 oz)   SpO2 98%   .65 kg/m²      **  Physical Exam  Constitutional:       Appearance: He is well-developed.   HENT:      Head: Normocephalic and atraumatic.      Right Ear: External ear normal.      Left Ear: External ear normal.      Nose: Nose normal.   Eyes:      Pupils: Pupils are equal, round, and reactive to light.   Cardiovascular:      Rate and Rhythm: Normal rate and regular rhythm.      Heart sounds: Normal heart sounds.   Pulmonary:      Effort: Pulmonary effort is normal.      Breath sounds: Normal breath sounds.   Abdominal:      General: Bowel sounds are normal.      Palpations: Abdomen is soft.          Comments: Area of tenderness with palpation   Musculoskeletal:         General: Normal range of motion.      Cervical back: Normal range of motion and neck supple.   Skin:     General: Skin is warm and dry.   Neurological:      Mental Status: He is alert and oriented to person, place, and time.   Psychiatric:         Behavior: Behavior normal.         Thought Content: Thought content normal.         Judgment: Judgment normal.       Physical Exam   Abdomen   Abdomen comments: Area of tenderness with palpation      Ortho Exam   Neurologic Exam     Mental Status   Oriented to person, place, and time.     Cranial Nerves     CN III, IV, VI   Pupils are equal, round, and reactive to light.         Diagnoses and all orders for this visit:    1. Type 2 diabetes mellitus with other neurologic complication, with long-term current use of insulin (Primary)  Overview:  Basalglar 30 units daily and Lispro 10 u tid " with meals     Orders:  -     POCT urinalysis dipstick, manual  -     POCT microalbumin    2. Intercostal muscle strain, initial encounter  Comments:  reassurance    Findings discussed. All questions answered.  .Natural course and self-limited nature of this condition discussed.  Follow-up for routine health maintenance as indicated.  Follow up in 6 months for recheck, sooner for worsening symptoms or any concerns.             Expected course, medications, and adverse effects discussed as appropriate.  Call or return if worsening or persistent symptoms.     This document is intended for medical professional use only.   Electronically signed by Eric Rosenberg MD on 10/04/2024. This content may not have been proofread.

## 2024-10-04 NOTE — TELEPHONE ENCOUNTER
Received fax from [facility] in [place] requesting PA on Pantoprazole 40mg Tabs. PA submitted online thru Covermymeds. Med approved. PA Case: [id number], Status: Approved, Coverage Starts on: 10/4/2024 12:00:00 AM, Coverage Ends on: 10/4/2025 12:00:00 AM. Notified pharmacy.

## 2024-10-08 DIAGNOSIS — Z79.4 TYPE 2 DIABETES MELLITUS WITH OTHER NEUROLOGIC COMPLICATION, WITH LONG-TERM CURRENT USE OF INSULIN: ICD-10-CM

## 2024-10-08 DIAGNOSIS — J30.2 OTHER SEASONAL ALLERGIC RHINITIS: ICD-10-CM

## 2024-10-08 DIAGNOSIS — I10 HYPERTENSION, BENIGN: ICD-10-CM

## 2024-10-08 DIAGNOSIS — E11.49 TYPE 2 DIABETES MELLITUS WITH OTHER NEUROLOGIC COMPLICATION, WITH LONG-TERM CURRENT USE OF INSULIN: ICD-10-CM

## 2024-10-08 DIAGNOSIS — J30.89 NON-SEASONAL ALLERGIC RHINITIS DUE TO OTHER ALLERGIC TRIGGER: ICD-10-CM

## 2024-10-09 ENCOUNTER — PATIENT ROUNDING (BHMG ONLY) (OUTPATIENT)
Dept: URGENT CARE | Facility: CLINIC | Age: 44
End: 2024-10-09
Payer: MEDICAID

## 2024-10-09 ENCOUNTER — OFFICE VISIT (OUTPATIENT)
Dept: FAMILY MEDICINE CLINIC | Facility: CLINIC | Age: 44
End: 2024-10-09
Payer: MEDICAID

## 2024-10-09 VITALS
SYSTOLIC BLOOD PRESSURE: 150 MMHG | DIASTOLIC BLOOD PRESSURE: 90 MMHG | TEMPERATURE: 98.4 F | WEIGHT: 292 LBS | RESPIRATION RATE: 8 BRPM | HEART RATE: 96 BPM | HEIGHT: 75 IN | BODY MASS INDEX: 36.31 KG/M2 | OXYGEN SATURATION: 97 %

## 2024-10-09 DIAGNOSIS — E66.811 CLASS 1 OBESITY DUE TO EXCESS CALORIES WITH SERIOUS COMORBIDITY AND BODY MASS INDEX (BMI) OF 34.0 TO 34.9 IN ADULT: ICD-10-CM

## 2024-10-09 DIAGNOSIS — L03.311 CELLULITIS OF ABDOMINAL WALL: ICD-10-CM

## 2024-10-09 DIAGNOSIS — I10 HYPERTENSION, BENIGN: ICD-10-CM

## 2024-10-09 DIAGNOSIS — E11.49 TYPE 2 DIABETES MELLITUS WITH OTHER NEUROLOGIC COMPLICATION, WITH LONG-TERM CURRENT USE OF INSULIN: ICD-10-CM

## 2024-10-09 DIAGNOSIS — B02.9 HERPES ZOSTER WITHOUT COMPLICATION: Primary | ICD-10-CM

## 2024-10-09 DIAGNOSIS — E66.09 CLASS 1 OBESITY DUE TO EXCESS CALORIES WITH SERIOUS COMORBIDITY AND BODY MASS INDEX (BMI) OF 34.0 TO 34.9 IN ADULT: ICD-10-CM

## 2024-10-09 DIAGNOSIS — Z98.890 HISTORY OF DISCECTOMY: ICD-10-CM

## 2024-10-09 DIAGNOSIS — Z79.4 TYPE 2 DIABETES MELLITUS WITH OTHER NEUROLOGIC COMPLICATION, WITH LONG-TERM CURRENT USE OF INSULIN: ICD-10-CM

## 2024-10-09 DIAGNOSIS — L29.9 PRURITUS: ICD-10-CM

## 2024-10-09 PROCEDURE — 1125F AMNT PAIN NOTED PAIN PRSNT: CPT | Performed by: FAMILY MEDICINE

## 2024-10-09 PROCEDURE — 99214 OFFICE O/P EST MOD 30 MIN: CPT | Performed by: FAMILY MEDICINE

## 2024-10-09 PROCEDURE — 3051F HG A1C>EQUAL 7.0%<8.0%: CPT | Performed by: FAMILY MEDICINE

## 2024-10-09 PROCEDURE — 1160F RVW MEDS BY RX/DR IN RCRD: CPT | Performed by: FAMILY MEDICINE

## 2024-10-09 PROCEDURE — 3080F DIAST BP >= 90 MM HG: CPT | Performed by: FAMILY MEDICINE

## 2024-10-09 PROCEDURE — 1159F MED LIST DOCD IN RCRD: CPT | Performed by: FAMILY MEDICINE

## 2024-10-09 PROCEDURE — 3077F SYST BP >= 140 MM HG: CPT | Performed by: FAMILY MEDICINE

## 2024-10-09 RX ORDER — HYDROXYZINE HYDROCHLORIDE 25 MG/1
25 TABLET, FILM COATED ORAL 4 TIMES DAILY PRN
COMMUNITY
End: 2024-10-09 | Stop reason: SDUPTHER

## 2024-10-09 RX ORDER — ACYCLOVIR 800 MG/1
800 TABLET ORAL
Qty: 50 TABLET | Refills: 1 | Status: SHIPPED | OUTPATIENT
Start: 2024-10-09

## 2024-10-09 RX ORDER — INSULIN GLARGINE 100 [IU]/ML
40 INJECTION, SOLUTION SUBCUTANEOUS NIGHTLY
Qty: 4 PEN | Refills: 11 | Status: SHIPPED | OUTPATIENT
Start: 2024-10-09

## 2024-10-09 RX ORDER — HYDROXYZINE HYDROCHLORIDE 25 MG/1
25 TABLET, FILM COATED ORAL 4 TIMES DAILY PRN
Qty: 90 TABLET | Refills: 12 | Status: SHIPPED | OUTPATIENT
Start: 2024-10-09

## 2024-10-09 RX ORDER — SEMAGLUTIDE 1.34 MG/ML
0.25 INJECTION, SOLUTION SUBCUTANEOUS WEEKLY
Qty: 3 ML | Refills: 12 | Status: SHIPPED | OUTPATIENT
Start: 2024-10-09

## 2024-10-09 NOTE — PROGRESS NOTES
Chief Complaint  Rash (First started around 9/18/2024, Worsening, Seen at [facility] on 10/8/2024), Diabetes (Last A1C was 7.76 on 9/6/2024), Hypertension, and Hyperlipidemia    Subjective     CC  Problem List  Visit Diagnosis   Encounters  Notes  Medications  Labs  Result Review Imaging  Media    Barrett Laguerre presents to [facility] for   History of Present Illness  Barrett was seen at [facility] urgent care  on 10/8/2024 . He was seen for rash and herpes zoster. Labs that were performed during the encounter included: none. Diagnostic studies that were performed included: none . Medication changes: patient was given Doxycycline 100 mcg daily,   methylPREDNISolone (MEDROL) 4 MG dose pack, as well as valtrex but his insurance has denied this ( pharmacy gave him a courtesy 3 day supply of this )     Patient was seen at urgent care for a rash that was described as :Papulovesicular rash on the left mid back and extending to let lateral chest area on erythematous base. Rash is following dermatomal pattern.Patient has some excoriation marks in some area with swelling. Concern for mild secondary bacterial infection for greater than 2 days that is worsening around his left chest and going to back now. He said he had lumbar discectomy done on 9/16 and then had some diffuse rash on chest chest and back that is still there, but now the rash around the left chest , going to back area is painful also. Denies any fever, chills, n/v/d or abdominal pain. No chest pain or shortness of breath. Initial BP high today, says he was very upset because he went to Dermatology office for his rash and was not seen because of his insurance and did not tell him ahead of time.     He was given antibiotic and steroids and a medication that is used for shingles but his insurance will not cover this. He has been taking Hydroxyzine 25mg for itching  Diabetes  He presents for his  follow-up diabetic visit. He has type 2 diabetes mellitus. The initial diagnosis of diabetes was made 4 years ago. Pertinent negatives for diabetes include no chest pain, no fatigue, no polydipsia, no polyuria and no weakness. There are no hypoglycemic complications. There are no diabetic complications. Risk factors for coronary artery disease include diabetes mellitus, male sex, sedentary lifestyle and hypertension. Current diabetic treatment includes insulin injections and oral agent (dual therapy). He has not had a previous visit with a dietitian. (his last A1c was 7.7 on 9/6/2024 and he has a continuous monitor  ) An ACE inhibitor/angiotensin II receptor blocker is not being taken. He does not see a podiatrist.Eye exam is not current.   Hypertension  This is a chronic problem. The current episode started more than 1 year ago. Pertinent negatives include no chest pain, neck pain, palpitations or shortness of breath. Risk factors for coronary artery disease include diabetes mellitus and obesity. Current antihypertension treatment includes calcium channel blockers and angiotensin blockers.   Hyperlipidemia  This is a chronic problem. The current episode started more than 1 year ago. Recent lipid tests were reviewed and are variable. Exacerbating diseases include diabetes and obesity. There are no known factors aggravating his hyperlipidemia. Pertinent negatives include no chest pain or shortness of breath. The current treatment provides mild improvement of lipids. There are no compliance problems.  Risk factors for coronary artery disease include family history, diabetes mellitus, hypertension, obesity and male sex.   Rash  This is a recurrent problem. The current episode started 1 to 4 weeks ago. The problem has been worse since onset. The affected locations include the back, chest, left lower leg, left ankle, right arm and left arm. The rash is characterized by itchiness, pain and redness (rash feels warm).  "Associated with: recent back surgery on 9/16/2024. Pertinent negatives include no congestion, cough, fatigue, fever, rhinorrhea or shortness of breath. Treatments tried: Urgent presscribed Valacyclovir 1gm, [id number], and Methylprednisolone 4mg. The treatment provided mild relief.       Review of Systems   Constitutional:  Negative for fatigue and fever.   HENT:  Negative for congestion and rhinorrhea.    Eyes:  Negative for visual disturbance.   Respiratory:  Negative for cough and shortness of breath.    Cardiovascular:  Negative for chest pain and palpitations.   Endocrine: Negative for cold intolerance, heat intolerance, polydipsia and polyuria.   Genitourinary:  Negative for dysuria.   Musculoskeletal:  Negative for neck pain.   Skin:  Positive for rash (left lateral mid back and abdomin.).   Allergic/Immunologic: Negative for environmental allergies.   Neurological:  Negative for weakness.   Hematological:  Negative for adenopathy. Does not bruise/bleed easily.        Objective   Vital Signs:   /90 (BP Location: Right arm, Patient Position: Sitting, Cuff Size: Adult)   Pulse 96   Temp 98.4 °F (36.9 °C) (Temporal)   Resp 8   Ht 189.7 cm (74.69\")   Wt 132 kg (292 lb)   SpO2 97%   BMI 36.81 kg/m²     Physical Exam  Constitutional:       General: He is not in acute distress.  Cardiovascular:      Rate and Rhythm: Normal rate and regular rhythm.      Heart sounds: No murmur heard.  Pulmonary:      Effort: Pulmonary effort is normal.      Breath sounds: Normal breath sounds. No wheezing.   Musculoskeletal:      Cervical back: Neck supple.   Lymphadenopathy:      Cervical: No cervical adenopathy.   Skin:     Findings: Rash (There is a drying vesicular rash over the left lateral mid chest wall consistent with HZ there is a fine maculopapular rash over the whole trunk suggestive of cellulitis it too is dry.) present.   Neurological:      Mental Status: He is alert.        Result Review :Labs  " Result Review  Imaging  Med Tab  Media                 Assessment and Plan CC Problem List  Visit Diagnosis  ROS  Review (Popup)  Health Maintenance  Quality  BestPractice  Medications  SmartSets  SnapShot Encounters  Media  Problem List Items Addressed This Visit          Unprioritized    Diabetes    Overview     Basalglar 38 units daily and Humalog 20 u tid with meals   A1c 7.6 09/06/2024         Relevant Medications    Semaglutide,0.25 or 0.5MG/DOS, (Ozempic, 0.25 or 0.5 MG/DOSE,) 2 MG/1.5ML solution pen-injector    Insulin Glargine (BASAGLAR KWIKPEN) 100 UNIT/ML injection pen    Hypertension, benign    Overview     Marginally controlled with illness.  He will monitor low salt wt reducing diet  He is also followed with cardiology   F/u 2 mo         Class 1 obesity due to excess calories with serious comorbidity and body mass index (BMI) of 34.0 to 34.9 in adult    History of discectomy    Pruritus    Overview     Continue hydroxyzine         Relevant Medications    hydrOXYzine (ATARAX) 25 MG tablet     Other Visit Diagnoses       Herpes zoster without complication    -  Primary    vs cellulitis will cover for both he will f/u if no improvement    Relevant Medications    acyclovir (ZOVIRAX) 800 MG tablet    Cellulitis of abdominal wall        complete doxycycline            Follow Up Instructions Charge Capture  Follow-up Communications  No follow-ups on file.  Patient was given instructions and counseling regarding his condition or for health maintenance advice. Please see specific information pulled into the AVS if appropriate.

## 2024-10-09 NOTE — ED NOTES
Thank you for letting us care for you in your recent visit to our urgent care center. We would love to hear about your experience with us. Was this the first time you have visited our location?    We’re always looking for ways to make our patients’ experiences even better. Do you have any recommendations on ways we may improve?     I appreciate you taking the time to respond. Please be on the lookout for a survey about your recent visit from [employer] via text or email. We would greatly appreciate if you could fill that out and turn it back in. We want your voice to be heard and we value your feedback.   Thank you for choosing [facility] for your healthcare needs.

## 2024-10-10 DIAGNOSIS — J30.2 OTHER SEASONAL ALLERGIC RHINITIS: ICD-10-CM

## 2024-10-10 RX ORDER — LORATADINE 10 MG/1
10 TABLET ORAL DAILY
Qty: 30 TABLET | Refills: 0 | OUTPATIENT
Start: 2024-10-10

## 2024-10-10 RX ORDER — AMLODIPINE BESYLATE 10 MG/1
10 TABLET ORAL NIGHTLY
Qty: 30 TABLET | Refills: 0 | Status: SHIPPED | OUTPATIENT
Start: 2024-10-10

## 2024-10-10 RX ORDER — LORATADINE 10 MG/1
10 TABLET ORAL DAILY
Qty: 90 TABLET | Refills: 0 | Status: SHIPPED | OUTPATIENT
Start: 2024-10-10

## 2024-10-10 RX ORDER — MONTELUKAST SODIUM 10 MG/1
10 TABLET ORAL NIGHTLY
Qty: 30 TABLET | Refills: 0 | Status: SHIPPED | OUTPATIENT
Start: 2024-10-10

## 2024-10-17 ENCOUNTER — TELEPHONE (OUTPATIENT)
Dept: FAMILY MEDICINE CLINIC | Facility: CLINIC | Age: 44
End: 2024-10-17

## 2024-10-17 NOTE — TELEPHONE ENCOUNTER
Received fax requesting PA on Ozempic. Need to fax office note for PA-waiting on note from 10-9 to be signed off.

## 2024-10-21 ENCOUNTER — TELEPHONE (OUTPATIENT)
Dept: NEUROSURGERY | Facility: CLINIC | Age: 44
End: 2024-10-21
Payer: MEDICAID

## 2024-10-21 NOTE — TELEPHONE ENCOUNTER
His first appointment with PT to be evaluated is going to be November the 5th and he just wanted to give an update.

## 2024-10-22 ENCOUNTER — OFFICE VISIT (OUTPATIENT)
Dept: PAIN MEDICINE | Facility: CLINIC | Age: 44
End: 2024-10-22
Payer: MEDICAID

## 2024-10-22 VITALS
DIASTOLIC BLOOD PRESSURE: 84 MMHG | WEIGHT: 293 LBS | RESPIRATION RATE: 16 BRPM | OXYGEN SATURATION: 99 % | BODY MASS INDEX: 36.93 KG/M2 | HEART RATE: 86 BPM | SYSTOLIC BLOOD PRESSURE: 121 MMHG

## 2024-10-22 DIAGNOSIS — M54.42 CHRONIC MIDLINE LOW BACK PAIN WITH BILATERAL SCIATICA: Primary | ICD-10-CM

## 2024-10-22 DIAGNOSIS — M51.362 DEGENERATION OF INTERVERTEBRAL DISC OF LUMBAR REGION WITH DISCOGENIC BACK PAIN AND LOWER EXTREMITY PAIN: ICD-10-CM

## 2024-10-22 DIAGNOSIS — M54.16 LUMBAR RADICULOPATHY: ICD-10-CM

## 2024-10-22 DIAGNOSIS — G89.29 CHRONIC MIDLINE LOW BACK PAIN WITH BILATERAL SCIATICA: Primary | ICD-10-CM

## 2024-10-22 DIAGNOSIS — M48.07 LUMBOSACRAL SPINAL STENOSIS: ICD-10-CM

## 2024-10-22 DIAGNOSIS — M54.41 CHRONIC MIDLINE LOW BACK PAIN WITH BILATERAL SCIATICA: Primary | ICD-10-CM

## 2024-10-22 DIAGNOSIS — Z98.890 STATUS POST LUMBAR SPINE OPERATIVE PROCEDURE FOR DECOMPRESSION OF SPINAL CORD: ICD-10-CM

## 2024-10-22 PROCEDURE — 1159F MED LIST DOCD IN RCRD: CPT | Performed by: PHYSICAL MEDICINE & REHABILITATION

## 2024-10-22 PROCEDURE — 1160F RVW MEDS BY RX/DR IN RCRD: CPT | Performed by: PHYSICAL MEDICINE & REHABILITATION

## 2024-10-22 PROCEDURE — 3079F DIAST BP 80-89 MM HG: CPT | Performed by: PHYSICAL MEDICINE & REHABILITATION

## 2024-10-22 PROCEDURE — 1125F AMNT PAIN NOTED PAIN PRSNT: CPT | Performed by: PHYSICAL MEDICINE & REHABILITATION

## 2024-10-22 PROCEDURE — 99214 OFFICE O/P EST MOD 30 MIN: CPT | Performed by: PHYSICAL MEDICINE & REHABILITATION

## 2024-10-22 PROCEDURE — 3074F SYST BP LT 130 MM HG: CPT | Performed by: PHYSICAL MEDICINE & REHABILITATION

## 2024-10-22 RX ORDER — HYDROCODONE BITARTRATE AND ACETAMINOPHEN 10; 325 MG/1; MG/1
1 TABLET ORAL EVERY 6 HOURS PRN
Qty: 30 TABLET | Refills: 0 | Status: SHIPPED | OUTPATIENT
Start: 2024-10-22

## 2024-10-22 RX ORDER — HYDROCODONE BITARTRATE AND ACETAMINOPHEN 10; 325 MG/1; MG/1
1 TABLET ORAL EVERY 6 HOURS PRN
Qty: 120 TABLET | Refills: 0 | Status: SHIPPED | OUTPATIENT
Start: 2024-10-22
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
## 2024-11-01 DIAGNOSIS — Z98.890 STATUS POST LUMBAR SPINE OPERATIVE PROCEDURE FOR DECOMPRESSION OF SPINAL CORD: ICD-10-CM

## 2024-11-01 RX ORDER — HYDROCODONE BITARTRATE AND ACETAMINOPHEN 10; 325 MG/1; MG/1
1 TABLET ORAL EVERY 6 HOURS PRN
Qty: 120 TABLET | Refills: 0 | Status: SHIPPED | OUTPATIENT
Start: 2024-11-01

## 2024-11-06 ENCOUNTER — TELEPHONE (OUTPATIENT)
Dept: FAMILY MEDICINE CLINIC | Facility: CLINIC | Age: 44
End: 2024-11-06

## 2024-11-06 NOTE — TELEPHONE ENCOUNTER
Pharmacy Name:      [facility] - [place], [place] - [address] [place] [address] - [phone]  - [phone] FX (Pharmacy) [phone] (Work)       What medication are you calling in regards to: GABAPENTIN    What question does the pharmacy have:PATIENT IS ON GABAPENTIN, HYDROCODONE AND TYLENOL 3    THEY WANTED TO KNOW IF YOU WERE AWARE AND DOES HE STILL CONTINUE WITH GABAPENTIN     Who is the provider that prescribed the medication:  THEY STATED IT WAS DR RIVERA     Additional notes:

## 2024-11-08 ENCOUNTER — OFFICE VISIT (OUTPATIENT)
Dept: FAMILY MEDICINE CLINIC | Facility: CLINIC | Age: 44
End: 2024-11-08
Payer: MEDICAID

## 2024-11-08 ENCOUNTER — TELEPHONE (OUTPATIENT)
Dept: FAMILY MEDICINE CLINIC | Facility: CLINIC | Age: 44
End: 2024-11-08

## 2024-11-08 VITALS
SYSTOLIC BLOOD PRESSURE: 128 MMHG | RESPIRATION RATE: 18 BRPM | HEART RATE: 115 BPM | OXYGEN SATURATION: 98 % | WEIGHT: 290.8 LBS | BODY MASS INDEX: 57.09 KG/M2 | TEMPERATURE: 96.4 F | DIASTOLIC BLOOD PRESSURE: 78 MMHG | HEIGHT: 60 IN

## 2024-11-08 DIAGNOSIS — J06.9 UPPER RESPIRATORY TRACT INFECTION, UNSPECIFIED TYPE: Primary | ICD-10-CM

## 2024-11-08 RX ORDER — AZITHROMYCIN 250 MG/1
TABLET, FILM COATED ORAL
Qty: 6 TABLET | Refills: 0 | Status: SHIPPED | OUTPATIENT
Start: 2024-11-08 | End: 2024-11-08

## 2024-11-08 RX ORDER — KETOCONAZOLE 20 MG/G
CREAM TOPICAL
COMMUNITY
Start: 2024-11-04

## 2024-11-08 RX ORDER — CEPHALEXIN 500 MG/1
500 CAPSULE ORAL 3 TIMES DAILY
Qty: 30 CAPSULE | Refills: 0 | Status: SHIPPED | OUTPATIENT
Start: 2024-11-08 | End: 2024-11-18

## 2024-11-08 RX ORDER — TERBINAFINE HYDROCHLORIDE 250 MG/1
TABLET ORAL
COMMUNITY
Start: 2024-11-04

## 2024-11-08 RX ORDER — KETOCONAZOLE 20 MG/ML
SHAMPOO TOPICAL
COMMUNITY
Start: 2024-11-04

## 2024-11-08 NOTE — TELEPHONE ENCOUNTER
Caller: [facility] [address] [place] [address] - [phone]  - [phone] FX     Relationship: [unfilled]        What is your medical concern? azithromycin (ZITHROMAX) 250 MG tablet [[id number]] (Order [id number])     DRUG INTERACTION   SYSTEM CRASH COULD NOT RECONNECT WITH [facility]

## 2024-11-08 NOTE — PROGRESS NOTES
Subjective   Barrett Laguerre is a 44 y.o. male.   Chief Complaint   Patient presents with    Rash    URI       History of Present Illness  Patient here to follow up on skin issues. Also c/o runny nose, h/a, sore throat x 1 week.        The following portions of the patient's history were reviewed and updated as appropriate: allergies, current medications, past family history, past medical history, past social history, past surgical history, and problem list  .    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[place]    Sleep apnea    Attention deficit disorder    Diabetes    Other seasonal allergic rhinitis    Mixed obsessional thoughts and acts    Hypertension, benign    Disorder of lipid metabolism    CIDP (chronic inflammatory demyelinating polyneuropathy)    Lumbar radiculopathy    Onychomycosis    Annual physical exam    Class 1 obesity due to excess calories with serious comorbidity and body mass index (BMI) of 34.0 to 34.9 in adult    Acute midline low back pain with left-sided sciatica    Degeneration of intervertebral disc of lumbar region with discogenic back pain and lower extremity pain    History of discectomy    Lumbar disc herniation    Pruritus       Current Outpatient Medications on File Prior to Visit   Medication Sig Dispense Refill    acetaminophen-codeine (TYLENOL/CODEINE #3) 300-30 MG per tablet Take 1 tablet by mouth 3 (Three) Times a Day As Needed for Mild Pain. 21 tablet 2    acyclovir (ZOVIRAX) 800 MG tablet Take 1 tablet by mouth 5 (Five) Times a Day. 50 tablet 1    albuterol sulfate  (90 Base) MCG/ACT inhaler Inhale 2 puffs Every 4 (Four) Hours As Needed for Wheezing. 6.7 g 0    Alcohol Swabs (B-D SINGLE USE SWABS REGULAR) pads Apply 1 each topically to the appropriate area as  directed Daily. 100 each 12    amLODIPine (NORVASC) 10 MG tablet TAKE 1 TABLET BY MOUTH ONCE DAILY AT NIGHT 30 tablet 0    Azelastine HCl 137 MCG/SPRAY solution 2 sprays into the nostril(s) as directed by provider 2 (Two) Times a Day. 60 mL 2    chlorthalidone (HYGROTEN) 50 MG tablet Take 1 tablet by mouth Daily. 90 tablet 3    colchicine-probenecid (COL-BENEMID) 0.5-500 MG per tablet Take 1 tablet by mouth Daily. 30 tablet 3    Continuous Blood Gluc  (Dexcom G6 ) device 1  ONCE DAILY 1 each 0    Continuous Glucose Sensor (Dexcom G6 Sensor) Use Every 10 (Ten) Days. 3 each 12    Continuous Glucose Transmitter (Dexcom G6 Transmitter) misc Use 1 each Every 3 (Three) Months. 1 each 3    Cyanocobalamin (Vitamin B-12 ER) 1000 MCG tablet controlled-release TAKE 1 TABLET DAILY 30 each 5    Diclofenac Epolamine (FLECTOR) 1.3 % patch patch Apply 1 patch topically to the appropriate area as directed 2 (Two) Times a Day As Needed (back pain). 60 patch 11    doxycycline (MONODOX) 100 MG capsule Take one capsule po bid for 10 days 20 capsule 0    Emtricitabine-Tenofovir AF (Descovy) 200-25 MG per tablet Take 1 tablet by mouth Daily.      FLUoxetine (PROzac) 40 MG capsule Take 1 capsule by mouth Every Night. 90 capsule 3    fluticasone (FLONASE) 50 MCG/ACT nasal spray 1 spray into the nostril(s) as directed by provider 2 (Two) Times a Day. 16 mL 3    gabapentin (NEURONTIN) 300 MG capsule Take 1 capsule by mouth 3 (Three) Times a Day. 270 capsule 1    glucose blood test strip USE 1 STRIP TO CHECK GLUCOSE 4 TIMES DAILY 100 each 12    glucose monitor monitoring kit Use 1 each Daily. Dx: E11.49 patient checks sugar 4 x day 1 each 0    HumaLOG KwikPen 100 UNIT/ML solution pen-injector INJECT 15 UNITS SUBCUTANEOUSLY THREE TIMES DAILY 15  MINUTES  BEFORE  MEALS  OR  IMMEDIATELY  FOLLOWING  MEALS 15 mL 0    HYDROcodone-acetaminophen (Norco)  MG per tablet Take 1 tablet by mouth Every 6 (Six) Hours As Needed for  Moderate Pain. 120 tablet 0    HYDROcodone-acetaminophen (NORCO)  MG per tablet Take 1 tablet by mouth Every 6 (Six) Hours As Needed for Moderate Pain or Severe Pain. 120 tablet 0    hydrOXYzine (ATARAX) 25 MG tablet Take 1 tablet by mouth 4 (Four) Times a Day As Needed for Itching. 90 tablet 12    Insulin Glargine (BASAGLAR KWIKPEN) 100 UNIT/ML injection pen Inject 40 Units under the skin into the appropriate area as directed Every Night. 4 Pen 11    ketoconazole (NIZORAL) 2 % cream       ketoconazole (NIZORAL) 2 % shampoo       Lancets misc Use 1 each 4 (Four) Times a Day. Dx: E11.49 patient checks sugar 4 x day 200 each 12    loratadine (EQ All Day Allergy Relief) 10 MG tablet Take 1 tablet by mouth Daily. 90 tablet 0    losartan (COZAAR) 100 MG tablet Take 1 tablet by mouth Daily. 90 tablet 3    methocarbamol (ROBAXIN) 750 MG tablet Take 1 tablet by mouth 4 (Four) Times a Day As Needed for Muscle Spasms. 360 tablet 3    methylPREDNISolone (MEDROL) 4 MG dose pack Take as directed on package instructions. 21 tablet 0    montelukast (SINGULAIR) 10 MG tablet TAKE 1 TABLET BY MOUTH ONCE DAILY AT NIGHT 30 tablet 0    naloxone (NARCAN) 4 MG/0.1ML nasal spray Call 911. Don't prime. Birney in 1 nostril for overdose. Repeat in 2-3 minutes in other nostril if no or minimal breathing/responsiveness. 2 each 0    oxybutynin XL (DITROPAN XL) 15 MG 24 hr tablet Take 1 tablet by mouth Daily. 90 tablet 3    pantoprazole (Protonix) 40 MG EC tablet Take 1 tablet by mouth Daily. 90 tablet 3    potassium chloride (MICRO-K) 10 MEQ CR capsule Take 1 capsule by mouth Daily. 90 capsule 3    promethazine (PHENERGAN) 25 MG tablet Take 1 tablet by mouth Every 6 (Six) Hours As Needed for Nausea or Vomiting. 90 tablet 0    Semaglutide,0.25 or 0.5MG/DOS, (Ozempic, 0.25 or 0.5 MG/DOSE,) 2 MG/1.5ML solution pen-injector Inject 0.25 mg under the skin into the appropriate area as directed 1 (One) Time Per Week. 3 mL 12    sildenafil (REVATIO)  20 MG tablet Take 1 tablet by mouth Daily As Needed (ed). 30 tablet 3    sitaGLIPtin-metFORMIN (Janumet)  MG per tablet Take 1 tablet by mouth 2 (Two) Times a Day With Meals. 180 tablet 3    sodium chloride (Ayr) 0.65 % nasal spray USE 1 DOSE IN EACH NOSTRIL THREE TIMES DAILY 50 mL 0    Sodium Fluoride (Denta 5000 Plus) 1.1 % cream Apply 1 Application to teeth 2 (Two) Times a Day. 102 g 5    terbinafine (lamiSIL) 250 MG tablet        No current facility-administered medications on file prior to visit.     Current outpatient and discharge medications have been reconciled for the patient.  Reviewed by: Eric Rosenberg MD      Allergies   Allergen Reactions    Penicillin G Anaphylaxis    Penicillins Anaphylaxis    Sulfa Antibiotics Hives     Was told by mother possible anaphylaxis    Allopurinol Rash       Review of Systems   Constitutional:  Positive for fever. Negative for activity change, appetite change and fatigue.   HENT:  Positive for congestion and sore throat. Negative for ear pain, swollen glands and voice change.    Eyes:  Negative for visual disturbance.   Respiratory:  Negative for shortness of breath and wheezing.    Cardiovascular:  Negative for chest pain and leg swelling.   Gastrointestinal:  Negative for abdominal pain, blood in stool, constipation, diarrhea, nausea and vomiting.   Endocrine: Negative for polydipsia and polyuria.   Genitourinary:  Negative for dysuria, frequency and hematuria.   Musculoskeletal:  Negative for joint swelling, neck pain and neck stiffness.   Skin:  Negative for rash and wound.   Neurological:  Negative for weakness, numbness and headache.   Psychiatric/Behavioral:  Negative for suicidal ideas and depressed mood.      I have reviewed and confirmed the accuracy of the ROS as documented by the MA/LPN/RN Eric Rosenberg MD    Objective   Visit Vitals  /78 (BP Location: Right arm, Patient Position: Sitting, Cuff Size: Adult)   Pulse 115   Temp 96.4  "°F (35.8 °C) (Temporal)   Resp 18   Ht 74.6 cm (29.37\")   Wt 132 kg (290 lb 12.8 oz)   SpO2 98%   .02 kg/m²      **  Physical Exam  Constitutional:       Appearance: He is well-developed.   HENT:      Head: Normocephalic and atraumatic.      Right Ear: External ear normal.      Left Ear: External ear normal.      Nose: Nose normal. Congestion present.   Eyes:      Pupils: Pupils are equal, round, and reactive to light.   Cardiovascular:      Rate and Rhythm: Normal rate and regular rhythm.      Heart sounds: Normal heart sounds.   Pulmonary:      Effort: Pulmonary effort is normal.      Breath sounds: Normal breath sounds.   Abdominal:      General: Bowel sounds are normal.      Palpations: Abdomen is soft.   Musculoskeletal:         General: Normal range of motion.      Cervical back: Normal range of motion and neck supple.   Skin:     General: Skin is warm and dry.   Neurological:      Mental Status: He is alert and oriented to person, place, and time.   Psychiatric:         Behavior: Behavior normal.         Thought Content: Thought content normal.         Judgment: Judgment normal.       Derm Physical Exam  Ortho Exam   Neurological Exam  Mental Status  Alert. Oriented to person, place, and time.    Cranial Nerves  CN III, IV, VI: Pupils equal round and reactive to light bilaterally.         Assessment & Plan  Upper respiratory tract infection, unspecified type    Orders:    POCT SARS-CoV-2 + Flu Antigen ARMANI    cephalexin (KEFLEX) 500 MG capsule; Take 1 capsule by mouth 3 (Three) Times a Day for 10 days.    Findings discussed. All questions answered.  Medication and medication adverse effects discussed.  Drug education given and explained to patient. Patient verbalized understanding.  Follow-up for routine health maintenance as indicated.  Follow-up in 2 weeks if not better.  Follow-up sooner for worsening symptoms or for any concerns.    Differential diagnosis discussed.              Expected course, " medications, and adverse effects discussed as appropriate.  Call or return if worsening or persistent symptoms.     This document is intended for medical professional use only.   Electronically signed by Eric Rosenberg MD on 11/08/2024. This content may not have been proofread.

## 2024-11-13 DIAGNOSIS — E11.49 TYPE 2 DIABETES MELLITUS WITH OTHER NEUROLOGIC COMPLICATION, WITH LONG-TERM CURRENT USE OF INSULIN: ICD-10-CM

## 2024-11-13 DIAGNOSIS — Z79.4 TYPE 2 DIABETES MELLITUS WITH OTHER NEUROLOGIC COMPLICATION, WITH LONG-TERM CURRENT USE OF INSULIN: ICD-10-CM

## 2024-11-14 RX ORDER — INSULIN LISPRO 100 [IU]/ML
INJECTION, SOLUTION INTRAVENOUS; SUBCUTANEOUS
Qty: 15 ML | Refills: 0 | Status: SHIPPED | OUTPATIENT
Start: 2024-11-14

## 2024-11-16 DIAGNOSIS — Z79.4 TYPE 2 DIABETES MELLITUS WITH OTHER NEUROLOGIC COMPLICATION, WITH LONG-TERM CURRENT USE OF INSULIN: ICD-10-CM

## 2024-11-16 DIAGNOSIS — E11.49 TYPE 2 DIABETES MELLITUS WITH OTHER NEUROLOGIC COMPLICATION, WITH LONG-TERM CURRENT USE OF INSULIN: ICD-10-CM

## 2024-11-18 RX ORDER — ISOPROPYL ALCOHOL 0.75 G/1
1 SWAB TOPICAL DAILY
Qty: 100 EACH | Refills: 12 | Status: SHIPPED | OUTPATIENT
Start: 2024-11-18

## 2024-11-22 ENCOUNTER — TELEPHONE (OUTPATIENT)
Dept: FAMILY MEDICINE CLINIC | Facility: CLINIC | Age: 44
End: 2024-11-22
Payer: MEDICAID

## 2024-11-22 DIAGNOSIS — B02.9 HERPES ZOSTER WITHOUT COMPLICATION: ICD-10-CM

## 2024-11-22 RX ORDER — DOXYCYCLINE 100 MG/1
CAPSULE ORAL
Qty: 20 CAPSULE | Refills: 0 | Status: SHIPPED | OUTPATIENT
Start: 2024-11-22

## 2024-11-22 NOTE — TELEPHONE ENCOUNTER
Caller: Barrett Laguerre    Relationship: Self    Best call back number: [phone]    What medication are you requesting: PLEASE ADVISE    What are your current symptoms: SINUS DRAINAGE    How long have you been experiencing symptoms: ONGOING    Have you had these symptoms before:    [x] Yes  [] No    Have you been treated for these symptoms before:   [x] Yes  [] No    If a prescription is needed, what is your preferred pharmacy and phone number: [facility] [address] [place] [address] [phone] [facility] [phone] FX     Additional notes:  STATES HE CAN'T TAKE A ZPACK DUE TO ANOTHER MEDICATION THAT IT INTERACTS WITH.     HE WAS TREATED FOR THIS ISSUE BEFORE AND WAS ALMOST GONE BUT NOW HE IS OUT OF MEDS, AND IT IS BACK.     HE TRIED TO MAKE AN APPT , BUT NO AVAILABILITIES    PLEASE CALL TO ADVISE.

## 2024-11-23 DIAGNOSIS — J30.89 NON-SEASONAL ALLERGIC RHINITIS DUE TO OTHER ALLERGIC TRIGGER: ICD-10-CM

## 2024-11-25 RX ORDER — MONTELUKAST SODIUM 10 MG/1
10 TABLET ORAL NIGHTLY
Qty: 30 TABLET | Refills: 0 | Status: SHIPPED | OUTPATIENT
Start: 2024-11-25

## 2024-11-26 DIAGNOSIS — E11.49 TYPE 2 DIABETES MELLITUS WITH OTHER NEUROLOGIC COMPLICATION, WITH LONG-TERM CURRENT USE OF INSULIN: ICD-10-CM

## 2024-11-26 DIAGNOSIS — Z79.4 TYPE 2 DIABETES MELLITUS WITH OTHER NEUROLOGIC COMPLICATION, WITH LONG-TERM CURRENT USE OF INSULIN: ICD-10-CM

## 2024-11-27 RX ORDER — PROCHLORPERAZINE 25 MG/1
SUPPOSITORY RECTAL
Qty: 1 EACH | Refills: 0 | Status: SHIPPED | OUTPATIENT
Start: 2024-11-27

## 2024-12-09 ENCOUNTER — OFFICE VISIT (OUTPATIENT)
Dept: FAMILY MEDICINE CLINIC | Facility: CLINIC | Age: 44
End: 2024-12-09
Payer: MEDICAID

## 2024-12-09 VITALS
DIASTOLIC BLOOD PRESSURE: 106 MMHG | HEART RATE: 96 BPM | WEIGHT: 296 LBS | RESPIRATION RATE: 20 BRPM | HEIGHT: 60 IN | BODY MASS INDEX: 58.11 KG/M2 | OXYGEN SATURATION: 99 % | SYSTOLIC BLOOD PRESSURE: 152 MMHG

## 2024-12-09 DIAGNOSIS — M10.9 GOUT, UNSPECIFIED CAUSE, UNSPECIFIED CHRONICITY, UNSPECIFIED SITE: ICD-10-CM

## 2024-12-09 DIAGNOSIS — I10 HYPERTENSION, BENIGN: ICD-10-CM

## 2024-12-09 DIAGNOSIS — J30.2 OTHER SEASONAL ALLERGIC RHINITIS: ICD-10-CM

## 2024-12-09 DIAGNOSIS — E11.49 TYPE 2 DIABETES MELLITUS WITH OTHER NEUROLOGIC COMPLICATION, WITH LONG-TERM CURRENT USE OF INSULIN: Primary | ICD-10-CM

## 2024-12-09 DIAGNOSIS — Z79.4 TYPE 2 DIABETES MELLITUS WITH OTHER NEUROLOGIC COMPLICATION, WITH LONG-TERM CURRENT USE OF INSULIN: Primary | ICD-10-CM

## 2024-12-09 DIAGNOSIS — F39 MOOD DISORDER: ICD-10-CM

## 2024-12-09 DIAGNOSIS — R05.1 ACUTE COUGH: ICD-10-CM

## 2024-12-09 DIAGNOSIS — R11.0 NAUSEA: ICD-10-CM

## 2024-12-09 DIAGNOSIS — G61.0 GUILLAIN-BARRE: ICD-10-CM

## 2024-12-09 DIAGNOSIS — J01.00 ACUTE NON-RECURRENT MAXILLARY SINUSITIS: ICD-10-CM

## 2024-12-09 DIAGNOSIS — B35.9 TINEA: ICD-10-CM

## 2024-12-09 LAB
EXPIRATION DATE: ABNORMAL
HBA1C MFR BLD: 8.1 % (ref 4.5–5.7)
Lab: ABNORMAL

## 2024-12-09 PROCEDURE — 3052F HG A1C>EQUAL 8.0%<EQUAL 9.0%: CPT | Performed by: FAMILY MEDICINE

## 2024-12-09 PROCEDURE — 3080F DIAST BP >= 90 MM HG: CPT | Performed by: FAMILY MEDICINE

## 2024-12-09 PROCEDURE — 1126F AMNT PAIN NOTED NONE PRSNT: CPT | Performed by: FAMILY MEDICINE

## 2024-12-09 PROCEDURE — 99214 OFFICE O/P EST MOD 30 MIN: CPT | Performed by: FAMILY MEDICINE

## 2024-12-09 PROCEDURE — 83036 HEMOGLOBIN GLYCOSYLATED A1C: CPT | Performed by: FAMILY MEDICINE

## 2024-12-09 PROCEDURE — 3077F SYST BP >= 140 MM HG: CPT | Performed by: FAMILY MEDICINE

## 2024-12-09 RX ORDER — GUAIFENESIN/DEXTROMETHORPHAN 100-10MG/5
5 SYRUP ORAL 3 TIMES DAILY PRN
Qty: 118 ML | Refills: 0 | Status: SHIPPED | OUTPATIENT
Start: 2024-12-09

## 2024-12-09 RX ORDER — AZELASTINE HYDROCHLORIDE 137 UG/1
2 SPRAY, METERED NASAL 2 TIMES DAILY
Qty: 60 ML | Refills: 2 | Status: SHIPPED | OUTPATIENT
Start: 2024-12-09

## 2024-12-09 RX ORDER — LOSARTAN POTASSIUM 100 MG/1
100 TABLET ORAL DAILY
Qty: 90 TABLET | Refills: 3 | Status: SHIPPED | OUTPATIENT
Start: 2024-12-09

## 2024-12-09 RX ORDER — CEFPROZIL 500 MG/1
500 TABLET, FILM COATED ORAL 2 TIMES DAILY
Qty: 28 TABLET | Refills: 0 | Status: SHIPPED | OUTPATIENT
Start: 2024-12-09

## 2024-12-09 RX ORDER — TERBINAFINE HYDROCHLORIDE 250 MG/1
250 TABLET ORAL DAILY
Qty: 30 TABLET | Refills: 0 | Status: SHIPPED | OUTPATIENT
Start: 2024-12-09

## 2024-12-09 RX ORDER — AMLODIPINE BESYLATE 10 MG/1
10 TABLET ORAL NIGHTLY
Qty: 90 TABLET | Refills: 3 | Status: SHIPPED | OUTPATIENT
Start: 2024-12-09

## 2024-12-09 RX ORDER — PROMETHAZINE HYDROCHLORIDE 25 MG/1
25 TABLET ORAL EVERY 6 HOURS PRN
Qty: 90 TABLET | Refills: 0 | Status: SHIPPED | OUTPATIENT
Start: 2024-12-09

## 2024-12-09 RX ORDER — CHLORTHALIDONE 50 MG/1
25 TABLET ORAL DAILY
Qty: 90 TABLET | Refills: 3 | Status: SHIPPED | OUTPATIENT
Start: 2024-12-09

## 2024-12-09 RX ORDER — FLUOXETINE 40 MG/1
40 CAPSULE ORAL NIGHTLY
Qty: 90 CAPSULE | Refills: 3 | Status: SHIPPED | OUTPATIENT
Start: 2024-12-09

## 2024-12-09 RX ORDER — LORATADINE 10 MG/1
10 TABLET ORAL DAILY
Qty: 90 TABLET | Refills: 0 | Status: SHIPPED | OUTPATIENT
Start: 2024-12-09

## 2024-12-09 RX ORDER — GABAPENTIN 300 MG/1
300 CAPSULE ORAL 3 TIMES DAILY
Qty: 270 CAPSULE | Refills: 1 | Status: SHIPPED | OUTPATIENT
Start: 2024-12-09

## 2024-12-09 RX ORDER — PROBENECID AND COLCHICINE .5; 5 MG/1; MG/1
1 TABLET ORAL DAILY
Qty: 30 TABLET | Refills: 3 | Status: SHIPPED | OUTPATIENT
Start: 2024-12-09

## 2024-12-09 NOTE — ASSESSMENT & PLAN NOTE
Orders:    colchicine-probenecid (COL-BENEMID) 0.5-500 MG per tablet; Take 1 tablet by mouth Daily.

## 2024-12-09 NOTE — ASSESSMENT & PLAN NOTE
Orders:    gabapentin (NEURONTIN) 300 MG capsule; Take 1 capsule by mouth 3 (Three) Times a Day.

## 2024-12-09 NOTE — ASSESSMENT & PLAN NOTE
Orders:    losartan (COZAAR) 100 MG tablet; Take 1 tablet by mouth Daily.    amLODIPine (NORVASC) 10 MG tablet; Take 1 tablet by mouth Every Night.    chlorthalidone (HYGROTEN) 50 MG tablet; Take 0.5 tablets by mouth Daily.

## 2024-12-09 NOTE — PROGRESS NOTES
Subjective   Barrett Laguerre is a 44 y.o. male.   Chief Complaint   Patient presents with    Diabetes    Hypertension       History of Present Illness  Still having problems with congestion, needs refill on cough med and other meds    Has not started ozempic yet    Diabetes  He presents for his follow-up diabetic visit. He has type 2 diabetes mellitus. The initial diagnosis of diabetes was made 4 years ago. Pertinent negatives for diabetes include no chest pain, no fatigue, no polydipsia, no polyuria and no weakness. Risk factors for coronary artery disease include diabetes mellitus, male sex, sedentary lifestyle and hypertension. Current diabetic treatment includes insulin injections and oral agent (dual therapy). He has not had a previous visit with a dietitian. (his last A1c was 7.7 on 9/6/2024 and he has a continuous monitor  ) An ACE inhibitor/angiotensin II receptor blocker is not being taken. He does not see a podiatrist. Eye exam is not current.   Hypertension  This is a chronic problem. The current episode started more than 1 year ago. Pertinent negatives include no chest pain, neck pain, palpitations or shortness of breath. Risk factors for coronary artery disease include diabetes mellitus and obesity. Current antihypertension treatment includes calcium channel blockers and angiotensin blockers.   Hyperlipidemia  This is a chronic problem. The current episode started more than 1 year ago. Recent lipid tests were reviewed and are variable. Exacerbating diseases include diabetes and obesity. There are no known factors aggravating his hyperlipidemia. Pertinent negatives include no chest pain or shortness of breath. The current treatment provides mild improvement of lipids. There are no compliance problems.  Risk factors for coronary artery disease include family history, diabetes mellitus, hypertension, obesity and male sex.   Rash  This is a recurrent problem. The current episode started 1 to 4 weeks ago.  The problem has been worse since onset. The affected locations include the back, chest, left lower leg, left ankle, right arm and left arm. The rash is characterized by itchiness, pain and redness (rash feels warm). Associated with: recent back surgery on 9/16/2024. Pertinent negatives include no congestion, cough, diarrhea, fatigue, fever, rhinorrhea, shortness of breath or vomiting. Treatments tried: Urgent presscribed Valacyclovir 1gm, [id number], and Methylprednisolone 4mg. The treatment provided mild relief.        The following portions of the patient's history were reviewed and updated as appropriate: allergies, current medications, past family history, past medical history, past social history, past surgical history, and problem list.    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-[place]    Sleep apnea    Attention deficit disorder    Diabetes    Other seasonal allergic rhinitis    Mixed obsessional thoughts and acts    Hypertension, benign    Disorder of lipid metabolism    CIDP (chronic inflammatory demyelinating polyneuropathy)    Lumbar radiculopathy    Onychomycosis    Annual physical exam    Class 1 obesity due to excess calories with serious comorbidity and body mass index (BMI) of 34.0 to 34.9 in adult    Acute midline low back pain with left-sided sciatica    Degeneration of intervertebral disc of lumbar region with discogenic back pain and lower extremity pain    History of discectomy    Lumbar disc herniation    Pruritus       Current Outpatient Medications on File Prior to Visit   Medication Sig Dispense Refill    acetaminophen-codeine (TYLENOL/CODEINE #3) 300-30 MG per tablet Take 1 tablet by mouth 3 (Three) Times a Day As Needed for Mild Pain. 21 tablet 2    acyclovir (ZOVIRAX) 800 MG  tablet Take 1 tablet by mouth 5 (Five) Times a Day. 50 tablet 1    albuterol sulfate  (90 Base) MCG/ACT inhaler Inhale 2 puffs Every 4 (Four) Hours As Needed for Wheezing. 6.7 g 0    Alcohol Swabs (B-D SINGLE USE SWABS REGULAR) pads Apply 1 each topically to the appropriate area as directed Daily. 100 each 12    Continuous Glucose  (Dexcom G6 ) device USE UNIT TO CHECK BLOOD SUGAR ONCE DAILY AS DIRECTED // 1 UNIT SHOULD LAST 365 DAYS 1 each 0    Continuous Glucose Sensor (Dexcom G6 Sensor) Use Every 10 (Ten) Days. 3 each 12    Continuous Glucose Transmitter (Dexcom G6 Transmitter) misc Use 1 each Every 3 (Three) Months. 1 each 3    Cyanocobalamin (Vitamin B-12 ER) 1000 MCG tablet controlled-release TAKE 1 TABLET DAILY 30 each 5    Diclofenac Epolamine (FLECTOR) 1.3 % patch patch Apply 1 patch topically to the appropriate area as directed 2 (Two) Times a Day As Needed (back pain). 60 patch 11    Emtricitabine-Tenofovir AF (Descovy) 200-25 MG per tablet Take 1 tablet by mouth Daily.      fluticasone (FLONASE) 50 MCG/ACT nasal spray 1 spray into the nostril(s) as directed by provider 2 (Two) Times a Day. 16 mL 3    glucose blood test strip USE 1 STRIP TO CHECK GLUCOSE 4 TIMES DAILY 100 each 12    glucose monitor monitoring kit Use 1 each Daily. Dx: E11.49 patient checks sugar 4 x day 1 each 0    HumaLOG KwikPen 100 UNIT/ML solution pen-injector INJECT 15 UNITS SUBCUTANEOUSLY THREE TIMES DAILY 15  MINUTES  BEFORE  MEALS  OR  IMMEDIATELY  FOLLOWING  MEALS 15 mL 0    HYDROcodone-acetaminophen (Norco)  MG per tablet Take 1 tablet by mouth Every 6 (Six) Hours As Needed for Moderate Pain. 120 tablet 0    HYDROcodone-acetaminophen (NORCO)  MG per tablet Take 1 tablet by mouth Every 6 (Six) Hours As Needed for Moderate Pain or Severe Pain. 120 tablet 0    hydrOXYzine (ATARAX) 25 MG tablet Take 1 tablet by mouth 4 (Four) Times a Day As Needed for Itching. 90 tablet 12    Insulin Glargine  (GRACIEAGLVALE VERDUZCOPEN) 100 UNIT/ML injection pen Inject 40 Units under the skin into the appropriate area as directed Every Night. 4 Pen 11    ketoconazole (NIZORAL) 2 % cream       ketoconazole (NIZORAL) 2 % shampoo       Lancets misc Use 1 each 4 (Four) Times a Day. Dx: E11.49 patient checks sugar 4 x day 200 each 12    methocarbamol (ROBAXIN) 750 MG tablet Take 1 tablet by mouth 4 (Four) Times a Day As Needed for Muscle Spasms. 360 tablet 3    methylPREDNISolone (MEDROL) 4 MG dose pack Take as directed on package instructions. 21 tablet 0    montelukast (SINGULAIR) 10 MG tablet TAKE 1 TABLET BY MOUTH ONCE DAILY AT NIGHT 30 tablet 0    naloxone (NARCAN) 4 MG/0.1ML nasal spray Call 911. Don't prime. [place] in 1 nostril for overdose. Repeat in 2-3 minutes in other nostril if no or minimal breathing/responsiveness. 2 each 0    oxybutynin XL (DITROPAN XL) 15 MG 24 hr tablet Take 1 tablet by mouth Daily. 90 tablet 3    pantoprazole (Protonix) 40 MG EC tablet Take 1 tablet by mouth Daily. 90 tablet 3    potassium chloride (MICRO-K) 10 MEQ CR capsule Take 1 capsule by mouth Daily. 90 capsule 3    Semaglutide,0.25 or 0.5MG/DOS, (Ozempic, 0.25 or 0.5 MG/DOSE,) 2 MG/1.5ML solution pen-injector Inject 0.25 mg under the skin into the appropriate area as directed 1 (One) Time Per Week. 3 mL 12    sildenafil (REVATIO) 20 MG tablet Take 1 tablet by mouth Daily As Needed (ed). 30 tablet 3    sitaGLIPtin-metFORMIN (Janumet)  MG per tablet Take 1 tablet by mouth 2 (Two) Times a Day With Meals. 180 tablet 3    sodium chloride ([place]) 0.65 % nasal spray USE 1 DOSE IN EACH NOSTRIL THREE TIMES DAILY 50 mL 0    Sodium Fluoride (Denta 5000 Plus) 1.1 % cream Apply 1 Application to teeth 2 (Two) Times a Day. 102 g 5    [DISCONTINUED] amLODIPine (NORVASC) 10 MG tablet TAKE 1 TABLET BY MOUTH ONCE DAILY AT NIGHT 30 tablet 0    [DISCONTINUED] Azelastine HCl 137 MCG/SPRAY solution 2 sprays into the nostril(s) as directed by provider 2 (Two)  Times a Day. 60 mL 2    [DISCONTINUED] chlorthalidone (HYGROTEN) 50 MG tablet Take 1 tablet by mouth Daily. 90 tablet 3    [DISCONTINUED] colchicine-probenecid (COL-BENEMID) 0.5-500 MG per tablet Take 1 tablet by mouth Daily. 30 tablet 3    [DISCONTINUED] FLUoxetine (PROzac) 40 MG capsule Take 1 capsule by mouth Every Night. 90 capsule 3    [DISCONTINUED] gabapentin (NEURONTIN) 300 MG capsule Take 1 capsule by mouth 3 (Three) Times a Day. 270 capsule 1    [DISCONTINUED] loratadine (EQ All Day Allergy Relief) 10 MG tablet Take 1 tablet by mouth Daily. 90 tablet 0    [DISCONTINUED] losartan (COZAAR) 100 MG tablet Take 1 tablet by mouth Daily. 90 tablet 3    [DISCONTINUED] promethazine (PHENERGAN) 25 MG tablet Take 1 tablet by mouth Every 6 (Six) Hours As Needed for Nausea or Vomiting. 90 tablet 0    [DISCONTINUED] terbinafine (lamiSIL) 250 MG tablet       doxycycline (MONODOX) 100 MG capsule Take one capsule po bid for 10 days (Patient not taking: Reported on 12/9/2024) 20 capsule 0     No current facility-administered medications on file prior to visit.     Current outpatient and discharge medications have been reconciled for the patient.  Reviewed by: Eric Rosenberg MD      Allergies   Allergen Reactions    Penicillin G Anaphylaxis    Penicillins Anaphylaxis    Sulfa Antibiotics Hives     Was told by mother possible anaphylaxis    Allopurinol Rash       Review of Systems   Constitutional:  Negative for activity change, appetite change, fatigue and fever.   HENT:  Negative for congestion, ear pain, rhinorrhea, swollen glands and voice change.    Eyes:  Negative for visual disturbance.   Respiratory:  Negative for cough, shortness of breath and wheezing.    Cardiovascular:  Negative for chest pain, palpitations and leg swelling.   Gastrointestinal:  Negative for abdominal pain, blood in stool, constipation, diarrhea, nausea and vomiting.   Endocrine: Negative for polydipsia and polyuria.   Genitourinary:   "Negative for dysuria, frequency and hematuria.   Musculoskeletal:  Negative for joint swelling, neck pain and neck stiffness.   Skin:  Positive for rash. Negative for wound.   Neurological:  Negative for weakness, numbness and headache.   Psychiatric/Behavioral:  Negative for suicidal ideas and depressed mood.      I have reviewed and confirmed the accuracy of the ROS as documented by the MA/LPN/RN Eric Rosenberg MD    Objective   Visit Vitals  BP (!) 152/106 (BP Location: Right arm, Patient Position: Sitting, Cuff Size: Large Adult)   Pulse 96   Resp 20   Ht 74.6 cm (29.37\")   Wt 134 kg (296 lb)   SpO2 99%   .26 kg/m²      **  Physical Exam  Constitutional:       Appearance: He is well-developed.   HENT:      Head: Normocephalic and atraumatic.      Right Ear: External ear normal.      Left Ear: External ear normal.      Nose: Nose normal.   Eyes:      Pupils: Pupils are equal, round, and reactive to light.   Cardiovascular:      Rate and Rhythm: Normal rate and regular rhythm.      Heart sounds: Normal heart sounds.   Pulmonary:      Effort: Pulmonary effort is normal.      Breath sounds: Normal breath sounds.   Abdominal:      General: Bowel sounds are normal.      Palpations: Abdomen is soft.   Musculoskeletal:         General: Normal range of motion.      Cervical back: Normal range of motion and neck supple.   Skin:     General: Skin is warm and dry.   Neurological:      Mental Status: He is alert and oriented to person, place, and time.   Psychiatric:         Behavior: Behavior normal.         Thought Content: Thought content normal.         Judgment: Judgment normal.       Derm Physical Exam  Ortho Exam   Neurological Exam  Mental Status  Alert. Oriented to person, place, and time.    Cranial Nerves  CN III, IV, VI: Pupils equal round and reactive to light bilaterally.         Assessment & Plan  Nausea    Orders:    promethazine (PHENERGAN) 25 MG tablet; Take 1 tablet by mouth Every 6 (Six) " Hours As Needed for Nausea or Vomiting.    Gout, unspecified cause, unspecified chronicity, unspecified site    Orders:    colchicine-probenecid (COL-BENEMID) 0.5-500 MG per tablet; Take 1 tablet by mouth Daily.    Other seasonal allergic rhinitis    Orders:    loratadine (EQ All Day Allergy Relief) 10 MG tablet; Take 1 tablet by mouth Daily.    Azelastine HCl 137 MCG/SPRAY solution; Administer 2 sprays into the nostril(s) as directed by provider 2 (Two) Times a Day.    Hypertension, benign      Orders:    losartan (COZAAR) 100 MG tablet; Take 1 tablet by mouth Daily.    amLODIPine (NORVASC) 10 MG tablet; Take 1 tablet by mouth Every Night.    chlorthalidone (HYGROTEN) 50 MG tablet; Take 0.5 tablets by mouth Daily.    Guillain-[place]    Orders:    gabapentin (NEURONTIN) 300 MG capsule; Take 1 capsule by mouth 3 (Three) Times a Day.    Mood disorder      Orders:    FLUoxetine (PROzac) 40 MG capsule; Take 1 capsule by mouth Every Night.    Type 2 diabetes mellitus with other neurologic complication, with long-term current use of insulin      Orders:    POC Glycosylated Hemoglobin (Hb A1C)    Acute non-recurrent maxillary sinusitis    Orders:    cefprozil (CEFZIL) 500 MG tablet; Take 1 tablet by mouth 2 (Two) Times a Day.    Tinea    Orders:    terbinafine (lamiSIL) 250 MG tablet; Take 1 tablet by mouth Daily.    Acute cough    Orders:    guaiFENesin-dextromethorphan (ROBITUSSIN DM) 100-10 MG/5ML syrup; Take 5 mL by mouth 3 (Three) Times a Day As Needed for Cough.       Findings discussed. All questions answered.  Medication and medication adverse effects discussed.  Drug education given and explained to patient. Patient verbalized understanding.Follow-up for routine health maintenance as indicated.  Follow up in 3 months for recheck, sooner for concerns.         Expected course, medications, and adverse effects discussed as appropriate.  Call or return if worsening or persistent symptoms.     This document is intended  for medical professional use only.   Electronically signed by Eric Rosenberg MD on 12/09/2024. This content may not have been proofread.

## 2024-12-09 NOTE — ASSESSMENT & PLAN NOTE
Orders:    loratadine (EQ All Day Allergy Relief) 10 MG tablet; Take 1 tablet by mouth Daily.    Azelastine HCl 137 MCG/SPRAY solution; Administer 2 sprays into the nostril(s) as directed by provider 2 (Two) Times a Day.

## 2024-12-10 ENCOUNTER — TELEPHONE (OUTPATIENT)
Dept: NEUROSURGERY | Facility: CLINIC | Age: 44
End: 2024-12-10
Payer: MEDICAID

## 2024-12-10 ENCOUNTER — OFFICE VISIT (OUTPATIENT)
Age: 44
End: 2024-12-10
Payer: MEDICAID

## 2024-12-10 VITALS — HEART RATE: 89 BPM | BODY MASS INDEX: 36.8 KG/M2 | WEIGHT: 296 LBS | OXYGEN SATURATION: 96 % | HEIGHT: 75 IN

## 2024-12-10 DIAGNOSIS — L60.3 ONYCHODYSTROPHY: Primary | ICD-10-CM

## 2024-12-10 DIAGNOSIS — E11.42 DIABETIC PERIPHERAL NEUROPATHY ASSOCIATED WITH TYPE 2 DIABETES MELLITUS: ICD-10-CM

## 2024-12-10 DIAGNOSIS — M79.675 PAIN IN TOES OF BOTH FEET: ICD-10-CM

## 2024-12-10 DIAGNOSIS — M79.674 PAIN IN TOES OF BOTH FEET: ICD-10-CM

## 2024-12-10 DIAGNOSIS — E11.65 TYPE 2 DIABETES MELLITUS WITH HYPERGLYCEMIA, WITHOUT LONG-TERM CURRENT USE OF INSULIN: ICD-10-CM

## 2024-12-10 DIAGNOSIS — R26.81 UNSTEADINESS ON FEET: ICD-10-CM

## 2024-12-10 RX ORDER — FUROSEMIDE 20 MG/1
TABLET ORAL
COMMUNITY
Start: 2024-12-09

## 2024-12-10 NOTE — TELEPHONE ENCOUNTER
Patient was calling to let me know that he has been having numbness and tingling on his left side of his body and his pain area. And he stated his pt has been late starting with the pt but would like to talk about his concerns.

## 2024-12-11 NOTE — PROGRESS NOTES
12/10/2024  Foot and Ankle Surgery - Established Patient/Follow-up  Provider: ANASTASIA Chan   Location: [facility]    Subjective:  Barrett Laguerre is a 44 y.o. male.     Chief Complaint   Patient presents with    Right Foot - Follow-up, Diabetes     Dm foot care, nails    Left Foot - Follow-up, Diabetes     Dm foot care, nails    Follow-up     Danii- 12/9/24       History of Present Illness  The patient is a 44-year-old male who presents for an established patient exam.    He reports an improvement in his condition, although he had to postpone his initial appointment, resulting in a delay of over a month. He suspects the presence of ingrown toenails and has attempted to manage them through self-trimming. He seeks confirmation that no ingrown toenails are present. He experienced pain in his toenails recently, accompanied by numbness and tingling sensations in his feet. The pain has been lessened since he cut his nails.    His A1c level was recorded as 8.1 yesterday. He has been approved for Ozempic and plans to collect it today. He anticipates that this medication will not only improve his A1c levels but also facilitate weight loss, which he believes will alleviate several other health issues. He expresses concern about dietary restrictions associated with Ozempic, particularly regarding dairy and red meat consumption. He intends to discuss these concerns with his pharmacist.    He is currently covered by [email or web address] insurance and is interested in exploring the possibility of obtaining a compounded foot cream that his insurance might cover.    Supplemental Information  He is not considering back surgery again. He was late starting physical therapy.    MEDICATIONS  Current: Ozempic       Allergies   Allergen Reactions    Penicillin G Anaphylaxis    Penicillins Anaphylaxis    Sulfa Antibiotics Hives     Was told by mother possible anaphylaxis    Allopurinol Rash       Current Outpatient Medications on  File Prior to Visit   Medication Sig Dispense Refill    acetaminophen-codeine (TYLENOL/CODEINE #3) 300-30 MG per tablet Take 1 tablet by mouth 3 (Three) Times a Day As Needed for Mild Pain. 21 tablet 2    acyclovir (ZOVIRAX) 800 MG tablet Take 1 tablet by mouth 5 (Five) Times a Day. 50 tablet 1    albuterol sulfate  (90 Base) MCG/ACT inhaler Inhale 2 puffs Every 4 (Four) Hours As Needed for Wheezing. 6.7 g 0    Alcohol Swabs (B-D SINGLE USE SWABS REGULAR) pads Apply 1 each topically to the appropriate area as directed Daily. 100 each 12    amLODIPine (NORVASC) 10 MG tablet Take 1 tablet by mouth Every Night. 90 tablet 3    Azelastine HCl 137 MCG/SPRAY solution Administer 2 sprays into the nostril(s) as directed by provider 2 (Two) Times a Day. 60 mL 2    cefprozil (CEFZIL) 500 MG tablet Take 1 tablet by mouth 2 (Two) Times a Day. 28 tablet 0    chlorthalidone (HYGROTEN) 50 MG tablet Take 0.5 tablets by mouth Daily. 90 tablet 3    colchicine-probenecid (COL-BENEMID) 0.5-500 MG per tablet Take 1 tablet by mouth Daily. 30 tablet 3    Continuous Glucose  (Dexcom G6 ) device USE UNIT TO CHECK BLOOD SUGAR ONCE DAILY AS DIRECTED // 1 UNIT SHOULD LAST 365 DAYS 1 each 0    Continuous Glucose Sensor (Dexcom G6 Sensor) Use Every 10 (Ten) Days. 3 each 12    Continuous Glucose Transmitter (Dexcom G6 Transmitter) misc Use 1 each Every 3 (Three) Months. 1 each 3    Cyanocobalamin (Vitamin B-12 ER) 1000 MCG tablet controlled-release TAKE 1 TABLET DAILY 30 each 5    Diclofenac Epolamine (FLECTOR) 1.3 % patch patch Apply 1 patch topically to the appropriate area as directed 2 (Two) Times a Day As Needed (back pain). 60 patch 11    doxycycline (MONODOX) 100 MG capsule Take one capsule po bid for 10 days 20 capsule 0    Emtricitabine-Tenofovir AF (Descovy) 200-25 MG per tablet Take 1 tablet by mouth Daily.      FLUoxetine (PROzac) 40 MG capsule Take 1 capsule by mouth Every Night. 90 capsule 3    fluticasone  (FLONASE) 50 MCG/ACT nasal spray 1 spray into the nostril(s) as directed by provider 2 (Two) Times a Day. 16 mL 3    furosemide (LASIX) 20 MG tablet       gabapentin (NEURONTIN) 300 MG capsule Take 1 capsule by mouth 3 (Three) Times a Day. 270 capsule 1    glucose blood test strip USE 1 STRIP TO CHECK GLUCOSE 4 TIMES DAILY 100 each 12    glucose monitor monitoring kit Use 1 each Daily. Dx: E11.49 patient checks sugar 4 x day 1 each 0    guaiFENesin-dextromethorphan (ROBITUSSIN DM) 100-10 MG/5ML syrup Take 5 mL by mouth 3 (Three) Times a Day As Needed for Cough. 118 mL 0    HumaLOG KwikPen 100 UNIT/ML solution pen-injector INJECT 15 UNITS SUBCUTANEOUSLY THREE TIMES DAILY 15  MINUTES  BEFORE  MEALS  OR  IMMEDIATELY  FOLLOWING  MEALS 15 mL 0    HYDROcodone-acetaminophen (Norco)  MG per tablet Take 1 tablet by mouth Every 6 (Six) Hours As Needed for Moderate Pain. 120 tablet 0    HYDROcodone-acetaminophen (NORCO)  MG per tablet Take 1 tablet by mouth Every 6 (Six) Hours As Needed for Moderate Pain or Severe Pain. 120 tablet 0    hydrOXYzine (ATARAX) 25 MG tablet Take 1 tablet by mouth 4 (Four) Times a Day As Needed for Itching. 90 tablet 12    Insulin Glargine (BASAGLAR KWIKPEN) 100 UNIT/ML injection pen Inject 40 Units under the skin into the appropriate area as directed Every Night. 4 Pen 11    ketoconazole (NIZORAL) 2 % cream       ketoconazole (NIZORAL) 2 % shampoo       Lancets misc Use 1 each 4 (Four) Times a Day. Dx: E11.49 patient checks sugar 4 x day 200 each 12    loratadine (EQ All Day Allergy Relief) 10 MG tablet Take 1 tablet by mouth Daily. 90 tablet 0    losartan (COZAAR) 100 MG tablet Take 1 tablet by mouth Daily. 90 tablet 3    methocarbamol (ROBAXIN) 750 MG tablet Take 1 tablet by mouth 4 (Four) Times a Day As Needed for Muscle Spasms. 360 tablet 3    methylPREDNISolone (MEDROL) 4 MG dose pack Take as directed on package instructions. 21 tablet 0    montelukast (SINGULAIR) 10 MG tablet TAKE  "1 TABLET BY MOUTH ONCE DAILY AT NIGHT 30 tablet 0    naloxone (NARCAN) 4 MG/0.1ML nasal spray Call 911. Don't prime. Boley in 1 nostril for overdose. Repeat in 2-3 minutes in other nostril if no or minimal breathing/responsiveness. 2 each 0    oxybutynin XL (DITROPAN XL) 15 MG 24 hr tablet Take 1 tablet by mouth Daily. 90 tablet 3    pantoprazole (Protonix) 40 MG EC tablet Take 1 tablet by mouth Daily. 90 tablet 3    potassium chloride (MICRO-K) 10 MEQ CR capsule Take 1 capsule by mouth Daily. 90 capsule 3    promethazine (PHENERGAN) 25 MG tablet Take 1 tablet by mouth Every 6 (Six) Hours As Needed for Nausea or Vomiting. 90 tablet 0    Semaglutide,0.25 or 0.5MG/DOS, (Ozempic, 0.25 or 0.5 MG/DOSE,) 2 MG/1.5ML solution pen-injector Inject 0.25 mg under the skin into the appropriate area as directed 1 (One) Time Per Week. 3 mL 12    sildenafil (REVATIO) 20 MG tablet Take 1 tablet by mouth Daily As Needed (ed). 30 tablet 3    sitaGLIPtin-metFORMIN (Janumet)  MG per tablet Take 1 tablet by mouth 2 (Two) Times a Day With Meals. 180 tablet 3    sodium chloride (Ayr) 0.65 % nasal spray USE 1 DOSE IN EACH NOSTRIL THREE TIMES DAILY 50 mL 0    Sodium Fluoride (Denta 5000 Plus) 1.1 % cream Apply 1 Application to teeth 2 (Two) Times a Day. 102 g 5    terbinafine (lamiSIL) 250 MG tablet Take 1 tablet by mouth Daily. 30 tablet 0     No current facility-administered medications on file prior to visit.       Objective   Pulse 89   Ht 190.5 cm (75\")   Wt 134 kg (296 lb)   SpO2 96%   BMI 37.00 kg/m²     Foot/Ankle Exam  Physical Exam  Right Foot Vascularity   Normal vascular exam    Dorsalis pedis:  2+  Posterior tibial:  2+  Skin temperature:  warm  Edema grading:  None  CFT:  < 3 seconds  Pedal hair growth:  Present  Varicosities:  none      Left Foot Vascularity   Normal vascular exam    Dorsalis pedis:  2+  Posterior tibial:  2+  Skin temperature:  warm  Edema grading:  None  CFT:  < 3 seconds  Pedal hair growth:  " Present  Varicosities:  none     NEUROLOGIC      Right Foot Neurologic   Protective Sensation using [place]-Ernesto Monofilament:   Sites tested: 9      Left Foot Neurologic   Protective Sensation using [place]-Ernesto Monofilament:   Sites tested: 10     MUSCULOSKELETAL      Right Foot Musculoskeletal   Tenderness:  none    Hammertoe:  First toe      Left Foot Musculoskeletal   Tenderness:  none  Hammertoe:  First toe     DERMATOLOGIC       Right Foot Dermatologic   Skin  Right foot skin is intact.   Nails  1.  Positive for elongated, abnormal thickness and dystrophic nail. (Mild callus)  2.  Positive for elongated, abnormal thickness and dystrophic nail.  3.  Positive for elongated, abnormal thickness and dystrophic nail.  4.  Positive for elongated, abnormal thickness and dystrophic nail.  5.  Positive for elongated, abnormal thickness and dystrophic nail.      Left Foot Dermatologic   Skin  Left foot skin is intact.   Nails  1.  Positive for elongated, abnormal thickness and dystrophic nail.  2.  Positive for elongated, abnormal thickness and dystrophic nail.  3.  Positive for elongated, abnormal thickness and dystrophic nail.  4.  Positive for elongated, abnormally thick and dystrophic nail.  5.  Positive for elongated, abnormally thick and dystrophic nail.             Results  Laboratory Studies  A1c is 8.1.     Assessment & Plan   Diagnoses and all orders for this visit:    1. Onychodystrophy (Primary)    2. Pain in toes of both feet    3. Diabetic peripheral neuropathy associated with type 2 diabetes mellitus    4. Type 2 diabetes mellitus with hyperglycemia, without long-term current use of insulin    5. Unsteadiness on feet      Assessment & Plan  On exam bilateral feet are stable with no open wounds or signs of active acute infection or inflammation.  I do feel patient is at moderate risk for pedal complication related to diabetes.  He is having some increased diabetic neuropathic pain I will prescribe  topical compounded pain cream from Rx alternatives for relief.Explained importance of diabetic foot care, daily foot checks, and glycemic control. Patient should check both feet on a daily basis, monitor and control blood sugars, make sure that both feet and in between toes are towel dried after baths or showers. Avoid barefoot walking at all times.  I discussed wearing white socks and checking shoes before wearing them. Patient was given information on proper foot care. Call the office at the first signs of a wound or with signs of infection.     Patient was advised that getting blood sugars under control may help with increased neuropathic pain.    Nail debridement: Both feet x10    Consent and time out was performed before proceeding with the procedure. Nails were debrided with a nail nipper without complication.  No anesthesia was required.  Indications for procedure were thickened, dystrophic, and fungal appearing nails which are difficult to trim.  Proper self-care and technique was discussed with patient.  Patient was stable after procedure.       Follow-up  The patient will follow up in 2 months.       No orders of the defined types were placed in this encounter.         Patient or patient representative verbalized consent for the use of Ambient Listening during the visit with  ANASTASIA Alvares for chart documentation. 12/11/2024  08:10 EST

## 2024-12-13 ENCOUNTER — TELEPHONE (OUTPATIENT)
Dept: FAMILY MEDICINE CLINIC | Facility: CLINIC | Age: 44
End: 2024-12-13
Payer: MEDICAID

## 2024-12-13 ENCOUNTER — OFFICE VISIT (OUTPATIENT)
Dept: PAIN MEDICINE | Facility: CLINIC | Age: 44
End: 2024-12-13
Payer: MEDICAID

## 2024-12-13 VITALS
WEIGHT: 295.4 LBS | SYSTOLIC BLOOD PRESSURE: 175 MMHG | HEART RATE: 87 BPM | BODY MASS INDEX: 36.92 KG/M2 | RESPIRATION RATE: 16 BRPM | OXYGEN SATURATION: 96 % | DIASTOLIC BLOOD PRESSURE: 120 MMHG

## 2024-12-13 DIAGNOSIS — M51.26 LUMBAR DISC HERNIATION: ICD-10-CM

## 2024-12-13 DIAGNOSIS — G89.29 CHRONIC MIDLINE LOW BACK PAIN WITH BILATERAL SCIATICA: Primary | ICD-10-CM

## 2024-12-13 DIAGNOSIS — M51.362 DEGENERATION OF INTERVERTEBRAL DISC OF LUMBAR REGION WITH DISCOGENIC BACK PAIN AND LOWER EXTREMITY PAIN: ICD-10-CM

## 2024-12-13 DIAGNOSIS — M54.16 LUMBAR RADICULOPATHY: ICD-10-CM

## 2024-12-13 DIAGNOSIS — M54.41 CHRONIC MIDLINE LOW BACK PAIN WITH BILATERAL SCIATICA: Primary | ICD-10-CM

## 2024-12-13 DIAGNOSIS — M54.42 CHRONIC MIDLINE LOW BACK PAIN WITH BILATERAL SCIATICA: Primary | ICD-10-CM

## 2024-12-13 DIAGNOSIS — M48.07 LUMBOSACRAL SPINAL STENOSIS: ICD-10-CM

## 2024-12-13 DIAGNOSIS — Z98.890 STATUS POST LUMBAR SPINE OPERATIVE PROCEDURE FOR DECOMPRESSION OF SPINAL CORD: ICD-10-CM

## 2024-12-13 RX ORDER — SEMAGLUTIDE 0.68 MG/ML
INJECTION, SOLUTION SUBCUTANEOUS
COMMUNITY
Start: 2024-12-09

## 2024-12-13 RX ORDER — METHOCARBAMOL 750 MG/1
750 TABLET, FILM COATED ORAL 4 TIMES DAILY PRN
Qty: 360 TABLET | Refills: 3 | Status: SHIPPED | OUTPATIENT
Start: 2024-12-13

## 2024-12-13 RX ORDER — HYDROCODONE BITARTRATE AND ACETAMINOPHEN 10; 325 MG/1; MG/1
1 TABLET ORAL EVERY 6 HOURS PRN
Qty: 120 TABLET | Refills: 0 | Status: SHIPPED | OUTPATIENT
Start: 2024-12-13

## 2024-12-13 NOTE — TELEPHONE ENCOUNTER
Caller: Rupali Solomon    Relationship: Mother    Best call back number: [phone]     Who are you requesting to speak with (clinical staff, provider,  specific staff member): CLINICAL STAFF    What was the call regarding: REQUESTS CALL BACK TO DISCUSS HUD FORM FOR PATIENT

## 2024-12-13 NOTE — TELEPHONE ENCOUNTER
Spoke with mother of patient, okay per verbal on file. Let her know that we received the blank form that they needed completed and I would speak to Dr. Rosenberg and let her know if we needed anything further.
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
## 2024-12-16 ENCOUNTER — TELEPHONE (OUTPATIENT)
Dept: FAMILY MEDICINE CLINIC | Facility: CLINIC | Age: 44
End: 2024-12-16
Payer: MEDICAID

## 2024-12-16 NOTE — TELEPHONE ENCOUNTER
Spoke with  mother; let her know that we did not need any additional information. Dr. Rosenberg filled and signed forms this morning. I faxed them and received confirmation. They will also be scanned to chart.

## 2024-12-16 NOTE — PROGRESS NOTES
"Subjective   History of Present Illness: Barrett Laguerre is a 44 y.o. male is here today for follow-up on Sx from 9/16/24. Today, patient reports 4/10 pain between the lumbar and left side of buttock. States he is having tingle / Numbness sensations going down the left leg.  Overall he has seen improvement in his left-sided radicular symptoms    Previous treatment:     Previous neurosurgery: Left L5-S1 microdiscectomy 9/16/24      Previous injections:     The following portions of the patient's history were reviewed and updated as appropriate: allergies, current medications, past family history, past medical history, past social history, past surgical history, and problem list.    Review of Systems   Constitutional:  Positive for activity change.   Musculoskeletal:  Positive for back pain.        + Leg pain   Neurological:  Positive for weakness and numbness.       Objective      /95 (BP Location: Left arm, Patient Position: Sitting, Cuff Size: Large Adult)   Pulse 97   Resp 18   Ht 190.5 cm (75\")   Wt 134 kg (294 lb 12.8 oz)   SpO2 95%   BMI 36.85 kg/m²    Body mass index is 36.85 kg/m².  Vitals:    12/18/24 1524   PainSc:   4   PainLoc: Buttocks         Neurological Exam        Assessment & Plan   Independent Review of Radiographic Studies:      I personally reviewed and interpreted the images from the following studies.    No new image    Medical Decision Making:      Barrett Laguerre is a 44 y.o. male status post revision left L5-S1 microdisc overall seeing improvement in his preoperative symptoms.  He has no limitations from my perspective and can ramp up to normal activity.  He can continue working with pain management.  I will see him back as needed      Diagnoses and all orders for this visit:    1. Status post lumbar laminectomy (Primary)      No follow-ups on file.    This patient was examined wearing appropriate personal protective equipment.                      Dr. Kuldip Osorio " IV    12/18/24  15:35 EST

## 2024-12-16 NOTE — TELEPHONE ENCOUNTER
Caller: Rupali Solomon    Relationship: Mother    Best call back number: [phone]     Who are you requesting to speak with (clinical staff, provider,  specific staff member): CLINICAL STAFF     What was the call regarding: WANTS TO DISCUSS THE HUD FORM STATES THERE IS INFORMATION THAT IS NEEDED THAT IS NOT IN PATIENT CHART PLEASE CALL AND ADVISE

## 2024-12-18 ENCOUNTER — OFFICE VISIT (OUTPATIENT)
Dept: NEUROSURGERY | Facility: CLINIC | Age: 44
End: 2024-12-18
Payer: MEDICAID

## 2024-12-18 ENCOUNTER — PATIENT MESSAGE (OUTPATIENT)
Dept: FAMILY MEDICINE CLINIC | Facility: CLINIC | Age: 44
End: 2024-12-18
Payer: MEDICAID

## 2024-12-18 VITALS
HEART RATE: 97 BPM | RESPIRATION RATE: 18 BRPM | HEIGHT: 75 IN | OXYGEN SATURATION: 95 % | BODY MASS INDEX: 36.65 KG/M2 | WEIGHT: 294.8 LBS | DIASTOLIC BLOOD PRESSURE: 95 MMHG | SYSTOLIC BLOOD PRESSURE: 154 MMHG

## 2024-12-18 DIAGNOSIS — J30.2 OTHER SEASONAL ALLERGIC RHINITIS: ICD-10-CM

## 2024-12-18 DIAGNOSIS — Z98.890 STATUS POST LUMBAR LAMINECTOMY: Primary | ICD-10-CM

## 2024-12-19 RX ORDER — CETIRIZINE HYDROCHLORIDE 10 MG/1
10 TABLET ORAL DAILY
COMMUNITY
Start: 2024-12-19 | End: 2024-12-19 | Stop reason: SDUPTHER

## 2024-12-19 RX ORDER — CETIRIZINE HYDROCHLORIDE 10 MG/1
10 TABLET ORAL DAILY
Qty: 90 TABLET | Refills: 1 | Status: SHIPPED | OUTPATIENT
Start: 2024-12-19

## 2024-12-19 RX ORDER — ECHINACEA PURPUREA EXTRACT 125 MG
TABLET ORAL
Qty: 50 ML | Refills: 0 | Status: SHIPPED | OUTPATIENT
Start: 2024-12-19

## 2024-12-26 RX ORDER — TOPIRAMATE 200 MG/1
TABLET, FILM COATED ORAL
COMMUNITY
Start: 2024-12-17

## 2024-12-27 ENCOUNTER — TELEPHONE (OUTPATIENT)
Dept: FAMILY MEDICINE CLINIC | Facility: CLINIC | Age: 44
End: 2024-12-27
Payer: MEDICAID

## 2024-12-27 NOTE — TELEPHONE ENCOUNTER
Sending mychart message letting patient know that we need him to be seen in order to send a new referral.

## 2024-12-27 NOTE — TELEPHONE ENCOUNTER
Caller: Barrett Laguerre    Relationship: Self    Best call back number: [phone]     What is the medical concern/diagnosis: CONSTANTLY GETTING SICK/ CONGESTION     What specialty or service is being requested: ENT REFERRAL     Any additional details:     PLEASE GIVE PATIENT A CALLBACK

## 2024-12-30 ENCOUNTER — TELEPHONE (OUTPATIENT)
Dept: PAIN MEDICINE | Facility: CLINIC | Age: 44
End: 2024-12-30
Payer: MEDICAID

## 2024-12-30 DIAGNOSIS — J30.89 NON-SEASONAL ALLERGIC RHINITIS DUE TO OTHER ALLERGIC TRIGGER: ICD-10-CM

## 2024-12-30 DIAGNOSIS — I10 HYPERTENSION, BENIGN: ICD-10-CM

## 2024-12-30 NOTE — TELEPHONE ENCOUNTER
Patient wanted to let us know that he is getting over having a sinus infection. He is not currently taking any antibiotics. Patient has an appt with his PCP Friday but has a procedure with Dr Lora on Thursday and wants to know if he should reschedule or keep it.

## 2024-12-30 NOTE — TELEPHONE ENCOUNTER
Provider: YESENIA    Caller: Barrett Laguerre    Relationship to Patient: Self    Pharmacy:     Phone Number: [phone]    Reason for Call: PT CALLED AND STATED THAT THERE ARE NEW HEALTH CONCERNS AND WANTS TO MAKE SURE HE CAN STILL HAVE HIS PROCEDURE ON 1.2.25 - PLEASE CALL BACK

## 2025-01-02 ENCOUNTER — HOSPITAL ENCOUNTER (OUTPATIENT)
Dept: PAIN MEDICINE | Facility: HOSPITAL | Age: 45
Discharge: HOME | End: 2025-01-02
Payer: MEDICAID

## 2025-01-02 VITALS
OXYGEN SATURATION: 96 % | DIASTOLIC BLOOD PRESSURE: 86 MMHG | BODY MASS INDEX: 35.56 KG/M2 | TEMPERATURE: 97.1 F | RESPIRATION RATE: 16 BRPM | HEIGHT: 75 IN | WEIGHT: 286 LBS | HEART RATE: 91 BPM | SYSTOLIC BLOOD PRESSURE: 139 MMHG

## 2025-01-02 DIAGNOSIS — G89.29 CHRONIC MIDLINE LOW BACK PAIN WITH BILATERAL SCIATICA: Primary | ICD-10-CM

## 2025-01-02 DIAGNOSIS — M54.41 CHRONIC MIDLINE LOW BACK PAIN WITH BILATERAL SCIATICA: Primary | ICD-10-CM

## 2025-01-02 DIAGNOSIS — R52 PAIN: ICD-10-CM

## 2025-01-02 DIAGNOSIS — M54.16 LUMBAR RADICULOPATHY: ICD-10-CM

## 2025-01-02 DIAGNOSIS — M54.42 CHRONIC MIDLINE LOW BACK PAIN WITH BILATERAL SCIATICA: Primary | ICD-10-CM

## 2025-01-02 PROCEDURE — 25010000002 LIDOCAINE PF 1% 1 % SOLUTION: Performed by: PHYSICAL MEDICINE & REHABILITATION

## 2025-01-02 PROCEDURE — 77003 FLUOROGUIDE FOR SPINE INJECT: CPT

## 2025-01-02 PROCEDURE — 25510000001 IOPAMIDOL 41 % SOLUTION: Performed by: PHYSICAL MEDICINE & REHABILITATION

## 2025-01-02 PROCEDURE — 25010000002 DEXAMETHASONE SODIUM PHOSPHATE 10 MG/ML SOLUTION: Performed by: PHYSICAL MEDICINE & REHABILITATION

## 2025-01-02 RX ORDER — AMLODIPINE BESYLATE 10 MG/1
10 TABLET ORAL NIGHTLY
Qty: 30 TABLET | Refills: 0 | Status: SHIPPED | OUTPATIENT
Start: 2025-01-02

## 2025-01-02 RX ORDER — MONTELUKAST SODIUM 10 MG/1
10 TABLET ORAL NIGHTLY
Qty: 30 TABLET | Refills: 0 | Status: SHIPPED | OUTPATIENT
Start: 2025-01-02 | End: 2025-01-03 | Stop reason: SDUPTHER

## 2025-01-02 RX ORDER — DEXAMETHASONE SODIUM PHOSPHATE 10 MG/ML
10 INJECTION, SOLUTION INTRAMUSCULAR; INTRAVENOUS ONCE
Status: COMPLETED | OUTPATIENT
Start: 2025-01-02 | End: 2025-01-02

## 2025-01-02 RX ORDER — IOPAMIDOL 408 MG/ML
10 INJECTION, SOLUTION INTRATHECAL
Status: COMPLETED | OUTPATIENT
Start: 2025-01-02 | End: 2025-01-02

## 2025-01-02 RX ORDER — LIDOCAINE HYDROCHLORIDE 10 MG/ML
5 INJECTION, SOLUTION EPIDURAL; INFILTRATION; INTRACAUDAL; PERINEURAL ONCE
Status: COMPLETED | OUTPATIENT
Start: 2025-01-02 | End: 2025-01-02

## 2025-01-02 RX ADMIN — LIDOCAINE HYDROCHLORIDE 5 ML: 10 INJECTION, SOLUTION EPIDURAL; INFILTRATION; INTRACAUDAL; PERINEURAL at 14:36

## 2025-01-02 RX ADMIN — DEXAMETHASONE SODIUM PHOSPHATE 10 MG: 10 INJECTION, SOLUTION INTRAMUSCULAR; INTRAVENOUS at 14:36

## 2025-01-02 RX ADMIN — IOPAMIDOL 2 ML: 408 INJECTION, SOLUTION INTRATHECAL at 14:35

## 2025-01-02 NOTE — DISCHARGE INSTRUCTIONS

## 2025-01-03 ENCOUNTER — TELEPHONE (OUTPATIENT)
Dept: PAIN MEDICINE | Facility: HOSPITAL | Age: 45
End: 2025-01-03
Payer: MEDICAID

## 2025-01-03 ENCOUNTER — OFFICE VISIT (OUTPATIENT)
Dept: FAMILY MEDICINE CLINIC | Facility: CLINIC | Age: 45
End: 2025-01-03
Payer: MEDICAID

## 2025-01-03 VITALS
DIASTOLIC BLOOD PRESSURE: 96 MMHG | OXYGEN SATURATION: 99 % | BODY MASS INDEX: 37.05 KG/M2 | HEIGHT: 75 IN | RESPIRATION RATE: 20 BRPM | HEART RATE: 86 BPM | SYSTOLIC BLOOD PRESSURE: 146 MMHG | WEIGHT: 298 LBS

## 2025-01-03 DIAGNOSIS — E11.49 TYPE 2 DIABETES MELLITUS WITH OTHER NEUROLOGIC COMPLICATION, WITH LONG-TERM CURRENT USE OF INSULIN: ICD-10-CM

## 2025-01-03 DIAGNOSIS — J30.2 OTHER SEASONAL ALLERGIC RHINITIS: ICD-10-CM

## 2025-01-03 DIAGNOSIS — K02.9 CARIES: ICD-10-CM

## 2025-01-03 DIAGNOSIS — J30.89 NON-SEASONAL ALLERGIC RHINITIS DUE TO OTHER ALLERGIC TRIGGER: ICD-10-CM

## 2025-01-03 DIAGNOSIS — Z79.4 TYPE 2 DIABETES MELLITUS WITH OTHER NEUROLOGIC COMPLICATION, WITH LONG-TERM CURRENT USE OF INSULIN: ICD-10-CM

## 2025-01-03 DIAGNOSIS — M10.9 GOUT, UNSPECIFIED CAUSE, UNSPECIFIED CHRONICITY, UNSPECIFIED SITE: ICD-10-CM

## 2025-01-03 DIAGNOSIS — R11.0 NAUSEA: ICD-10-CM

## 2025-01-03 DIAGNOSIS — R06.2 WHEEZING: ICD-10-CM

## 2025-01-03 PROCEDURE — 1126F AMNT PAIN NOTED NONE PRSNT: CPT | Performed by: FAMILY MEDICINE

## 2025-01-03 PROCEDURE — 99214 OFFICE O/P EST MOD 30 MIN: CPT | Performed by: FAMILY MEDICINE

## 2025-01-03 PROCEDURE — 3080F DIAST BP >= 90 MM HG: CPT | Performed by: FAMILY MEDICINE

## 2025-01-03 PROCEDURE — 3077F SYST BP >= 140 MM HG: CPT | Performed by: FAMILY MEDICINE

## 2025-01-03 RX ORDER — PROBENECID AND COLCHICINE .5; 5 MG/1; MG/1
1 TABLET ORAL DAILY
Qty: 30 TABLET | Refills: 3 | Status: SHIPPED | OUTPATIENT
Start: 2025-01-03

## 2025-01-03 RX ORDER — PROMETHAZINE HYDROCHLORIDE 25 MG/1
25 TABLET ORAL EVERY 6 HOURS PRN
Qty: 90 TABLET | Refills: 0 | Status: SHIPPED | OUTPATIENT
Start: 2025-01-03

## 2025-01-03 RX ORDER — FLUTICASONE PROPIONATE 50 MCG
1 SPRAY, SUSPENSION (ML) NASAL 2 TIMES DAILY
Qty: 3 G | Refills: 0 | Status: SHIPPED | OUTPATIENT
Start: 2025-01-03

## 2025-01-03 RX ORDER — SODIUM FLUORIDE 5 MG/ML
1 PASTE, DENTIFRICE DENTAL 2 TIMES DAILY
Qty: 102 G | Refills: 5 | Status: SHIPPED | OUTPATIENT
Start: 2025-01-03

## 2025-01-03 RX ORDER — INSULIN GLARGINE 100 [IU]/ML
40 INJECTION, SOLUTION SUBCUTANEOUS NIGHTLY
Qty: 12 ML | Refills: 11 | Status: SHIPPED | OUTPATIENT
Start: 2025-01-03

## 2025-01-03 RX ORDER — MONTELUKAST SODIUM 10 MG/1
10 TABLET ORAL NIGHTLY
Qty: 90 TABLET | Refills: 0 | Status: SHIPPED | OUTPATIENT
Start: 2025-01-03

## 2025-01-03 RX ORDER — LORATADINE 10 MG/1
10 TABLET ORAL DAILY
Qty: 90 TABLET | Refills: 0 | Status: SHIPPED | OUTPATIENT
Start: 2025-01-03

## 2025-01-03 RX ORDER — ECHINACEA PURPUREA EXTRACT 125 MG
TABLET ORAL
Qty: 50 ML | Refills: 0 | Status: SHIPPED | OUTPATIENT
Start: 2025-01-03

## 2025-01-03 RX ORDER — ALBUTEROL SULFATE 90 UG/1
2 INHALANT RESPIRATORY (INHALATION) EVERY 4 HOURS PRN
Qty: 6.7 G | Refills: 0 | Status: SHIPPED | OUTPATIENT
Start: 2025-01-03

## 2025-01-03 RX ORDER — CETIRIZINE HYDROCHLORIDE 10 MG/1
10 TABLET ORAL DAILY
Qty: 90 TABLET | Refills: 1 | Status: SHIPPED | OUTPATIENT
Start: 2025-01-03

## 2025-01-03 RX ORDER — INSULIN GLARGINE 100 [IU]/ML
40 INJECTION, SOLUTION SUBCUTANEOUS NIGHTLY
Qty: 4 PEN | Refills: 11 | Status: SHIPPED | OUTPATIENT
Start: 2025-01-03 | End: 2025-01-03

## 2025-01-03 NOTE — ASSESSMENT & PLAN NOTE
Orders:    cetirizine (zyrTEC) 10 MG tablet; Take 1 tablet by mouth Daily.    sodium chloride (Ayr) 0.65 % nasal spray; USE 1 DOSE IN EACH NOSTRIL THREE TIMES DAILY    fluticasone (FLONASE) 50 MCG/ACT nasal spray; Administer 1 spray into the nostril(s) as directed by provider 2 (Two) Times a Day.    loratadine (EQ All Day Allergy Relief) 10 MG tablet; Take 1 tablet by mouth Daily.

## 2025-01-03 NOTE — PROGRESS NOTES
Subjective   Barrett Laguerre is a 44 y.o. male.   No chief complaint on file.      History of Present Illness  Here for follow up sinusitis. Feeling much better, still slightly stuffy. Pt wondering if he would benefit from ENT eval.     Needs refill on multiple meds, see A&P for details. Stable        The following portions of the patient's history were reviewed and updated as appropriate: allergies, current medications, past family history, past medical history, past social history, past surgical history, and problem list.    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-Arab    Sleep apnea    Attention deficit disorder    Diabetes    Other seasonal allergic rhinitis    Mixed obsessional thoughts and acts    Hypertension, benign    Disorder of lipid metabolism    CIDP (chronic inflammatory demyelinating polyneuropathy)    Lumbar radiculopathy    Onychomycosis    Annual physical exam    Class 1 obesity due to excess calories with serious comorbidity and body mass index (BMI) of 34.0 to 34.9 in adult    Acute midline low back pain with left-sided sciatica    Degeneration of intervertebral disc of lumbar region with discogenic back pain and lower extremity pain    History of discectomy    Lumbar disc herniation    Pruritus       Current Outpatient Medications on File Prior to Visit   Medication Sig Dispense Refill    acetaminophen-codeine (TYLENOL/CODEINE #3) 300-30 MG per tablet Take 1 tablet by mouth 3 (Three) Times a Day As Needed for Mild Pain. 21 tablet 2    acyclovir (ZOVIRAX) 800 MG tablet Take 1 tablet by mouth 5 (Five) Times a Day. 50 tablet 1    Alcohol Swabs (B-D SINGLE USE SWABS REGULAR) pads Apply 1 each topically to the appropriate area as directed Daily. 100 each 12    amLODIPine (NORVASC) 10 MG  tablet TAKE 1 TABLET BY MOUTH ONCE DAILY AT NIGHT 30 tablet 0    Azelastine HCl 137 MCG/SPRAY solution Administer 2 sprays into the nostril(s) as directed by provider 2 (Two) Times a Day. 60 mL 2    cefprozil (CEFZIL) 500 MG tablet Take 1 tablet by mouth 2 (Two) Times a Day. 28 tablet 0    chlorthalidone (HYGROTEN) 50 MG tablet Take 0.5 tablets by mouth Daily. 90 tablet 3    Continuous Glucose  (Dexcom G6 ) device USE UNIT TO CHECK BLOOD SUGAR ONCE DAILY AS DIRECTED // 1 UNIT SHOULD LAST 365 DAYS 1 each 0    Continuous Glucose Sensor (Dexcom G6 Sensor) Use Every 10 (Ten) Days. 3 each 12    Continuous Glucose Transmitter (Dexcom G6 Transmitter) misc Use 1 each Every 3 (Three) Months. 1 each 3    Cyanocobalamin (Vitamin B-12 ER) 1000 MCG tablet controlled-release TAKE 1 TABLET DAILY 30 each 5    Diclofenac Epolamine (FLECTOR) 1.3 % patch patch Apply 1 patch topically to the appropriate area as directed 2 (Two) Times a Day As Needed (back pain). 60 patch 11    Emtricitabine-Tenofovir AF (Descovy) 200-25 MG per tablet Take 1 tablet by mouth Daily.      FLUoxetine (PROzac) 40 MG capsule Take 1 capsule by mouth Every Night. 90 capsule 3    furosemide (LASIX) 20 MG tablet       gabapentin (NEURONTIN) 300 MG capsule Take 1 capsule by mouth 3 (Three) Times a Day. 270 capsule 1    glucose blood test strip USE 1 STRIP TO CHECK GLUCOSE 4 TIMES DAILY 100 each 12    glucose monitor monitoring kit Use 1 each Daily. Dx: E11.49 patient checks sugar 4 x day 1 each 0    guaiFENesin-dextromethorphan (ROBITUSSIN DM) 100-10 MG/5ML syrup Take 5 mL by mouth 3 (Three) Times a Day As Needed for Cough. 118 mL 0    HumaLOG KwikPen 100 UNIT/ML solution pen-injector INJECT 15 UNITS SUBCUTANEOUSLY THREE TIMES DAILY 15  MINUTES  BEFORE  MEALS  OR  IMMEDIATELY  FOLLOWING  MEALS 15 mL 0    HYDROcodone-acetaminophen (Norco)  MG per tablet Take 1 tablet by mouth Every 6 (Six) Hours As Needed for Moderate Pain. 120 tablet 0     HYDROcodone-acetaminophen (NORCO)  MG per tablet Take 1 tablet by mouth Every 6 (Six) Hours As Needed for Moderate Pain or Severe Pain. 120 tablet 0    HYDROcodone-acetaminophen (Norco)  MG per tablet Take 1 tablet by mouth Every 6 (Six) Hours As Needed for Moderate Pain. 120 tablet 0    hydrOXYzine (ATARAX) 25 MG tablet Take 1 tablet by mouth 4 (Four) Times a Day As Needed for Itching. 90 tablet 12    ketoconazole (NIZORAL) 2 % cream       ketoconazole (NIZORAL) 2 % shampoo       Lancets misc Use 1 each 4 (Four) Times a Day. Dx: E11.49 patient checks sugar 4 x day 200 each 12    losartan (COZAAR) 100 MG tablet Take 1 tablet by mouth Daily. 90 tablet 3    methocarbamol (ROBAXIN) 750 MG tablet Take 1 tablet by mouth 4 (Four) Times a Day As Needed for Muscle Spasms. 360 tablet 3    naloxone (NARCAN) 4 MG/0.1ML nasal spray Call 911. Don't prime. [place] in 1 nostril for overdose. Repeat in 2-3 minutes in other nostril if no or minimal breathing/responsiveness. 2 each 0    oxybutynin XL (DITROPAN XL) 15 MG 24 hr tablet Take 1 tablet by mouth Daily. 90 tablet 3    Ozempic, 0.25 or 0.5 MG/DOSE, 2 MG/3ML solution pen-injector INJECT 0.25 MG UNDER THE SKIN INTO THE APPROPRIATE AREA AS DIRECTED 1 TIME PER WEEK      pantoprazole (Protonix) 40 MG EC tablet Take 1 tablet by mouth Daily. 90 tablet 3    potassium chloride (MICRO-K) 10 MEQ CR capsule Take 1 capsule by mouth Daily. 90 capsule 3    Semaglutide,0.25 or 0.5MG/DOS, (Ozempic, 0.25 or 0.5 MG/DOSE,) 2 MG/1.5ML solution pen-injector Inject 0.25 mg under the skin into the appropriate area as directed 1 (One) Time Per Week. 3 mL 12    sildenafil (REVATIO) 20 MG tablet Take 1 tablet by mouth Daily As Needed (ed). 30 tablet 3    sitaGLIPtin-metFORMIN (Janumet)  MG per tablet Take 1 tablet by mouth 2 (Two) Times a Day With Meals. 180 tablet 3    terbinafine (lamiSIL) 250 MG tablet Take 1 tablet by mouth Daily. 30 tablet 0    topiramate (TOPAMAX) 200 MG tablet        [DISCONTINUED] albuterol sulfate  (90 Base) MCG/ACT inhaler Inhale 2 puffs Every 4 (Four) Hours As Needed for Wheezing. 6.7 g 0    [DISCONTINUED] cetirizine (zyrTEC) 10 MG tablet Take 1 tablet by mouth Daily. 90 tablet 1    [DISCONTINUED] colchicine-probenecid (COL-BENEMID) 0.5-500 MG per tablet Take 1 tablet by mouth Daily. 30 tablet 3    [DISCONTINUED] fluticasone (FLONASE) 50 MCG/ACT nasal spray 1 spray into the nostril(s) as directed by provider 2 (Two) Times a Day. 16 mL 3    [DISCONTINUED] Insulin Glargine (BASAGLAR KWIKPEN) 100 UNIT/ML injection pen Inject 40 Units under the skin into the appropriate area as directed Every Night. 4 Pen 11    [DISCONTINUED] loratadine (EQ All Day Allergy Relief) 10 MG tablet Take 1 tablet by mouth Daily. 90 tablet 0    [DISCONTINUED] montelukast (SINGULAIR) 10 MG tablet TAKE 1 TABLET BY MOUTH ONCE DAILY AT NIGHT 30 tablet 0    [DISCONTINUED] promethazine (PHENERGAN) 25 MG tablet Take 1 tablet by mouth Every 6 (Six) Hours As Needed for Nausea or Vomiting. 90 tablet 0    [DISCONTINUED] sodium chloride (Ayr) 0.65 % nasal spray USE 1 DOSE IN EACH NOSTRIL THREE TIMES DAILY 50 mL 0    [DISCONTINUED] Sodium Fluoride (Denta 5000 Plus) 1.1 % cream Apply 1 Application to teeth 2 (Two) Times a Day. 102 g 5    [DISCONTINUED] doxycycline (MONODOX) 100 MG capsule Take one capsule po bid for 10 days 20 capsule 0    [DISCONTINUED] methylPREDNISolone (MEDROL) 4 MG dose pack Take as directed on package instructions. 21 tablet 0     No current facility-administered medications on file prior to visit.     Current outpatient and discharge medications have been reconciled for the patient.  Reviewed by: Eric Rosenberg MD      Allergies   Allergen Reactions    Penicillin G Anaphylaxis    Penicillins Anaphylaxis    Sulfa Antibiotics Hives     Was told by mother possible anaphylaxis    Allopurinol Rash       Review of Systems   Constitutional:  Negative for activity change, appetite  "change, fatigue and fever.   HENT:  Positive for congestion. Negative for ear pain, swollen glands and voice change.    Eyes:  Negative for visual disturbance.   Respiratory:  Negative for shortness of breath and wheezing.    Cardiovascular:  Negative for chest pain and leg swelling.   Gastrointestinal:  Negative for abdominal pain, blood in stool, constipation, diarrhea, nausea and vomiting.   Endocrine: Negative for polydipsia and polyuria.   Genitourinary:  Negative for dysuria, frequency and hematuria.   Musculoskeletal:  Negative for joint swelling, neck pain and neck stiffness.   Skin:  Negative for rash and wound.   Neurological:  Negative for weakness, numbness and headache.   Psychiatric/Behavioral:  Negative for suicidal ideas and depressed mood.      I have reviewed and confirmed the accuracy of the ROS as documented by the MA/LPN/RN Eric Rosenberg MD    Objective   Visit Vitals  /96 (BP Location: Right arm, Patient Position: Sitting, Cuff Size: Adult)   Pulse 86   Resp 20   Ht 190.5 cm (75\")   Wt 135 kg (298 lb)   SpO2 99%   BMI 37.25 kg/m²      **  Physical Exam  Constitutional:       Appearance: He is well-developed.   HENT:      Head: Normocephalic and atraumatic.      Right Ear: Tympanic membrane and external ear normal.      Left Ear: Tympanic membrane and external ear normal.      Nose: Nose normal. Rhinorrhea present.      Mouth/Throat:      Mouth: Mucous membranes are moist.      Pharynx: No oropharyngeal exudate, posterior oropharyngeal erythema or postnasal drip.   Eyes:      Pupils: Pupils are equal, round, and reactive to light.   Cardiovascular:      Rate and Rhythm: Normal rate and regular rhythm.      Heart sounds: Normal heart sounds.   Pulmonary:      Effort: Pulmonary effort is normal.      Breath sounds: Normal breath sounds.   Abdominal:      General: Bowel sounds are normal.      Palpations: Abdomen is soft.   Musculoskeletal:         General: Normal range of motion.    "   Cervical back: Normal range of motion and neck supple.   Skin:     General: Skin is warm and dry.   Neurological:      Mental Status: He is alert and oriented to person, place, and time.   Psychiatric:         Behavior: Behavior normal.         Thought Content: Thought content normal.         Judgment: Judgment normal.       Derm Physical Exam  Ortho Exam   Neurological Exam  Mental Status  Alert. Oriented to person, place, and time.    Cranial Nerves  CN III, IV, VI: Pupils equal round and reactive to light bilaterally.         Assessment & Plan  Wheezing    Orders:    albuterol sulfate  (90 Base) MCG/ACT inhaler; Inhale 2 puffs Every 4 (Four) Hours As Needed for Wheezing.    Other seasonal allergic rhinitis    Orders:    cetirizine (zyrTEC) 10 MG tablet; Take 1 tablet by mouth Daily.    sodium chloride (Ayr) 0.65 % nasal spray; USE 1 DOSE IN EACH NOSTRIL THREE TIMES DAILY    fluticasone (FLONASE) 50 MCG/ACT nasal spray; Administer 1 spray into the nostril(s) as directed by provider 2 (Two) Times a Day.    loratadine (EQ All Day Allergy Relief) 10 MG tablet; Take 1 tablet by mouth Daily.    Caries    Orders:    Sodium Fluoride (Denta 5000 Plus) 1.1 % cream; Apply 1 Application to teeth 2 (Two) Times a Day.    Type 2 diabetes mellitus with other neurologic complication, with long-term current use of insulin  Pt to start ozempic this week and follow up in 3 months. Watch sugar closely since on insulin     Orders:    Insulin Glargine (BASAGLAR KWIKPEN) 100 UNIT/ML injection pen; Inject 40 Units under the skin into the appropriate area as directed Every Night.    Non-seasonal allergic rhinitis due to other allergic trigger    Orders:    montelukast (SINGULAIR) 10 MG tablet; Take 1 tablet by mouth Every Night.    Gout, unspecified cause, unspecified chronicity, unspecified site    Orders:    colchicine-probenecid (COL-BENEMID) 0.5-500 MG per tablet; Take 1 tablet by mouth Daily.    Nausea    Orders:     promethazine (PHENERGAN) 25 MG tablet; Take 1 tablet by mouth Every 6 (Six) Hours As Needed for Nausea or Vomiting.     Findings discussed. All questions answered.  Reassurance, education.  Medication and medication adverse effects discussed.  Drug education given and explained to patient. Patient verbalized understanding.  Follow up in 3 months for recheck, sooner for concerns.  Consider ENT apt f sinus sx persist at follow up appt. Suspect tail end of uri at today's appt.         Expected course, medications, and adverse effects discussed as appropriate.  Call or return if worsening or persistent symptoms.     This document is intended for medical professional use only.   Electronically signed by Eric Rosenberg MD on 01/03/2025. This content may not have been proofread.

## 2025-01-03 NOTE — ASSESSMENT & PLAN NOTE
Pt to start ozempic this week and follow up in 3 months. Watch sugar closely since on insulin     Orders:    Insulin Glargine (BASAGLAR KWIKPEN) 100 UNIT/ML injection pen; Inject 40 Units under the skin into the appropriate area as directed Every Night.

## 2025-01-12 DIAGNOSIS — E11.49 TYPE 2 DIABETES MELLITUS WITH OTHER NEUROLOGIC COMPLICATION, WITH LONG-TERM CURRENT USE OF INSULIN: ICD-10-CM

## 2025-01-12 DIAGNOSIS — Z79.4 TYPE 2 DIABETES MELLITUS WITH OTHER NEUROLOGIC COMPLICATION, WITH LONG-TERM CURRENT USE OF INSULIN: ICD-10-CM

## 2025-01-13 ENCOUNTER — TELEPHONE (OUTPATIENT)
Dept: FAMILY MEDICINE CLINIC | Facility: CLINIC | Age: 45
End: 2025-01-13
Payer: MEDICAID

## 2025-01-13 DIAGNOSIS — I10 HYPERTENSION, BENIGN: ICD-10-CM

## 2025-01-13 RX ORDER — INSULIN LISPRO 100 [IU]/ML
INJECTION, SOLUTION INTRAVENOUS; SUBCUTANEOUS
Qty: 15 ML | Refills: 0 | Status: SHIPPED | OUTPATIENT
Start: 2025-01-13

## 2025-01-13 RX ORDER — CHLORTHALIDONE 50 MG/1
50 TABLET ORAL DAILY
Qty: 90 TABLET | Refills: 3 | OUTPATIENT
Start: 2025-01-13

## 2025-01-13 NOTE — TELEPHONE ENCOUNTER
Caller: Barrett Laguerre    Relationship: Self    Best call back number: [phone] (OK TO LEAVE )     What is the best time to reach you: ASAP     Who are you requesting to speak with (clinical staff, provider, specific staff member): CLINICAL     What was the call regarding: PT STATES THAT chlorthalidone (HYGROTEN) 50 MG tablet WAS CHANGED FROM 50 MG 1 X PER DAY TO 50 MG 1/2 TABLET PER DAY ON 12/9/24, BUT HE WAS NOT AWARE THAT PCP WAS DECREASING DOSE. WANTS TO KNOW IF THIS WAS A MISTAKE OR WHY THIS WAS CHANGED. PLEASE ADVISE.     Is it okay if the provider responds through MyChart: NO

## 2025-01-29 ENCOUNTER — TELEPHONE (OUTPATIENT)
Dept: FAMILY MEDICINE CLINIC | Facility: CLINIC | Age: 45
End: 2025-01-29
Payer: MEDICAID

## 2025-01-29 NOTE — TELEPHONE ENCOUNTER
Caty with [facility] called and said that Barrett was complaining of some what high blood pressure and chest pain with activity for about a month.  She just wanted Dr. Rosenberg to be aware.  Please contact her at  with any questions.  Thanks Portia

## 2025-02-10 DIAGNOSIS — R11.0 NAUSEA: ICD-10-CM

## 2025-02-10 DIAGNOSIS — J30.2 OTHER SEASONAL ALLERGIC RHINITIS: ICD-10-CM

## 2025-02-10 DIAGNOSIS — R06.2 WHEEZING: ICD-10-CM

## 2025-02-10 DIAGNOSIS — E11.49 TYPE 2 DIABETES MELLITUS WITH OTHER NEUROLOGIC COMPLICATION, WITH LONG-TERM CURRENT USE OF INSULIN: ICD-10-CM

## 2025-02-10 DIAGNOSIS — Z79.4 TYPE 2 DIABETES MELLITUS WITH OTHER NEUROLOGIC COMPLICATION, WITH LONG-TERM CURRENT USE OF INSULIN: ICD-10-CM

## 2025-02-10 RX ORDER — ALBUTEROL SULFATE 90 UG/1
INHALANT RESPIRATORY (INHALATION)
Qty: 9 G | Refills: 0 | Status: SHIPPED | OUTPATIENT
Start: 2025-02-10

## 2025-02-10 RX ORDER — INSULIN LISPRO 100 [IU]/ML
INJECTION, SOLUTION INTRAVENOUS; SUBCUTANEOUS
Qty: 15 ML | Refills: 0 | Status: SHIPPED | OUTPATIENT
Start: 2025-02-10

## 2025-02-10 RX ORDER — ECHINACEA PURPUREA EXTRACT 125 MG
TABLET ORAL
Qty: 50 ML | Refills: 0 | Status: SHIPPED | OUTPATIENT
Start: 2025-02-10

## 2025-02-10 RX ORDER — PROMETHAZINE HYDROCHLORIDE 25 MG/1
TABLET ORAL
Qty: 90 TABLET | Refills: 0 | Status: SHIPPED | OUTPATIENT
Start: 2025-02-10

## 2025-02-10 NOTE — TELEPHONE ENCOUNTER
Caller: [facility] [address] [place] [address], [place] - [facility] [phone] [place] [phone] FX    Relationship: Pharmacy    Best call back number: [phone]    What medication are you requesting: GENERIC OF HumaLOG KwikPen 100 UNIT/ML solution pen-injector     What are your current symptoms:     How long have you been experiencing symptoms:     Have you had these symptoms before:    [x] Yes  [] No    Have you been treated for these symptoms before:   [x] Yes  [] No    If a prescription is needed, what is your preferred pharmacy and phone number:  [facility] [address] [place] [address], [place] - [address] [facility] [phone] [place] [phone] FX     Additional notes:  PATIENTS INSURANCE IS REQUESTING THE GENERIC VERSION.

## 2025-02-11 ENCOUNTER — OFFICE VISIT (OUTPATIENT)
Age: 45
End: 2025-02-11
Payer: MEDICAID

## 2025-02-11 VITALS — BODY MASS INDEX: 37.05 KG/M2 | HEIGHT: 75 IN | OXYGEN SATURATION: 95 % | WEIGHT: 298 LBS | HEART RATE: 84 BPM

## 2025-02-11 DIAGNOSIS — M79.675 PAIN IN TOES OF BOTH FEET: ICD-10-CM

## 2025-02-11 DIAGNOSIS — R26.81 UNSTEADINESS ON FEET: ICD-10-CM

## 2025-02-11 DIAGNOSIS — M20.42 HAMMERTOE, BILATERAL: ICD-10-CM

## 2025-02-11 DIAGNOSIS — E11.65 TYPE 2 DIABETES MELLITUS WITH HYPERGLYCEMIA, WITHOUT LONG-TERM CURRENT USE OF INSULIN: ICD-10-CM

## 2025-02-11 DIAGNOSIS — M20.41 HAMMERTOE, BILATERAL: ICD-10-CM

## 2025-02-11 DIAGNOSIS — L60.3 ONYCHODYSTROPHY: Primary | ICD-10-CM

## 2025-02-11 DIAGNOSIS — M79.674 PAIN IN TOES OF BOTH FEET: ICD-10-CM

## 2025-02-11 DIAGNOSIS — L84 CALLUS OF TOE: ICD-10-CM

## 2025-02-11 DIAGNOSIS — E11.42 DIABETIC PERIPHERAL NEUROPATHY ASSOCIATED WITH TYPE 2 DIABETES MELLITUS: ICD-10-CM

## 2025-02-11 NOTE — PROGRESS NOTES
02/11/2025  Foot and Ankle Surgery - Established Patient/Follow-up  Provider: ANASTASIA Chan   Location: [facility]    Subjective:  Barrett Laguerre is a 44 y.o. male.     Chief Complaint   Patient presents with    Right Foot - Follow-up, Diabetes     Dm foot care, nails    Left Foot - Follow-up, Diabetes     Dm foot care, nails    Follow-Up     .Eric Rosenberg MD- 1/3/24         History of Present Illness  The patient is a 44-year-old male who presents today for a routine diabetic foot check and nail care.    He reports an improvement in his back condition, attributing it to less frequent use of pain medication. He has been undergoing therapy, which he finds beneficial. He underwent back surgery several months ago, during which a portion of the bone was removed.    He expresses a need for new diabetic shoes and inserts, as his current ones are over a year old. He prefers the option of receiving three heat-molded inserts, citing their durability even when exposed to rain.       Allergies   Allergen Reactions    Penicillin G Anaphylaxis    Penicillins Anaphylaxis    Sulfa Antibiotics Hives     Was told by mother possible anaphylaxis    Allopurinol Rash       Current Outpatient Medications on File Prior to Visit   Medication Sig Dispense Refill    acetaminophen-codeine (TYLENOL/CODEINE #3) 300-30 MG per tablet Take 1 tablet by mouth 3 (Three) Times a Day As Needed for Mild Pain. 21 tablet 2    acyclovir (ZOVIRAX) 800 MG tablet Take 1 tablet by mouth 5 (Five) Times a Day. 50 tablet 1    albuterol sulfate  (90 Base) MCG/ACT inhaler INHALE 2 PUFFS INTO LUNGS EVERY 4 HOURS AS NEEDED FOR WHEEZING 9 g 0    Alcohol Swabs (B-D SINGLE USE SWABS REGULAR) pads Apply 1 each topically to the appropriate area as directed Daily. 100 each 12    amLODIPine (NORVASC) 10 MG tablet TAKE 1 TABLET BY MOUTH ONCE DAILY AT NIGHT 30 tablet 0    Azelastine HCl 137 MCG/SPRAY solution Administer 2 sprays into  the nostril(s) as directed by provider 2 (Two) Times a Day. 60 mL 2    cefprozil (CEFZIL) 500 MG tablet Take 1 tablet by mouth 2 (Two) Times a Day. 28 tablet 0    cetirizine (zyrTEC) 10 MG tablet Take 1 tablet by mouth Daily. 90 tablet 1    chlorthalidone (HYGROTEN) 50 MG tablet Take 1 tablet by mouth Daily. 90 tablet 3    colchicine-probenecid (COL-BENEMID) 0.5-500 MG per tablet Take 1 tablet by mouth Daily. 30 tablet 3    Continuous Glucose  (Dexcom G6 ) device USE UNIT TO CHECK BLOOD SUGAR ONCE DAILY AS DIRECTED // 1 UNIT SHOULD LAST 365 DAYS 1 each 0    Continuous Glucose Sensor (Dexcom G6 Sensor) Use Every 10 (Ten) Days. 3 each 12    Continuous Glucose Transmitter (Dexcom G6 Transmitter) misc Use 1 each Every 3 (Three) Months. 1 each 3    Cyanocobalamin (Vitamin B-12 ER) 1000 MCG tablet controlled-release TAKE 1 TABLET DAILY 30 each 5    Diclofenac Epolamine (FLECTOR) 1.3 % patch patch Apply 1 patch topically to the appropriate area as directed 2 (Two) Times a Day As Needed (back pain). 60 patch 11    Emtricitabine-Tenofovir AF (Descovy) 200-25 MG per tablet Take 1 tablet by mouth Daily.      FLUoxetine (PROzac) 40 MG capsule Take 1 capsule by mouth Every Night. 90 capsule 3    fluticasone (FLONASE) 50 MCG/ACT nasal spray Administer 1 spray into the nostril(s) as directed by provider 2 (Two) Times a Day. 3 g 0    furosemide (LASIX) 20 MG tablet       gabapentin (NEURONTIN) 300 MG capsule Take 1 capsule by mouth 3 (Three) Times a Day. 270 capsule 1    glucose blood test strip USE 1 STRIP TO CHECK GLUCOSE 4 TIMES DAILY 100 each 12    glucose monitor monitoring kit Use 1 each Daily. Dx: E11.49 patient checks sugar 4 x day 1 each 0    guaiFENesin-dextromethorphan (ROBITUSSIN DM) 100-10 MG/5ML syrup Take 5 mL by mouth 3 (Three) Times a Day As Needed for Cough. 118 mL 0    HYDROcodone-acetaminophen (Norco)  MG per tablet Take 1 tablet by mouth Every 6 (Six) Hours As Needed for Moderate Pain. 120  tablet 0    HYDROcodone-acetaminophen (NORCO)  MG per tablet Take 1 tablet by mouth Every 6 (Six) Hours As Needed for Moderate Pain or Severe Pain. 120 tablet 0    HYDROcodone-acetaminophen (Norco)  MG per tablet Take 1 tablet by mouth Every 6 (Six) Hours As Needed for Moderate Pain. 120 tablet 0    hydrOXYzine (ATARAX) 25 MG tablet Take 1 tablet by mouth 4 (Four) Times a Day As Needed for Itching. 90 tablet 12    Insulin Glargine (BASAGLAR KWIKPEN) 100 UNIT/ML injection pen Inject 40 Units under the skin into the appropriate area as directed Every Night. 12 mL 11    Insulin Lispro, 1 Unit Dial, (HumaLOG KwikPen) 100 UNIT/ML solution pen-injector INJECT 15 UNITS SUBCUTANEOUSLY THREE TIMES DAILY 15 MINUTES BEFORE MEALS OR IMMEDIATELY FOLLOWING MEALS 15 mL 0    ketoconazole (NIZORAL) 2 % cream       ketoconazole (NIZORAL) 2 % shampoo       Lancets misc Use 1 each 4 (Four) Times a Day. Dx: E11.49 patient checks sugar 4 x day 200 each 12    loratadine (EQ All Day Allergy Relief) 10 MG tablet Take 1 tablet by mouth Daily. 90 tablet 0    losartan (COZAAR) 100 MG tablet Take 1 tablet by mouth Daily. 90 tablet 3    methocarbamol (ROBAXIN) 750 MG tablet Take 1 tablet by mouth 4 (Four) Times a Day As Needed for Muscle Spasms. 360 tablet 3    montelukast (SINGULAIR) 10 MG tablet Take 1 tablet by mouth Every Night. 90 tablet 0    naloxone (NARCAN) 4 MG/0.1ML nasal spray Call 911. Don't prime. [place] in 1 nostril for overdose. Repeat in 2-3 minutes in other nostril if no or minimal breathing/responsiveness. 2 each 0    oxybutynin XL (DITROPAN XL) 15 MG 24 hr tablet Take 1 tablet by mouth Daily. 90 tablet 3    Ozempic, 0.25 or 0.5 MG/DOSE, 2 MG/3ML solution pen-injector INJECT 0.25 MG UNDER THE SKIN INTO THE APPROPRIATE AREA AS DIRECTED 1 TIME PER WEEK      pantoprazole (Protonix) 40 MG EC tablet Take 1 tablet by mouth Daily. 90 tablet 3    potassium chloride (MICRO-K) 10 MEQ CR capsule Take 1 capsule by mouth Daily.  "90 capsule 3    promethazine (PHENERGAN) 25 MG tablet TAKE 1 TABLET BY MOUTH EVERY 6 HOURS AS NEEDED FOR NAUSEA FOR VOMITING 90 tablet 0    Semaglutide,0.25 or 0.5MG/DOS, (Ozempic, 0.25 or 0.5 MG/DOSE,) 2 MG/1.5ML solution pen-injector Inject 0.25 mg under the skin into the appropriate area as directed 1 (One) Time Per Week. 3 mL 12    sildenafil (REVATIO) 20 MG tablet Take 1 tablet by mouth Daily As Needed (ed). 30 tablet 3    sitaGLIPtin-metFORMIN (Janumet)  MG per tablet Take 1 tablet by mouth 2 (Two) Times a Day With Meals. 180 tablet 3    sodium chloride (Ayr) 0.65 % nasal spray USE 1 DOSE IN EACH NOSTRIL THREE TIMES DAILY 50 mL 0    Sodium Fluoride (Denta 5000 Plus) 1.1 % cream Apply 1 Application to teeth 2 (Two) Times a Day. 102 g 5    terbinafine (lamiSIL) 250 MG tablet Take 1 tablet by mouth Daily. 30 tablet 0    topiramate (TOPAMAX) 200 MG tablet        No current facility-administered medications on file prior to visit.       Objective   Pulse 84   Ht 190.5 cm (75\")   Wt 135 kg (298 lb)   SpO2 95%   BMI 37.25 kg/m²     Foot/Ankle Exam    Right Foot Vascularity   Normal vascular exam    Dorsalis pedis:  2+  Posterior tibial:  2+  Skin temperature:  warm  Edema grading:  None  CFT:  < 3 seconds  Pedal hair growth:  Present  Varicosities:  none      Left Foot Vascularity   Normal vascular exam    Dorsalis pedis:  2+  Posterior tibial:  2+  Skin temperature:  warm  Edema grading:  None  CFT:  < 3 seconds  Pedal hair growth:  Present  Varicosities:  none     NEUROLOGIC      Right Foot Neurologic   Protective Sensation using [place] Monofilament:   Sites tested: 9      Left Foot Neurologic   Protective Sensation using [place] Monofilament:   Sites tested: 10     MUSCULOSKELETAL      Right Foot Musculoskeletal   Tenderness:  none    [place]:  First toe      Left Foot Musculoskeletal   Tenderness:  none  [place]:  First toe     DERMATOLOGIC       Right Foot Dermatologic "   Skin  Right foot skin is intact.   Nails  1.  Positive for elongated, abnormal thickness and dystrophic nail. (Mild callus)  2.  Positive for elongated, abnormal thickness and dystrophic nail.  3.  Positive for elongated, abnormal thickness and dystrophic nail.  4.  Positive for elongated, abnormal thickness and dystrophic nail.  5.  Positive for elongated, abnormal thickness and dystrophic nail.      Left Foot Dermatologic   Skin  Left foot skin is intact.   Nails  1.  Positive for elongated, abnormal thickness and dystrophic nail.  2.  Positive for elongated, abnormal thickness and dystrophic nail.  3.  Positive for elongated, abnormal thickness and dystrophic nail.  4.  Positive for elongated, abnormally thick and dystrophic nail.  5.  Positive for elongated, abnormally thick and dystrophic nail.           Physical Exam         Results       Assessment & Plan   Diagnoses and all orders for this visit:    1. Onychodystrophy (Primary)    2. Diabetic peripheral neuropathy associated with type 2 diabetes mellitus    3. Pain in toes of both feet    4. Type 2 diabetes mellitus with hyperglycemia, without long-term current use of insulin    5. Unsteadiness on feet      Assessment & Plan  1. Routine diabetic foot examination.  His overall foot health appears satisfactory. A comprehensive foot examination was conducted, and all 10 toenails were debrided during this visit. Paperwork for diabetic shoes and inserts will be sent to [facility] for fitting.    2. Back pain.  He reports improvement in back pain and is currently undergoing therapy. No new medications or interventions are required at this time.    Nail debridement: Both feet x10    Consent and time out was performed before proceeding with the procedure. Nails were debrided with a nail nipper without complication.  No anesthesia was required.  Indications for procedure were thickened, dystrophic, and fungal appearing nails which are difficult to trim.  Proper  self-care and technique was discussed with patient.  Patient was stable after procedure.     Follow-up  The patient will follow up in 2 months.    PROCEDURE  The patient underwent back surgery several months ago, during which a portion of the bone was removed.    All 10 toenails were debrided during this visit.       No orders of the defined types were placed in this encounter.         Patient or patient representative verbalized consent for the use of Ambient Listening during the visit with  ANASTASIA Alvares for chart documentation. 2/11/2025  16:03 EST

## 2025-02-12 ENCOUNTER — TELEPHONE (OUTPATIENT)
Age: 45
End: 2025-02-12
Payer: MEDICAID

## 2025-02-18 ENCOUNTER — TELEPHONE (OUTPATIENT)
Dept: ORTHOPEDIC SURGERY | Facility: CLINIC | Age: 45
End: 2025-02-18
Payer: MEDICAID

## 2025-02-18 NOTE — TELEPHONE ENCOUNTER
PATIENT CALLED TODAY AND ADVISED HE SPOKE WITH [facility] AND THEY ADVISED TO HIM THEY HAVE NOT RCVD THE ORDER FOR THE DIABETIC SHOES AND INSERTS.    FAX# [phone]

## 2025-02-19 NOTE — TELEPHONE ENCOUNTER
I called pt and let him know that is a different fax# than on the forms and I will get it faxed to that number he provided

## 2025-03-06 ENCOUNTER — TELEPHONE (OUTPATIENT)
Dept: ORTHOPEDIC SURGERY | Facility: CLINIC | Age: 45
End: 2025-03-06
Payer: MEDICAID

## 2025-03-06 NOTE — TELEPHONE ENCOUNTER
Patient called in stating that [facility] sent us a form that his insurance requires for his diabetic shoes and he is wanting to know the status of it. He spoke to [facility] and they said they have not received the form back from us. Please reach out to patient.  [facility]- [phone]  Pt. Number- [phone]

## 2025-03-07 ENCOUNTER — OFFICE VISIT (OUTPATIENT)
Dept: PAIN MEDICINE | Facility: CLINIC | Age: 45
End: 2025-03-07
Payer: MEDICAID

## 2025-03-07 VITALS
OXYGEN SATURATION: 97 % | DIASTOLIC BLOOD PRESSURE: 82 MMHG | RESPIRATION RATE: 16 BRPM | BODY MASS INDEX: 36.37 KG/M2 | WEIGHT: 291 LBS | HEART RATE: 82 BPM | SYSTOLIC BLOOD PRESSURE: 126 MMHG

## 2025-03-07 DIAGNOSIS — M51.362 DEGENERATION OF INTERVERTEBRAL DISC OF LUMBAR REGION WITH DISCOGENIC BACK PAIN AND LOWER EXTREMITY PAIN: ICD-10-CM

## 2025-03-07 DIAGNOSIS — M48.07 LUMBOSACRAL SPINAL STENOSIS: ICD-10-CM

## 2025-03-07 DIAGNOSIS — G89.29 CHRONIC MIDLINE LOW BACK PAIN WITH BILATERAL SCIATICA: Primary | ICD-10-CM

## 2025-03-07 DIAGNOSIS — M54.41 CHRONIC MIDLINE LOW BACK PAIN WITH BILATERAL SCIATICA: Primary | ICD-10-CM

## 2025-03-07 DIAGNOSIS — M54.42 CHRONIC MIDLINE LOW BACK PAIN WITH BILATERAL SCIATICA: Primary | ICD-10-CM

## 2025-03-07 DIAGNOSIS — Z98.890 STATUS POST LUMBAR SPINE OPERATIVE PROCEDURE FOR DECOMPRESSION OF SPINAL CORD: ICD-10-CM

## 2025-03-07 DIAGNOSIS — M54.16 LUMBAR RADICULOPATHY: ICD-10-CM

## 2025-03-07 RX ORDER — HYDROCODONE BITARTRATE AND ACETAMINOPHEN 10; 325 MG/1; MG/1
1 TABLET ORAL EVERY 6 HOURS PRN
Qty: 120 TABLET | Refills: 0 | Status: SHIPPED | OUTPATIENT
Start: 2025-03-07

## 2025-03-14 ENCOUNTER — OFFICE VISIT (OUTPATIENT)
Dept: FAMILY MEDICINE CLINIC | Facility: CLINIC | Age: 45
End: 2025-03-14
Payer: MEDICAID

## 2025-03-14 VITALS
SYSTOLIC BLOOD PRESSURE: 130 MMHG | DIASTOLIC BLOOD PRESSURE: 78 MMHG | TEMPERATURE: 97.3 F | WEIGHT: 292.8 LBS | RESPIRATION RATE: 18 BRPM | HEART RATE: 90 BPM | HEIGHT: 75 IN | BODY MASS INDEX: 36.41 KG/M2 | OXYGEN SATURATION: 98 %

## 2025-03-14 DIAGNOSIS — Z79.4 TYPE 2 DIABETES MELLITUS WITH OTHER NEUROLOGIC COMPLICATION, WITH LONG-TERM CURRENT USE OF INSULIN: Primary | ICD-10-CM

## 2025-03-14 DIAGNOSIS — E11.49 TYPE 2 DIABETES MELLITUS WITH OTHER NEUROLOGIC COMPLICATION, WITH LONG-TERM CURRENT USE OF INSULIN: Primary | ICD-10-CM

## 2025-03-14 LAB
BILIRUB BLD-MCNC: NEGATIVE MG/DL
CLARITY, POC: CLEAR
COLOR UR: YELLOW
EXPIRATION DATE: ABNORMAL
GLUCOSE UR STRIP-MCNC: ABNORMAL MG/DL
HBA1C MFR BLD: 10.1 % (ref 4.5–5.7)
KETONES UR QL: NEGATIVE
LEUKOCYTE EST, POC: NEGATIVE
Lab: 856
NITRITE UR-MCNC: NEGATIVE MG/ML
PH UR: 5 [PH] (ref 5–8)
PROT UR STRIP-MCNC: ABNORMAL MG/DL
RBC # UR STRIP: NEGATIVE /UL
SP GR UR: 1.01 (ref 1–1.03)
UROBILINOGEN UR QL: NORMAL

## 2025-03-14 RX ORDER — INSULIN LISPRO 100 [IU]/ML
INJECTION, SOLUTION INTRAVENOUS; SUBCUTANEOUS
Qty: 18 ML | Refills: 0 | Status: SHIPPED | OUTPATIENT
Start: 2025-03-14

## 2025-03-14 NOTE — ASSESSMENT & PLAN NOTE
Start ozempic, increase humalog. Follow up in 1 month for recheck, sooner for concerns.     Orders:    POC Glycosylated Hemoglobin (Hb A1C)    Insulin Lispro, 1 Unit Dial, (HumaLOG KwikPen) 100 UNIT/ML solution pen-injector; INJECT 20 UNITS SUBCUTANEOUSLY THREE TIMES DAILY 15 MINUTES BEFORE MEALS OR IMMEDIATELY FOLLOWING MEALS

## 2025-03-14 NOTE — PROGRESS NOTES
Subjective   Barrett Laguerre is a 45 y.o. male.   Chief Complaint   Patient presents with    Diabetes     History of Present Illness      Diabetes  He presents for his follow-up diabetic visit. He has type 2 diabetes mellitus. The initial diagnosis of diabetes was made 4 years ago. Pertinent negatives for diabetes include no chest pain, no fatigue, no polydipsia, no polyuria and no weakness. Risk factors for coronary artery disease include diabetes mellitus, male sex, sedentary lifestyle and hypertension. Current diabetic treatment includes insulin injections and oral agent (dual therapy). He has not had a previous visit with a dietitian. An ACE inhibitor/angiotensin II receptor blocker is not being taken. He does not see a podiatrist. Eye exam is not current.   Hypertension  This is a chronic problem. The current episode started more than 1 year ago. Pertinent negatives include no chest pain, neck pain, palpitations or shortness of breath. Risk factors for coronary artery disease include diabetes mellitus and obesity. Current antihypertension treatment includes calcium channel blockers and angiotensin blockers.   Hyperlipidemia  This is a chronic problem. The current episode started more than 1 year ago. Recent lipid tests were reviewed and are variable. Exacerbating diseases include diabetes and obesity. There are no known factors aggravating his hyperlipidemia. Pertinent negatives include no chest pain or shortness of breath. The current treatment provides mild improvement of lipids. There are no compliance problems.  Risk factors for coronary artery disease include family history, diabetes mellitus, hypertension, obesity and male sex.        The following portions of the patient's history were reviewed and updated as appropriate: allergies, current medications, past family history, past medical history, past social history, past surgical history, and problem list.    Patient Active Problem List   Diagnosis     Gout    Lumbosacral spinal stenosis    Memory impairment    Acquired flexible flat foot    Chronic midline low back pain with bilateral sciatica    Other specified dorsopathies, site unspecified    Guillain-[place]    Sleep apnea    Attention deficit disorder    Diabetes    Other seasonal allergic rhinitis    Mixed obsessional thoughts and acts    Hypertension, benign    Disorder of lipid metabolism    CIDP (chronic inflammatory demyelinating polyneuropathy)    Lumbar radiculopathy    Onychomycosis    Annual physical exam    Class 1 obesity due to excess calories with serious comorbidity and body mass index (BMI) of 34.0 to 34.9 in adult    Acute midline low back pain with left-sided sciatica    Degeneration of intervertebral disc of lumbar region with discogenic back pain and lower extremity pain    History of discectomy    Lumbar disc herniation    Pruritus       Current Outpatient Medications on File Prior to Visit   Medication Sig Dispense Refill    acetaminophen-codeine (TYLENOL/CODEINE #3) 300-30 MG per tablet Take 1 tablet by mouth 3 (Three) Times a Day As Needed for Mild Pain. 21 tablet 2    acyclovir (ZOVIRAX) 800 MG tablet Take 1 tablet by mouth 5 (Five) Times a Day. 50 tablet 1    albuterol sulfate  (90 Base) MCG/ACT inhaler INHALE 2 PUFFS INTO LUNGS EVERY 4 HOURS AS NEEDED FOR WHEEZING 9 g 0    Alcohol Swabs (B-D SINGLE USE SWABS REGULAR) pads Apply 1 each topically to the appropriate area as directed Daily. 100 each 12    amLODIPine (NORVASC) 10 MG tablet TAKE 1 TABLET BY MOUTH ONCE DAILY AT NIGHT 30 tablet 0    Azelastine HCl 137 MCG/SPRAY solution Administer 2 sprays into the nostril(s) as directed by provider 2 (Two) Times a Day. 60 mL 2    cefprozil (CEFZIL) 500 MG tablet Take 1 tablet by mouth 2 (Two) Times a Day. 28 tablet 0    cetirizine (zyrTEC) 10 MG tablet Take 1 tablet by mouth Daily. 90 tablet 1    chlorthalidone (HYGROTEN) 50 MG tablet Take 1 tablet by mouth Daily. 90 tablet 3     colchicine-probenecid (COL-BENEMID) 0.5-500 MG per tablet Take 1 tablet by mouth Daily. 30 tablet 3    Continuous Glucose  (Dexcom G6 ) device USE UNIT TO CHECK BLOOD SUGAR ONCE DAILY AS DIRECTED // 1 UNIT SHOULD LAST 365 DAYS 1 each 0    Continuous Glucose Sensor (Dexcom G6 Sensor) Use Every 10 (Ten) Days. 3 each 12    Continuous Glucose Transmitter (Dexcom G6 Transmitter) misc Use 1 each Every 3 (Three) Months. 1 each 3    Cyanocobalamin (Vitamin B-12 ER) 1000 MCG tablet controlled-release TAKE 1 TABLET DAILY 30 each 5    Diclofenac Epolamine (FLECTOR) 1.3 % patch patch Apply 1 patch topically to the appropriate area as directed 2 (Two) Times a Day As Needed (back pain). 60 patch 11    Emtricitabine-Tenofovir AF (Descovy) 200-25 MG per tablet Take 1 tablet by mouth Daily.      FLUoxetine (PROzac) 40 MG capsule Take 1 capsule by mouth Every Night. 90 capsule 3    fluticasone (FLONASE) 50 MCG/ACT nasal spray Administer 1 spray into the nostril(s) as directed by provider 2 (Two) Times a Day. 3 g 0    furosemide (LASIX) 20 MG tablet       gabapentin (NEURONTIN) 300 MG capsule Take 1 capsule by mouth 3 (Three) Times a Day. 270 capsule 1    glucose blood test strip USE 1 STRIP TO CHECK GLUCOSE 4 TIMES DAILY 100 each 12    glucose monitor monitoring kit Use 1 each Daily. Dx: E11.49 patient checks sugar 4 x day 1 each 0    guaiFENesin-dextromethorphan (ROBITUSSIN DM) 100-10 MG/5ML syrup Take 5 mL by mouth 3 (Three) Times a Day As Needed for Cough. 118 mL 0    HYDROcodone-acetaminophen (NORCO)  MG per tablet Take 1 tablet by mouth Every 6 (Six) Hours As Needed for Moderate Pain or Severe Pain. 120 tablet 0    HYDROcodone-acetaminophen (Norco)  MG per tablet Take 1 tablet by mouth Every 6 (Six) Hours As Needed for Moderate Pain. 120 tablet 0    HYDROcodone-acetaminophen (Norco)  MG per tablet Take 1 tablet by mouth Every 6 (Six) Hours As Needed for Moderate Pain. 120 tablet 0    hydrOXYzine  (ATARAX) 25 MG tablet Take 1 tablet by mouth 4 (Four) Times a Day As Needed for Itching. 90 tablet 12    Insulin Glargine (BASAGLAR KWIKPEN) 100 UNIT/ML injection pen Inject 40 Units under the skin into the appropriate area as directed Every Night. 12 mL 11    ketoconazole (NIZORAL) 2 % cream       ketoconazole (NIZORAL) 2 % shampoo       Lancets misc Use 1 each 4 (Four) Times a Day. Dx: E11.49 patient checks sugar 4 x day 200 each 12    loratadine (EQ All Day Allergy Relief) 10 MG tablet Take 1 tablet by mouth Daily. 90 tablet 0    losartan (COZAAR) 100 MG tablet Take 1 tablet by mouth Daily. 90 tablet 3    methocarbamol (ROBAXIN) 750 MG tablet Take 1 tablet by mouth 4 (Four) Times a Day As Needed for Muscle Spasms. 360 tablet 3    montelukast (SINGULAIR) 10 MG tablet Take 1 tablet by mouth Every Night. 90 tablet 0    naloxone (NARCAN) 4 MG/0.1ML nasal spray Call 911. Don't prime. [place] in 1 nostril for overdose. Repeat in 2-3 minutes in other nostril if no or minimal breathing/responsiveness. 2 each 0    oxybutynin XL (DITROPAN XL) 15 MG 24 hr tablet Take 1 tablet by mouth Daily. 90 tablet 3    Ozempic, 0.25 or 0.5 MG/DOSE, 2 MG/3ML solution pen-injector INJECT 0.25 MG UNDER THE SKIN INTO THE APPROPRIATE AREA AS DIRECTED 1 TIME PER WEEK      pantoprazole (Protonix) 40 MG EC tablet Take 1 tablet by mouth Daily. 90 tablet 3    potassium chloride (MICRO-K) 10 MEQ CR capsule Take 1 capsule by mouth Daily. 90 capsule 3    promethazine (PHENERGAN) 25 MG tablet TAKE 1 TABLET BY MOUTH EVERY 6 HOURS AS NEEDED FOR NAUSEA FOR VOMITING 90 tablet 0    Semaglutide,0.25 or 0.5MG/DOS, (Ozempic, 0.25 or 0.5 MG/DOSE,) 2 MG/1.5ML solution pen-injector Inject 0.25 mg under the skin into the appropriate area as directed 1 (One) Time Per Week. 3 mL 12    sildenafil (REVATIO) 20 MG tablet Take 1 tablet by mouth Daily As Needed (ed). 30 tablet 3    sitaGLIPtin-metFORMIN (Janumet)  MG per tablet Take 1 tablet by mouth 2 (Two) Times  a Day With Meals. 180 tablet 3    sodium chloride (Ayr) 0.65 % nasal spray USE 1 DOSE IN EACH NOSTRIL THREE TIMES DAILY 50 mL 0    Sodium Fluoride (Denta 5000 Plus) 1.1 % cream Apply 1 Application to teeth 2 (Two) Times a Day. 102 g 5    terbinafine (lamiSIL) 250 MG tablet Take 1 tablet by mouth Daily. 30 tablet 0    topiramate (TOPAMAX) 200 MG tablet       [DISCONTINUED] Insulin Lispro, 1 Unit Dial, (HumaLOG KwikPen) 100 UNIT/ML solution pen-injector INJECT 15 UNITS SUBCUTANEOUSLY THREE TIMES DAILY 15 MINUTES BEFORE MEALS OR IMMEDIATELY FOLLOWING MEALS 15 mL 0     No current facility-administered medications on file prior to visit.     Current outpatient and discharge medications have been reconciled for the patient.  Reviewed by: Eric Rosenberg MD      Allergies   Allergen Reactions    Penicillin G Anaphylaxis    Penicillins Anaphylaxis    Sulfa Antibiotics Hives     Was told by mother possible anaphylaxis    Allopurinol Rash       Review of Systems   Constitutional:  Negative for activity change, appetite change, fatigue and fever.   HENT:  Negative for ear pain, swollen glands and voice change.    Eyes:  Negative for visual disturbance.   Respiratory:  Negative for shortness of breath and wheezing.    Cardiovascular:  Negative for chest pain, palpitations and leg swelling.   Gastrointestinal:  Negative for abdominal pain, blood in stool, constipation, diarrhea, nausea and vomiting.   Endocrine: Negative for polydipsia and polyuria.   Genitourinary:  Negative for dysuria, frequency and hematuria.   Musculoskeletal:  Negative for joint swelling, neck pain and neck stiffness.   Skin:  Negative for rash and wound.   Neurological:  Negative for weakness, numbness and headache.   Psychiatric/Behavioral:  Negative for suicidal ideas and depressed mood.      I have reviewed and confirmed the accuracy of the ROS as documented by the MA/LPN/RN Eric Rosenberg MD    Objective   Visit Vitals  /78 (BP  "Location: Right arm, Patient Position: Sitting, Cuff Size: Adult)   Pulse 90   Temp 97.3 °F (36.3 °C) (Temporal)   Resp 18   Ht 190.5 cm (75\")   Wt 133 kg (292 lb 12.8 oz)   SpO2 98%   BMI 36.60 kg/m²      **  Physical Exam  Vitals reviewed.   Constitutional:       Appearance: He is well-developed.   HENT:      Head: Normocephalic and atraumatic.      Right Ear: External ear normal.      Left Ear: External ear normal.      Nose: Nose normal.   Eyes:      Conjunctiva/sclera: Conjunctivae normal.      Pupils: Pupils are equal, round, and reactive to light.   Cardiovascular:      Rate and Rhythm: Normal rate and regular rhythm.      Heart sounds: Normal heart sounds.   Pulmonary:      Effort: Pulmonary effort is normal. No respiratory distress.      Breath sounds: Normal breath sounds.   Abdominal:      General: Abdomen is flat. Bowel sounds are normal.      Palpations: Abdomen is soft.   Musculoskeletal:         General: No signs of injury. Normal range of motion.      Cervical back: Normal range of motion and neck supple.   Skin:     General: Skin is warm and dry.      Findings: No rash.   Neurological:      Mental Status: He is alert and oriented to person, place, and time.   Psychiatric:         Behavior: Behavior normal.         Thought Content: Thought content normal.         Judgment: Judgment normal.       Derm Physical Exam  Ortho Exam   Neurological Exam  Mental Status  Alert. Oriented to person, place, and time.    Cranial Nerves  CN III, IV, VI: Pupils equal round and reactive to light bilaterally.       Assessment & Plan  Type 2 diabetes mellitus with other neurologic complication, with long-term current use of insulin  Start ozempic, increase humalog. Follow up in 1 month for recheck, sooner for concerns.     Orders:    POC Glycosylated Hemoglobin (Hb A1C)    Insulin Lispro, 1 Unit Dial, (HumaLOG KwikPen) 100 UNIT/ML solution pen-injector; INJECT 20 UNITS SUBCUTANEOUSLY THREE TIMES DAILY 15 MINUTES BEFORE " MEALS OR IMMEDIATELY FOLLOWING MEALS     Follow up in 3 months for recheck, sooner for concerns.  Medication and medication adverse effects discussed.  Drug education given and explained to patient. Patient verbalized understanding.         Expected course, medications, and adverse effects discussed as appropriate.  Call or return if worsening or persistent symptoms.     This document is intended for medical professional use only.   Electronically signed by Eric Rosenberg MD on 03/14/2025. This content may not have been proofread.

## 2025-03-15 LAB
ALBUMIN/CREAT UR: 1269 MG/G CREAT (ref 0–29)
CREAT UR-MCNC: 228 MG/DL
MICROALBUMIN UR-MCNC: 2893.8 UG/ML

## 2025-03-19 DIAGNOSIS — K02.9 CARIES: ICD-10-CM

## 2025-03-20 RX ORDER — SODIUM FLUORIDE 1.1 G/100G
CREAM ORAL
Qty: 102 G | Refills: 0 | Status: SHIPPED | OUTPATIENT
Start: 2025-03-20

## 2025-03-26 ENCOUNTER — TELEPHONE (OUTPATIENT)
Dept: FAMILY MEDICINE CLINIC | Facility: CLINIC | Age: 45
End: 2025-03-26
Payer: MEDICAID

## 2025-03-26 NOTE — TELEPHONE ENCOUNTER
They need to call his podiatrist to request addendum to their chart notes. We can not make changes to another providers notes.

## 2025-03-26 NOTE — TELEPHONE ENCOUNTER
Caller: SHELIA    Relationship to patient: Other    Best call back number: [phone]    Patient is needing:   SHELIA WITH [facility] WAS CALLING IN REGARD TO A PRESCRIPTION FOR DIABETICS SHOES.    SHELIA STATED THAT WITH THE PAPER SHE GOT THE DOCTORS SIGN OFF BUT SHE'S NEEDING AN ADDENDUM BE MADE UNDER THE SUBJECT LINE. IT SAYS NO CHIEF COMPLAINT ON FILE PER INSURANCE SHE WOULD NEED THAT UPDATED TO DIABETIC FOLLOW UP OR DIABETIC MANAGEMENT.     SHE STATED IF YOU COULD SEND THAT BACK WITH THAT NOT THAT WOULD BE AMAZING.    PLEASE CALL TO DISCUSS.

## 2025-04-07 ENCOUNTER — TELEPHONE (OUTPATIENT)
Dept: FAMILY MEDICINE CLINIC | Facility: CLINIC | Age: 45
End: 2025-04-07
Payer: MEDICAID

## 2025-04-07 DIAGNOSIS — E11.49 TYPE 2 DIABETES MELLITUS WITH OTHER NEUROLOGIC COMPLICATION, WITH LONG-TERM CURRENT USE OF INSULIN: ICD-10-CM

## 2025-04-07 DIAGNOSIS — Z79.4 TYPE 2 DIABETES MELLITUS WITH OTHER NEUROLOGIC COMPLICATION, WITH LONG-TERM CURRENT USE OF INSULIN: ICD-10-CM

## 2025-04-07 RX ORDER — INSULIN LISPRO 100 [IU]/ML
INJECTION, SOLUTION INTRAVENOUS; SUBCUTANEOUS
Qty: 18 ML | Refills: 5 | Status: SHIPPED | OUTPATIENT
Start: 2025-04-07

## 2025-04-07 NOTE — TELEPHONE ENCOUNTER
Barrett called and needs to speak to you regarding the G6 and G7.  He would like for you to call him before you leave.  Please contact him at .  Thanks Portia

## 2025-04-07 NOTE — TELEPHONE ENCOUNTER
Patient called the office requesting to speak with Hanny. He wanted to inform Dr. Rosenberg that he has to special order patches for this and he does not know when they will arrive. He is requesting the insulin because he was due to follow up on the 16th but he wont be using the G6 until the patches arrive. Patient had me reschedule his appointment because he wants to get to use the G6 with his patches before his appointment. Please advise and call patient if you have further questions.

## 2025-04-07 NOTE — TELEPHONE ENCOUNTER
Called patient, left message on machine rx will be sent for his insulin and dexcom website states preferred location abdomen, back of upper arms, and upper buttocks so his back should not be an issue. He can wear the G6 on his arm. Spoke with the pharmacy and hey said they are having trouble getting the G7 so he should just stay on the G6.

## 2025-04-07 NOTE — TELEPHONE ENCOUNTER
CAN YOU CALL AND DISCUSS WITH THE PATIENT PLEASE       HE IS ALSO ASKING FOR HIS FAST ACTING INSULIN       Caller: Barrett Laguerre    Relationship: Self    Best call back number: [phone] (Mobile)     What medication are you requesting:       DEXCOM G7 MONITOR, SENSORS, AND TRANSMITTERS.       DEX COM G7 - FOR ARM USE    CURRENTLY ON G6 BUT NOT FDA APPROVED FOR ARM USE.      HE HAS BACK ISSUES AND CAN ONLY WEAR ON THE ARM ONLY       Have you had these symptoms before:    [x] Yes  [] No    Have you been treated for these symptoms before:   [x] Yes  [] No    If a prescription is needed, what is your preferred pharmacy and phone number:    [facility] [address] - [phone] [facility] [phone]  [phone]     Additional notes:

## 2025-04-11 ENCOUNTER — OFFICE VISIT (OUTPATIENT)
Age: 45
End: 2025-04-11
Payer: MEDICAID

## 2025-04-11 VITALS — BODY MASS INDEX: 36.31 KG/M2 | HEART RATE: 95 BPM | WEIGHT: 292 LBS | OXYGEN SATURATION: 96 % | HEIGHT: 75 IN

## 2025-04-11 DIAGNOSIS — M79.675 PAIN IN TOES OF BOTH FEET: ICD-10-CM

## 2025-04-11 DIAGNOSIS — L60.3 ONYCHODYSTROPHY: Primary | ICD-10-CM

## 2025-04-11 DIAGNOSIS — M79.674 PAIN IN TOES OF BOTH FEET: ICD-10-CM

## 2025-04-11 DIAGNOSIS — R26.81 UNSTEADINESS ON FEET: ICD-10-CM

## 2025-04-11 DIAGNOSIS — E11.65 TYPE 2 DIABETES MELLITUS WITH HYPERGLYCEMIA, WITHOUT LONG-TERM CURRENT USE OF INSULIN: ICD-10-CM

## 2025-04-11 DIAGNOSIS — E11.42 DM TYPE 2 WITH DIABETIC PERIPHERAL NEUROPATHY: ICD-10-CM

## 2025-04-11 DIAGNOSIS — E11.42 DIABETIC PERIPHERAL NEUROPATHY ASSOCIATED WITH TYPE 2 DIABETES MELLITUS: ICD-10-CM

## 2025-04-11 RX ORDER — PEN NEEDLE, DIABETIC 32GX 5/32"
NEEDLE, DISPOSABLE MISCELLANEOUS
COMMUNITY
Start: 2025-03-26

## 2025-04-11 NOTE — PROGRESS NOTES
04/11/2025  Foot and Ankle Surgery - Established Patient/Follow-up  Provider: ANASTASIA Chan   Location: [facility]    Subjective:  Barrett Laguerre is a 45 y.o. male.     Chief Complaint   Patient presents with    Right Foot - Follow-up, Diabetes     Dm foot care, nails    Left Foot - Follow-up, Diabetes     Dm foot care, nails    Follow-Up     PCP: Eric Rosenberg MD  Last PCP Visit:   3/14/25       History of Present Illness  The patient is a 45-year-old male who presents for an established patient exam.    He is currently in the process of acquiring a specific model of shoes, which he has been unable to obtain due to unavailability from the . He is considering alternative footwear options, including work boots and [employer] Balance shoes, with the intention of inserting custom orthotics. He has previously used [employer]chers shoes but discontinued their use after a month due to issues with the insoles. He has recently discovered old custom inserts and is seeking advice on their safety and suitability for use.       Allergies   Allergen Reactions    Penicillin G Anaphylaxis    Penicillins Anaphylaxis    Sulfa Antibiotics Hives     Was told by mother possible anaphylaxis    Allopurinol Rash       Current Outpatient Medications on File Prior to Visit   Medication Sig Dispense Refill    acetaminophen-codeine (TYLENOL/CODEINE #3) 300-30 MG per tablet Take 1 tablet by mouth 3 (Three) Times a Day As Needed for Mild Pain. 21 tablet 2    acyclovir (ZOVIRAX) 800 MG tablet Take 1 tablet by mouth 5 (Five) Times a Day. 50 tablet 1    albuterol sulfate  (90 Base) MCG/ACT inhaler INHALE 2 PUFFS INTO LUNGS EVERY 4 HOURS AS NEEDED FOR WHEEZING 9 g 0    Alcohol Swabs (B-D SINGLE USE SWABS REGULAR) pads Apply 1 each topically to the appropriate area as directed Daily. 100 each 12    amLODIPine (NORVASC) 10 MG tablet TAKE 1 TABLET BY MOUTH ONCE DAILY AT NIGHT 30 tablet 0    Azelastine HCl 137  MCG/SPRAY solution Administer 2 sprays into the nostril(s) as directed by provider 2 (Two) Times a Day. 60 mL 2    BD Pen Needle Kyra 2nd Gen 32G X 4 MM misc USE 1 PEN NEEDLE 4 TIMES DAILY BEFORE MEAL(S) AND AT BEDTIME      cefprozil (CEFZIL) 500 MG tablet Take 1 tablet by mouth 2 (Two) Times a Day. 28 tablet 0    cetirizine (zyrTEC) 10 MG tablet Take 1 tablet by mouth Daily. 90 tablet 1    chlorthalidone (HYGROTEN) 50 MG tablet Take 1 tablet by mouth Daily. 90 tablet 3    colchicine-probenecid (COL-BENEMID) 0.5-500 MG per tablet Take 1 tablet by mouth Daily. 30 tablet 3    Continuous Glucose  (Dexcom G6 ) device USE UNIT TO CHECK BLOOD SUGAR ONCE DAILY AS DIRECTED // 1 UNIT SHOULD LAST 365 DAYS 1 each 0    Continuous Glucose Sensor (Dexcom G6 Sensor) Use Every 10 (Ten) Days. 3 each 12    Continuous Glucose Transmitter (Dexcom G6 Transmitter) misc Use 1 each Every 3 (Three) Months. 1 each 3    Cyanocobalamin (Vitamin B-12 ER) 1000 MCG tablet controlled-release TAKE 1 TABLET DAILY 30 each 5    Denta 5000 Plus 1.1 % cream APPLY 1 APPLICATION OF CREAM TO THE TEETH TWICE DAILY 102 g 0    Diclofenac Epolamine (FLECTOR) 1.3 % patch patch Apply 1 patch topically to the appropriate area as directed 2 (Two) Times a Day As Needed (back pain). 60 patch 11    Emtricitabine-Tenofovir AF (Descovy) 200-25 MG per tablet Take 1 tablet by mouth Daily.      FLUoxetine (PROzac) 40 MG capsule Take 1 capsule by mouth Every Night. 90 capsule 3    fluticasone (FLONASE) 50 MCG/ACT nasal spray Administer 1 spray into the nostril(s) as directed by provider 2 (Two) Times a Day. 3 g 0    furosemide (LASIX) 20 MG tablet       gabapentin (NEURONTIN) 300 MG capsule Take 1 capsule by mouth 3 (Three) Times a Day. 270 capsule 1    glucose blood test strip USE 1 STRIP TO CHECK GLUCOSE 4 TIMES DAILY 100 each 12    glucose monitor monitoring kit Use 1 each Daily. Dx: E11.49 patient checks sugar 4 x day 1 each 0     guaiFENesin-dextromethorphan (ROBITUSSIN DM) 100-10 MG/5ML syrup Take 5 mL by mouth 3 (Three) Times a Day As Needed for Cough. 118 mL 0    HYDROcodone-acetaminophen (NORCO)  MG per tablet Take 1 tablet by mouth Every 6 (Six) Hours As Needed for Moderate Pain or Severe Pain. 120 tablet 0    HYDROcodone-acetaminophen (Norco)  MG per tablet Take 1 tablet by mouth Every 6 (Six) Hours As Needed for Moderate Pain. 120 tablet 0    HYDROcodone-acetaminophen (Norco)  MG per tablet Take 1 tablet by mouth Every 6 (Six) Hours As Needed for Moderate Pain. 120 tablet 0    hydrOXYzine (ATARAX) 25 MG tablet Take 1 tablet by mouth 4 (Four) Times a Day As Needed for Itching. 90 tablet 12    Insulin Glargine (BASAGLAR KWIKPEN) 100 UNIT/ML injection pen Inject 40 Units under the skin into the appropriate area as directed Every Night. 12 mL 11    Insulin Lispro, 1 Unit Dial, (HumaLOG KwikPen) 100 UNIT/ML solution pen-injector INJECT 20 UNITS SUBCUTANEOUSLY THREE TIMES DAILY 15 MINUTES BEFORE MEALS OR IMMEDIATELY FOLLOWING MEALS 18 mL 5    ketoconazole (NIZORAL) 2 % cream       ketoconazole (NIZORAL) 2 % shampoo       Lancets misc Use 1 each 4 (Four) Times a Day. Dx: E11.49 patient checks sugar 4 x day 200 each 12    loratadine (EQ All Day Allergy Relief) 10 MG tablet Take 1 tablet by mouth Daily. 90 tablet 0    losartan (COZAAR) 100 MG tablet Take 1 tablet by mouth Daily. 90 tablet 3    methocarbamol (ROBAXIN) 750 MG tablet Take 1 tablet by mouth 4 (Four) Times a Day As Needed for Muscle Spasms. 360 tablet 3    montelukast (SINGULAIR) 10 MG tablet Take 1 tablet by mouth Every Night. 90 tablet 0    naloxone (NARCAN) 4 MG/0.1ML nasal spray Call 911. Don't prime. [place] in 1 nostril for overdose. Repeat in 2-3 minutes in other nostril if no or minimal breathing/responsiveness. 2 each 0    oxybutynin XL (DITROPAN XL) 15 MG 24 hr tablet Take 1 tablet by mouth Daily. 90 tablet 3    Ozempic, 0.25 or 0.5 MG/DOSE, 2 MG/3ML  "solution pen-injector INJECT 0.25 MG UNDER THE SKIN INTO THE APPROPRIATE AREA AS DIRECTED 1 TIME PER WEEK      pantoprazole (Protonix) 40 MG EC tablet Take 1 tablet by mouth Daily. 90 tablet 3    potassium chloride (MICRO-K) 10 MEQ CR capsule Take 1 capsule by mouth Daily. 90 capsule 3    promethazine (PHENERGAN) 25 MG tablet TAKE 1 TABLET BY MOUTH EVERY 6 HOURS AS NEEDED FOR NAUSEA FOR VOMITING 90 tablet 0    Semaglutide,0.25 or 0.5MG/DOS, (Ozempic, 0.25 or 0.5 MG/DOSE,) 2 MG/1.5ML solution pen-injector Inject 0.25 mg under the skin into the appropriate area as directed 1 (One) Time Per Week. 3 mL 12    sildenafil (REVATIO) 20 MG tablet Take 1 tablet by mouth Daily As Needed (ed). 30 tablet 3    sitaGLIPtin-metFORMIN (Janumet)  MG per tablet Take 1 tablet by mouth 2 (Two) Times a Day With Meals. 180 tablet 3    sodium chloride ([place]) 0.65 % nasal spray USE 1 DOSE IN EACH NOSTRIL THREE TIMES DAILY 50 mL 0    terbinafine (lamiSIL) 250 MG tablet Take 1 tablet by mouth Daily. 30 tablet 0    topiramate (TOPAMAX) 200 MG tablet        No current facility-administered medications on file prior to visit.       Objective   Pulse 95   Ht 190.5 cm (75\")   Wt 132 kg (292 lb)   SpO2 96%   BMI 36.50 kg/m²     Foot/Ankle Exam    Right Foot Vascularity   Normal vascular exam    Dorsalis pedis:  2+  Posterior tibial:  2+  Skin temperature:  warm  Edema grading:  None  CFT:  < 3 seconds  Pedal hair growth:  Present  Varicosities:  none      Left Foot Vascularity   Normal vascular exam    Dorsalis pedis:  2+  Posterior tibial:  2+  Skin temperature:  warm  Edema grading:  None  CFT:  < 3 seconds  Pedal hair growth:  Present  Varicosities:  none     NEUROLOGIC      Right Foot Neurologic   Protective Sensation using [place] Monofilament:   Sites tested: 9      Left Foot Neurologic   Protective Sensation using [place] Monofilament:   Sites tested: 10     MUSCULOSKELETAL      Right Foot Musculoskeletal "   Tenderness:  none    [place]:  First toe      Left Foot Musculoskeletal   Tenderness:  none  [place]:  First toe     DERMATOLOGIC       Right Foot Dermatologic   Skin  Right foot skin is intact.   Nails  1.  Positive for elongated, abnormal thickness and dystrophic nail. (Mild callus)  2.  Positive for elongated, abnormal thickness and dystrophic nail.  3.  Positive for elongated, abnormal thickness and dystrophic nail.  4.  Positive for elongated, abnormal thickness and dystrophic nail.  5.  Positive for elongated, abnormal thickness and dystrophic nail.      Left Foot Dermatologic   Skin  Left foot skin is intact.   Nails  1.  Positive for elongated, abnormal thickness and dystrophic nail.  2.  Positive for elongated, abnormal thickness and dystrophic nail.  3.  Positive for elongated, abnormal thickness and dystrophic nail.  4.  Positive for elongated, abnormally thick and dystrophic nail.  5.  Positive for elongated, abnormally thick and dystrophic nail.        Physical Exam  Nails were debrided today times 10.       Results       Assessment & Plan   Diagnoses and all orders for this visit:    1. Onychodystrophy (Primary)    2. Diabetic peripheral neuropathy associated with type 2 diabetes mellitus    3. Pain in toes of both feet    4. Type 2 diabetes mellitus with hyperglycemia, without long-term current use of insulin    5. Unsteadiness on feet    6. DM type 2 with diabetic peripheral neuropathy      Assessment & Plan  1. Diabetic foot care.  He was advised to gradually increase the duration of wearing his new footwear, ensuring a proper fit. Nails were debrided today x 10.    Pt is at mild to moderate risk for pedal complication related to diabetes. Explained importance of diabetic foot care, daily foot checks, and glycemic control. Patient should check both feet on a daily basis, monitor and control blood sugars, make sure that both feet and in between toes are towel dried after baths or showers. Avoid  barefoot walking at all times.  I discussed wearing white socks and checking shoes before wearing them. Patient was given information on proper foot care. Call the office at the first signs of a wound or with signs of infection.     Nail debridement: Both feet x10    Consent and time out was performed before proceeding with the procedure. Nails were debrided with a nail nipper without complication.  No anesthesia was required.  Indications for procedure were thickened, dystrophic, and fungal appearing nails which are difficult to trim.  Proper self-care and technique was discussed with patient.  Patient was stable after procedure.     Follow-up  The patient will follow up in 2 months.    PROCEDURE  Nails were debrided today times 10.       No orders of the defined types were placed in this encounter.         Patient or patient representative verbalized consent for the use of Ambient Listening during the visit with  ANASTASIA Alvares for chart documentation. 4/11/2025  14:47 EDT

## 2025-04-15 ENCOUNTER — TELEPHONE (OUTPATIENT)
Dept: FAMILY MEDICINE CLINIC | Facility: CLINIC | Age: 45
End: 2025-04-15
Payer: MEDICAID

## 2025-04-15 NOTE — TELEPHONE ENCOUNTER
Hub staff attempted to follow warm transfer process and was unsuccessful     Caller: Barrett Laguerre    Relationship to patient: Self    Best call back number:     [phone]       Patient is needing: PATIENT NEEDS HELPS WITH PUTTING ON HIS GLUCOSE SENSOR

## 2025-04-20 DIAGNOSIS — I10 HYPERTENSION, BENIGN: ICD-10-CM

## 2025-04-20 DIAGNOSIS — R11.0 NAUSEA: ICD-10-CM

## 2025-04-21 RX ORDER — PROMETHAZINE HYDROCHLORIDE 25 MG/1
25 TABLET ORAL
Qty: 90 TABLET | Refills: 0 | Status: SHIPPED | OUTPATIENT
Start: 2025-04-21

## 2025-04-21 RX ORDER — POTASSIUM CHLORIDE 750 MG/1
10 CAPSULE, EXTENDED RELEASE ORAL DAILY
Qty: 90 CAPSULE | Refills: 0 | Status: SHIPPED | OUTPATIENT
Start: 2025-04-21

## 2025-04-24 DIAGNOSIS — J30.89 NON-SEASONAL ALLERGIC RHINITIS DUE TO OTHER ALLERGIC TRIGGER: ICD-10-CM

## 2025-04-24 DIAGNOSIS — I10 HYPERTENSION, BENIGN: ICD-10-CM

## 2025-04-24 RX ORDER — AMLODIPINE BESYLATE 10 MG/1
10 TABLET ORAL NIGHTLY
Qty: 90 TABLET | Refills: 1 | Status: SHIPPED | OUTPATIENT
Start: 2025-04-24

## 2025-04-24 RX ORDER — MONTELUKAST SODIUM 10 MG/1
10 TABLET ORAL NIGHTLY
Qty: 90 TABLET | Refills: 1 | Status: SHIPPED | OUTPATIENT
Start: 2025-04-24

## 2025-05-05 ENCOUNTER — TELEPHONE (OUTPATIENT)
Dept: FAMILY MEDICINE CLINIC | Facility: CLINIC | Age: 45
End: 2025-05-05
Payer: MEDICAID

## 2025-05-05 DIAGNOSIS — E11.49 TYPE 2 DIABETES MELLITUS WITH OTHER NEUROLOGIC COMPLICATION, WITH LONG-TERM CURRENT USE OF INSULIN: ICD-10-CM

## 2025-05-05 DIAGNOSIS — Z79.4 TYPE 2 DIABETES MELLITUS WITH OTHER NEUROLOGIC COMPLICATION, WITH LONG-TERM CURRENT USE OF INSULIN: ICD-10-CM

## 2025-05-05 RX ORDER — GLUCOSAM/CHON-MSM1/C/MANG/BOSW 500-416.6
TABLET ORAL
Qty: 100 EACH | Refills: 3 | Status: SHIPPED | OUTPATIENT
Start: 2025-05-05

## 2025-05-05 NOTE — TELEPHONE ENCOUNTER
[facility] Janumet 50-1000mg Tabs. PA submitted online thru Covermymeds. Waiting on authorization.

## 2025-05-07 ENCOUNTER — TELEPHONE (OUTPATIENT)
Dept: FAMILY MEDICINE CLINIC | Facility: CLINIC | Age: 45
End: 2025-05-07
Payer: MEDICAID

## 2025-05-07 NOTE — TELEPHONE ENCOUNTER
Patient called and needs to speak to you regarding his Janumet being denied.  Please contact him at .  Thanks Portia

## 2025-05-07 NOTE — TELEPHONE ENCOUNTER
Spoke with pt. Informed him his insurance will not cover the Janumet since he is taking Ozempic. He will call pharmacy to see if they have coupon.

## 2025-05-13 DIAGNOSIS — I10 HYPERTENSION, BENIGN: ICD-10-CM

## 2025-05-15 RX ORDER — CHLORTHALIDONE 50 MG/1
50 TABLET ORAL DAILY
Qty: 90 TABLET | Refills: 0 | Status: SHIPPED | OUTPATIENT
Start: 2025-05-15 | End: 2025-05-16 | Stop reason: SDUPTHER

## 2025-05-15 NOTE — PROGRESS NOTES
Subjective   Barrett Laguerre is a 45 y.o. male.   Chief Complaint   Patient presents with    Diabetes     History of Present Illness  Patient concerned because he thinks his sugars have been too high  Has not been taking basaglar consistently   Needs refill on multiple meds     The following portions of the patient's history were reviewed and updated as appropriate: allergies, current medications, past family history, past medical history, past social history, past surgical history, and problem list.    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-[place]    Sleep apnea    Attention deficit disorder    Diabetes    Other seasonal allergic rhinitis    Mixed obsessional thoughts and acts    Hypertension, benign    Disorder of lipid metabolism    CIDP (chronic inflammatory demyelinating polyneuropathy)    Lumbar radiculopathy    Onychomycosis    Annual physical exam    Class 1 obesity due to excess calories with serious comorbidity and body mass index (BMI) of 34.0 to 34.9 in adult    Acute midline low back pain with left-sided sciatica    Degeneration of intervertebral disc of lumbar region with discogenic back pain and lower extremity pain    History of discectomy    Lumbar disc herniation    Pruritus       Current Outpatient Medications on File Prior to Visit   Medication Sig Dispense Refill    acetaminophen-codeine (TYLENOL/CODEINE #3) 300-30 MG per tablet Take 1 tablet by mouth 3 (Three) Times a Day As Needed for Mild Pain. 21 tablet 2    acyclovir (ZOVIRAX) 800 MG tablet Take 1 tablet by mouth 5 (Five) Times a Day. 50 tablet 1    albuterol sulfate  (90 Base) MCG/ACT inhaler INHALE 2 PUFFS INTO LUNGS EVERY 4 HOURS AS NEEDED FOR WHEEZING 9 g 0    amLODIPine (NORVASC) 10 MG tablet TAKE 1 TABLET BY MOUTH ONCE  DAILY AT NIGHT 90 tablet 1    BD Pen Needle Kyra 2nd Gen 32G X 4 MM misc USE 1 PEN NEEDLE 4 TIMES DAILY BEFORE MEAL(S) AND AT BEDTIME      cetirizine (zyrTEC) 10 MG tablet Take 1 tablet by mouth Daily. 90 tablet 1    Continuous Glucose  (Dexcom G6 ) device USE UNIT TO CHECK BLOOD SUGAR ONCE DAILY AS DIRECTED // 1 UNIT SHOULD LAST 365 DAYS 1 each 0    Cyanocobalamin (Vitamin B-12 ER) 1000 MCG tablet controlled-release TAKE 1 TABLET DAILY 30 each 5    Diclofenac Epolamine (FLECTOR) 1.3 % patch patch Apply 1 patch topically to the appropriate area as directed 2 (Two) Times a Day As Needed (back pain). 60 patch 11    Emtricitabine-Tenofovir AF (Descovy) 200-25 MG per tablet Take 1 tablet by mouth Daily.      FLUoxetine (PROzac) 40 MG capsule Take 1 capsule by mouth Every Night. 90 capsule 3    furosemide (LASIX) 20 MG tablet       glucose monitor monitoring kit Use 1 each Daily. Dx: E11.49 patient checks sugar 4 x day 1 each 0    guaiFENesin-dextromethorphan (ROBITUSSIN DM) 100-10 MG/5ML syrup Take 5 mL by mouth 3 (Three) Times a Day As Needed for Cough. 118 mL 0    HYDROcodone-acetaminophen (NORCO)  MG per tablet Take 1 tablet by mouth Every 6 (Six) Hours As Needed for Moderate Pain or Severe Pain. 120 tablet 0    HYDROcodone-acetaminophen (Norco)  MG per tablet Take 1 tablet by mouth Every 6 (Six) Hours As Needed for Moderate Pain. 120 tablet 0    HYDROcodone-acetaminophen (Norco)  MG per tablet Take 1 tablet by mouth Every 6 (Six) Hours As Needed for Moderate Pain. 120 tablet 0    hydrOXYzine (ATARAX) 25 MG tablet Take 1 tablet by mouth 4 (Four) Times a Day As Needed for Itching. 90 tablet 12    Insulin Glargine (BASAGLAR KWIKPEN) 100 UNIT/ML injection pen Inject 40 Units under the skin into the appropriate area as directed Every Night. 12 mL 11    ketoconazole (NIZORAL) 2 % cream       losartan (COZAAR) 100 MG tablet Take 1 tablet by mouth Daily. 90 tablet 3    methocarbamol  (ROBAXIN) 750 MG tablet Take 1 tablet by mouth 4 (Four) Times a Day As Needed for Muscle Spasms. 360 tablet 3    naloxone (NARCAN) 4 MG/0.1ML nasal spray Call 911. Don't prime. [place] in 1 nostril for overdose. Repeat in 2-3 minutes in other nostril if no or minimal breathing/responsiveness. 2 each 0    Ozempic, 0.25 or 0.5 MG/DOSE, 2 MG/3ML solution pen-injector INJECT 0.25 MG UNDER THE SKIN INTO THE APPROPRIATE AREA AS DIRECTED 1 TIME PER WEEK      Semaglutide,0.25 or 0.5MG/DOS, (Ozempic, 0.25 or 0.5 MG/DOSE,) 2 MG/1.5ML solution pen-injector Inject 0.25 mg under the skin into the appropriate area as directed 1 (One) Time Per Week. 3 mL 12    sildenafil (REVATIO) 20 MG tablet Take 1 tablet by mouth Daily As Needed (ed). 30 tablet 3    sitaGLIPtin-metFORMIN (Janumet)  MG per tablet Take 1 tablet by mouth 2 (Two) Times a Day With Meals. 180 tablet 3    sodium chloride ([place]) 0.65 % nasal spray USE 1 DOSE IN EACH NOSTRIL THREE TIMES DAILY 50 mL 0    topiramate (TOPAMAX) 200 MG tablet       TRUEplus Lancets 28G misc USE 1 EACH TO CHECK GLUCOSE 4 TIMES DAILY 100 each 3    [DISCONTINUED] Alcohol Swabs 70 % pads Use.      [DISCONTINUED] Azelastine HCl 137 MCG/SPRAY solution Administer 2 sprays into the nostril(s) as directed by provider 2 (Two) Times a Day. 60 mL 2    [DISCONTINUED] chlorthalidone (HYGROTEN) 50 MG tablet Take 1 tablet by mouth once daily 90 tablet 0    [DISCONTINUED] colchicine-probenecid (COL-BENEMID) 0.5-500 MG per tablet Take 1 tablet by mouth Daily. 30 tablet 3    [DISCONTINUED] Continuous Glucose Sensor (Dexcom G6 Sensor) Use Every 10 (Ten) Days. 3 each 12    [DISCONTINUED] Continuous Glucose Transmitter (Dexcom G6 Transmitter) misc Use 1 each Every 3 (Three) Months. 1 each 3    [DISCONTINUED] Denta 5000 Plus 1.1 % cream APPLY 1 APPLICATION OF CREAM TO THE TEETH TWICE DAILY 102 g 0    [DISCONTINUED] fluticasone (FLONASE) 50 MCG/ACT nasal spray Administer 1 spray into the nostril(s) as directed  by provider 2 (Two) Times a Day. 3 g 0    [DISCONTINUED] gabapentin (NEURONTIN) 300 MG capsule Take 1 capsule by mouth 3 (Three) Times a Day. 270 capsule 1    [DISCONTINUED] glucose blood test strip USE 1 STRIP TO CHECK GLUCOSE 4 TIMES DAILY 100 each 12    [DISCONTINUED] Insulin Lispro, 1 Unit Dial, (HumaLOG KwikPen) 100 UNIT/ML solution pen-injector INJECT 20 UNITS SUBCUTANEOUSLY THREE TIMES DAILY 15 MINUTES BEFORE MEALS OR IMMEDIATELY FOLLOWING MEALS 18 mL 5    [DISCONTINUED] ketoconazole (NIZORAL) 2 % shampoo       [DISCONTINUED] loratadine (EQ All Day Allergy Relief) 10 MG tablet Take 1 tablet by mouth Daily. 90 tablet 0    [DISCONTINUED] montelukast (SINGULAIR) 10 MG tablet TAKE 1 TABLET BY MOUTH ONCE DAILY AT NIGHT 90 tablet 1    [DISCONTINUED] oxybutynin XL (DITROPAN XL) 15 MG 24 hr tablet Take 1 tablet by mouth Daily. 90 tablet 3    [DISCONTINUED] pantoprazole (Protonix) 40 MG EC tablet Take 1 tablet by mouth Daily. 90 tablet 3    [DISCONTINUED] potassium chloride (MICRO-K) 10 MEQ CR capsule Take 1 capsule by mouth once daily 90 capsule 0    [DISCONTINUED] promethazine (PHENERGAN) 25 MG tablet TAKE 1 TABLET BY MOUTH EVERY 6 HOURS AS NEEDED FOR NAUSEA OR VOMITING 90 tablet 0    [DISCONTINUED] terbinafine (lamiSIL) 250 MG tablet Take 1 tablet by mouth Daily. 30 tablet 0    [DISCONTINUED] Alcohol Swabs (B-D SINGLE USE SWABS REGULAR) pads Apply 1 each topically to the appropriate area as directed Daily. 100 each 12    [DISCONTINUED] cefprozil (CEFZIL) 500 MG tablet Take 1 tablet by mouth 2 (Two) Times a Day. (Patient not taking: Reported on 5/16/2025) 28 tablet 0     No current facility-administered medications on file prior to visit.     Current outpatient and discharge medications have been reconciled for the patient.  Reviewed by: Eric Rosenberg MD      Allergies   Allergen Reactions    Penicillin G Anaphylaxis    Penicillins Anaphylaxis    Sulfa Antibiotics Hives     Was told by mother possible  "anaphylaxis    Allopurinol Rash       Review of Systems   Constitutional:  Negative for activity change, appetite change, fatigue and fever.   HENT:  Negative for ear pain, swollen glands and voice change.    Eyes:  Negative for visual disturbance.   Respiratory:  Negative for shortness of breath and wheezing.    Cardiovascular:  Negative for chest pain and leg swelling.   Gastrointestinal:  Negative for abdominal pain, blood in stool, constipation, diarrhea, nausea and vomiting.   Endocrine: Negative for polydipsia and polyuria.   Genitourinary:  Negative for dysuria, frequency and hematuria.   Musculoskeletal:  Negative for joint swelling, neck pain and neck stiffness.   Skin:  Negative for rash and wound.   Neurological:  Negative for weakness, numbness and headache.   Psychiatric/Behavioral:  Negative for suicidal ideas and depressed mood.      I have reviewed and confirmed the accuracy of the ROS as documented by the MA/LPN/RN Eric Rosenberg MD    Objective   Visit Vitals  /80 (BP Location: Right arm, Patient Position: Sitting, Cuff Size: Large Adult)   Pulse 84   Resp 20   Ht 190.5 cm (75\")   Wt 131 kg (289 lb)   SpO2 99%   BMI 36.12 kg/m²      **  Physical Exam  Constitutional:       Appearance: He is well-developed.   HENT:      Head: Normocephalic and atraumatic.      Right Ear: External ear normal.      Left Ear: External ear normal.      Nose: Nose normal.   Eyes:      Pupils: Pupils are equal, round, and reactive to light.   Cardiovascular:      Rate and Rhythm: Normal rate and regular rhythm.      Heart sounds: Normal heart sounds.   Pulmonary:      Effort: Pulmonary effort is normal.      Breath sounds: Normal breath sounds.   Abdominal:      General: Bowel sounds are normal.      Palpations: Abdomen is soft.   Musculoskeletal:         General: Normal range of motion.      Cervical back: Normal range of motion and neck supple.   Skin:     General: Skin is warm and dry.   Neurological:     "  Mental Status: He is alert and oriented to person, place, and time.   Psychiatric:         Behavior: Behavior normal.         Thought Content: Thought content normal.         Judgment: Judgment normal.       Derm Physical Exam  Ortho Exam   Neurological Exam  Mental Status  Alert. Oriented to person, place, and time.    Cranial Nerves  CN III, IV, VI: Pupils equal round and reactive to light bilaterally.         Assessment & Plan  Type 2 diabetes mellitus with other neurologic complication, with long-term current use of insulin  Take basaglar daily as directed  Sugars not adequately controlled     Orders:    Comprehensive Metabolic Panel    Alcohol Swabs 70 % pads; Use 1 each 4 (Four) Times a Day Before Meals & at Bedtime As Needed (sugar check).    Continuous Glucose Sensor (Dexcom G6 Sensor); Use Every 10 (Ten) Days.    Continuous Glucose Transmitter (Dexcom G6 Transmitter) misc; Use 1 each Every 3 (Three) Months.    glucose blood test strip; 1 each by Other route 4 (Four) Times a Day. use 1 strip to check glucose 4 times daily    Insulin Lispro, 1 Unit Dial, (HumaLOG KwikPen) 100 UNIT/ML solution pen-injector; INJECT 20 UNITS SUBCUTANEOUSLY THREE TIMES DAILY 15 MINUTES BEFORE MEALS OR IMMEDIATELY FOLLOWING MEALS    Ambulatory Referral to Diabetic Education    Other seasonal allergic rhinitis    Orders:    Azelastine HCl 137 MCG/SPRAY solution; Administer 2 sprays into the nostril(s) as directed by provider 2 (Two) Times a Day.    fluticasone (FLONASE) 50 MCG/ACT nasal spray; Administer 1 spray into the nostril(s) as directed by provider 2 (Two) Times a Day.    loratadine (EQ All Day Allergy Relief) 10 MG tablet; Take 1 tablet by mouth Daily.    Hypertension, benign      Orders:    chlorthalidone (HYGROTEN) 50 MG tablet; Take 1 tablet by mouth Daily.    potassium chloride (MICRO-K) 10 MEQ CR capsule; Take 1 capsule by mouth Daily.    Gout, unspecified cause, unspecified chronicity, unspecified site    Orders:     colchicine-probenecid (COL-BENEMID) 0.5-500 MG per tablet; Take 1 tablet by mouth Daily.    Guillain-[place]    Orders:    gabapentin (NEURONTIN) 300 MG capsule; Take 1 capsule by mouth 3 (Three) Times a Day.    Non-seasonal allergic rhinitis due to other allergic trigger    Orders:    montelukast (SINGULAIR) 10 MG tablet; Take 1 tablet by mouth Every Night.    Urinary frequency    Orders:    oxybutynin XL (DITROPAN XL) 15 MG 24 hr tablet; Take 1 tablet by mouth Daily.    GERD (gastroesophageal reflux disease)    Orders:    pantoprazole (Protonix) 40 MG EC tablet; Take 1 tablet by mouth Daily.    Nausea    Orders:    promethazine (PHENERGAN) 25 MG tablet; Take 1 tablet by mouth every 6 (six) to 8 (eight) hours as needed (or vomiting).    Tinea    Orders:    terbinafine (lamiSIL) 250 MG tablet; Take 1 tablet by mouth Daily.    Seborrheic dermatitis    Orders:    ketoconazole (NIZORAL) 2 % shampoo; Apply  topically to the appropriate area as directed 2 (Two) Times a Week.    [place]    Orders:    Sodium Fluoride (Denta 5000 Plus) 1.1 % cream; Take 1 Application by mouth 2 (Two) Times a Day.    Acute cough    Orders:    dextromethorphan 15 MG/5ML syrup; Take 10 mL by mouth 4 (Four) Times a Day As Needed for Cough.       Findings discussed. All questions answered.  Follow up in 1 month for reevaluation, sooner for concerns.   Medication and medication adverse effects discussed.  Drug education given and explained to patient. Patient verbalized understanding.       Expected course, medications, and adverse effects discussed as appropriate.  Call or return if worsening or persistent symptoms.     This document is intended for medical professional use only.   Electronically signed by Eric Rosenberg MD on 05/16/2025. This content may not have been proofread.

## 2025-05-16 ENCOUNTER — OFFICE VISIT (OUTPATIENT)
Dept: FAMILY MEDICINE CLINIC | Facility: CLINIC | Age: 45
End: 2025-05-16
Payer: MEDICAID

## 2025-05-16 VITALS
SYSTOLIC BLOOD PRESSURE: 132 MMHG | BODY MASS INDEX: 35.93 KG/M2 | OXYGEN SATURATION: 99 % | HEIGHT: 75 IN | DIASTOLIC BLOOD PRESSURE: 80 MMHG | HEART RATE: 84 BPM | WEIGHT: 289 LBS | RESPIRATION RATE: 20 BRPM

## 2025-05-16 DIAGNOSIS — M10.9 GOUT, UNSPECIFIED CAUSE, UNSPECIFIED CHRONICITY, UNSPECIFIED SITE: ICD-10-CM

## 2025-05-16 DIAGNOSIS — K02.9 CARIES: ICD-10-CM

## 2025-05-16 DIAGNOSIS — E11.49 TYPE 2 DIABETES MELLITUS WITH OTHER NEUROLOGIC COMPLICATION, WITH LONG-TERM CURRENT USE OF INSULIN: Primary | ICD-10-CM

## 2025-05-16 DIAGNOSIS — I10 HYPERTENSION, BENIGN: ICD-10-CM

## 2025-05-16 DIAGNOSIS — Z79.4 TYPE 2 DIABETES MELLITUS WITH OTHER NEUROLOGIC COMPLICATION, WITH LONG-TERM CURRENT USE OF INSULIN: Primary | ICD-10-CM

## 2025-05-16 DIAGNOSIS — J30.89 NON-SEASONAL ALLERGIC RHINITIS DUE TO OTHER ALLERGIC TRIGGER: ICD-10-CM

## 2025-05-16 DIAGNOSIS — R35.0 URINARY FREQUENCY: ICD-10-CM

## 2025-05-16 DIAGNOSIS — J30.2 OTHER SEASONAL ALLERGIC RHINITIS: ICD-10-CM

## 2025-05-16 DIAGNOSIS — L21.9 SEBORRHEIC DERMATITIS: ICD-10-CM

## 2025-05-16 DIAGNOSIS — B35.9 TINEA: ICD-10-CM

## 2025-05-16 DIAGNOSIS — R11.0 NAUSEA: ICD-10-CM

## 2025-05-16 DIAGNOSIS — K21.9 GERD (GASTROESOPHAGEAL REFLUX DISEASE): ICD-10-CM

## 2025-05-16 DIAGNOSIS — R05.1 ACUTE COUGH: ICD-10-CM

## 2025-05-16 DIAGNOSIS — G61.0 GUILLAIN-BARRE: ICD-10-CM

## 2025-05-16 PROCEDURE — 3079F DIAST BP 80-89 MM HG: CPT | Performed by: FAMILY MEDICINE

## 2025-05-16 PROCEDURE — 3075F SYST BP GE 130 - 139MM HG: CPT | Performed by: FAMILY MEDICINE

## 2025-05-16 PROCEDURE — 1126F AMNT PAIN NOTED NONE PRSNT: CPT | Performed by: FAMILY MEDICINE

## 2025-05-16 PROCEDURE — 3046F HEMOGLOBIN A1C LEVEL >9.0%: CPT | Performed by: FAMILY MEDICINE

## 2025-05-16 PROCEDURE — 99214 OFFICE O/P EST MOD 30 MIN: CPT | Performed by: FAMILY MEDICINE

## 2025-05-16 RX ORDER — PANTOPRAZOLE SODIUM 40 MG/1
40 TABLET, DELAYED RELEASE ORAL DAILY
Qty: 90 TABLET | Refills: 3 | Status: SHIPPED | OUTPATIENT
Start: 2025-05-16

## 2025-05-16 RX ORDER — AZELASTINE HYDROCHLORIDE 137 UG/1
2 SPRAY, METERED NASAL 2 TIMES DAILY
Qty: 60 ML | Refills: 2 | Status: SHIPPED | OUTPATIENT
Start: 2025-05-16

## 2025-05-16 RX ORDER — MONTELUKAST SODIUM 10 MG/1
10 TABLET ORAL NIGHTLY
Qty: 90 TABLET | Refills: 1 | Status: SHIPPED | OUTPATIENT
Start: 2025-05-16

## 2025-05-16 RX ORDER — POTASSIUM CHLORIDE 750 MG/1
10 CAPSULE, EXTENDED RELEASE ORAL DAILY
Qty: 90 CAPSULE | Refills: 0 | Status: SHIPPED | OUTPATIENT
Start: 2025-05-16

## 2025-05-16 RX ORDER — PROMETHAZINE HYDROCHLORIDE 25 MG/1
25 TABLET ORAL
Qty: 90 TABLET | Refills: 0 | Status: SHIPPED | OUTPATIENT
Start: 2025-05-16

## 2025-05-16 RX ORDER — GLUCOSAMINE HCL/CHONDROITIN SU 500-400 MG
CAPSULE ORAL
COMMUNITY
End: 2025-05-16 | Stop reason: SDUPTHER

## 2025-05-16 RX ORDER — TERBINAFINE HYDROCHLORIDE 250 MG/1
250 TABLET ORAL DAILY
Qty: 30 TABLET | Refills: 0 | Status: SHIPPED | OUTPATIENT
Start: 2025-05-16

## 2025-05-16 RX ORDER — KETOCONAZOLE 20 MG/ML
SHAMPOO, SUSPENSION TOPICAL 2 TIMES WEEKLY
Qty: 120 ML | Refills: 5 | Status: SHIPPED | OUTPATIENT
Start: 2025-05-19

## 2025-05-16 RX ORDER — SODIUM FLUORIDE 5 MG/ML
1 PASTE, DENTIFRICE DENTAL 2 TIMES DAILY
Qty: 102 G | Refills: 11 | Status: SHIPPED | OUTPATIENT
Start: 2025-05-16

## 2025-05-16 RX ORDER — OXYBUTYNIN CHLORIDE 15 MG/1
15 TABLET, EXTENDED RELEASE ORAL DAILY
Qty: 90 TABLET | Refills: 3 | Status: SHIPPED | OUTPATIENT
Start: 2025-05-16

## 2025-05-16 RX ORDER — GLUCOSAMINE HCL/CHONDROITIN SU 500-400 MG
1 CAPSULE ORAL
Qty: 100 EACH | Refills: 5 | Status: SHIPPED | OUTPATIENT
Start: 2025-05-16

## 2025-05-16 RX ORDER — INSULIN LISPRO 100 [IU]/ML
INJECTION, SOLUTION INTRAVENOUS; SUBCUTANEOUS
Qty: 18 ML | Refills: 5 | Status: SHIPPED | OUTPATIENT
Start: 2025-05-16

## 2025-05-16 RX ORDER — PROCHLORPERAZINE 25 MG/1
1 SUPPOSITORY RECTAL
Qty: 1 EACH | Refills: 3 | Status: SHIPPED | OUTPATIENT
Start: 2025-05-16

## 2025-05-16 RX ORDER — GABAPENTIN 300 MG/1
300 CAPSULE ORAL 3 TIMES DAILY
Qty: 270 CAPSULE | Refills: 1 | Status: SHIPPED | OUTPATIENT
Start: 2025-05-16

## 2025-05-16 RX ORDER — LORATADINE 10 MG/1
10 TABLET ORAL DAILY
Qty: 90 TABLET | Refills: 0 | Status: SHIPPED | OUTPATIENT
Start: 2025-05-16

## 2025-05-16 RX ORDER — PROCHLORPERAZINE 25 MG/1
SUPPOSITORY RECTAL
Qty: 3 EACH | Refills: 12 | Status: SHIPPED | OUTPATIENT
Start: 2025-05-16

## 2025-05-16 RX ORDER — PROBENECID AND COLCHICINE .5; 5 MG/1; MG/1
1 TABLET ORAL DAILY
Qty: 30 TABLET | Refills: 3 | Status: SHIPPED | OUTPATIENT
Start: 2025-05-16

## 2025-05-16 RX ORDER — FLUTICASONE PROPIONATE 50 MCG
1 SPRAY, SUSPENSION (ML) NASAL 2 TIMES DAILY
Qty: 3 G | Refills: 0 | Status: SHIPPED | OUTPATIENT
Start: 2025-05-16

## 2025-05-16 RX ORDER — CHLORTHALIDONE 50 MG/1
50 TABLET ORAL DAILY
Qty: 90 TABLET | Refills: 3 | Status: SHIPPED | OUTPATIENT
Start: 2025-05-16

## 2025-05-16 NOTE — ASSESSMENT & PLAN NOTE
Take basaglar daily as directed  Sugars not adequately controlled     Orders:    Comprehensive Metabolic Panel    Alcohol Swabs 70 % pads; Use 1 each 4 (Four) Times a Day Before Meals & at Bedtime As Needed (sugar check).    Continuous Glucose Sensor (Dexcom G6 Sensor); Use Every 10 (Ten) Days.    Continuous Glucose Transmitter (Dexcom G6 Transmitter) misc; Use 1 each Every 3 (Three) Months.    glucose blood test strip; 1 each by Other route 4 (Four) Times a Day. use 1 strip to check glucose 4 times daily    Insulin Lispro, 1 Unit Dial, (HumaLOG KwikPen) 100 UNIT/ML solution pen-injector; INJECT 20 UNITS SUBCUTANEOUSLY THREE TIMES DAILY 15 MINUTES BEFORE MEALS OR IMMEDIATELY FOLLOWING MEALS    Ambulatory Referral to Diabetic Education

## 2025-05-16 NOTE — ASSESSMENT & PLAN NOTE
Orders:    Azelastine HCl 137 MCG/SPRAY solution; Administer 2 sprays into the nostril(s) as directed by provider 2 (Two) Times a Day.    fluticasone (FLONASE) 50 MCG/ACT nasal spray; Administer 1 spray into the nostril(s) as directed by provider 2 (Two) Times a Day.    loratadine (EQ All Day Allergy Relief) 10 MG tablet; Take 1 tablet by mouth Daily.

## 2025-05-16 NOTE — ASSESSMENT & PLAN NOTE
Orders:    chlorthalidone (HYGROTEN) 50 MG tablet; Take 1 tablet by mouth Daily.    potassium chloride (MICRO-K) 10 MEQ CR capsule; Take 1 capsule by mouth Daily.     Addended by: Shirlie Boas on: 7/13/2020 11:41 AM     Modules accepted: Orders

## 2025-05-19 DIAGNOSIS — Z79.4 TYPE 2 DIABETES MELLITUS WITH OTHER NEUROLOGIC COMPLICATION, WITH LONG-TERM CURRENT USE OF INSULIN: ICD-10-CM

## 2025-05-19 DIAGNOSIS — E11.49 TYPE 2 DIABETES MELLITUS WITH OTHER NEUROLOGIC COMPLICATION, WITH LONG-TERM CURRENT USE OF INSULIN: ICD-10-CM

## 2025-05-19 NOTE — TELEPHONE ENCOUNTER
"Received fax from [facility] stating rx for test strips was sent over but it needs to say \"as needed\" and insurance will only pay for 50 per 30 days. Spoke with [facility]. Informed her pt uses a dexcom so no sure why he needs this. She's not sure if insurance will even pay if it's ran thru again. If not, pt will have to pay out of pocket. Cannot do PA on this since he is using dexcom.   "

## 2025-05-21 ENCOUNTER — TELEPHONE (OUTPATIENT)
Dept: FAMILY MEDICINE CLINIC | Facility: CLINIC | Age: 45
End: 2025-05-21
Payer: MEDICAID

## 2025-05-21 NOTE — TELEPHONE ENCOUNTER
Patient called saying that Dr. Rosenberg referred him to a dietician but no one has called him to schedule. He's wanting the phone # so he can call and schedule.

## 2025-06-11 ENCOUNTER — TELEPHONE (OUTPATIENT)
Dept: FAMILY MEDICINE CLINIC | Facility: CLINIC | Age: 45
End: 2025-06-11
Payer: OTHER GOVERNMENT

## 2025-06-11 NOTE — TELEPHONE ENCOUNTER
Received fax back. PA Case ID #: [id number]. PA Case: [id number], Status: Approved, Coverage Starts on: 6/11/2025 12:00:00 AM, Coverage Ends on: 6/11/2026 12:00:00 AM.  Effective Date: 6/11/2025  Authorization Expiration Date: 6/11/2026.

## 2025-06-11 NOTE — TELEPHONE ENCOUNTER
Received fax from [facility] requesting PA on Basaglar KwikPen 100U/ml. Pa submitted online thru Covermymeds. Waiting on authorization.

## 2025-06-13 ENCOUNTER — OFFICE VISIT (OUTPATIENT)
Dept: PAIN MEDICINE | Facility: CLINIC | Age: 45
End: 2025-06-13
Payer: OTHER GOVERNMENT

## 2025-06-13 VITALS
DIASTOLIC BLOOD PRESSURE: 98 MMHG | SYSTOLIC BLOOD PRESSURE: 146 MMHG | OXYGEN SATURATION: 96 % | HEART RATE: 82 BPM | BODY MASS INDEX: 36.37 KG/M2 | WEIGHT: 291 LBS | RESPIRATION RATE: 16 BRPM

## 2025-06-13 DIAGNOSIS — M54.16 LUMBAR RADICULOPATHY: ICD-10-CM

## 2025-06-13 DIAGNOSIS — M51.362 DEGENERATION OF INTERVERTEBRAL DISC OF LUMBAR REGION WITH DISCOGENIC BACK PAIN AND LOWER EXTREMITY PAIN: ICD-10-CM

## 2025-06-13 DIAGNOSIS — G61.0 GUILLAIN-BARRE: ICD-10-CM

## 2025-06-13 DIAGNOSIS — Z79.899 HIGH RISK MEDICATION USE: Primary | ICD-10-CM

## 2025-06-13 DIAGNOSIS — Z98.890 STATUS POST LUMBAR SPINE OPERATIVE PROCEDURE FOR DECOMPRESSION OF SPINAL CORD: ICD-10-CM

## 2025-06-13 DIAGNOSIS — M54.42 CHRONIC MIDLINE LOW BACK PAIN WITH BILATERAL SCIATICA: ICD-10-CM

## 2025-06-13 DIAGNOSIS — M54.41 CHRONIC MIDLINE LOW BACK PAIN WITH BILATERAL SCIATICA: ICD-10-CM

## 2025-06-13 DIAGNOSIS — G89.29 CHRONIC MIDLINE LOW BACK PAIN WITH BILATERAL SCIATICA: ICD-10-CM

## 2025-06-13 DIAGNOSIS — M51.26 LUMBAR DISC HERNIATION: ICD-10-CM

## 2025-06-13 DIAGNOSIS — M48.07 LUMBOSACRAL SPINAL STENOSIS: ICD-10-CM

## 2025-06-13 RX ORDER — HYDROCODONE BITARTRATE AND ACETAMINOPHEN 10; 325 MG/1; MG/1
1 TABLET ORAL EVERY 6 HOURS PRN
Qty: 120 TABLET | Refills: 0 | Status: SHIPPED | OUTPATIENT
Start: 2025-06-13

## 2025-06-13 RX ORDER — GABAPENTIN 300 MG/1
300 CAPSULE ORAL 3 TIMES DAILY
Qty: 270 CAPSULE | Refills: 1 | Status: SHIPPED | OUTPATIENT
Start: 2025-06-13
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
## 2025-06-14 LAB
ALBUMIN SERPL-MCNC: 4.1 G/DL (ref 4.1–5.1)
ALP SERPL-CCNC: 102 IU/L (ref 44–121)
ALT SERPL-CCNC: 27 IU/L (ref 0–44)
AST SERPL-CCNC: 26 IU/L (ref 0–40)
BILIRUB SERPL-MCNC: 0.2 MG/DL (ref 0–1.2)
BUN SERPL-MCNC: 11 MG/DL (ref 6–24)
BUN/CREAT SERPL: 10 (ref 9–20)
CALCIUM SERPL-MCNC: 8.8 MG/DL (ref 8.7–10.2)
CHLORIDE SERPL-SCNC: 99 MMOL/L (ref 96–106)
CO2 SERPL-SCNC: 24 MMOL/L (ref 20–29)
CREAT SERPL-MCNC: 1.09 MG/DL (ref 0.76–1.27)
EGFRCR SERPLBLD CKD-EPI 2021: 86 ML/MIN/1.73
GLOBULIN SER CALC-MCNC: 2.7 G/DL (ref 1.5–4.5)
GLUCOSE SERPL-MCNC: 222 MG/DL (ref 70–99)
POTASSIUM SERPL-SCNC: 4.1 MMOL/L (ref 3.5–5.2)
PROT SERPL-MCNC: 6.8 G/DL (ref 6–8.5)
SODIUM SERPL-SCNC: 138 MMOL/L (ref 134–144)

## 2025-06-16 ENCOUNTER — OFFICE VISIT (OUTPATIENT)
Dept: FAMILY MEDICINE CLINIC | Facility: CLINIC | Age: 45
End: 2025-06-16
Payer: OTHER GOVERNMENT

## 2025-06-16 ENCOUNTER — RESULTS FOLLOW-UP (OUTPATIENT)
Dept: FAMILY MEDICINE CLINIC | Facility: CLINIC | Age: 45
End: 2025-06-16

## 2025-06-16 ENCOUNTER — TELEPHONE (OUTPATIENT)
Dept: FAMILY MEDICINE CLINIC | Facility: CLINIC | Age: 45
End: 2025-06-16

## 2025-06-16 VITALS
DIASTOLIC BLOOD PRESSURE: 84 MMHG | OXYGEN SATURATION: 98 % | BODY MASS INDEX: 35.93 KG/M2 | HEIGHT: 75 IN | SYSTOLIC BLOOD PRESSURE: 132 MMHG | HEART RATE: 96 BPM | TEMPERATURE: 97.1 F | WEIGHT: 289 LBS | RESPIRATION RATE: 20 BRPM

## 2025-06-16 DIAGNOSIS — L01.00 IMPETIGO: ICD-10-CM

## 2025-06-16 DIAGNOSIS — E11.49 TYPE 2 DIABETES MELLITUS WITH OTHER NEUROLOGIC COMPLICATION, WITH LONG-TERM CURRENT USE OF INSULIN: Primary | ICD-10-CM

## 2025-06-16 DIAGNOSIS — I10 HYPERTENSION, BENIGN: ICD-10-CM

## 2025-06-16 DIAGNOSIS — Z79.4 TYPE 2 DIABETES MELLITUS WITH OTHER NEUROLOGIC COMPLICATION, WITH LONG-TERM CURRENT USE OF INSULIN: Primary | ICD-10-CM

## 2025-06-16 DIAGNOSIS — M10.9 GOUT, UNSPECIFIED CAUSE, UNSPECIFIED CHRONICITY, UNSPECIFIED SITE: ICD-10-CM

## 2025-06-16 DIAGNOSIS — B35.9 TINEA: ICD-10-CM

## 2025-06-16 DIAGNOSIS — J30.2 OTHER SEASONAL ALLERGIC RHINITIS: ICD-10-CM

## 2025-06-16 LAB
BILIRUB BLD-MCNC: ABNORMAL MG/DL
CLARITY, POC: CLEAR
COLOR UR: YELLOW
EXPIRATION DATE: ABNORMAL
GLUCOSE UR STRIP-MCNC: NEGATIVE MG/DL
HBA1C MFR BLD: 7.2 % (ref 4.5–5.7)
KETONES UR QL: ABNORMAL
LEUKOCYTE EST, POC: NEGATIVE
Lab: ABNORMAL
NITRITE UR-MCNC: NEGATIVE MG/ML
PH UR: 5 [PH] (ref 5–8)
PROT UR STRIP-MCNC: ABNORMAL MG/DL
RBC # UR STRIP: NEGATIVE /UL
SP GR UR: 1.02 (ref 1–1.03)
UROBILINOGEN UR QL: ABNORMAL

## 2025-06-16 PROCEDURE — 3075F SYST BP GE 130 - 139MM HG: CPT | Performed by: FAMILY MEDICINE

## 2025-06-16 PROCEDURE — 83036 HEMOGLOBIN GLYCOSYLATED A1C: CPT | Performed by: FAMILY MEDICINE

## 2025-06-16 PROCEDURE — 3079F DIAST BP 80-89 MM HG: CPT | Performed by: FAMILY MEDICINE

## 2025-06-16 PROCEDURE — 1125F AMNT PAIN NOTED PAIN PRSNT: CPT | Performed by: FAMILY MEDICINE

## 2025-06-16 PROCEDURE — 99214 OFFICE O/P EST MOD 30 MIN: CPT | Performed by: FAMILY MEDICINE

## 2025-06-16 PROCEDURE — 3051F HG A1C>EQUAL 7.0%<8.0%: CPT | Performed by: FAMILY MEDICINE

## 2025-06-16 RX ORDER — LORATADINE 10 MG/1
10 TABLET ORAL DAILY
Qty: 90 TABLET | Refills: 3 | Status: SHIPPED | OUTPATIENT
Start: 2025-06-16

## 2025-06-16 RX ORDER — FLUTICASONE PROPIONATE 50 MCG
1 SPRAY, SUSPENSION (ML) NASAL 2 TIMES DAILY
Qty: 3 G | Refills: 5 | Status: SHIPPED | OUTPATIENT
Start: 2025-06-16

## 2025-06-16 RX ORDER — MUPIROCIN 20 MG/G
1 OINTMENT TOPICAL 3 TIMES DAILY
Qty: 30 G | Refills: 0 | Status: SHIPPED | OUTPATIENT
Start: 2025-06-16

## 2025-06-16 RX ORDER — TERBINAFINE HYDROCHLORIDE 250 MG/1
250 TABLET ORAL DAILY
Qty: 30 TABLET | Refills: 0 | Status: SHIPPED | OUTPATIENT
Start: 2025-06-16

## 2025-06-16 RX ORDER — MUPIROCIN CALCIUM 20 MG/G
1 CREAM TOPICAL 3 TIMES DAILY
Qty: 15 G | Refills: 0 | Status: SHIPPED | OUTPATIENT
Start: 2025-06-16 | End: 2025-06-16

## 2025-06-16 NOTE — TELEPHONE ENCOUNTER
Caller: [facility] - [place], [place] - [address] - [phone] [facility] [phone] FX    Relationship: Pharmacy    Best call back number: [phone]    What medication are you requesting: mupirocin (BACTROBAN) OINTMENT     What are your current symptoms:     How long have you been experiencing symptoms:     Have you had these symptoms before:      [x] Yes  [] No    Have you been treated for these symptoms before:     [x] Yes  [] No    If a prescription is needed, what is your preferred pharmacy and phone number: [facility] [address] [place] [address], [place] - [address] - [phone] [facility] [phone] FX      Additional notes:

## 2025-06-16 NOTE — ASSESSMENT & PLAN NOTE
Much better, continue current management     Orders:    POCT urinalysis dipstick, manual    POC Glycosylated Hemoglobin (Hb A1C)    CBC & Differential    Comprehensive Metabolic Panel    TSH    Lipid Panel With / Chol / HDL Ratio

## 2025-06-16 NOTE — PROGRESS NOTES
Subjective   Barrett Laguerre is a 45 y.o. male.   Chief Complaint   Patient presents with    Diabetes     History of Present Illness      Diabetes  He presents for his follow-up diabetic visit. He has type 2 diabetes mellitus. The initial diagnosis of diabetes was made 4 years ago. Pertinent negatives for diabetes include no chest pain, no fatigue, no polydipsia, no polyuria and no weakness. Risk factors for coronary artery disease include diabetes mellitus, male sex, sedentary lifestyle and hypertension. Current diabetic treatment includes insulin injections and oral agent (dual therapy). He has not had a previous visit with a dietitian. An ACE inhibitor/angiotensin II receptor blocker is not being taken. He does not see a podiatrist. Eye exam is not current.   Hypertension  This is a chronic problem. The current episode started more than 1 year ago. Pertinent negatives include no chest pain, neck pain, palpitations or shortness of breath. Risk factors for coronary artery disease include diabetes mellitus and obesity. Current antihypertension treatment includes calcium channel blockers and angiotensin blockers.   Hyperlipidemia  This is a chronic problem. The current episode started more than 1 year ago. Recent lipid tests were reviewed and are variable. Exacerbating diseases include diabetes and obesity. There are no known factors aggravating his hyperlipidemia. Pertinent negatives include no chest pain or shortness of breath. The current treatment provides mild improvement of lipids. There are no compliance problems.  Risk factors for coronary artery disease include family history, diabetes mellitus, hypertension, obesity and male sex.        The following portions of the patient's history were reviewed and updated as appropriate: allergies, current medications, past family history, past medical history, past social history, past surgical history, and problem list.    Patient Active Problem List   Diagnosis     Gout    Lumbosacral spinal stenosis    Memory impairment    Acquired flexible flat foot    Chronic midline low back pain with bilateral sciatica    Other specified dorsopathies, site unspecified    Guillain-[place]    Sleep apnea    Attention deficit disorder    Diabetes    Other seasonal allergic rhinitis    Mixed obsessional thoughts and acts    Hypertension, benign    Disorder of lipid metabolism    CIDP (chronic inflammatory demyelinating polyneuropathy)    Lumbar radiculopathy    Onychomycosis    Annual physical exam    Class 1 obesity due to excess calories with serious comorbidity and body mass index (BMI) of 34.0 to 34.9 in adult    Acute midline low back pain with left-sided sciatica    Degeneration of intervertebral disc of lumbar region with discogenic back pain and lower extremity pain    History of discectomy    Lumbar disc herniation    Pruritus       Current Outpatient Medications on File Prior to Visit   Medication Sig Dispense Refill    acetaminophen-codeine (TYLENOL/CODEINE #3) 300-30 MG per tablet Take 1 tablet by mouth 3 (Three) Times a Day As Needed for Mild Pain. 21 tablet 2    albuterol sulfate  (90 Base) MCG/ACT inhaler INHALE 2 PUFFS INTO LUNGS EVERY 4 HOURS AS NEEDED FOR WHEEZING 9 g 0    Alcohol Swabs 70 % pads Use 1 each 4 (Four) Times a Day Before Meals & at Bedtime As Needed (sugar check). 100 each 5    amLODIPine (NORVASC) 10 MG tablet TAKE 1 TABLET BY MOUTH ONCE DAILY AT NIGHT 90 tablet 1    Azelastine HCl 137 MCG/SPRAY solution Administer 2 sprays into the nostril(s) as directed by provider 2 (Two) Times a Day. 60 mL 2    BD Pen Needle Kyra 2nd Gen 32G X 4 MM misc USE 1 PEN NEEDLE 4 TIMES DAILY BEFORE MEAL(S) AND AT BEDTIME      cetirizine (zyrTEC) 10 MG tablet Take 1 tablet by mouth Daily. 90 tablet 1    chlorthalidone (HYGROTEN) 50 MG tablet Take 1 tablet by mouth Daily. 90 tablet 3    colchicine-probenecid (COL-BENEMID) 0.5-500 MG per tablet Take 1 tablet by mouth Daily. 30  tablet 3    Continuous Glucose  (Dexcom G6 ) device USE UNIT TO CHECK BLOOD SUGAR ONCE DAILY AS DIRECTED // 1 UNIT SHOULD LAST 365 DAYS 1 each 0    Continuous Glucose Sensor (Dexcom G6 Sensor) Use Every 10 (Ten) Days. 3 each 12    Continuous Glucose Transmitter (Dexcom G6 Transmitter) misc Use 1 each Every 3 (Three) Months. 1 each 3    Cyanocobalamin (Vitamin B-12 ER) 1000 MCG tablet controlled-release TAKE 1 TABLET DAILY 30 each 5    Diclofenac Epolamine (FLECTOR) 1.3 % patch patch Apply 1 patch topically to the appropriate area as directed 2 (Two) Times a Day As Needed (back pain). 60 patch 11    Emtricitabine-Tenofovir AF (Descovy) 200-25 MG per tablet Take 1 tablet by mouth Daily.      FLUoxetine (PROzac) 40 MG capsule Take 1 capsule by mouth Every Night. 90 capsule 3    furosemide (LASIX) 20 MG tablet       gabapentin (NEURONTIN) 300 MG capsule Take 1 capsule by mouth 3 (Three) Times a Day. 270 capsule 1    glucose blood test strip 1 each by Other route 4 (Four) Times a Day As Needed (diabetes). use 1 strip to check glucose 4 times daily 50 each 12    glucose monitor monitoring kit Use 1 each Daily. Dx: E11.49 patient checks sugar 4 x day 1 each 0    guaiFENesin-dextromethorphan (ROBITUSSIN DM) 100-10 MG/5ML syrup Take 5 mL by mouth 3 (Three) Times a Day As Needed for Cough. 118 mL 0    HYDROcodone-acetaminophen (Norco)  MG per tablet Take 1 tablet by mouth Every 6 (Six) Hours As Needed for Moderate Pain. 120 tablet 0    HYDROcodone-acetaminophen (Norco)  MG per tablet Take 1 tablet by mouth Every 6 (Six) Hours As Needed for Moderate Pain. 120 tablet 0    HYDROcodone-acetaminophen (NORCO)  MG per tablet Take 1 tablet by mouth Every 6 (Six) Hours As Needed for Moderate Pain or Severe Pain. 120 tablet 0    hydrOXYzine (ATARAX) 25 MG tablet Take 1 tablet by mouth 4 (Four) Times a Day As Needed for Itching. 90 tablet 12    Insulin Glargine (BASAGLAR KWIKPEN) 100 UNIT/ML injection  pen Inject 40 Units under the skin into the appropriate area as directed Every Night. 12 mL 11    Insulin Lispro, 1 Unit Dial, (HumaLOG KwikPen) 100 UNIT/ML solution pen-injector INJECT 20 UNITS SUBCUTANEOUSLY THREE TIMES DAILY 15 MINUTES BEFORE MEALS OR IMMEDIATELY FOLLOWING MEALS 18 mL 5    ketoconazole (NIZORAL) 2 % cream       ketoconazole (NIZORAL) 2 % shampoo Apply  topically to the appropriate area as directed 2 (Two) Times a Week. 120 mL 5    losartan (COZAAR) 100 MG tablet Take 1 tablet by mouth Daily. 90 tablet 3    methocarbamol (ROBAXIN) 750 MG tablet Take 1 tablet by mouth 4 (Four) Times a Day As Needed for Muscle Spasms. 360 tablet 3    montelukast (SINGULAIR) 10 MG tablet Take 1 tablet by mouth Every Night. 90 tablet 1    naloxone (NARCAN) 4 MG/0.1ML nasal spray Call 911. Don't prime. [place] in 1 nostril for overdose. Repeat in 2-3 minutes in other nostril if no or minimal breathing/responsiveness. 2 each 0    oxybutynin XL (DITROPAN XL) 15 MG 24 hr tablet Take 1 tablet by mouth Daily. 90 tablet 3    potassium chloride (MICRO-K) 10 MEQ CR capsule Take 1 capsule by mouth Daily. 90 capsule 0    promethazine (PHENERGAN) 25 MG tablet Take 1 tablet by mouth every 6 (six) to 8 (eight) hours as needed (or vomiting). 90 tablet 0    sildenafil (REVATIO) 20 MG tablet Take 1 tablet by mouth Daily As Needed (ed). 30 tablet 3    sitaGLIPtin-metFORMIN (Janumet)  MG per tablet Take 1 tablet by mouth 2 (Two) Times a Day With Meals. 180 tablet 3    sodium chloride (Ayr) 0.65 % nasal spray USE 1 DOSE IN EACH NOSTRIL THREE TIMES DAILY 50 mL 0    Sodium Fluoride (Denta 5000 Plus) 1.1 % cream Take 1 Application by mouth 2 (Two) Times a Day. 102 g 11    TRUEplus Lancets 28G misc USE 1 EACH TO CHECK GLUCOSE 4 TIMES DAILY 100 each 3    [DISCONTINUED] fluticasone (FLONASE) 50 MCG/ACT nasal spray Administer 1 spray into the nostril(s) as directed by provider 2 (Two) Times a Day. 3 g 0    [DISCONTINUED] loratadine (EQ All  Day Allergy Relief) 10 MG tablet Take 1 tablet by mouth Daily. 90 tablet 0    [DISCONTINUED] terbinafine (lamiSIL) 250 MG tablet Take 1 tablet by mouth Daily. 30 tablet 0    pantoprazole (Protonix) 40 MG EC tablet Take 1 tablet by mouth Daily. (Patient not taking: Reported on 6/16/2025) 90 tablet 3    Semaglutide,0.25 or 0.5MG/DOS, (Ozempic, 0.25 or 0.5 MG/DOSE,) 2 MG/1.5ML solution pen-injector Inject 0.25 mg under the skin into the appropriate area as directed 1 (One) Time Per Week. (Patient not taking: Reported on 6/16/2025) 3 mL 12    topiramate (TOPAMAX) 200 MG tablet  (Patient not taking: Reported on 6/16/2025)      [DISCONTINUED] acyclovir (ZOVIRAX) 800 MG tablet Take 1 tablet by mouth 5 (Five) Times a Day. (Patient not taking: Reported on 6/16/2025) 50 tablet 1    [DISCONTINUED] dextromethorphan 15 MG/5ML syrup Take 10 mL by mouth 4 (Four) Times a Day As Needed for Cough. (Patient not taking: Reported on 6/16/2025) 118 mL 5    [DISCONTINUED] Ozempic, 0.25 or 0.5 MG/DOSE, 2 MG/3ML solution pen-injector INJECT 0.25 MG UNDER THE SKIN INTO THE APPROPRIATE AREA AS DIRECTED 1 TIME PER WEEK (Patient not taking: Reported on 6/16/2025)       No current facility-administered medications on file prior to visit.     Current outpatient and discharge medications have been reconciled for the patient.  Reviewed by: Eric Rosenberg MD      Allergies   Allergen Reactions    Penicillin G Anaphylaxis    Penicillins Anaphylaxis    Sulfa Antibiotics Hives     Was told by mother possible anaphylaxis    Allopurinol Rash       Review of Systems   Constitutional:  Negative for fatigue.   Respiratory:  Negative for shortness of breath.    Cardiovascular:  Negative for chest pain and palpitations.   Endocrine: Negative for polydipsia and polyuria.   Musculoskeletal:  Negative for neck pain.   Neurological:  Negative for weakness.     I have reviewed and confirmed the accuracy of the ROS as documented by the MA/LPN/RN Eric  "Reji Rosenberg MD    Objective   Visit Vitals  /84 (BP Location: Right arm, Patient Position: Sitting, Cuff Size: Large Adult)   Pulse 96   Temp 97.1 °F (36.2 °C) (Temporal)   Resp 20   Ht 190.5 cm (75\")   Wt 131 kg (289 lb)   SpO2 98%   BMI 36.12 kg/m²      **  Physical Exam  Vitals reviewed.   Constitutional:       Appearance: He is well-developed.   HENT:      Head: Normocephalic.      Right Ear: External ear normal.      Left Ear: External ear normal.      Nose: Nose normal.   Eyes:      Conjunctiva/sclera: Conjunctivae normal.   Cardiovascular:      Rate and Rhythm: Normal rate.   Pulmonary:      Effort: Pulmonary effort is normal. No respiratory distress.   Abdominal:      General: Abdomen is flat.   Musculoskeletal:         General: No signs of injury.      Cervical back: Normal range of motion.   Skin:     Findings: No rash.   Neurological:      Mental Status: He is alert and oriented to person, place, and time.   Psychiatric:         Behavior: Behavior normal.         Thought Content: Thought content normal.         Judgment: Judgment normal.       Derm Physical Exam  Ortho Exam   Neurological Exam  Mental Status  Alert. Oriented to person, place, and time.         Assessment & Plan  Type 2 diabetes mellitus with other neurologic complication, with long-term current use of insulin  Much better, continue current management     Orders:    POCT urinalysis dipstick, manual    POC Glycosylated Hemoglobin (Hb A1C)    CBC & Differential    Comprehensive Metabolic Panel    TSH    Lipid Panel With / Chol / HDL Ratio    Hypertension, benign  Stable, continue current management.     Orders:    CBC & Differential    Comprehensive Metabolic Panel    Gout, unspecified cause, unspecified chronicity, unspecified site    Orders:    Uric Acid    Impetigo  Some redness at area of dexcom needle insertion  Orders:    mupirocin (BACTROBAN) 2 % ointment; Apply 1 Application topically to the appropriate area as directed 3 " (Three) Times a Day.    Other seasonal allergic rhinitis    Orders:    fluticasone (FLONASE) 50 MCG/ACT nasal spray; Administer 1 spray into the nostril(s) as directed by provider 2 (Two) Times a Day.    loratadine (EQ All Day Allergy Relief) 10 MG tablet; Take 1 tablet by mouth Daily.    Tinea  Currently doing well, would like refill of   Orders:    terbinafine (lamiSIL) 250 MG tablet; Take 1 tablet by mouth Daily.     Findings discussed. All questions answered.  Medication and medication adverse effects discussed.  Drug education given and explained to patient. Patient verbalized understanding..  Follow up in 3 months for recheck, sooner for concerns.       Expected course, medications, and adverse effects discussed as appropriate.  Call or return if worsening or persistent symptoms.     This document is intended for medical professional use only.   Electronically signed by Eric Rosenberg MD on 06/16/2025. This content may not have been proofread.

## 2025-06-16 NOTE — ASSESSMENT & PLAN NOTE
Orders:    fluticasone (FLONASE) 50 MCG/ACT nasal spray; Administer 1 spray into the nostril(s) as directed by provider 2 (Two) Times a Day.    loratadine (EQ All Day Allergy Relief) 10 MG tablet; Take 1 tablet by mouth Daily.

## 2025-06-18 DIAGNOSIS — J30.2 OTHER SEASONAL ALLERGIC RHINITIS: ICD-10-CM

## 2025-06-18 RX ORDER — ECHINACEA PURPUREA EXTRACT 125 MG
TABLET ORAL
Qty: 50 ML | Refills: 3 | Status: SHIPPED | OUTPATIENT
Start: 2025-06-18

## 2025-06-19 DIAGNOSIS — J30.2 OTHER SEASONAL ALLERGIC RHINITIS: ICD-10-CM

## 2025-06-19 RX ORDER — ECHINACEA PURPUREA EXTRACT 125 MG
TABLET ORAL
Qty: 50 ML | Refills: 0 | OUTPATIENT
Start: 2025-06-19

## 2025-06-24 ENCOUNTER — OFFICE VISIT (OUTPATIENT)
Age: 45
End: 2025-06-24
Payer: OTHER GOVERNMENT

## 2025-06-24 VITALS — HEIGHT: 75 IN | BODY MASS INDEX: 35.93 KG/M2 | HEART RATE: 83 BPM | OXYGEN SATURATION: 96 % | WEIGHT: 289 LBS

## 2025-06-24 DIAGNOSIS — M79.674 PAIN IN TOES OF BOTH FEET: ICD-10-CM

## 2025-06-24 DIAGNOSIS — R26.81 UNSTEADINESS ON FEET: ICD-10-CM

## 2025-06-24 DIAGNOSIS — L60.3 ONYCHODYSTROPHY: ICD-10-CM

## 2025-06-24 DIAGNOSIS — E11.42 DIABETIC PERIPHERAL NEUROPATHY ASSOCIATED WITH TYPE 2 DIABETES MELLITUS: Primary | ICD-10-CM

## 2025-06-24 DIAGNOSIS — L84 CALLUS OF FOOT: ICD-10-CM

## 2025-06-24 DIAGNOSIS — M79.675 PAIN IN TOES OF BOTH FEET: ICD-10-CM

## 2025-06-24 DIAGNOSIS — L85.3 XEROSIS OF SKIN: ICD-10-CM

## 2025-06-25 NOTE — PROGRESS NOTES
06/24/2025  Foot and Ankle Surgery - Established Patient/Follow-up  Provider: ANASTASIA Chan   Location: [facility]    Subjective:  Barrett Laguerre is a 45 y.o. male.     Chief Complaint   Patient presents with    Right Foot - Follow-up, Diabetes     Dm foot care, nails    Left Foot - Follow-up, Diabetes     Dm foot care, nails    Follow-Up     Eric Rosenberg MD  6/16/25       History of Present Illness  The patient is a 45-year-old male who presents for a routine diabetic eye check.    He reports no issues with his nails but expresses concern about the condition of his feet, particularly due to dryness and recent blistering. He attributes these symptoms to a combination of factors, including the use of new shoes and water shoes during a recent trip to [place]. He also mentions that he has been experiencing back problems, which have led to changes in his gait. He has been using new insoles and work boots, which he believes have been beneficial. However, he notes that the inserts provided with the shoes were not as effective, leading him to use them in his water shoes instead. He finds that these shoes can sometimes cause more discomfort than walking barefoot, but acknowledges that walking barefoot on hot surfaces is not ideal.    His blood sugar levels have been inconsistent, with readings ranging from 120 to 300 according to his continuous glucose monitor (CGM). His most recent A1c level was between 7.5 and 7.6. He has been experiencing issues with his CGM, which frequently detaches, causing irritation and scarring on his arm. He is currently using his right arm for the CGM to allow his left arm to heal. He has attempted to alleviate the issue by shaving the area where the patch is applied, but this has not been successful as the patch tends to peel around the edges.    He is having back issues and is going to get a new epidural on 07/03/2025.       Allergies   Allergen Reactions     Penicillin G Anaphylaxis    Penicillins Anaphylaxis    Sulfa Antibiotics Hives     Was told by mother possible anaphylaxis    Allopurinol Rash       Current Outpatient Medications on File Prior to Visit   Medication Sig Dispense Refill    acetaminophen-codeine (TYLENOL/CODEINE #3) 300-30 MG per tablet Take 1 tablet by mouth 3 (Three) Times a Day As Needed for Mild Pain. 21 tablet 2    albuterol sulfate  (90 Base) MCG/ACT inhaler INHALE 2 PUFFS INTO LUNGS EVERY 4 HOURS AS NEEDED FOR WHEEZING 9 g 0    Alcohol Swabs 70 % pads Use 1 each 4 (Four) Times a Day Before Meals & at Bedtime As Needed (sugar check). 100 each 5    amLODIPine (NORVASC) 10 MG tablet TAKE 1 TABLET BY MOUTH ONCE DAILY AT NIGHT 90 tablet 1    Azelastine HCl 137 MCG/SPRAY solution Administer 2 sprays into the nostril(s) as directed by provider 2 (Two) Times a Day. 60 mL 2    BD Pen Needle Kyra 2nd Gen 32G X 4 MM misc USE 1 PEN NEEDLE 4 TIMES DAILY BEFORE MEAL(S) AND AT BEDTIME      cetirizine (zyrTEC) 10 MG tablet Take 1 tablet by mouth Daily. 90 tablet 1    chlorthalidone (HYGROTEN) 50 MG tablet Take 1 tablet by mouth Daily. 90 tablet 3    colchicine-probenecid (COL-BENEMID) 0.5-500 MG per tablet Take 1 tablet by mouth Daily. 30 tablet 3    Continuous Glucose  (Dexcom G6 ) device USE UNIT TO CHECK BLOOD SUGAR ONCE DAILY AS DIRECTED // 1 UNIT SHOULD LAST 365 DAYS 1 each 0    Continuous Glucose Sensor (Dexcom G6 Sensor) Use Every 10 (Ten) Days. 3 each 12    Continuous Glucose Transmitter (Dexcom G6 Transmitter) misc Use 1 each Every 3 (Three) Months. 1 each 3    Cyanocobalamin (Vitamin B-12 ER) 1000 MCG tablet controlled-release TAKE 1 TABLET DAILY 30 each 5    Diclofenac Epolamine (FLECTOR) 1.3 % patch patch Apply 1 patch topically to the appropriate area as directed 2 (Two) Times a Day As Needed (back pain). 60 patch 11    Emtricitabine-Tenofovir AF (Descovy) 200-25 MG per tablet Take 1 tablet by mouth Daily.      FLUoxetine  (PROzac) 40 MG capsule Take 1 capsule by mouth Every Night. 90 capsule 3    fluticasone (FLONASE) 50 MCG/ACT nasal spray Administer 1 spray into the nostril(s) as directed by provider 2 (Two) Times a Day. 3 g 5    furosemide (LASIX) 20 MG tablet       gabapentin (NEURONTIN) 300 MG capsule Take 1 capsule by mouth 3 (Three) Times a Day. 270 capsule 1    glucose blood test strip 1 each by Other route 4 (Four) Times a Day As Needed (diabetes). use 1 strip to check glucose 4 times daily 50 each 12    glucose monitor monitoring kit Use 1 each Daily. Dx: E11.49 patient checks sugar 4 x day 1 each 0    guaiFENesin-dextromethorphan (ROBITUSSIN DM) 100-10 MG/5ML syrup Take 5 mL by mouth 3 (Three) Times a Day As Needed for Cough. 118 mL 0    HYDROcodone-acetaminophen (Norco)  MG per tablet Take 1 tablet by mouth Every 6 (Six) Hours As Needed for Moderate Pain. 120 tablet 0    HYDROcodone-acetaminophen (Norco)  MG per tablet Take 1 tablet by mouth Every 6 (Six) Hours As Needed for Moderate Pain. 120 tablet 0    HYDROcodone-acetaminophen (NORCO)  MG per tablet Take 1 tablet by mouth Every 6 (Six) Hours As Needed for Moderate Pain or Severe Pain. 120 tablet 0    hydrOXYzine (ATARAX) 25 MG tablet Take 1 tablet by mouth 4 (Four) Times a Day As Needed for Itching. 90 tablet 12    Insulin Glargine (BASAGLAR KWIKPEN) 100 UNIT/ML injection pen Inject 40 Units under the skin into the appropriate area as directed Every Night. 12 mL 11    Insulin Lispro, 1 Unit Dial, (HumaLOG KwikPen) 100 UNIT/ML solution pen-injector INJECT 20 UNITS SUBCUTANEOUSLY THREE TIMES DAILY 15 MINUTES BEFORE MEALS OR IMMEDIATELY FOLLOWING MEALS 18 mL 5    ketoconazole (NIZORAL) 2 % cream       ketoconazole (NIZORAL) 2 % shampoo Apply  topically to the appropriate area as directed 2 (Two) Times a Week. 120 mL 5    loratadine (EQ All Day Allergy Relief) 10 MG tablet Take 1 tablet by mouth Daily. 90 tablet 3    losartan (COZAAR) 100 MG tablet Take  "1 tablet by mouth Daily. 90 tablet 3    methocarbamol (ROBAXIN) 750 MG tablet Take 1 tablet by mouth 4 (Four) Times a Day As Needed for Muscle Spasms. 360 tablet 3    montelukast (SINGULAIR) 10 MG tablet Take 1 tablet by mouth Every Night. 90 tablet 1    mupirocin (BACTROBAN) 2 % ointment Apply 1 Application topically to the appropriate area as directed 3 (Three) Times a Day. 30 g 0    naloxone (NARCAN) 4 MG/0.1ML nasal spray Call 911. Don't prime. [place]sey in 1 nostril for overdose. Repeat in 2-3 minutes in other nostril if no or minimal breathing/responsiveness. 2 each 0    oxybutynin XL (DITROPAN XL) 15 MG 24 hr tablet Take 1 tablet by mouth Daily. 90 tablet 3    pantoprazole (Protonix) 40 MG EC tablet Take 1 tablet by mouth Daily. 90 tablet 3    potassium chloride (MICRO-K) 10 MEQ CR capsule Take 1 capsule by mouth Daily. 90 capsule 0    promethazine (PHENERGAN) 25 MG tablet Take 1 tablet by mouth every 6 (six) to 8 (eight) hours as needed (or vomiting). 90 tablet 0    Semaglutide,0.25 or 0.5MG/DOS, (Ozempic, 0.25 or 0.5 MG/DOSE,) 2 MG/1.5ML solution pen-injector Inject 0.25 mg under the skin into the appropriate area as directed 1 (One) Time Per Week. 3 mL 12    sildenafil (REVATIO) 20 MG tablet Take 1 tablet by mouth Daily As Needed (ed). 30 tablet 3    sitaGLIPtin-metFORMIN (Janumet)  MG per tablet Take 1 tablet by mouth 2 (Two) Times a Day With Meals. 180 tablet 3    sodium chloride (Ayr) 0.65 % nasal spray USE 1 DOSE IN EACH NOSTRIL THREE TIMES DAILY 50 mL 3    Sodium Fluoride (Denta 5000 Plus) 1.1 % cream Take 1 Application by mouth 2 (Two) Times a Day. 102 g 11    terbinafine (lamiSIL) 250 MG tablet Take 1 tablet by mouth Daily. 30 tablet 0    topiramate (TOPAMAX) 200 MG tablet       TRUEplus Lancets 28G misc USE 1 EACH TO CHECK GLUCOSE 4 TIMES DAILY 100 each 3     No current facility-administered medications on file prior to visit.       Objective   Pulse 83   Ht 190.5 cm (75\")   Wt 131 kg (289 lb) "   SpO2 96%   BMI 36.12 kg/m²     Foot/Ankle Exam    Right Foot Vascularity   Normal vascular exam    Dorsalis pedis:  2+  Posterior tibial:  2+  Skin temperature:  warm  Edema grading:  None  CFT:  < 3 seconds  Pedal hair growth:  Present  Varicosities:  none      Left Foot Vascularity   Normal vascular exam    Dorsalis pedis:  2+  Posterior tibial:  2+  Skin temperature:  warm  Edema grading:  None  CFT:  < 3 seconds  Pedal hair growth:  Present  Varicosities:  none     NEUROLOGIC      Right Foot Neurologic   Protective Sensation using Warren-Ernesto Monofilament:   Sites tested: 9      Left Foot Neurologic   Protective Sensation using Warren-Ernesto Monofilament:   Sites tested: 10     MUSCULOSKELETAL      Right Foot Musculoskeletal   Tenderness:  none    Hammertoe:  First toe      Left Foot Musculoskeletal   Tenderness:  none  Hammertoe:  First toe     DERMATOLOGIC       Right Foot Dermatologic   Skin  Right foot skin is intact. Mild callusing   Nails  1.  Positive for elongated, abnormal thickness and dystrophic nail. (Mild callus)  2.  Positive for elongated, abnormal thickness and dystrophic nail.  3.  Positive for elongated, abnormal thickness and dystrophic nail.  4.  Positive for elongated, abnormal thickness and dystrophic nail.  5.  Positive for elongated, abnormal thickness and dystrophic nail.      Left Foot Dermatologic   Skin  Left foot skin is intact.   Nails  1.  Positive for elongated, abnormal thickness and dystrophic nail.  2.  Positive for elongated, abnormal thickness and dystrophic nail.  3.  Positive for elongated, abnormal thickness and dystrophic nail.  4.  Positive for elongated, abnormally thick and dystrophic nail.  5.  Positive for elongated, abnormally thick and dystrophic nail.  Physical Exam  Musculoskeletal:  Bilateral feet: Appear stable. No open wounds or signs of active infection. Nails debrided today x10.       Results  Labs   - A1c: 7.5-7.6%     Assessment & Plan    Diagnoses and all orders for this visit:    1. Diabetic peripheral neuropathy associated with type 2 diabetes mellitus (Primary)    2. Onychodystrophy    3. Pain in toes of both feet    4. Xerosis of skin    5. Callus of foot    6. Unsteadiness on feet      Assessment & Plan  1. Diabetic foot care:  Feet are showing signs of increased friction, potentially due to altered gait from back issues or the use of different footwear. There are no open wounds or signs of active infection. Nails were debrided today x10.  Monitor feet closely and ensure adequate rest. Avoid walking barefoot, especially on hot surfaces. Consider using properly fitting shoes and insoles to reduce friction. Patient education on daily foot inspection and proper footwear was provided. Discussed the importance of maintaining good foot hygiene and moisturizing to prevent dryness and blisters. Advised to avoid water shoes that cause irritation and to replace them with better-fitting alternatives.    2. Diabetes Mellitus:  Blood sugar levels have been fluctuating between 120 and 300, with a recent A1c of 7.5 or 7.6. He is using a Continuous Glucose Monitor (CGM) but is experiencing issues with it peeling off, causing skin irritation and scarring.  Continue working on better blood sugar control to improve overall health and foot condition. Consider shaving the areas where the CGM patches are applied to help them adhere better. Discussed alternative CGM over patches that may have better adhesion. Advised to monitor blood sugar levels closely and report any significant changes to the primary care physician. Emphasized the importance of maintaining a balanced diet and regular exercise to manage diabetes effectively.    Explained importance of diabetic foot care, daily foot checks, and glycemic control. Patient should check both feet on a daily basis, monitor and control blood sugars, make sure that both feet and in between toes are towel dried after  baths or showers. Avoid barefoot walking at all times.  I discussed wearing white socks and checking shoes before wearing them. Patient was given information on proper foot care. Call the office at the first signs of a wound or with signs of infection.     Nail debridement: Both feet x10    Consent and time out was performed before proceeding with the procedure. Nails were debrided with a nail nipper without complication.  No anesthesia was required.  Indications for procedure were thickened, dystrophic, and fungal appearing nails which are difficult to trim.  Proper self-care and technique was discussed with patient.  Patient was stable after procedure.     PROCEDURE  Nails debrided today x 10.       No orders of the defined types were placed in this encounter.         Patient or patient representative verbalized consent for the use of Ambient Listening during the visit with  ANASTASIA Alvares for chart documentation. 6/25/2025  10:12 EDT

## 2025-07-03 ENCOUNTER — HOSPITAL ENCOUNTER (OUTPATIENT)
Dept: PAIN MEDICINE | Facility: HOSPITAL | Age: 45
Discharge: HOME | End: 2025-07-03
Payer: OTHER GOVERNMENT

## 2025-07-03 VITALS
DIASTOLIC BLOOD PRESSURE: 110 MMHG | WEIGHT: 289 LBS | SYSTOLIC BLOOD PRESSURE: 174 MMHG | HEIGHT: 75 IN | HEART RATE: 70 BPM | RESPIRATION RATE: 20 BRPM | BODY MASS INDEX: 35.93 KG/M2 | TEMPERATURE: 97.3 F | OXYGEN SATURATION: 95 %

## 2025-07-03 DIAGNOSIS — R52 PAIN: ICD-10-CM

## 2025-07-03 DIAGNOSIS — M54.41 CHRONIC MIDLINE LOW BACK PAIN WITH BILATERAL SCIATICA: Primary | ICD-10-CM

## 2025-07-03 DIAGNOSIS — M54.42 CHRONIC MIDLINE LOW BACK PAIN WITH BILATERAL SCIATICA: Primary | ICD-10-CM

## 2025-07-03 DIAGNOSIS — M54.16 LUMBAR RADICULOPATHY: ICD-10-CM

## 2025-07-03 DIAGNOSIS — G89.29 CHRONIC MIDLINE LOW BACK PAIN WITH BILATERAL SCIATICA: Primary | ICD-10-CM

## 2025-07-03 PROCEDURE — 77003 FLUOROGUIDE FOR SPINE INJECT: CPT

## 2025-07-03 PROCEDURE — 25010000002 DEXAMETHASONE SODIUM PHOSPHATE 10 MG/ML SOLUTION: Performed by: PHYSICAL MEDICINE & REHABILITATION

## 2025-07-03 PROCEDURE — 25010000002 LIDOCAINE PF 1% 1 % SOLUTION: Performed by: PHYSICAL MEDICINE & REHABILITATION

## 2025-07-03 PROCEDURE — 25510000001 IOPAMIDOL 41 % SOLUTION: Performed by: PHYSICAL MEDICINE & REHABILITATION

## 2025-07-03 RX ORDER — DEXAMETHASONE SODIUM PHOSPHATE 10 MG/ML
10 INJECTION, SOLUTION INTRAMUSCULAR; INTRAVENOUS ONCE
Status: COMPLETED | OUTPATIENT
Start: 2025-07-03 | End: 2025-07-03

## 2025-07-03 RX ORDER — LIDOCAINE HYDROCHLORIDE 10 MG/ML
5 INJECTION, SOLUTION EPIDURAL; INFILTRATION; INTRACAUDAL; PERINEURAL ONCE
Status: COMPLETED | OUTPATIENT
Start: 2025-07-03 | End: 2025-07-03

## 2025-07-03 RX ORDER — IOPAMIDOL 408 MG/ML
10 INJECTION, SOLUTION INTRATHECAL
Status: COMPLETED | OUTPATIENT
Start: 2025-07-03 | End: 2025-07-03

## 2025-07-03 RX ADMIN — IOPAMIDOL 10 ML: 408 INJECTION, SOLUTION INTRATHECAL at 16:06

## 2025-07-03 RX ADMIN — LIDOCAINE HYDROCHLORIDE 5 ML: 10 INJECTION, SOLUTION EPIDURAL; INFILTRATION; INTRACAUDAL; PERINEURAL at 16:06

## 2025-07-03 RX ADMIN — DEXAMETHASONE SODIUM PHOSPHATE 10 MG: 10 INJECTION, SOLUTION INTRAMUSCULAR; INTRAVENOUS at 16:06
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
## 2025-07-03 NOTE — DISCHARGE INSTRUCTIONS

## 2025-07-10 ENCOUNTER — PATIENT MESSAGE (OUTPATIENT)
Dept: FAMILY MEDICINE CLINIC | Facility: CLINIC | Age: 45
End: 2025-07-10
Payer: OTHER GOVERNMENT

## 2025-07-14 DIAGNOSIS — Z79.4 TYPE 2 DIABETES MELLITUS WITH OTHER NEUROLOGIC COMPLICATION, WITH LONG-TERM CURRENT USE OF INSULIN: ICD-10-CM

## 2025-07-14 DIAGNOSIS — E11.49 TYPE 2 DIABETES MELLITUS WITH OTHER NEUROLOGIC COMPLICATION, WITH LONG-TERM CURRENT USE OF INSULIN: ICD-10-CM

## 2025-07-14 RX ORDER — GLUCOSAM/CHON-MSM1/C/MANG/BOSW 500-416.6
TABLET ORAL
Qty: 100 EACH | Refills: 3 | Status: SHIPPED | OUTPATIENT
Start: 2025-07-14

## 2025-07-14 RX ORDER — BLOOD-GLUCOSE METER
KIT MISCELLANEOUS
Qty: 1 EACH | Refills: 0 | Status: SHIPPED | OUTPATIENT
Start: 2025-07-14

## 2025-08-08 ENCOUNTER — OFFICE VISIT (OUTPATIENT)
Dept: FAMILY MEDICINE CLINIC | Facility: CLINIC | Age: 45
End: 2025-08-08
Payer: OTHER GOVERNMENT

## 2025-08-08 VITALS
TEMPERATURE: 97.5 F | WEIGHT: 291 LBS | DIASTOLIC BLOOD PRESSURE: 70 MMHG | RESPIRATION RATE: 18 BRPM | OXYGEN SATURATION: 98 % | BODY MASS INDEX: 36.18 KG/M2 | SYSTOLIC BLOOD PRESSURE: 130 MMHG | HEART RATE: 95 BPM | HEIGHT: 75 IN

## 2025-08-08 DIAGNOSIS — R09.82 POSTNASAL DRIP: ICD-10-CM

## 2025-08-08 DIAGNOSIS — J01.90 ACUTE SINUSITIS, RECURRENCE NOT SPECIFIED, UNSPECIFIED LOCATION: ICD-10-CM

## 2025-08-08 DIAGNOSIS — J06.9 VIRAL UPPER RESPIRATORY TRACT INFECTION: Primary | ICD-10-CM

## 2025-08-08 PROCEDURE — 99213 OFFICE O/P EST LOW 20 MIN: CPT

## 2025-08-08 PROCEDURE — 3075F SYST BP GE 130 - 139MM HG: CPT

## 2025-08-08 PROCEDURE — 3051F HG A1C>EQUAL 7.0%<8.0%: CPT

## 2025-08-08 PROCEDURE — 87428 SARSCOV & INF VIR A&B AG IA: CPT

## 2025-08-08 PROCEDURE — 3078F DIAST BP <80 MM HG: CPT

## 2025-08-08 PROCEDURE — 1126F AMNT PAIN NOTED NONE PRSNT: CPT

## 2025-08-08 RX ORDER — DOXYCYCLINE 100 MG/1
100 CAPSULE ORAL 2 TIMES DAILY
Qty: 14 CAPSULE | Refills: 0 | Status: SHIPPED | OUTPATIENT
Start: 2025-08-08 | End: 2025-08-15

## 2025-08-08 RX ORDER — IPRATROPIUM BROMIDE 21 UG/1
2 SPRAY, METERED NASAL EVERY 12 HOURS
Qty: 30 ML | Refills: 0 | Status: SHIPPED | OUTPATIENT
Start: 2025-08-08

## 2025-08-26 ENCOUNTER — OFFICE VISIT (OUTPATIENT)
Age: 45
End: 2025-08-26
Payer: OTHER GOVERNMENT

## 2025-08-26 VITALS — RESPIRATION RATE: 20 BRPM | BODY MASS INDEX: 36.18 KG/M2 | WEIGHT: 291 LBS | HEIGHT: 75 IN

## 2025-08-26 DIAGNOSIS — L60.3 ONYCHODYSTROPHY: ICD-10-CM

## 2025-08-26 DIAGNOSIS — M79.674 PAIN IN TOES OF BOTH FEET: ICD-10-CM

## 2025-08-26 DIAGNOSIS — R26.81 UNSTEADINESS ON FEET: ICD-10-CM

## 2025-08-26 DIAGNOSIS — L85.3 XEROSIS OF SKIN: ICD-10-CM

## 2025-08-26 DIAGNOSIS — M79.675 PAIN IN TOES OF BOTH FEET: ICD-10-CM

## 2025-08-26 DIAGNOSIS — E11.65 TYPE 2 DIABETES MELLITUS WITH HYPERGLYCEMIA, WITHOUT LONG-TERM CURRENT USE OF INSULIN: ICD-10-CM

## 2025-08-26 DIAGNOSIS — E11.42 DIABETIC PERIPHERAL NEUROPATHY ASSOCIATED WITH TYPE 2 DIABETES MELLITUS: Primary | ICD-10-CM

## 2025-08-27 ENCOUNTER — OFFICE VISIT (OUTPATIENT)
Dept: FAMILY MEDICINE CLINIC | Facility: CLINIC | Age: 45
End: 2025-08-27
Payer: OTHER GOVERNMENT

## 2025-08-27 VITALS
DIASTOLIC BLOOD PRESSURE: 90 MMHG | OXYGEN SATURATION: 98 % | HEIGHT: 75 IN | WEIGHT: 295 LBS | HEART RATE: 74 BPM | BODY MASS INDEX: 36.68 KG/M2 | RESPIRATION RATE: 20 BRPM | SYSTOLIC BLOOD PRESSURE: 144 MMHG

## 2025-08-27 DIAGNOSIS — J01.90 ACUTE SINUSITIS, RECURRENCE NOT SPECIFIED, UNSPECIFIED LOCATION: Primary | ICD-10-CM

## 2025-08-27 PROCEDURE — 3080F DIAST BP >= 90 MM HG: CPT | Performed by: FAMILY MEDICINE

## 2025-08-27 PROCEDURE — 1126F AMNT PAIN NOTED NONE PRSNT: CPT | Performed by: FAMILY MEDICINE

## 2025-08-27 PROCEDURE — 3051F HG A1C>EQUAL 7.0%<8.0%: CPT | Performed by: FAMILY MEDICINE

## 2025-08-27 PROCEDURE — 99213 OFFICE O/P EST LOW 20 MIN: CPT | Performed by: FAMILY MEDICINE

## 2025-08-27 PROCEDURE — 3077F SYST BP >= 140 MM HG: CPT | Performed by: FAMILY MEDICINE

## 2025-08-27 RX ORDER — DOXYCYCLINE 100 MG/1
100 CAPSULE ORAL 2 TIMES DAILY
Qty: 20 CAPSULE | Refills: 0 | Status: SHIPPED | OUTPATIENT
Start: 2025-08-27 | End: 2025-09-06

## (undated) DEVICE — INTENDED FOR TISSUE SEPARATION, AND OTHER PROCEDURES THAT REQUIRE A SHARP SURGICAL BLADE TO PUNCTURE OR CUT.: Brand: BARD-PARKER ® CARBON RIB-BACK BLADES

## (undated) DEVICE — PK BASIC SPINE 50

## (undated) DEVICE — SOLUTION,WATER,IRRIGATION,1000ML,STERILE: Brand: MEDLINE

## (undated) DEVICE — Device

## (undated) DEVICE — ANTIBACTERIAL UNDYED BRAIDED (POLYGLACTIN 910), SYNTHETIC ABSORBABLE SUTURE: Brand: COATED VICRYL

## (undated) DEVICE — UNDERGLV SURG BIOGEL/PI PF SYNTH SURG SZ8.5 BLU 50/BX

## (undated) DEVICE — GLV SURG BIOGEL LTX PF 8

## (undated) DEVICE — ANTIBACTERIAL VIOLET BRAIDED (POLYGLACTIN 910), SYNTHETIC ABSORBABLE SUTURE: Brand: COATED VICRYL

## (undated) DEVICE — 6.0MM PRECISION ROUND

## (undated) DEVICE — PENCL HND ROCKRSWTCH HOLSTR EZ CLEAN TP CRD 10FT

## (undated) DEVICE — ADHS SKIN PREMIERPRO EXOFIN TOPICAL HI/VISC .5ML

## (undated) DEVICE — DRP C/ARMOR

## (undated) DEVICE — GLV SURG SIGNATURE ESSENTIAL PF LTX SZ7.5

## (undated) DEVICE — GLV SURG SENSICARE SLT PF LF 7 STRL

## (undated) DEVICE — SHEET, DRAPE, SPLIT, STERILE: Brand: MEDLINE

## (undated) DEVICE — COVADERM: Brand: DEROYAL

## (undated) DEVICE — DRSNG WND BORDR/ADHS NONADHR/GZ LF 4X4IN STRL

## (undated) DEVICE — DECANTER: Brand: UNBRANDED

## (undated) DEVICE — KT SPINE SURG TOP W/PRONEVIEW

## (undated) DEVICE — 3.0MM PRECISION NEURO (MATCH HEAD)

## (undated) DEVICE — SMOKE EVACUATION TUBING WITH 7/8 IN TO 1/4 IN REDUCER: Brand: BUFFALO FILTER

## (undated) DEVICE — UNDERGLV SURG BIOGEL INDICATOR LF PF 7.5

## (undated) DEVICE — KT SURG TURNOVER 050

## (undated) DEVICE — TUBING, SUCTION, 1/4" X 12', STRAIGHT: Brand: MEDLINE

## (undated) DEVICE — KENDALL SCD EXPRESS FOOT CUFF, MEDIUM: Brand: KENDALL SCD

## (undated) DEVICE — ELECTRD BLD EZ CLN MOD 6.5IN

## (undated) DEVICE — PAPR PRNT PK SONY W RIBN UPC55

## (undated) DEVICE — CVR HNDL LT SURG ACCSSRY BLU STRL

## (undated) DEVICE — SYR LUERLOK 30CC

## (undated) DEVICE — PRT MIN/ACC MARS PEEK STR 22X60MM 1P/U

## (undated) DEVICE — CELLERATERX SURGICAL HYDROLYZED COLLAGEN 1G TYPE I BOVINE COLLAGEN FOR CHRONIC AND ACUTE WOUNDS, PARTIAL AND FULL THICKNESS WOUNDS, PRESSURE INJURIES I-IV, VENOUS STASIS ULCERS, ARTERIAL ULCERS, DIABETIC ULCERS, TRAUMATIC WOUNDS, FIRST AND SECOND DEGREE BURNS, SUPERFICIAL WOUNDS AND SURGICAL WOUNDS. STERILE UNTIL OPENED.  STORE AT ROOM TEMPERATURE. FOR USE ON MODERATELY TO HEAVILY EXUDATIVE WOUNDS. CLEANSE THE WOUND PER FACILITY PROTOCOL.  APPLY CELLERATERX POWDER OVER THE ENTIRE WOUND BED AND THE EDGES OF THE WOUND.  COVER WITH AN APPROPRIATE DRESSING.  REAPPLY 2-3 TIMES A WEEK, OR WITH EACH DRESSING CHANGE, TAKING CARE NOT TO DISRUPT WOUND SITE. HTTPS://SANARAMEDTECH.COM/SURGICAL/CELLERATERX-SURGICAL/: Brand: CELLERATERX SURGICAL

## (undated) DEVICE — DRP OPMI 326071

## (undated) DEVICE — SPONGE,NEURO,1"X1",XR,STRL,LF,10/PK: Brand: MEDLINE

## (undated) DEVICE — PK ENDO GI 50

## (undated) DEVICE — UNDRGLV SURG BIOGEL PIMICROINDICATOR SYNTH SZ7 LF STRL

## (undated) DEVICE — TOTAL TRAY, DB, 100% SILI FOLEY, 16FR 10: Brand: MEDLINE